# Patient Record
Sex: MALE | Race: WHITE | Employment: OTHER | URBAN - METROPOLITAN AREA
[De-identification: names, ages, dates, MRNs, and addresses within clinical notes are randomized per-mention and may not be internally consistent; named-entity substitution may affect disease eponyms.]

---

## 2019-12-05 RX ORDER — GABAPENTIN 600 MG/1
600 TABLET ORAL 3 TIMES DAILY
COMMUNITY
End: 2020-09-21

## 2019-12-05 ASSESSMENT — PAIN SCALES - GENERAL: PAINLEVEL_OUTOF10: 9

## 2019-12-06 ENCOUNTER — HOSPITAL ENCOUNTER (INPATIENT)
Age: 64
LOS: 2 days | Discharge: HOME | DRG: 195 | End: 2019-12-08
Attending: EMERGENCY MEDICINE | Admitting: HOSPITALIST
Payer: MEDICARE

## 2019-12-06 ENCOUNTER — APPOINTMENT (OUTPATIENT)
Dept: CT IMAGING | Age: 64
DRG: 195 | End: 2019-12-06
Payer: MEDICARE

## 2019-12-06 ENCOUNTER — APPOINTMENT (OUTPATIENT)
Dept: GENERAL RADIOLOGY | Age: 64
DRG: 195 | End: 2019-12-06
Payer: MEDICARE

## 2019-12-06 DIAGNOSIS — J18.9 PNEUMONIA DUE TO ORGANISM: Primary | ICD-10-CM

## 2019-12-06 LAB
A/G RATIO: 0.9 (ref 1.1–2.2)
ALBUMIN SERPL-MCNC: 3.2 G/DL (ref 3.4–5)
ALP BLD-CCNC: 75 U/L (ref 40–129)
ALT SERPL-CCNC: 10 U/L (ref 10–40)
ANION GAP SERPL CALCULATED.3IONS-SCNC: 10 MMOL/L (ref 3–16)
ANISOCYTOSIS: ABNORMAL
AST SERPL-CCNC: 10 U/L (ref 15–37)
BASOPHILS ABSOLUTE: 0 K/UL (ref 0–0.2)
BASOPHILS RELATIVE PERCENT: 0 %
BILIRUB SERPL-MCNC: 0.3 MG/DL (ref 0–1)
BUN BLDV-MCNC: 16 MG/DL (ref 7–20)
CALCIUM SERPL-MCNC: 8.8 MG/DL (ref 8.3–10.6)
CHLORIDE BLD-SCNC: 100 MMOL/L (ref 99–110)
CO2: 23 MMOL/L (ref 21–32)
CREAT SERPL-MCNC: 0.8 MG/DL (ref 0.8–1.3)
EKG ATRIAL RATE: 77 BPM
EKG DIAGNOSIS: NORMAL
EKG P AXIS: 41 DEGREES
EKG P-R INTERVAL: 146 MS
EKG Q-T INTERVAL: 438 MS
EKG QRS DURATION: 108 MS
EKG QTC CALCULATION (BAZETT): 495 MS
EKG R AXIS: 59 DEGREES
EKG T AXIS: 62 DEGREES
EKG VENTRICULAR RATE: 77 BPM
EOSINOPHILS ABSOLUTE: 0.5 K/UL (ref 0–0.6)
EOSINOPHILS RELATIVE PERCENT: 2 %
GFR AFRICAN AMERICAN: >60
GFR NON-AFRICAN AMERICAN: >60
GLOBULIN: 3.5 G/DL
GLUCOSE BLD-MCNC: 130 MG/DL (ref 70–99)
HCT VFR BLD CALC: 35.6 % (ref 40.5–52.5)
HEMOGLOBIN: 12.2 G/DL (ref 13.5–17.5)
LACTIC ACID, SEPSIS: 0.6 MMOL/L (ref 0.4–1.9)
LACTIC ACID, SEPSIS: 1.1 MMOL/L (ref 0.4–1.9)
LYMPHOCYTES ABSOLUTE: 2.1 K/UL (ref 1–5.1)
LYMPHOCYTES RELATIVE PERCENT: 8 %
MACROCYTES: ABNORMAL
MCH RBC QN AUTO: 30.7 PG (ref 26–34)
MCHC RBC AUTO-ENTMCNC: 34.4 G/DL (ref 31–36)
MCV RBC AUTO: 89.4 FL (ref 80–100)
MICROCYTES: ABNORMAL
MONOCYTES ABSOLUTE: 1.8 K/UL (ref 0–1.3)
MONOCYTES RELATIVE PERCENT: 7 %
NEUTROPHILS ABSOLUTE: 21.4 K/UL (ref 1.7–7.7)
NEUTROPHILS RELATIVE PERCENT: 83 %
OVALOCYTES: ABNORMAL
PDW BLD-RTO: 15.2 % (ref 12.4–15.4)
PLATELET # BLD: 682 K/UL (ref 135–450)
PMV BLD AUTO: 7.3 FL (ref 5–10.5)
POTASSIUM REFLEX MAGNESIUM: 4.1 MMOL/L (ref 3.5–5.1)
PRO-BNP: 995 PG/ML (ref 0–124)
PROCALCITONIN: 0.32 NG/ML (ref 0–0.15)
RBC # BLD: 3.99 M/UL (ref 4.2–5.9)
REPORT: NORMAL
RESPIRATORY PANEL PCR: NORMAL
SLIDE REVIEW: ABNORMAL
SODIUM BLD-SCNC: 133 MMOL/L (ref 136–145)
TOTAL PROTEIN: 6.7 G/DL (ref 6.4–8.2)
TROPONIN: <0.01 NG/ML
WBC # BLD: 25.8 K/UL (ref 4–11)

## 2019-12-06 PROCEDURE — 71046 X-RAY EXAM CHEST 2 VIEWS: CPT

## 2019-12-06 PROCEDURE — 87040 BLOOD CULTURE FOR BACTERIA: CPT

## 2019-12-06 PROCEDURE — 99222 1ST HOSP IP/OBS MODERATE 55: CPT | Performed by: INTERNAL MEDICINE

## 2019-12-06 PROCEDURE — 80053 COMPREHEN METABOLIC PANEL: CPT

## 2019-12-06 PROCEDURE — 2580000003 HC RX 258: Performed by: HOSPITALIST

## 2019-12-06 PROCEDURE — 85025 COMPLETE CBC W/AUTO DIFF WBC: CPT

## 2019-12-06 PROCEDURE — 97530 THERAPEUTIC ACTIVITIES: CPT

## 2019-12-06 PROCEDURE — 97161 PT EVAL LOW COMPLEX 20 MIN: CPT

## 2019-12-06 PROCEDURE — 93010 ELECTROCARDIOGRAM REPORT: CPT | Performed by: INTERNAL MEDICINE

## 2019-12-06 PROCEDURE — 6370000000 HC RX 637 (ALT 250 FOR IP): Performed by: HOSPITALIST

## 2019-12-06 PROCEDURE — 94640 AIRWAY INHALATION TREATMENT: CPT

## 2019-12-06 PROCEDURE — 1200000000 HC SEMI PRIVATE

## 2019-12-06 PROCEDURE — 36415 COLL VENOUS BLD VENIPUNCTURE: CPT

## 2019-12-06 PROCEDURE — 96374 THER/PROPH/DIAG INJ IV PUSH: CPT

## 2019-12-06 PROCEDURE — 97165 OT EVAL LOW COMPLEX 30 MIN: CPT

## 2019-12-06 PROCEDURE — 6360000002 HC RX W HCPCS: Performed by: EMERGENCY MEDICINE

## 2019-12-06 PROCEDURE — 84484 ASSAY OF TROPONIN QUANT: CPT

## 2019-12-06 PROCEDURE — 2580000003 HC RX 258: Performed by: EMERGENCY MEDICINE

## 2019-12-06 PROCEDURE — 6370000000 HC RX 637 (ALT 250 FOR IP): Performed by: INTERNAL MEDICINE

## 2019-12-06 PROCEDURE — 93005 ELECTROCARDIOGRAM TRACING: CPT | Performed by: EMERGENCY MEDICINE

## 2019-12-06 PROCEDURE — 6370000000 HC RX 637 (ALT 250 FOR IP): Performed by: EMERGENCY MEDICINE

## 2019-12-06 PROCEDURE — 0099U HC RESPIRPTHGN MULT REV TRANS & AMP PRB TECH 20 TRGT: CPT

## 2019-12-06 PROCEDURE — 94760 N-INVAS EAR/PLS OXIMETRY 1: CPT

## 2019-12-06 PROCEDURE — 99284 EMERGENCY DEPT VISIT MOD MDM: CPT

## 2019-12-06 PROCEDURE — 97116 GAIT TRAINING THERAPY: CPT

## 2019-12-06 PROCEDURE — 83880 ASSAY OF NATRIURETIC PEPTIDE: CPT

## 2019-12-06 PROCEDURE — 6360000004 HC RX CONTRAST MEDICATION: Performed by: EMERGENCY MEDICINE

## 2019-12-06 PROCEDURE — 71260 CT THORAX DX C+: CPT

## 2019-12-06 PROCEDURE — 87449 NOS EACH ORGANISM AG IA: CPT

## 2019-12-06 PROCEDURE — 83605 ASSAY OF LACTIC ACID: CPT

## 2019-12-06 PROCEDURE — 84145 PROCALCITONIN (PCT): CPT

## 2019-12-06 PROCEDURE — 6360000002 HC RX W HCPCS: Performed by: HOSPITALIST

## 2019-12-06 PROCEDURE — 97535 SELF CARE MNGMENT TRAINING: CPT

## 2019-12-06 PROCEDURE — 94761 N-INVAS EAR/PLS OXIMETRY MLT: CPT

## 2019-12-06 RX ORDER — SODIUM CHLORIDE 9 MG/ML
INJECTION, SOLUTION INTRAVENOUS CONTINUOUS
Status: DISCONTINUED | OUTPATIENT
Start: 2019-12-06 | End: 2019-12-08 | Stop reason: HOSPADM

## 2019-12-06 RX ORDER — IPRATROPIUM BROMIDE AND ALBUTEROL SULFATE 2.5; .5 MG/3ML; MG/3ML
1 SOLUTION RESPIRATORY (INHALATION) ONCE
Status: COMPLETED | OUTPATIENT
Start: 2019-12-06 | End: 2019-12-06

## 2019-12-06 RX ORDER — OXYCODONE HYDROCHLORIDE AND ACETAMINOPHEN 5; 325 MG/1; MG/1
1 TABLET ORAL EVERY 4 HOURS PRN
Status: DISCONTINUED | OUTPATIENT
Start: 2019-12-06 | End: 2019-12-08 | Stop reason: HOSPADM

## 2019-12-06 RX ORDER — CYCLOBENZAPRINE HCL 10 MG
10 TABLET ORAL 3 TIMES DAILY PRN
Status: DISCONTINUED | OUTPATIENT
Start: 2019-12-06 | End: 2019-12-08 | Stop reason: HOSPADM

## 2019-12-06 RX ORDER — BENZONATATE 100 MG/1
100 CAPSULE ORAL 3 TIMES DAILY PRN
Status: DISCONTINUED | OUTPATIENT
Start: 2019-12-06 | End: 2019-12-06 | Stop reason: HOSPADM

## 2019-12-06 RX ORDER — LISINOPRIL 20 MG/1
40 TABLET ORAL DAILY
Status: DISCONTINUED | OUTPATIENT
Start: 2019-12-06 | End: 2019-12-08 | Stop reason: HOSPADM

## 2019-12-06 RX ORDER — GABAPENTIN 300 MG/1
600 CAPSULE ORAL 3 TIMES DAILY
Status: DISCONTINUED | OUTPATIENT
Start: 2019-12-06 | End: 2019-12-08 | Stop reason: HOSPADM

## 2019-12-06 RX ORDER — ACETAMINOPHEN 325 MG/1
650 TABLET ORAL EVERY 4 HOURS PRN
Status: DISCONTINUED | OUTPATIENT
Start: 2019-12-06 | End: 2019-12-08 | Stop reason: HOSPADM

## 2019-12-06 RX ORDER — ALBUTEROL SULFATE 2.5 MG/3ML
2.5 SOLUTION RESPIRATORY (INHALATION)
Status: DISCONTINUED | OUTPATIENT
Start: 2019-12-06 | End: 2019-12-08 | Stop reason: HOSPADM

## 2019-12-06 RX ORDER — FAMOTIDINE 20 MG/1
20 TABLET, FILM COATED ORAL 2 TIMES DAILY
Status: DISCONTINUED | OUTPATIENT
Start: 2019-12-06 | End: 2019-12-08 | Stop reason: HOSPADM

## 2019-12-06 RX ORDER — TRAZODONE HYDROCHLORIDE 50 MG/1
50 TABLET ORAL NIGHTLY
Status: DISCONTINUED | OUTPATIENT
Start: 2019-12-06 | End: 2019-12-08 | Stop reason: HOSPADM

## 2019-12-06 RX ORDER — ONDANSETRON 2 MG/ML
4 INJECTION INTRAMUSCULAR; INTRAVENOUS EVERY 6 HOURS PRN
Status: DISCONTINUED | OUTPATIENT
Start: 2019-12-06 | End: 2019-12-08 | Stop reason: HOSPADM

## 2019-12-06 RX ORDER — HYDROCODONE BITARTRATE AND ACETAMINOPHEN 5; 325 MG/1; MG/1
1 TABLET ORAL EVERY 6 HOURS PRN
Status: DISCONTINUED | OUTPATIENT
Start: 2019-12-06 | End: 2019-12-06

## 2019-12-06 RX ORDER — HYDROCODONE BITARTRATE AND ACETAMINOPHEN 5; 325 MG/1; MG/1
1 TABLET ORAL ONCE
Status: COMPLETED | OUTPATIENT
Start: 2019-12-06 | End: 2019-12-06

## 2019-12-06 RX ORDER — VERAPAMIL HYDROCHLORIDE 80 MG/1
80 TABLET ORAL 3 TIMES DAILY
Status: DISCONTINUED | OUTPATIENT
Start: 2019-12-06 | End: 2019-12-08 | Stop reason: HOSPADM

## 2019-12-06 RX ORDER — SODIUM CHLORIDE 0.9 % (FLUSH) 0.9 %
10 SYRINGE (ML) INJECTION EVERY 12 HOURS SCHEDULED
Status: DISCONTINUED | OUTPATIENT
Start: 2019-12-06 | End: 2019-12-08 | Stop reason: HOSPADM

## 2019-12-06 RX ORDER — GUAIFENESIN 600 MG/1
600 TABLET, EXTENDED RELEASE ORAL 2 TIMES DAILY
Status: DISCONTINUED | OUTPATIENT
Start: 2019-12-06 | End: 2019-12-08 | Stop reason: HOSPADM

## 2019-12-06 RX ORDER — SODIUM CHLORIDE 0.9 % (FLUSH) 0.9 %
10 SYRINGE (ML) INJECTION PRN
Status: DISCONTINUED | OUTPATIENT
Start: 2019-12-06 | End: 2019-12-08 | Stop reason: HOSPADM

## 2019-12-06 RX ORDER — IPRATROPIUM BROMIDE AND ALBUTEROL SULFATE 2.5; .5 MG/3ML; MG/3ML
1 SOLUTION RESPIRATORY (INHALATION)
Status: DISCONTINUED | OUTPATIENT
Start: 2019-12-06 | End: 2019-12-08 | Stop reason: HOSPADM

## 2019-12-06 RX ADMIN — Medication 10 ML: at 08:09

## 2019-12-06 RX ADMIN — CYCLOBENZAPRINE HYDROCHLORIDE 10 MG: 10 TABLET, FILM COATED ORAL at 17:15

## 2019-12-06 RX ADMIN — CYCLOBENZAPRINE HYDROCHLORIDE 10 MG: 10 TABLET, FILM COATED ORAL at 23:25

## 2019-12-06 RX ADMIN — HYDROCODONE BITARTRATE AND ACETAMINOPHEN 1 TABLET: 5; 325 TABLET ORAL at 02:20

## 2019-12-06 RX ADMIN — HYDROCODONE BITARTRATE AND ACETAMINOPHEN 1 TABLET: 5; 325 TABLET ORAL at 06:09

## 2019-12-06 RX ADMIN — Medication 1 G: at 04:35

## 2019-12-06 RX ADMIN — FAMOTIDINE 20 MG: 20 TABLET ORAL at 21:45

## 2019-12-06 RX ADMIN — TRAZODONE HYDROCHLORIDE 50 MG: 50 TABLET ORAL at 23:16

## 2019-12-06 RX ADMIN — BENZONATATE 100 MG: 100 CAPSULE ORAL at 02:19

## 2019-12-06 RX ADMIN — LISINOPRIL 40 MG: 20 TABLET ORAL at 08:10

## 2019-12-06 RX ADMIN — GABAPENTIN 600 MG: 300 CAPSULE ORAL at 15:37

## 2019-12-06 RX ADMIN — VERAPAMIL HYDROCHLORIDE 80 MG: 80 TABLET, FILM COATED ORAL at 08:10

## 2019-12-06 RX ADMIN — IPRATROPIUM BROMIDE AND ALBUTEROL SULFATE 1 AMPULE: .5; 3 SOLUTION RESPIRATORY (INHALATION) at 19:44

## 2019-12-06 RX ADMIN — GABAPENTIN 600 MG: 300 CAPSULE ORAL at 08:10

## 2019-12-06 RX ADMIN — Medication 10 ML: at 21:48

## 2019-12-06 RX ADMIN — IPRATROPIUM BROMIDE AND ALBUTEROL SULFATE 1 AMPULE: .5; 3 SOLUTION RESPIRATORY (INHALATION) at 08:19

## 2019-12-06 RX ADMIN — ENOXAPARIN SODIUM 40 MG: 40 INJECTION SUBCUTANEOUS at 08:09

## 2019-12-06 RX ADMIN — OXYCODONE HYDROCHLORIDE AND ACETAMINOPHEN 1 TABLET: 5; 325 TABLET ORAL at 17:15

## 2019-12-06 RX ADMIN — AZITHROMYCIN MONOHYDRATE 500 MG: 500 INJECTION, POWDER, LYOPHILIZED, FOR SOLUTION INTRAVENOUS at 04:42

## 2019-12-06 RX ADMIN — HYDROCODONE BITARTRATE AND ACETAMINOPHEN 1 TABLET: 5; 325 TABLET ORAL at 12:32

## 2019-12-06 RX ADMIN — GUAIFENESIN 600 MG: 600 TABLET, EXTENDED RELEASE ORAL at 08:10

## 2019-12-06 RX ADMIN — OXYCODONE HYDROCHLORIDE AND ACETAMINOPHEN 1 TABLET: 5; 325 TABLET ORAL at 21:33

## 2019-12-06 RX ADMIN — SODIUM CHLORIDE: 9 INJECTION, SOLUTION INTRAVENOUS at 06:10

## 2019-12-06 RX ADMIN — IPRATROPIUM BROMIDE AND ALBUTEROL SULFATE 1 AMPULE: .5; 3 SOLUTION RESPIRATORY (INHALATION) at 15:26

## 2019-12-06 RX ADMIN — GUAIFENESIN 600 MG: 600 TABLET, EXTENDED RELEASE ORAL at 21:45

## 2019-12-06 RX ADMIN — IOPAMIDOL 75 ML: 755 INJECTION, SOLUTION INTRAVENOUS at 02:57

## 2019-12-06 RX ADMIN — IPRATROPIUM BROMIDE AND ALBUTEROL SULFATE 1 AMPULE: .5; 3 SOLUTION RESPIRATORY (INHALATION) at 11:50

## 2019-12-06 RX ADMIN — IPRATROPIUM BROMIDE AND ALBUTEROL SULFATE 1 AMPULE: .5; 3 SOLUTION RESPIRATORY (INHALATION) at 02:34

## 2019-12-06 RX ADMIN — VERAPAMIL HYDROCHLORIDE 80 MG: 80 TABLET, FILM COATED ORAL at 21:47

## 2019-12-06 RX ADMIN — GABAPENTIN 600 MG: 300 CAPSULE ORAL at 21:45

## 2019-12-06 RX ADMIN — FAMOTIDINE 20 MG: 20 TABLET ORAL at 08:10

## 2019-12-06 RX ADMIN — VERAPAMIL HYDROCHLORIDE 80 MG: 80 TABLET, FILM COATED ORAL at 15:38

## 2019-12-06 ASSESSMENT — PAIN DESCRIPTION - ORIENTATION
ORIENTATION: MID
ORIENTATION: RIGHT;LEFT
ORIENTATION: MID;LOWER

## 2019-12-06 ASSESSMENT — PAIN DESCRIPTION - ONSET
ONSET: ON-GOING
ONSET: ON-GOING
ONSET: GRADUAL
ONSET: ON-GOING

## 2019-12-06 ASSESSMENT — PAIN DESCRIPTION - DESCRIPTORS
DESCRIPTORS: ACHING;RADIATING
DESCRIPTORS: RADIATING
DESCRIPTORS: ACHING

## 2019-12-06 ASSESSMENT — PAIN DESCRIPTION - LOCATION
LOCATION: BACK;CHEST
LOCATION: BACK
LOCATION: BACK
LOCATION: BACK;CHEST
LOCATION: BACK;CHEST
LOCATION: BACK

## 2019-12-06 ASSESSMENT — PAIN SCALES - GENERAL
PAINLEVEL_OUTOF10: 7
PAINLEVEL_OUTOF10: 9
PAINLEVEL_OUTOF10: 2
PAINLEVEL_OUTOF10: 9
PAINLEVEL_OUTOF10: 7
PAINLEVEL_OUTOF10: 6
PAINLEVEL_OUTOF10: 5
PAINLEVEL_OUTOF10: 9

## 2019-12-06 ASSESSMENT — PAIN DESCRIPTION - PAIN TYPE
TYPE: CHRONIC PAIN
TYPE: CHRONIC PAIN
TYPE: ACUTE PAIN
TYPE: CHRONIC PAIN
TYPE: ACUTE PAIN
TYPE: ACUTE PAIN

## 2019-12-06 ASSESSMENT — PAIN DESCRIPTION - PROGRESSION
CLINICAL_PROGRESSION: GRADUALLY IMPROVING
CLINICAL_PROGRESSION: GRADUALLY WORSENING
CLINICAL_PROGRESSION: GRADUALLY IMPROVING
CLINICAL_PROGRESSION: NOT CHANGED
CLINICAL_PROGRESSION: GRADUALLY IMPROVING
CLINICAL_PROGRESSION: NOT CHANGED

## 2019-12-06 ASSESSMENT — PAIN DESCRIPTION - FREQUENCY
FREQUENCY: INTERMITTENT
FREQUENCY: CONTINUOUS
FREQUENCY: INTERMITTENT

## 2019-12-06 ASSESSMENT — PAIN - FUNCTIONAL ASSESSMENT
PAIN_FUNCTIONAL_ASSESSMENT: PREVENTS OR INTERFERES SOME ACTIVE ACTIVITIES AND ADLS

## 2019-12-07 LAB
A/G RATIO: 0.8 (ref 1.1–2.2)
ALBUMIN SERPL-MCNC: 2.7 G/DL (ref 3.4–5)
ALP BLD-CCNC: 73 U/L (ref 40–129)
ALT SERPL-CCNC: 10 U/L (ref 10–40)
ANION GAP SERPL CALCULATED.3IONS-SCNC: 10 MMOL/L (ref 3–16)
AST SERPL-CCNC: 8 U/L (ref 15–37)
BASOPHILS ABSOLUTE: 0.1 K/UL (ref 0–0.2)
BASOPHILS RELATIVE PERCENT: 0.4 %
BILIRUB SERPL-MCNC: 0.3 MG/DL (ref 0–1)
BUN BLDV-MCNC: 10 MG/DL (ref 7–20)
CALCIUM SERPL-MCNC: 8.3 MG/DL (ref 8.3–10.6)
CHLORIDE BLD-SCNC: 100 MMOL/L (ref 99–110)
CO2: 25 MMOL/L (ref 21–32)
CREAT SERPL-MCNC: 0.6 MG/DL (ref 0.8–1.3)
EOSINOPHILS ABSOLUTE: 0.4 K/UL (ref 0–0.6)
EOSINOPHILS RELATIVE PERCENT: 1.7 %
GFR AFRICAN AMERICAN: >60
GFR NON-AFRICAN AMERICAN: >60
GLOBULIN: 3.3 G/DL
GLUCOSE BLD-MCNC: 121 MG/DL (ref 70–99)
HCT VFR BLD CALC: 32.9 % (ref 40.5–52.5)
HEMOGLOBIN: 11.1 G/DL (ref 13.5–17.5)
L. PNEUMOPHILA SEROGP 1 UR AG: NORMAL
LYMPHOCYTES ABSOLUTE: 2.1 K/UL (ref 1–5.1)
LYMPHOCYTES RELATIVE PERCENT: 10.1 %
MCH RBC QN AUTO: 30.5 PG (ref 26–34)
MCHC RBC AUTO-ENTMCNC: 33.7 G/DL (ref 31–36)
MCV RBC AUTO: 90.4 FL (ref 80–100)
MONOCYTES ABSOLUTE: 2.1 K/UL (ref 0–1.3)
MONOCYTES RELATIVE PERCENT: 9.9 %
NEUTROPHILS ABSOLUTE: 16.5 K/UL (ref 1.7–7.7)
NEUTROPHILS RELATIVE PERCENT: 77.9 %
PDW BLD-RTO: 14.9 % (ref 12.4–15.4)
PLATELET # BLD: 710 K/UL (ref 135–450)
PMV BLD AUTO: 7.4 FL (ref 5–10.5)
POTASSIUM REFLEX MAGNESIUM: 3.9 MMOL/L (ref 3.5–5.1)
RBC # BLD: 3.63 M/UL (ref 4.2–5.9)
SODIUM BLD-SCNC: 135 MMOL/L (ref 136–145)
STREP PNEUMONIAE ANTIGEN, URINE: NORMAL
TOTAL PROTEIN: 6 G/DL (ref 6.4–8.2)
WBC # BLD: 21.2 K/UL (ref 4–11)

## 2019-12-07 PROCEDURE — 94761 N-INVAS EAR/PLS OXIMETRY MLT: CPT

## 2019-12-07 PROCEDURE — 6360000002 HC RX W HCPCS: Performed by: HOSPITALIST

## 2019-12-07 PROCEDURE — 6370000000 HC RX 637 (ALT 250 FOR IP): Performed by: INTERNAL MEDICINE

## 2019-12-07 PROCEDURE — 87070 CULTURE OTHR SPECIMN AEROBIC: CPT

## 2019-12-07 PROCEDURE — 2580000003 HC RX 258: Performed by: HOSPITALIST

## 2019-12-07 PROCEDURE — 94640 AIRWAY INHALATION TREATMENT: CPT

## 2019-12-07 PROCEDURE — 6370000000 HC RX 637 (ALT 250 FOR IP): Performed by: HOSPITALIST

## 2019-12-07 PROCEDURE — 1200000000 HC SEMI PRIVATE

## 2019-12-07 PROCEDURE — 87205 SMEAR GRAM STAIN: CPT

## 2019-12-07 PROCEDURE — 85025 COMPLETE CBC W/AUTO DIFF WBC: CPT

## 2019-12-07 PROCEDURE — 99232 SBSQ HOSP IP/OBS MODERATE 35: CPT | Performed by: INTERNAL MEDICINE

## 2019-12-07 PROCEDURE — 94669 MECHANICAL CHEST WALL OSCILL: CPT

## 2019-12-07 PROCEDURE — 36415 COLL VENOUS BLD VENIPUNCTURE: CPT

## 2019-12-07 PROCEDURE — 80053 COMPREHEN METABOLIC PANEL: CPT

## 2019-12-07 RX ADMIN — SODIUM CHLORIDE: 9 INJECTION, SOLUTION INTRAVENOUS at 21:02

## 2019-12-07 RX ADMIN — ENOXAPARIN SODIUM 40 MG: 40 INJECTION SUBCUTANEOUS at 08:06

## 2019-12-07 RX ADMIN — GUAIFENESIN 600 MG: 600 TABLET, EXTENDED RELEASE ORAL at 08:04

## 2019-12-07 RX ADMIN — GABAPENTIN 600 MG: 300 CAPSULE ORAL at 20:34

## 2019-12-07 RX ADMIN — LISINOPRIL 40 MG: 20 TABLET ORAL at 08:04

## 2019-12-07 RX ADMIN — OXYCODONE HYDROCHLORIDE AND ACETAMINOPHEN 1 TABLET: 5; 325 TABLET ORAL at 16:49

## 2019-12-07 RX ADMIN — SODIUM CHLORIDE: 9 INJECTION, SOLUTION INTRAVENOUS at 08:05

## 2019-12-07 RX ADMIN — VERAPAMIL HYDROCHLORIDE 80 MG: 80 TABLET, FILM COATED ORAL at 20:35

## 2019-12-07 RX ADMIN — GABAPENTIN 600 MG: 300 CAPSULE ORAL at 08:04

## 2019-12-07 RX ADMIN — AZITHROMYCIN MONOHYDRATE 500 MG: 500 INJECTION, POWDER, LYOPHILIZED, FOR SOLUTION INTRAVENOUS at 06:02

## 2019-12-07 RX ADMIN — Medication 1 G: at 06:02

## 2019-12-07 RX ADMIN — IPRATROPIUM BROMIDE AND ALBUTEROL SULFATE 1 AMPULE: .5; 3 SOLUTION RESPIRATORY (INHALATION) at 21:28

## 2019-12-07 RX ADMIN — IPRATROPIUM BROMIDE AND ALBUTEROL SULFATE 1 AMPULE: .5; 3 SOLUTION RESPIRATORY (INHALATION) at 09:05

## 2019-12-07 RX ADMIN — CYCLOBENZAPRINE HYDROCHLORIDE 10 MG: 10 TABLET, FILM COATED ORAL at 15:32

## 2019-12-07 RX ADMIN — GUAIFENESIN 600 MG: 600 TABLET, EXTENDED RELEASE ORAL at 20:34

## 2019-12-07 RX ADMIN — FAMOTIDINE 20 MG: 20 TABLET ORAL at 08:04

## 2019-12-07 RX ADMIN — OXYCODONE HYDROCHLORIDE AND ACETAMINOPHEN 1 TABLET: 5; 325 TABLET ORAL at 06:01

## 2019-12-07 RX ADMIN — GABAPENTIN 600 MG: 300 CAPSULE ORAL at 13:23

## 2019-12-07 RX ADMIN — OXYCODONE HYDROCHLORIDE AND ACETAMINOPHEN 1 TABLET: 5; 325 TABLET ORAL at 21:02

## 2019-12-07 RX ADMIN — Medication 10 ML: at 08:05

## 2019-12-07 RX ADMIN — VERAPAMIL HYDROCHLORIDE 80 MG: 80 TABLET, FILM COATED ORAL at 13:23

## 2019-12-07 RX ADMIN — CYCLOBENZAPRINE HYDROCHLORIDE 10 MG: 10 TABLET, FILM COATED ORAL at 23:56

## 2019-12-07 RX ADMIN — OXYCODONE HYDROCHLORIDE AND ACETAMINOPHEN 1 TABLET: 5; 325 TABLET ORAL at 01:50

## 2019-12-07 RX ADMIN — OXYCODONE HYDROCHLORIDE AND ACETAMINOPHEN 1 TABLET: 5; 325 TABLET ORAL at 12:32

## 2019-12-07 RX ADMIN — Medication 10 ML: at 20:36

## 2019-12-07 RX ADMIN — IPRATROPIUM BROMIDE AND ALBUTEROL SULFATE 1 AMPULE: .5; 3 SOLUTION RESPIRATORY (INHALATION) at 16:53

## 2019-12-07 RX ADMIN — VERAPAMIL HYDROCHLORIDE 80 MG: 80 TABLET, FILM COATED ORAL at 08:04

## 2019-12-07 RX ADMIN — TRAZODONE HYDROCHLORIDE 50 MG: 50 TABLET ORAL at 21:02

## 2019-12-07 RX ADMIN — FAMOTIDINE 20 MG: 20 TABLET ORAL at 20:35

## 2019-12-07 RX ADMIN — IPRATROPIUM BROMIDE AND ALBUTEROL SULFATE 1 AMPULE: .5; 3 SOLUTION RESPIRATORY (INHALATION) at 12:24

## 2019-12-07 RX ADMIN — CYCLOBENZAPRINE HYDROCHLORIDE 10 MG: 10 TABLET, FILM COATED ORAL at 07:25

## 2019-12-07 ASSESSMENT — PAIN DESCRIPTION - PROGRESSION
CLINICAL_PROGRESSION: NOT CHANGED
CLINICAL_PROGRESSION: NOT CHANGED

## 2019-12-07 ASSESSMENT — PAIN SCALES - GENERAL
PAINLEVEL_OUTOF10: 9
PAINLEVEL_OUTOF10: 9
PAINLEVEL_OUTOF10: 7
PAINLEVEL_OUTOF10: 0
PAINLEVEL_OUTOF10: 8
PAINLEVEL_OUTOF10: 0
PAINLEVEL_OUTOF10: 9
PAINLEVEL_OUTOF10: 8

## 2019-12-07 ASSESSMENT — PAIN DESCRIPTION - FREQUENCY
FREQUENCY: CONTINUOUS

## 2019-12-07 ASSESSMENT — PAIN DESCRIPTION - ORIENTATION
ORIENTATION: RIGHT;LEFT;LOWER;MID
ORIENTATION: LEFT
ORIENTATION: MID;LOWER
ORIENTATION: LEFT
ORIENTATION: RIGHT;LEFT;MID;LOWER

## 2019-12-07 ASSESSMENT — PAIN DESCRIPTION - PAIN TYPE
TYPE: CHRONIC PAIN

## 2019-12-07 ASSESSMENT — PAIN DESCRIPTION - LOCATION
LOCATION: BACK
LOCATION: BACK;KNEE;LEG
LOCATION: BACK;LEG

## 2019-12-07 ASSESSMENT — PAIN SCALES - WONG BAKER: WONGBAKER_NUMERICALRESPONSE: 0

## 2019-12-07 ASSESSMENT — PAIN DESCRIPTION - DESCRIPTORS
DESCRIPTORS: ACHING;CONSTANT
DESCRIPTORS: ACHING
DESCRIPTORS: ACHING;CONSTANT
DESCRIPTORS: ACHING;CONSTANT
DESCRIPTORS: SORE

## 2019-12-07 ASSESSMENT — PAIN DESCRIPTION - ONSET
ONSET: ON-GOING
ONSET: ON-GOING

## 2019-12-07 ASSESSMENT — PAIN - FUNCTIONAL ASSESSMENT
PAIN_FUNCTIONAL_ASSESSMENT: PREVENTS OR INTERFERES SOME ACTIVE ACTIVITIES AND ADLS
PAIN_FUNCTIONAL_ASSESSMENT: PREVENTS OR INTERFERES SOME ACTIVE ACTIVITIES AND ADLS

## 2019-12-08 VITALS
DIASTOLIC BLOOD PRESSURE: 86 MMHG | HEIGHT: 69 IN | SYSTOLIC BLOOD PRESSURE: 162 MMHG | OXYGEN SATURATION: 97 % | RESPIRATION RATE: 18 BRPM | BODY MASS INDEX: 21.8 KG/M2 | HEART RATE: 82 BPM | TEMPERATURE: 97.7 F | WEIGHT: 147.2 LBS

## 2019-12-08 LAB
ANION GAP SERPL CALCULATED.3IONS-SCNC: 11 MMOL/L (ref 3–16)
ANISOCYTOSIS: ABNORMAL
BASOPHILS ABSOLUTE: 0 K/UL (ref 0–0.2)
BASOPHILS RELATIVE PERCENT: 0 %
BUN BLDV-MCNC: 10 MG/DL (ref 7–20)
CALCIUM SERPL-MCNC: 8.4 MG/DL (ref 8.3–10.6)
CHLORIDE BLD-SCNC: 99 MMOL/L (ref 99–110)
CO2: 24 MMOL/L (ref 21–32)
CREAT SERPL-MCNC: 0.5 MG/DL (ref 0.8–1.3)
EOSINOPHILS ABSOLUTE: 0.3 K/UL (ref 0–0.6)
EOSINOPHILS RELATIVE PERCENT: 2 %
GFR AFRICAN AMERICAN: >60
GFR NON-AFRICAN AMERICAN: >60
GLUCOSE BLD-MCNC: 104 MG/DL (ref 70–99)
HCT VFR BLD CALC: 35 % (ref 40.5–52.5)
HEMOGLOBIN: 11.5 G/DL (ref 13.5–17.5)
HOWELL-JOLLY BODIES: ABNORMAL
LYMPHOCYTES ABSOLUTE: 1.6 K/UL (ref 1–5.1)
LYMPHOCYTES RELATIVE PERCENT: 10 %
MACROCYTES: ABNORMAL
MCH RBC QN AUTO: 30.6 PG (ref 26–34)
MCHC RBC AUTO-ENTMCNC: 32.8 G/DL (ref 31–36)
MCV RBC AUTO: 93.3 FL (ref 80–100)
MONOCYTES ABSOLUTE: 1.6 K/UL (ref 0–1.3)
MONOCYTES RELATIVE PERCENT: 10 %
NEUTROPHILS ABSOLUTE: 12.2 K/UL (ref 1.7–7.7)
NEUTROPHILS RELATIVE PERCENT: 78 %
OVALOCYTES: ABNORMAL
PDW BLD-RTO: 15.6 % (ref 12.4–15.4)
PLATELET # BLD: 750 K/UL (ref 135–450)
PLATELET SLIDE REVIEW: ABNORMAL
PMV BLD AUTO: 7.2 FL (ref 5–10.5)
POTASSIUM SERPL-SCNC: 3.8 MMOL/L (ref 3.5–5.1)
RBC # BLD: 3.76 M/UL (ref 4.2–5.9)
SLIDE REVIEW: ABNORMAL
SODIUM BLD-SCNC: 134 MMOL/L (ref 136–145)
TOXIC GRANULATION: PRESENT
WBC # BLD: 15.7 K/UL (ref 4–11)

## 2019-12-08 PROCEDURE — 85025 COMPLETE CBC W/AUTO DIFF WBC: CPT

## 2019-12-08 PROCEDURE — 94760 N-INVAS EAR/PLS OXIMETRY 1: CPT

## 2019-12-08 PROCEDURE — 6370000000 HC RX 637 (ALT 250 FOR IP): Performed by: HOSPITALIST

## 2019-12-08 PROCEDURE — 99232 SBSQ HOSP IP/OBS MODERATE 35: CPT | Performed by: INTERNAL MEDICINE

## 2019-12-08 PROCEDURE — 2580000003 HC RX 258: Performed by: HOSPITALIST

## 2019-12-08 PROCEDURE — 94640 AIRWAY INHALATION TREATMENT: CPT

## 2019-12-08 PROCEDURE — 6360000002 HC RX W HCPCS: Performed by: HOSPITALIST

## 2019-12-08 PROCEDURE — 36415 COLL VENOUS BLD VENIPUNCTURE: CPT

## 2019-12-08 PROCEDURE — 6370000000 HC RX 637 (ALT 250 FOR IP): Performed by: INTERNAL MEDICINE

## 2019-12-08 PROCEDURE — 80048 BASIC METABOLIC PNL TOTAL CA: CPT

## 2019-12-08 RX ORDER — LEVOFLOXACIN 750 MG/1
750 TABLET ORAL DAILY
Qty: 10 TABLET | Refills: 0 | Status: SHIPPED | OUTPATIENT
Start: 2019-12-08 | End: 2019-12-18

## 2019-12-08 RX ORDER — OXYCODONE HYDROCHLORIDE AND ACETAMINOPHEN 5; 325 MG/1; MG/1
1 TABLET ORAL EVERY 6 HOURS PRN
Qty: 12 TABLET | Refills: 0 | Status: SHIPPED | OUTPATIENT
Start: 2019-12-08 | End: 2019-12-11

## 2019-12-08 RX ADMIN — Medication 10 ML: at 07:50

## 2019-12-08 RX ADMIN — OXYCODONE HYDROCHLORIDE AND ACETAMINOPHEN 1 TABLET: 5; 325 TABLET ORAL at 01:24

## 2019-12-08 RX ADMIN — VERAPAMIL HYDROCHLORIDE 80 MG: 80 TABLET, FILM COATED ORAL at 13:21

## 2019-12-08 RX ADMIN — VERAPAMIL HYDROCHLORIDE 80 MG: 80 TABLET, FILM COATED ORAL at 07:49

## 2019-12-08 RX ADMIN — OXYCODONE HYDROCHLORIDE AND ACETAMINOPHEN 1 TABLET: 5; 325 TABLET ORAL at 09:40

## 2019-12-08 RX ADMIN — LISINOPRIL 40 MG: 20 TABLET ORAL at 07:49

## 2019-12-08 RX ADMIN — GABAPENTIN 600 MG: 300 CAPSULE ORAL at 07:50

## 2019-12-08 RX ADMIN — IPRATROPIUM BROMIDE AND ALBUTEROL SULFATE 1 AMPULE: .5; 3 SOLUTION RESPIRATORY (INHALATION) at 11:10

## 2019-12-08 RX ADMIN — GABAPENTIN 600 MG: 300 CAPSULE ORAL at 13:21

## 2019-12-08 RX ADMIN — IPRATROPIUM BROMIDE AND ALBUTEROL SULFATE 1 AMPULE: .5; 3 SOLUTION RESPIRATORY (INHALATION) at 07:47

## 2019-12-08 RX ADMIN — Medication 1 G: at 05:28

## 2019-12-08 RX ADMIN — OXYCODONE HYDROCHLORIDE AND ACETAMINOPHEN 1 TABLET: 5; 325 TABLET ORAL at 14:14

## 2019-12-08 RX ADMIN — AZITHROMYCIN MONOHYDRATE 500 MG: 500 INJECTION, POWDER, LYOPHILIZED, FOR SOLUTION INTRAVENOUS at 05:28

## 2019-12-08 RX ADMIN — GUAIFENESIN 600 MG: 600 TABLET, EXTENDED RELEASE ORAL at 07:49

## 2019-12-08 RX ADMIN — FAMOTIDINE 20 MG: 20 TABLET ORAL at 07:49

## 2019-12-08 RX ADMIN — CYCLOBENZAPRINE HYDROCHLORIDE 10 MG: 10 TABLET, FILM COATED ORAL at 07:49

## 2019-12-08 RX ADMIN — OXYCODONE HYDROCHLORIDE AND ACETAMINOPHEN 1 TABLET: 5; 325 TABLET ORAL at 05:28

## 2019-12-08 RX ADMIN — ENOXAPARIN SODIUM 40 MG: 40 INJECTION SUBCUTANEOUS at 07:50

## 2019-12-08 ASSESSMENT — PAIN DESCRIPTION - LOCATION
LOCATION: BACK;KNEE

## 2019-12-08 ASSESSMENT — PAIN DESCRIPTION - PAIN TYPE
TYPE: CHRONIC PAIN

## 2019-12-08 ASSESSMENT — PAIN SCALES - GENERAL
PAINLEVEL_OUTOF10: 8
PAINLEVEL_OUTOF10: 9
PAINLEVEL_OUTOF10: 0
PAINLEVEL_OUTOF10: 8

## 2019-12-08 ASSESSMENT — PAIN DESCRIPTION - ORIENTATION
ORIENTATION: LEFT

## 2019-12-08 ASSESSMENT — PAIN DESCRIPTION - FREQUENCY
FREQUENCY: CONTINUOUS
FREQUENCY: CONTINUOUS

## 2019-12-08 ASSESSMENT — PAIN DESCRIPTION - DESCRIPTORS
DESCRIPTORS: ACHING;CONSTANT
DESCRIPTORS: ACHING;CONSTANT

## 2019-12-08 ASSESSMENT — PAIN DESCRIPTION - PROGRESSION
CLINICAL_PROGRESSION: NOT CHANGED
CLINICAL_PROGRESSION: NOT CHANGED

## 2019-12-09 ENCOUNTER — CARE COORDINATION (OUTPATIENT)
Dept: CASE MANAGEMENT | Age: 64
End: 2019-12-09

## 2019-12-09 ENCOUNTER — TELEPHONE (OUTPATIENT)
Dept: PULMONOLOGY | Age: 64
End: 2019-12-09

## 2019-12-09 LAB
CULTURE, RESPIRATORY: NORMAL
GRAM STAIN RESULT: NORMAL

## 2019-12-10 ENCOUNTER — CARE COORDINATION (OUTPATIENT)
Dept: CASE MANAGEMENT | Age: 64
End: 2019-12-10

## 2019-12-10 LAB
BLOOD CULTURE, ROUTINE: NORMAL
CULTURE, BLOOD 2: NORMAL

## 2019-12-17 ENCOUNTER — CARE COORDINATION (OUTPATIENT)
Dept: CASE MANAGEMENT | Age: 64
End: 2019-12-17

## 2019-12-27 ENCOUNTER — HOSPITAL ENCOUNTER (INPATIENT)
Age: 64
LOS: 6 days | Discharge: HOME | DRG: 871 | End: 2020-01-02
Attending: EMERGENCY MEDICINE | Admitting: FAMILY MEDICINE
Payer: MEDICARE

## 2019-12-27 ENCOUNTER — TELEPHONE (OUTPATIENT)
Dept: OTHER | Facility: CLINIC | Age: 64
End: 2019-12-27

## 2019-12-27 ENCOUNTER — APPOINTMENT (OUTPATIENT)
Dept: CT IMAGING | Age: 64
DRG: 871 | End: 2019-12-27
Payer: MEDICARE

## 2019-12-27 ENCOUNTER — APPOINTMENT (OUTPATIENT)
Dept: GENERAL RADIOLOGY | Age: 64
DRG: 871 | End: 2019-12-27
Payer: MEDICARE

## 2019-12-27 PROBLEM — J18.9 PNEUMONIA: Status: ACTIVE | Noted: 2019-12-27

## 2019-12-27 LAB
A/G RATIO: 1 (ref 1.1–2.2)
ALBUMIN SERPL-MCNC: 3.7 G/DL (ref 3.4–5)
ALP BLD-CCNC: 100 U/L (ref 40–129)
ALT SERPL-CCNC: 8 U/L (ref 10–40)
ANION GAP SERPL CALCULATED.3IONS-SCNC: 15 MMOL/L (ref 3–16)
AST SERPL-CCNC: 9 U/L (ref 15–37)
BANDED NEUTROPHILS RELATIVE PERCENT: 2 % (ref 0–7)
BASOPHILS ABSOLUTE: 0 K/UL (ref 0–0.2)
BASOPHILS RELATIVE PERCENT: 0 %
BILIRUB SERPL-MCNC: 0.6 MG/DL (ref 0–1)
BUN BLDV-MCNC: 11 MG/DL (ref 7–20)
CALCIUM SERPL-MCNC: 9.1 MG/DL (ref 8.3–10.6)
CHLORIDE BLD-SCNC: 95 MMOL/L (ref 99–110)
CO2: 22 MMOL/L (ref 21–32)
CREAT SERPL-MCNC: <0.5 MG/DL (ref 0.8–1.3)
D DIMER: 410 NG/ML DDU (ref 0–229)
EOSINOPHILS ABSOLUTE: 0 K/UL (ref 0–0.6)
EOSINOPHILS RELATIVE PERCENT: 0 %
GFR AFRICAN AMERICAN: >60
GFR NON-AFRICAN AMERICAN: >60
GLOBULIN: 3.6 G/DL
GLUCOSE BLD-MCNC: 89 MG/DL (ref 70–99)
HCT VFR BLD CALC: 39.5 % (ref 40.5–52.5)
HEMOGLOBIN: 13.4 G/DL (ref 13.5–17.5)
HOWELL-JOLLY BODIES: ABNORMAL
INR BLD: 1.25 (ref 0.86–1.14)
LACTIC ACID, SEPSIS: 0.8 MMOL/L (ref 0.4–1.9)
LYMPHOCYTES ABSOLUTE: 1.8 K/UL (ref 1–5.1)
LYMPHOCYTES RELATIVE PERCENT: 7 %
MCH RBC QN AUTO: 29.9 PG (ref 26–34)
MCHC RBC AUTO-ENTMCNC: 33.9 G/DL (ref 31–36)
MCV RBC AUTO: 88.2 FL (ref 80–100)
MONOCYTES ABSOLUTE: 1.5 K/UL (ref 0–1.3)
MONOCYTES RELATIVE PERCENT: 6 %
NEUTROPHILS ABSOLUTE: 21.8 K/UL (ref 1.7–7.7)
NEUTROPHILS RELATIVE PERCENT: 85 %
OVALOCYTES: ABNORMAL
PDW BLD-RTO: 15.1 % (ref 12.4–15.4)
PLATELET # BLD: 745 K/UL (ref 135–450)
PLATELET SLIDE REVIEW: ABNORMAL
PMV BLD AUTO: 7.7 FL (ref 5–10.5)
POLYCHROMASIA: ABNORMAL
POTASSIUM REFLEX MAGNESIUM: 3.8 MMOL/L (ref 3.5–5.1)
PROTHROMBIN TIME: 14.5 SEC (ref 10–13.2)
RBC # BLD: 4.48 M/UL (ref 4.2–5.9)
SLIDE REVIEW: ABNORMAL
SODIUM BLD-SCNC: 132 MMOL/L (ref 136–145)
TOTAL PROTEIN: 7.3 G/DL (ref 6.4–8.2)
TROPONIN: <0.01 NG/ML
WBC # BLD: 25 K/UL (ref 4–11)

## 2019-12-27 PROCEDURE — 6360000004 HC RX CONTRAST MEDICATION: Performed by: EMERGENCY MEDICINE

## 2019-12-27 PROCEDURE — 96365 THER/PROPH/DIAG IV INF INIT: CPT

## 2019-12-27 PROCEDURE — 71046 X-RAY EXAM CHEST 2 VIEWS: CPT

## 2019-12-27 PROCEDURE — 96367 TX/PROPH/DG ADDL SEQ IV INF: CPT

## 2019-12-27 PROCEDURE — 93005 ELECTROCARDIOGRAM TRACING: CPT | Performed by: EMERGENCY MEDICINE

## 2019-12-27 PROCEDURE — 1200000000 HC SEMI PRIVATE

## 2019-12-27 PROCEDURE — 85610 PROTHROMBIN TIME: CPT

## 2019-12-27 PROCEDURE — 2580000003 HC RX 258: Performed by: EMERGENCY MEDICINE

## 2019-12-27 PROCEDURE — 85025 COMPLETE CBC W/AUTO DIFF WBC: CPT

## 2019-12-27 PROCEDURE — 83605 ASSAY OF LACTIC ACID: CPT

## 2019-12-27 PROCEDURE — 80053 COMPREHEN METABOLIC PANEL: CPT

## 2019-12-27 PROCEDURE — 84484 ASSAY OF TROPONIN QUANT: CPT

## 2019-12-27 PROCEDURE — 6360000002 HC RX W HCPCS: Performed by: EMERGENCY MEDICINE

## 2019-12-27 PROCEDURE — 96361 HYDRATE IV INFUSION ADD-ON: CPT

## 2019-12-27 PROCEDURE — 85379 FIBRIN DEGRADATION QUANT: CPT

## 2019-12-27 PROCEDURE — 71260 CT THORAX DX C+: CPT

## 2019-12-27 PROCEDURE — 36415 COLL VENOUS BLD VENIPUNCTURE: CPT

## 2019-12-27 PROCEDURE — 96375 TX/PRO/DX INJ NEW DRUG ADDON: CPT

## 2019-12-27 PROCEDURE — 99285 EMERGENCY DEPT VISIT HI MDM: CPT

## 2019-12-27 PROCEDURE — 87040 BLOOD CULTURE FOR BACTERIA: CPT

## 2019-12-27 RX ORDER — SODIUM CHLORIDE 0.9 % (FLUSH) 0.9 %
10 SYRINGE (ML) INJECTION PRN
Status: DISCONTINUED | OUTPATIENT
Start: 2019-12-27 | End: 2020-01-02 | Stop reason: HOSPADM

## 2019-12-27 RX ORDER — ONDANSETRON 2 MG/ML
4 INJECTION INTRAMUSCULAR; INTRAVENOUS ONCE
Status: COMPLETED | OUTPATIENT
Start: 2019-12-27 | End: 2019-12-27

## 2019-12-27 RX ORDER — 0.9 % SODIUM CHLORIDE 0.9 %
1000 INTRAVENOUS SOLUTION INTRAVENOUS ONCE
Status: COMPLETED | OUTPATIENT
Start: 2019-12-27 | End: 2019-12-27

## 2019-12-27 RX ORDER — LISINOPRIL 40 MG/1
40 TABLET ORAL DAILY
COMMUNITY

## 2019-12-27 RX ORDER — SODIUM CHLORIDE 0.9 % (FLUSH) 0.9 %
10 SYRINGE (ML) INJECTION EVERY 12 HOURS SCHEDULED
Status: DISCONTINUED | OUTPATIENT
Start: 2019-12-27 | End: 2020-01-02 | Stop reason: HOSPADM

## 2019-12-27 RX ORDER — VANCOMYCIN HYDROCHLORIDE 1 G/200ML
1000 INJECTION, SOLUTION INTRAVENOUS ONCE
Status: COMPLETED | OUTPATIENT
Start: 2019-12-27 | End: 2019-12-27

## 2019-12-27 RX ORDER — SODIUM CHLORIDE 9 MG/ML
30 INJECTION, SOLUTION INTRAVENOUS ONCE
Status: COMPLETED | OUTPATIENT
Start: 2019-12-27 | End: 2019-12-27

## 2019-12-27 RX ORDER — MORPHINE SULFATE 4 MG/ML
4 INJECTION, SOLUTION INTRAMUSCULAR; INTRAVENOUS ONCE
Status: COMPLETED | OUTPATIENT
Start: 2019-12-27 | End: 2019-12-27

## 2019-12-27 RX ADMIN — SODIUM CHLORIDE 1000 ML: 9 INJECTION, SOLUTION INTRAVENOUS at 18:00

## 2019-12-27 RX ADMIN — CEFEPIME HYDROCHLORIDE 2 G: 2 INJECTION, POWDER, FOR SOLUTION INTRAVENOUS at 18:53

## 2019-12-27 RX ADMIN — VANCOMYCIN HYDROCHLORIDE 1000 MG: 1 INJECTION, SOLUTION INTRAVENOUS at 19:45

## 2019-12-27 RX ADMIN — IOPAMIDOL 75 ML: 755 INJECTION, SOLUTION INTRAVENOUS at 17:42

## 2019-12-27 RX ADMIN — MORPHINE SULFATE 4 MG: 4 INJECTION INTRAVENOUS at 16:54

## 2019-12-27 RX ADMIN — SODIUM CHLORIDE 2121 ML: 9 INJECTION, SOLUTION INTRAVENOUS at 19:00

## 2019-12-27 RX ADMIN — ONDANSETRON 4 MG: 2 INJECTION INTRAMUSCULAR; INTRAVENOUS at 16:54

## 2019-12-27 ASSESSMENT — PAIN DESCRIPTION - FREQUENCY: FREQUENCY: CONTINUOUS

## 2019-12-27 ASSESSMENT — PAIN DESCRIPTION - DESCRIPTORS: DESCRIPTORS: ACHING;SHARP

## 2019-12-27 ASSESSMENT — PAIN SCALES - GENERAL
PAINLEVEL_OUTOF10: 10
PAINLEVEL_OUTOF10: 8

## 2019-12-27 ASSESSMENT — PAIN DESCRIPTION - LOCATION: LOCATION: BACK

## 2019-12-27 NOTE — ED PROVIDER NOTES
eMERGENCY dEPARTMENT eNCOUnter      PtName: Jason Bettencourt  MRN: [id number]  [place] 1955  Date of evaluation: 12/27/2019  Provider: Xu Solano, [address] [place] [address]       Chief Complaint   Patient presents with    Cough     c/o coughing, congestion, pt has scoliosis and c/o pain due to coughing so much. Pt states he was diagnosed with pneumonia 2 weeks ago and finished the antibiotic course completly. HISTORY OF PRESENT ILLNESS   (Location/Symptom, Timing/Onset,Context/Setting, Quality, Duration, Modifying Factors, Severity) Note limiting factors. HPI    Jsaon Bettencourt is a 71 y.o. male who presents to the emergency department with chief complaint of cough and chest pain. Patient diagnosed with left-sided pneumonia 2 weeks ago and finished antibiotics. Over the past couple days has been having some left-sided sharp chest pain described as intermittent without radiation worse with deep inspiration. Mild shortness of breath and productive cough. No fever. Pain is described as 8/10 no history of ACS. However has a history of hypertension hyperlipidemia. Nursing Notes were reviewed. REVIEW OF SYSTEMS    (2+ forlevel 4; 10+ for level 5)     Review of Systems  See hpi for further details. Complete 10 point review of all systems performed and is otherwise negative unless noted above.     PAST MEDICAL HISTORY     Past Medical History:   Diagnosis Date    Arthritis     Gong's esophagus     Chronic back pain     COPD (chronic obstructive pulmonary disease) ([address]     Depression     GERD (gastroesophageal reflux disease)     HYPERCHOLESTERAEMIA     Hypertension     Prinzmetal angina (HCC)     Spinal stenoses     TIA (transient ischaemic attack)        SURGICAL HISTORY       Past Surgical History:   Procedure Laterality Date    APPENDECTOMY      BACK SURGERY      CHOLECYSTECTOMY      CLAVICLE SURGERY      COLONOSCOPY      WITH BIOPSY    JOINT REPLACEMENT      RIGHT KNEE    SPLENECTOMY      UPPER GASTROINTESTINAL ENDOSCOPY  5-14-10    egd with biopsy    UPPER GASTROINTESTINAL ENDOSCOPY  11-27-12    Esophagogastroduodenoscopy with biopsy, Romero esophageal dilation, and Botulinum injection of the pylorus       CURRENT MEDICATIONS       Previous Medications    DULOXETINE (CYMBALTA) 20 MG EXTENDED RELEASE CAPSULE    Take 20 mg by mouth    GABAPENTIN (NEURONTIN) 600 MG TABLET    Take 600 mg by mouth 3 times daily. LISINOPRIL (PRINIVIL;ZESTRIL) 40 MG TABLET    Take 40 mg by mouth daily    RABEPRAZOLE (ACIPHEX) 20 MG TABLET    Take 1 tablet by mouth 4 times daily (before meals and nightly). VERAPAMIL (CALAN) 80 MG TABLET    Take 80 mg by mouth 3 times daily. ALLERGIES     Amitriptyline; Diltiazem hcl; Lansoprazole; Tricyclic antidepressants; Trilisate [choline magnesium trisalicylate];  Codeine; and Demerol    FAMILY HISTORY       Family History   Problem Relation Age of Onset    Heart Disease Mother     Cancer Father         lung          SOCIAL HISTORY       Social History     Socioeconomic History    Marital status:      Spouse name: None    Number of children: None    Years of education: None    Highest education level: None   Occupational History    None   Social Needs    Financial resource strain: None    Food insecurity:     Worry: None     Inability: None    Transportation needs:     Medical: None     Non-medical: None   Tobacco Use    Smoking status: Current Every Day Smoker     Packs/day: 1.00     Years: 42.00     Pack years: 42.00    Smokeless tobacco: Never Used   Substance and Sexual Activity    Alcohol use: No    Drug use: No    Sexual activity: None   Lifestyle    Physical activity:     Days per week: None     Minutes per session: None    Stress: None   Relationships    Social connections:     Talks on phone: None     Gets together: None     Attends [employer] service: None     Active member of club or organization: None     Attends meetings of clubs or organizations: None     Relationship status: None    Intimate partner violence:     Fear of current or ex partner: None     Emotionally abused: None     Physically abused: None     Forced sexual activity: None   Other Topics Concern    None   Social History Narrative    None       SCREENINGS           PHYSICAL EXAM    (5+ for level 4, 8+ for level 5)     ED Triage Vitals [12/27/19 1414]   BP Temp Temp Source Pulse Resp SpO2 Height Weight   (!) 149/127 99.1 °F (37.3 °C) Infrared 98 18 96 % 5' 9\" (1.753 m) 152 lb (68.9 kg)       Physical Exam  Vitals signs and nursing note reviewed. Constitutional:       General: He is not in acute distress. Appearance: Normal appearance. He is well-developed. He is not diaphoretic. HENT:      Head: Normocephalic and atraumatic. Right Ear: External ear normal.      Left Ear: External ear normal.      Nose: Nose normal.      Mouth/Throat:      Mouth: Mucous membranes are moist.      Pharynx: Oropharynx is clear. Eyes:      General: No scleral icterus. Right eye: No discharge. Left eye: No discharge. Conjunctiva/sclera: Conjunctivae normal.      Pupils: Pupils are equal, round, and reactive to light. Neck:      Musculoskeletal: Normal range of motion and neck supple. Vascular: No JVD. Trachea: No tracheal deviation. Cardiovascular:      Rate and Rhythm: Normal rate and regular rhythm. Pulses: Normal pulses. Heart sounds: Normal heart sounds. No murmur. No friction rub. No gallop. Pulmonary:      Effort: Pulmonary effort is normal. No respiratory distress. Breath sounds: Normal breath sounds. No stridor. No wheezing, rhonchi or rales. Chest:      Chest wall: Tenderness (Left costochondral junction) present. Abdominal:      General: There is no distension. Palpations: Abdomen is soft. Tenderness: There is no tenderness. Musculoskeletal: Normal range of motion.          General: No pleural effusion. Upper Abdomen: Hepatomegaly. Status post cholecystectomy, with mild intrahepatic ductal dilatation. Extrahepatic biliary dilatation is also present. This is partially imaged and evaluated. Nonobstructing left renal calculus. Minimal bilateral renal pelviectasis. No evidence of large adrenal masses. Nonspecific thickening of the distal esophagus. Soft Tissues/Bones: No significant chest wall abnormality. No evidence of acute osseous abnormalities. Multilevel degenerative changes of the thoracic spine. Interval increase in size of large masslike pulmonary opacity within left upper lobe. Differential includes worsening pneumonia or worsening neoplasm. Recommend short interval follow-up examination in 2-4 weeks. Interval improvement in nodular pulmonary opacities within lingula and left lower lobe. Scattered bibasilar atelectasis. Continued attention on follow-up examination is recommended. Mediastinal and bilateral hilar lymphadenopathy. Extensive emphysema. No evidence of acute pulmonary embolism. Hepatomegaly. Status post cholecystectomy, with intrahepatic and extrahepatic ductal dilatation. This is partially imaged. Nonobstructing left renal calculus. Minimal renal pelviectasis is partially imaged. Nonspecific thickening of the distal esophagus. This can be re-evaluated with upper endoscopy non emergently.        LABS:  Labs Reviewed   CBC WITH AUTO DIFFERENTIAL - Abnormal; Notable for the following components:       Result Value    WBC 25.0 (*)     Hemoglobin 13.4 (*)     Hematocrit 39.5 (*)     Platelets 547 (*)     Neutrophils Absolute 21.8 (*)     Monocytes Absolute 1.5 (*)     Polychromasia Occasional (*)     Ovalocytes Occasional (*)     Oh-Jolly Bodies Occasional (*)     All other components within normal limits    Narrative:     Performed at:  [facility] [address], [place], [address]   Phone [phone] METABOLIC PANEL W/ REFLEX TO MG FOR LOW K - Abnormal; Notable for the following components:    Sodium 132 (*)     Chloride 95 (*)     CREATININE <0.5 (*)     Albumin/Globulin Ratio 1.0 (*)     ALT 8 (*)     AST 9 (*)     All other components within normal limits    Narrative:     Performed at:  [facility] [address] [place], [place]   Phone [phone]   PROTIME-INR - Abnormal; Notable for the following components:    Protime 14.5 (*)     INR 1.25 (*)     All other components within normal limits    Narrative:     Performed at:  [facility] [address] [place], [place]   Phone [phone]   D-DIMER, QUANTITATIVE - Abnormal; Notable for the following components:    D-Dimer, Quant 410 (*)     All other components within normal limits    Narrative:     Performed at:  [facility] [address], [place]   Phone [phone]   CULTURE BLOOD #1   CULTURE BLOOD #2   TROPONIN    Narrative:     Performed at:  [facility] [address], [place]   Phone [phone]   LACTATE, SEPSIS    Narrative:     Performed at:  [facility] [address], [place]   Phone [phone]       All other labs were within normal range or not returned as of this dictation.     EMERGENCY DEPARTMENT COURSE and DIFFERENTIAL DIAGNOSIS/MDM:   Vitals:    Vitals:    12/27/19 1811 12/27/19 1813 12/27/19 1830 12/27/19 1930   BP: (!) 156/89  (!) 176/98 (!) 150/88   Pulse:  86 95 85   Resp:  17 19 19   Temp:       TempSrc:       SpO2:  92% 94% 97%   Weight:       Height:           Medications   sodium chloride flush 0.9 % injection 10 mL (has no administration in time range)   sodium chloride flush 0.9 % injection 10 mL (has no administration in time range)   vancomycin (VANCOCIN) 1000 mg in dextrose 5% 200 mL IVPB (1,000 mg Intravenous New Bag 12/27/19 1945)   morphine injection 4 mg (4 mg Intravenous Given 12/27/19 1654)   ondansetron (ZOFRAN) injection 4 mg (4 mg Intravenous Given 12/27/19 1654)   0.9 % sodium chloride bolus (0 mLs Intravenous Stopped 12/27/19 1900)   iopamidol (ISOVUE-370) 76 % injection 75 mL (75 mLs Intravenous Given 12/27/19 1742)   0.9 % sodium chloride infusion (2,121 mLs Intravenous New Bag 12/27/19 1900)   cefepime (MAXIPIME) 2 g IVPB minibag (0 g Intravenous Stopped 12/27/19 1923)       MDM. Cardiac work-up, dimer ordered. Dimer is elevated so CT PE protocol will be ordered given pleuritic chest pain. IV fluids and morphine/Zofran ordered. Heart score is low risk. Dimer is elevated so CT PE protocol was performed. Abnormalities noted above on CT chest.  Given patient's elevated white blood cell count and pleuritic chest pain as well as mass versus pneumonia on CT broad-spectrum antibiotics initiated. Patient recently hospitalized so nosocomial coverage initiated. Case discussed with hospitalist for admission. Note that given patient has a heart rate greater than 90 he does meet sepsis criteria so 30 cc/kg of ideal body weight IV fluids initiated. CRITICAL CARE TIME: 30 minutes excluding billable procedure time: aggressive fluid resuscitation in sepsis, multiple re-evaluations, complex MDM, potential for deterioration. CONSULTS:  PHARMACY TO DOSE VANCOMYCIN  IP CONSULT TO HOSPITALIST  PHARMACY TO DOSE VANCOMYCIN    PROCEDURES:  Unless otherwise noted below, none     Procedures    FINAL IMPRESSION      1. Pneumonia due to organism    2. Sepsis, due to unspecified organism, unspecified whether acute organ dysfunction present ([facility] [address]    3.  Abnormal CT of the chest          DISPOSITION/PLAN   DISPOSITION Admitted 12/27/2019 07:08:39 PM      PATIENT REFERRED TO:  Christelle Garibay [address] [facility]  [phone]            DISCHARGE MEDICATIONS:  New Prescriptions No medications on file          (Please note:  Portions of this note were completed with a voice recognition program. Efforts were made to edit the dictations but occasionally words and phrases are mis-transcribed.)    Form v2016. J.5-cn    Xu Solano DO (electronically signed)  Emergency Medicine Provider              Virgilio Matamoros DO  12/27/19 2037

## 2019-12-27 NOTE — ED NOTES
tech reporting pt. States he is having CP while in waiting room.  EKG ordered,completed, and shown to MD. Petros Negrete RN  12/27/19 1578

## 2019-12-28 LAB
ANION GAP SERPL CALCULATED.3IONS-SCNC: 12 MMOL/L (ref 3–16)
BUN BLDV-MCNC: 11 MG/DL (ref 7–20)
CALCIUM SERPL-MCNC: 8.5 MG/DL (ref 8.3–10.6)
CHLORIDE BLD-SCNC: 97 MMOL/L (ref 99–110)
CO2: 24 MMOL/L (ref 21–32)
CREAT SERPL-MCNC: 0.5 MG/DL (ref 0.8–1.3)
EKG ATRIAL RATE: 94 BPM
EKG DIAGNOSIS: NORMAL
EKG P AXIS: 70 DEGREES
EKG P-R INTERVAL: 154 MS
EKG Q-T INTERVAL: 412 MS
EKG QRS DURATION: 106 MS
EKG QTC CALCULATION (BAZETT): 515 MS
EKG R AXIS: 61 DEGREES
EKG T AXIS: 89 DEGREES
EKG VENTRICULAR RATE: 94 BPM
GFR AFRICAN AMERICAN: >60
GFR NON-AFRICAN AMERICAN: >60
GLUCOSE BLD-MCNC: 206 MG/DL (ref 70–99)
HCT VFR BLD CALC: 37.6 % (ref 40.5–52.5)
HEMOGLOBIN: 12.7 G/DL (ref 13.5–17.5)
MCH RBC QN AUTO: 29.8 PG (ref 26–34)
MCHC RBC AUTO-ENTMCNC: 33.7 G/DL (ref 31–36)
MCV RBC AUTO: 88.3 FL (ref 80–100)
PDW BLD-RTO: 14.8 % (ref 12.4–15.4)
PLATELET # BLD: 653 K/UL (ref 135–450)
PMV BLD AUTO: 7.6 FL (ref 5–10.5)
POTASSIUM SERPL-SCNC: 3.7 MMOL/L (ref 3.5–5.1)
RAPID INFLUENZA  B AGN: NEGATIVE
RAPID INFLUENZA A AGN: NEGATIVE
RBC # BLD: 4.26 M/UL (ref 4.2–5.9)
SODIUM BLD-SCNC: 133 MMOL/L (ref 136–145)
WBC # BLD: 24.3 K/UL (ref 4–11)

## 2019-12-28 PROCEDURE — 6370000000 HC RX 637 (ALT 250 FOR IP): Performed by: FAMILY MEDICINE

## 2019-12-28 PROCEDURE — 6370000000 HC RX 637 (ALT 250 FOR IP): Performed by: NURSE PRACTITIONER

## 2019-12-28 PROCEDURE — 93010 ELECTROCARDIOGRAM REPORT: CPT | Performed by: INTERNAL MEDICINE

## 2019-12-28 PROCEDURE — 6360000002 HC RX W HCPCS: Performed by: FAMILY MEDICINE

## 2019-12-28 PROCEDURE — 87804 INFLUENZA ASSAY W/OPTIC: CPT

## 2019-12-28 PROCEDURE — 1200000000 HC SEMI PRIVATE

## 2019-12-28 PROCEDURE — 2580000003 HC RX 258: Performed by: EMERGENCY MEDICINE

## 2019-12-28 PROCEDURE — 2580000003 HC RX 258

## 2019-12-28 PROCEDURE — 2580000003 HC RX 258: Performed by: FAMILY MEDICINE

## 2019-12-28 PROCEDURE — 99222 1ST HOSP IP/OBS MODERATE 55: CPT | Performed by: INTERNAL MEDICINE

## 2019-12-28 PROCEDURE — 80048 BASIC METABOLIC PNL TOTAL CA: CPT

## 2019-12-28 PROCEDURE — 36415 COLL VENOUS BLD VENIPUNCTURE: CPT

## 2019-12-28 PROCEDURE — 85027 COMPLETE CBC AUTOMATED: CPT

## 2019-12-28 RX ORDER — LISINOPRIL 20 MG/1
40 TABLET ORAL DAILY
Status: DISCONTINUED | OUTPATIENT
Start: 2019-12-28 | End: 2020-01-02 | Stop reason: HOSPADM

## 2019-12-28 RX ORDER — VANCOMYCIN HYDROCHLORIDE 1 G/200ML
1000 INJECTION, SOLUTION INTRAVENOUS EVERY 12 HOURS
Status: DISCONTINUED | OUTPATIENT
Start: 2019-12-28 | End: 2019-12-30 | Stop reason: DRUGHIGH

## 2019-12-28 RX ORDER — ACETAMINOPHEN 325 MG/1
650 TABLET ORAL EVERY 6 HOURS PRN
Status: DISCONTINUED | OUTPATIENT
Start: 2019-12-28 | End: 2020-01-02 | Stop reason: HOSPADM

## 2019-12-28 RX ORDER — OXYCODONE HYDROCHLORIDE AND ACETAMINOPHEN 5; 325 MG/1; MG/1
1 TABLET ORAL EVERY 6 HOURS PRN
Status: DISCONTINUED | OUTPATIENT
Start: 2019-12-28 | End: 2019-12-28

## 2019-12-28 RX ORDER — VERAPAMIL HYDROCHLORIDE 80 MG/1
80 TABLET ORAL 3 TIMES DAILY
Status: DISCONTINUED | OUTPATIENT
Start: 2019-12-28 | End: 2020-01-02 | Stop reason: HOSPADM

## 2019-12-28 RX ORDER — SODIUM CHLORIDE 9 MG/ML
INJECTION, SOLUTION INTRAVENOUS
Status: COMPLETED
Start: 2019-12-28 | End: 2019-12-28

## 2019-12-28 RX ORDER — OXYCODONE HYDROCHLORIDE AND ACETAMINOPHEN 5; 325 MG/1; MG/1
1 TABLET ORAL EVERY 4 HOURS PRN
Status: DISCONTINUED | OUTPATIENT
Start: 2019-12-28 | End: 2020-01-02 | Stop reason: HOSPADM

## 2019-12-28 RX ORDER — DULOXETIN HYDROCHLORIDE 20 MG/1
20 CAPSULE, DELAYED RELEASE ORAL DAILY
Status: DISCONTINUED | OUTPATIENT
Start: 2019-12-28 | End: 2020-01-02 | Stop reason: HOSPADM

## 2019-12-28 RX ORDER — GABAPENTIN 300 MG/1
600 CAPSULE ORAL 3 TIMES DAILY
Status: DISCONTINUED | OUTPATIENT
Start: 2019-12-28 | End: 2020-01-02 | Stop reason: HOSPADM

## 2019-12-28 RX ADMIN — OXYCODONE HYDROCHLORIDE AND ACETAMINOPHEN 1 TABLET: 5; 325 TABLET ORAL at 01:47

## 2019-12-28 RX ADMIN — OXYCODONE HYDROCHLORIDE AND ACETAMINOPHEN 1 TABLET: 5; 325 TABLET ORAL at 08:47

## 2019-12-28 RX ADMIN — SODIUM CHLORIDE 250 ML: 9 INJECTION, SOLUTION INTRAVENOUS at 03:22

## 2019-12-28 RX ADMIN — GABAPENTIN 600 MG: 300 CAPSULE ORAL at 21:14

## 2019-12-28 RX ADMIN — OXYCODONE HYDROCHLORIDE AND ACETAMINOPHEN 1 TABLET: 5; 325 TABLET ORAL at 14:54

## 2019-12-28 RX ADMIN — LISINOPRIL 40 MG: 20 TABLET ORAL at 08:47

## 2019-12-28 RX ADMIN — Medication 10 ML: at 21:14

## 2019-12-28 RX ADMIN — GABAPENTIN 600 MG: 300 CAPSULE ORAL at 08:47

## 2019-12-28 RX ADMIN — VANCOMYCIN HYDROCHLORIDE 1000 MG: 1 INJECTION, SOLUTION INTRAVENOUS at 11:59

## 2019-12-28 RX ADMIN — OXYCODONE HYDROCHLORIDE AND ACETAMINOPHEN 1 TABLET: 5; 325 TABLET ORAL at 18:40

## 2019-12-28 RX ADMIN — VERAPAMIL HYDROCHLORIDE 80 MG: 80 TABLET, FILM COATED ORAL at 08:47

## 2019-12-28 RX ADMIN — VERAPAMIL HYDROCHLORIDE 80 MG: 80 TABLET, FILM COATED ORAL at 21:14

## 2019-12-28 RX ADMIN — VANCOMYCIN HYDROCHLORIDE 1000 MG: 1 INJECTION, SOLUTION INTRAVENOUS at 23:40

## 2019-12-28 RX ADMIN — Medication 10 ML: at 08:47

## 2019-12-28 RX ADMIN — DULOXETINE HYDROCHLORIDE 20 MG: 20 CAPSULE, DELAYED RELEASE ORAL at 14:11

## 2019-12-28 RX ADMIN — SODIUM CHLORIDE 100 ML: 9 INJECTION, SOLUTION INTRAVENOUS at 21:18

## 2019-12-28 RX ADMIN — Medication 10 ML: at 01:55

## 2019-12-28 RX ADMIN — OXYCODONE HYDROCHLORIDE AND ACETAMINOPHEN 1 TABLET: 5; 325 TABLET ORAL at 23:36

## 2019-12-28 RX ADMIN — CEFEPIME HYDROCHLORIDE 2 G: 2 INJECTION, POWDER, FOR SOLUTION INTRAVENOUS at 03:22

## 2019-12-28 RX ADMIN — GABAPENTIN 600 MG: 300 CAPSULE ORAL at 14:11

## 2019-12-28 RX ADMIN — CEFEPIME HYDROCHLORIDE 2 G: 2 INJECTION, POWDER, FOR SOLUTION INTRAVENOUS at 21:13

## 2019-12-28 RX ADMIN — CEFEPIME HYDROCHLORIDE 2 G: 2 INJECTION, POWDER, FOR SOLUTION INTRAVENOUS at 11:06

## 2019-12-28 RX ADMIN — VERAPAMIL HYDROCHLORIDE 80 MG: 80 TABLET, FILM COATED ORAL at 14:11

## 2019-12-28 ASSESSMENT — PAIN DESCRIPTION - PROGRESSION
CLINICAL_PROGRESSION: NOT CHANGED
CLINICAL_PROGRESSION: GRADUALLY IMPROVING
CLINICAL_PROGRESSION: NOT CHANGED

## 2019-12-28 ASSESSMENT — PAIN SCALES - WONG BAKER
WONGBAKER_NUMERICALRESPONSE: 6
WONGBAKER_NUMERICALRESPONSE: 2
WONGBAKER_NUMERICALRESPONSE: 6
WONGBAKER_NUMERICALRESPONSE: 2
WONGBAKER_NUMERICALRESPONSE: 4
WONGBAKER_NUMERICALRESPONSE: 6
WONGBAKER_NUMERICALRESPONSE: 2
WONGBAKER_NUMERICALRESPONSE: 6

## 2019-12-28 ASSESSMENT — PAIN DESCRIPTION - LOCATION
LOCATION: CHEST;BACK
LOCATION: BACK;CHEST
LOCATION: BACK;CHEST
LOCATION: BACK

## 2019-12-28 ASSESSMENT — PAIN DESCRIPTION - PAIN TYPE
TYPE: CHRONIC PAIN

## 2019-12-28 ASSESSMENT — PAIN SCALES - GENERAL
PAINLEVEL_OUTOF10: 8
PAINLEVEL_OUTOF10: 10
PAINLEVEL_OUTOF10: 8
PAINLEVEL_OUTOF10: 9
PAINLEVEL_OUTOF10: 7
PAINLEVEL_OUTOF10: 8
PAINLEVEL_OUTOF10: 10
PAINLEVEL_OUTOF10: 10
PAINLEVEL_OUTOF10: 7
PAINLEVEL_OUTOF10: 8

## 2019-12-28 ASSESSMENT — PAIN DESCRIPTION - ORIENTATION
ORIENTATION: LEFT;ANTERIOR
ORIENTATION: LEFT
ORIENTATION: LEFT;ANTERIOR

## 2019-12-28 ASSESSMENT — PAIN DESCRIPTION - DESCRIPTORS
DESCRIPTORS: SHARP

## 2019-12-28 ASSESSMENT — PAIN DESCRIPTION - FREQUENCY
FREQUENCY: CONTINUOUS

## 2019-12-28 ASSESSMENT — PAIN DESCRIPTION - ONSET
ONSET: ON-GOING

## 2019-12-28 ASSESSMENT — PAIN - FUNCTIONAL ASSESSMENT: PAIN_FUNCTIONAL_ASSESSMENT: PREVENTS OR INTERFERES WITH ALL ACTIVE AND SOME PASSIVE ACTIVITIES

## 2019-12-28 NOTE — CARE COORDINATION
Discharge Planning Assessment  SW discharge planner met with patient to discuss reason for admission, current living situation, and potential needs at the time of discharge. Pt in ED d/t pneumonia     Demographics/Insurance verified:  Yes    Current type of dwelling:  House    Living arrangements:  w/spouse    Level of function/Support:  Pt reports he is still using his cane inside and walker outside of the home. Pt reported could barely use the walker today. Spouse is supportive. PCP:  Dr. Anaid Wallace    Last Visit to PCP:  November    DME:  nel Gallego    Active with any community resources/agencies/skilled home care:  None. Pt reported no non-skilled needs. Medication compliance issues:  No    Financial issues that could impact healthcare:  No    Transportation at the time of discharge:  Pt drives. Spouse can assist home. Tentative discharge plan:   Please consider PT/OT dre. Pt agreeable to [facility] if recommended. No other needs identified.     Electronically signed by EAN Rivera, [place] on 12/27/2019 at 7:45 PM

## 2019-12-28 NOTE — H&P
Reactions    Amitriptyline Other (See Comments)     crying    Diltiazem Hcl Other (See Comments)     crying    Lansoprazole Other (See Comments)     crying    Tricyclic Antidepressants Other (See Comments)     crying    Trilisate [Choline Magnesium Trisalicylate] Other (See Comments)     crying    Codeine Nausea And Vomiting    Demerol Nausea And Vomiting        Social History:   reports that he has been smoking. He has a 42.00 pack-year smoking history. He has never used smokeless tobacco. He reports that he does not drink alcohol or use drugs. Family History:  family history includes Cancer in his father; Heart Disease in his mother. ,     Physical Exam:  BP (!) 150/88   Pulse 85   Temp 99.1 °F (37.3 °C) (Infrared)   Resp 19   Ht 5' 9\" (1.753 m)   Wt 152 lb (68.9 kg)   SpO2 97%   BMI 22.45 kg/m²     General appearance:  Appears comfortable. Well nourished  Eyes: Sclera clear, pupils equal  ENT: Moist mucus membranes, no thrush. Trachea midline. Cardiovascular: Regular rhythm, normal S1, S2. No murmur, gallop, rub. No edema in lower extremities  Respiratory: Clear to auscultation bilaterally, no wheeze, good inspiratory effort  Gastrointestinal: Abdomen soft, non-tender, not distended, normal bowel sounds  Musculoskeletal: No cyanosis in digits, neck supple  Neurology: Cranial nerves grossly intact. Alert and oriented in time, place and person. No speech or motor deficits  Psychiatry: Appropriate affect.  Not agitated  Skin: Warm, dry, normal turgor, no rash    Labs:  CBC:   Lab Results   Component Value Date    WBC 25.0 12/27/2019    RBC 4.48 12/27/2019    HGB 13.4 12/27/2019    HCT 39.5 12/27/2019    MCV 88.2 12/27/2019    MCH 29.9 12/27/2019    MCHC 33.9 12/27/2019    RDW 15.1 12/27/2019     12/27/2019    MPV 7.7 12/27/2019     BMP:    Lab Results   Component Value Date     12/27/2019    K 3.8 12/27/2019    CL 95 12/27/2019    CO2 22 12/27/2019    BUN 11 12/27/2019    CREATININE <0.5 12/27/2019    CALCIUM 9.1 12/27/2019    GFRAA >60 12/27/2019    GFRAA >60 08/31/2012    LABGLOM >60 12/27/2019    LABGLOM 109 03/04/2015    GLUCOSE 89 12/27/2019       Chest Xray:   EKG:      Problem List  Active Problems:    Pneumonia  Resolved Problems:    * No resolved hospital problems. *        Assessment/Plan:         1. Health care associated Bacterial pneumonia   Flu a/b , resp culutre and blood cultures are pending  On IV vanc and cefepime  Breathing tx  Pulmonology consulted    Sepsis with leukocytosis WBC 25K  Fever  Pulse rate elvated in 90's      Current every day smoker   Advised cessation     HTN cont home meds    Admit as inpatien. I anticipate hospitalization spanning more than two midnights for investigation and treatment of the above medically necessary diagnoses.       Any Zee MD    12/27/2019 8:43 PM

## 2019-12-28 NOTE — PROGRESS NOTES
[facility]    12/28/2019 2:07 PM        Name: Coretta Land . Admitted: 12/27/2019  Primary Care Provider: Rupesh Leija (Tel: [phone])      Subjective:  . Feels somewhat better today  Tmax 102 last night  Coughing up sputum no blood  Breathing on RA  Tolerating diet    Reviewed interval ancillary notes    Current Medications  gabapentin (NEURONTIN) capsule 600 mg, TID  lisinopril (PRINIVIL;ZESTRIL) tablet 40 mg, Daily  verapamil (CALAN) tablet 80 mg, TID  cefepime (MAXIPIME) 2 g IVPB minibag, Q8H  vancomycin (VANCOCIN) 1000 mg in dextrose 5% 200 mL IVPB, Q12H  acetaminophen (TYLENOL) tablet 650 mg, Q6H PRN  oxyCODONE-acetaminophen (PERCOCET) 5-325 MG per tablet 1 tablet, Q6H PRN  DULoxetine (CYMBALTA) extended release capsule 20 mg, Daily  sodium chloride flush 0.9 % injection 10 mL, 2 times per day  sodium chloride flush 0.9 % injection 10 mL, PRN        Objective:  BP (!) 162/83   Pulse 82   Temp 99 °F (37.2 °C) (Oral)   Resp 17   Ht 5' 9\" (1.753 m)   Wt 140 lb 11.2 oz (63.8 kg)   SpO2 94%   BMI 20.78 kg/m²     Intake/Output Summary (Last 24 hours) at 12/28/2019 1407  Last data filed at 12/27/2019 2100  Gross per 24 hour   Intake 2371 ml   Output --   Net 2371 ml      Wt Readings from Last 3 Encounters:   12/28/19 140 lb 11.2 oz (63.8 kg)   12/08/19 147 lb 3.2 oz (66.8 kg)   03/30/18 145 lb 1 oz (65.8 kg)       General appearance:  Appears comfortable  Eyes: Sclera clear. Pupils equal.  ENT: Moist oral mucosa. Trachea midline, no adenopathy. Cardiovascular: Regular rhythm, normal S1, S2. No murmur. No edema in lower extremities  Respiratory: Not using accessory muscles. Good inspiratory effort. Clear to auscultation bilaterally, no wheeze or crackles.    GI: Abdomen soft, no tenderness, not distended, normal bowel sounds  Musculoskeletal: No cyanosis in digits, neck supple  Neurology: CN 2-12 grossly intact. No speech or motor deficits  Psych: Normal affect. Alert and oriented in time, place and person  Skin: Warm, dry, normal turgor    Labs and Tests:  CBC:   Recent Labs     12/27/19  1654 12/28/19  1145   WBC 25.0* 24.3*   HGB 13.4* 12.7*   * 653*     BMP:    Recent Labs     12/27/19  1654 12/28/19  1145   * 133*   K 3.8 3.7   CL 95* 97*   CO2 22 24   BUN 11 11   CREATININE <0.5* 0.5*   GLUCOSE 89 206*     Hepatic:   Recent Labs     12/27/19  1654   AST 9*   ALT 8*   BILITOT 0.6   ALKPHOS 100               Problem List  Active Problems:    Pneumonia  Resolved Problems:    * No resolved hospital problems. *       Assessment & Plan:   1.  Health care associated Bacterial pneumonia   Flu a/b , resp culutre and blood cultures are pending  On IV vanc and cefepime  Breathing tx  Pulmonology consulted     Sepsis with leukocytosis WBC trending down  Fever  Pulse rate elvated in 90's        Current every day smoker   Advised cessation      HTN cont home meds      Diet: DIET GENERAL;  Code:Full Code  DVT PPX      Radha Mcghee MD   12/28/2019 2:07 PM

## 2019-12-28 NOTE — CONSULTS
lymphadenopathy bilaterally.  No evidence of a   significant axillary lymphadenopathy.  No evidence of significant lower neck   lymphadenopathy within limitation of this examination.       Lungs/pleura:       Interval increase and worsening masslike pulmonary opacity within left upper   lobe.  Interval improvement in nodular pulmonary opacity within lingula and   left lower lobe.  Additional bibasilar pulmonary opacities and has a   configuration of atelectasis.  No evidence of suspicious pulmonary opacity   within right lung.  No evidence of pneumothorax.  Trace left pleural   effusion.  No evidence of right pleural effusion.       Upper Abdomen:       Hepatomegaly.  Status post cholecystectomy, with mild intrahepatic ductal   dilatation.  Extrahepatic biliary dilatation is also present.  This is   partially imaged and evaluated.       Nonobstructing left renal calculus.  Minimal bilateral renal pelviectasis. No evidence of large adrenal masses.       Nonspecific thickening of the distal esophagus.       Soft Tissues/Bones:       No significant chest wall abnormality.  No evidence of acute osseous   abnormalities.  Multilevel degenerative changes of the thoracic spine.           Impression   Interval increase in size of large masslike pulmonary opacity within left   upper lobe.  Differential includes worsening pneumonia or worsening neoplasm.    Recommend short interval follow-up examination in 2-4 weeks.       Interval improvement in nodular pulmonary opacities within lingula and left   lower lobe.  Scattered bibasilar atelectasis.  Continued attention on   follow-up examination is recommended.       Mediastinal and bilateral hilar lymphadenopathy.       Extensive emphysema.       No evidence of acute pulmonary embolism.       Hepatomegaly.  Status post cholecystectomy, with intrahepatic and   extrahepatic ductal dilatation.  This is partially imaged.       Nonobstructing left renal calculus.  Minimal renal pelviectasis is partially   imaged.       Nonspecific thickening of the distal esophagus.  This can be re-evaluated   with upper endoscopy non emergently. Problem List:   Left upper lobe pneumonia  Probable COPD  History of splenectomy    Assessment/Plan:     Reviewed patient CT imaging, the overall appearance is of the lung parenchyma still appears to be related to pneumonia. The opacities in the left upper lobe appears worse, however those in the left lingula and lower lobe appeared better in comparison to CT done earlier this month. Prior history of splenectomy, which puts him at risk for pneumonia. However recurrent hospitalization with similar problems. There is worsening of left apical irregular opacity which has a masslike configuration-which probably could be biopsied under CT guidance to rule out malignancy. Will repeat respiratory sputum cultures. If above investigations negative, might require bronchoscopy at a later date for bronchial lavage. Discussed the plan with the patient and his wife. Patient is not keen on the procedure and would like to think about this. Pulmonary will follow.     Kristina Adam MD

## 2019-12-28 NOTE — ED NOTES
Pts wife calling out multiple times stating, \"I have not eaten all day, why can I not eat. \" It was stated to wife that we had turkey sandwiches as well as crackers and peanut butter, pts wife states, \"That's not OK! Why cant you order me food! \" It was stated to wife that it was OK if she left to get food and came back but that I could not order her food at this time because the cafe was closed. Pts wife stated, \"This is bullshit! You have kept us here all day and I cant even eat! \" It was stated to wife that she was free to leave whenever she wanted to and she was welcome to come back. It was explained that the pt did not have a clean bed assigned to him yet but that if he was not in the ED when she came back that we would let her know what room he went to. Pts wife stated, \"This is ridiculous! We have been here all damn day and you cant feed me! \" At that time I stated that I was sorry she was upset and we were trying ti get pt upstairs to his room as soon as possible but that she was OK to come back and forth if she wanted to go get herself something to eat. Pts wife stated, \"Whatever! \" I left the room.       Nolan Moulton RN  12/27/19 1500

## 2019-12-29 LAB
HCT VFR BLD CALC: 34.9 % (ref 40.5–52.5)
HEMOGLOBIN: 11.6 G/DL (ref 13.5–17.5)
MCH RBC QN AUTO: 29.3 PG (ref 26–34)
MCHC RBC AUTO-ENTMCNC: 33.2 G/DL (ref 31–36)
MCV RBC AUTO: 88.3 FL (ref 80–100)
PDW BLD-RTO: 14.6 % (ref 12.4–15.4)
PLATELET # BLD: 567 K/UL (ref 135–450)
PMV BLD AUTO: 7.8 FL (ref 5–10.5)
RBC # BLD: 3.95 M/UL (ref 4.2–5.9)
VANCOMYCIN TROUGH: 10.4 UG/ML (ref 10–20)
WBC # BLD: 15.4 K/UL (ref 4–11)

## 2019-12-29 PROCEDURE — 80202 ASSAY OF VANCOMYCIN: CPT

## 2019-12-29 PROCEDURE — 36415 COLL VENOUS BLD VENIPUNCTURE: CPT

## 2019-12-29 PROCEDURE — 1200000000 HC SEMI PRIVATE

## 2019-12-29 PROCEDURE — 99232 SBSQ HOSP IP/OBS MODERATE 35: CPT | Performed by: INTERNAL MEDICINE

## 2019-12-29 PROCEDURE — 94760 N-INVAS EAR/PLS OXIMETRY 1: CPT

## 2019-12-29 PROCEDURE — 2580000003 HC RX 258: Performed by: EMERGENCY MEDICINE

## 2019-12-29 PROCEDURE — 2580000003 HC RX 258: Performed by: FAMILY MEDICINE

## 2019-12-29 PROCEDURE — 6360000002 HC RX W HCPCS: Performed by: FAMILY MEDICINE

## 2019-12-29 PROCEDURE — 6370000000 HC RX 637 (ALT 250 FOR IP): Performed by: FAMILY MEDICINE

## 2019-12-29 PROCEDURE — 85027 COMPLETE CBC AUTOMATED: CPT

## 2019-12-29 RX ADMIN — VERAPAMIL HYDROCHLORIDE 80 MG: 80 TABLET, FILM COATED ORAL at 12:15

## 2019-12-29 RX ADMIN — CEFEPIME HYDROCHLORIDE 2 G: 2 INJECTION, POWDER, FOR SOLUTION INTRAVENOUS at 02:38

## 2019-12-29 RX ADMIN — GABAPENTIN 600 MG: 300 CAPSULE ORAL at 20:27

## 2019-12-29 RX ADMIN — OXYCODONE HYDROCHLORIDE AND ACETAMINOPHEN 1 TABLET: 5; 325 TABLET ORAL at 07:54

## 2019-12-29 RX ADMIN — OXYCODONE HYDROCHLORIDE AND ACETAMINOPHEN 1 TABLET: 5; 325 TABLET ORAL at 03:52

## 2019-12-29 RX ADMIN — VERAPAMIL HYDROCHLORIDE 80 MG: 80 TABLET, FILM COATED ORAL at 20:27

## 2019-12-29 RX ADMIN — GABAPENTIN 600 MG: 300 CAPSULE ORAL at 12:15

## 2019-12-29 RX ADMIN — GABAPENTIN 600 MG: 300 CAPSULE ORAL at 07:53

## 2019-12-29 RX ADMIN — Medication 10 ML: at 07:57

## 2019-12-29 RX ADMIN — VANCOMYCIN HYDROCHLORIDE 1000 MG: 1 INJECTION, SOLUTION INTRAVENOUS at 13:21

## 2019-12-29 RX ADMIN — LISINOPRIL 40 MG: 20 TABLET ORAL at 07:53

## 2019-12-29 RX ADMIN — VERAPAMIL HYDROCHLORIDE 80 MG: 80 TABLET, FILM COATED ORAL at 07:54

## 2019-12-29 RX ADMIN — OXYCODONE HYDROCHLORIDE AND ACETAMINOPHEN 1 TABLET: 5; 325 TABLET ORAL at 16:22

## 2019-12-29 RX ADMIN — DULOXETINE HYDROCHLORIDE 20 MG: 20 CAPSULE, DELAYED RELEASE ORAL at 07:54

## 2019-12-29 RX ADMIN — CEFEPIME HYDROCHLORIDE 2 G: 2 INJECTION, POWDER, FOR SOLUTION INTRAVENOUS at 18:00

## 2019-12-29 RX ADMIN — CEFEPIME HYDROCHLORIDE 2 G: 2 INJECTION, POWDER, FOR SOLUTION INTRAVENOUS at 12:15

## 2019-12-29 RX ADMIN — OXYCODONE HYDROCHLORIDE AND ACETAMINOPHEN 1 TABLET: 5; 325 TABLET ORAL at 20:27

## 2019-12-29 RX ADMIN — OXYCODONE HYDROCHLORIDE AND ACETAMINOPHEN 1 TABLET: 5; 325 TABLET ORAL at 12:15

## 2019-12-29 RX ADMIN — Medication 10 ML: at 20:27

## 2019-12-29 ASSESSMENT — PAIN SCALES - GENERAL
PAINLEVEL_OUTOF10: 3
PAINLEVEL_OUTOF10: 9

## 2019-12-29 ASSESSMENT — PAIN DESCRIPTION - PAIN TYPE
TYPE: CHRONIC PAIN
TYPE: CHRONIC PAIN;ACUTE PAIN
TYPE: CHRONIC PAIN

## 2019-12-29 ASSESSMENT — PAIN SCALES - WONG BAKER
WONGBAKER_NUMERICALRESPONSE: 2

## 2019-12-29 ASSESSMENT — PAIN DESCRIPTION - LOCATION
LOCATION: BACK
LOCATION: BACK;CHEST
LOCATION: CHEST;BACK;HIP;LEG

## 2019-12-29 ASSESSMENT — PAIN DESCRIPTION - PROGRESSION
CLINICAL_PROGRESSION: NOT CHANGED
CLINICAL_PROGRESSION: NOT CHANGED

## 2019-12-29 NOTE — PROGRESS NOTES
[facility] Pulmonary/CCM Progress note      Admit Date: 12/27/2019    Chief Complaint: Fevers, cough and pleurisy    Subjective: Interval History: Persistent symptoms, states that he feels slightly better. Worried about lung biopsy and risk of pneumothorax. No significant phlegm with the cough.     Scheduled Meds:   gabapentin  600 mg Oral TID    lisinopril  40 mg Oral Daily    verapamil  80 mg Oral TID    cefepime  2 g Intravenous Q8H    vancomycin  1,000 mg Intravenous Q12H    DULoxetine  20 mg Oral Daily    sodium chloride flush  10 mL Intravenous 2 times per day     Continuous Infusions:  PRN Meds:acetaminophen, oxyCODONE-acetaminophen, sodium chloride flush    Review of Systems  Constitutional: negative for fatigue, fevers, malaise and weight loss  Ears, nose, mouth, throat: negative for ear drainage, epistaxis, hoarseness, nasal congestion, sore throat and voice change  Respiratory: negative except for cough, pleurisy/chest pain and shortness of breath  Cardiovascular: negative for chest pain, chest pressure/discomfort, irregular heart beat, lower extremity edema and palpitations  Gastrointestinal: negative for abdominal pain, constipation, diarrhea, jaundice, melena, odynophagia, reflux symptoms and vomiting  Hematologic/lymphatic: negative for bleeding, easy bruising, lymphadenopathy and petechiae  Musculoskeletal:negative for arthralgias, bone pain, muscle weakness, neck pain and stiff joints  Neurological: negative for dizziness, gait problems, headaches, seizures, speech problems, tremors and weakness  Behavioral/Psych: negative for anxiety, behavior problems, depression, fatigue and sleep disturbance  Endocrine: negative for diabetic symptoms including none, neuropathy, polyphagia, polyuria, polydipsia, vomiting and diarrhea and temperature intolerance  Allergic/Immunologic: negative for anaphylaxis, angioedema, hay fever and urticaria    Objective:     Patient Vitals for the past 8 hrs:   BP Temp Temp src Pulse Resp SpO2   12/29/19 1510 -- -- -- -- -- 96 %   12/29/19 1215 133/82 98.3 °F (36.8 °C) Axillary 72 18 96 %   12/29/19 0751 (!) 143/77 97.5 °F (36.4 °C) Oral 71 18 93 %     I/O last 3 completed shifts:  In: -   Out: 1325 [Urine:1325]  No intake/output data recorded. General Appearance: alert and oriented to person, place and time, well developed and well- nourished, in no acute distress  Skin: warm and dry, no rash or erythema  Head: normocephalic and atraumatic  Eyes: pupils equal, round, and reactive to light, extraocular eye movements intact, conjunctivae normal  ENT: external ear and ear canal normal bilaterally, nose without deformity, nasal mucosa and turbinates normal  Neck: supple and non-tender without mass, no cervical lymphadenopathy  Pulmonary/Chest: clear to auscultation bilaterally- no wheezes, rales or rhonchi, normal air movement, no respiratory distress  Cardiovascular: normal rate, regular rhythm,  no murmurs, rubs, distal pulses intact, no carotid bruits  Abdomen: soft, non-tender, non-distended, normal bowel sounds, no masses or organomegaly  Lymph Nodes: Cervical, supraclavicular normal  Extremities: no cyanosis, clubbing or edema  Musculoskeletal: normal range of motion, no joint swelling, deformity or tenderness  Neurologic: alert, no focal neurologic deficits    Data Review:  CBC:   Lab Results   Component Value Date    WBC 15.4 12/29/2019    RBC 3.95 12/29/2019     BMP:   Lab Results   Component Value Date    GLUCOSE 206 12/28/2019    CO2 24 12/28/2019    BUN 11 12/28/2019    CREATININE 0.5 12/28/2019    CALCIUM 8.5 12/28/2019     ABG:   Lab Results   Component Value Date    XIT7JHN 27.3 09/21/2015    BEART 3.1 09/21/2015    A1KJIKSD 97.4 09/21/2015    PHART 7.426 09/21/2015    IXG6FZY 42.3 09/21/2015    PO2ART 88.3 09/21/2015    TPQ7TFQ 28.6 09/21/2015       Radiology: All pertinent images / reports were reviewed as a part of this visit.     Narrative   EXAMINATION:   CTA OF THE CHEST 12/27/2019 5:42 pm       TECHNIQUE:   CTA of the chest was performed after the administration of intravenous   contrast.  Multiplanar reformatted images are provided for review.  MIP   images are provided for review. Dose modulation, iterative reconstruction,   and/or weight based adjustment of the mA/kV was utilized to reduce the   radiation dose to as low as reasonably achievable.       COMPARISON:   December 6, 2019       HISTORY:   ORDERING SYSTEM PROVIDED HISTORY: cp, elevated dimer   TECHNOLOGIST PROVIDED HISTORY:   Reason for exam:->cp, elevated dimer   Reason for Exam: Cough (c/o coughing, congestion, pt has scoliosis and c/o   pain due to coughing so much. Pt states he was diagnosed with pneumonia 2   weeks ago and finished the antibiotic course completly.)   Acuity: Acute   Type of Exam: Initial       FINDINGS:   Pulmonary Arteries:       No evidence of acute pulmonary embolism to the lobar branches. The main   pulmonary artery measures 2.9 cm.  This is considered within normal limits.       Mediastinum:       No evidence of thoracic aortic dissection.  No evidence of pericardial   effusion.       Mediastinal and hilar lymphadenopathy bilaterally.       No evidence of significant axillary lymphadenopathy.       Lungs/pleura:       Interval increase and worsening masslike pulmonary opacity within left upper   lobe.  Interval improvement in nodular pulmonary opacity within lingula and   left lower lobe.       Additional bibasilar pulmonary opacities and has a configuration of   atelectasis.  No evidence of pneumothorax.       Trace left pleural effusion.  No evidence of right pleural effusion.       Upper Abdomen:       Hepatomegaly.  Status post cholecystectomy, with mild intrahepatic ductal   dilatation.  Extrahepatic biliary dilatation is also present.  This is   partially imaged and evaluated.       Nonobstructing left renal calculus.  Minimal bilateral renal pelviectasis.    No evidence of

## 2019-12-29 NOTE — PROGRESS NOTES
[facility] PROGRESS NOTE    12/29/2019 3:43 PM        Name: Marcus Horton . Admitted: 12/27/2019  Primary Care Provider: Odalis Wyatt (Tel: [phone])      Subjective:  .    Smoker admitted with pneumonia  On broad spectrum abx  + cough, phlegm   Afebrile  Diet ok   Wbc trending down    Reviewed interval ancillary notes    Current Medications  gabapentin (NEURONTIN) capsule 600 mg, TID  lisinopril (PRINIVIL;ZESTRIL) tablet 40 mg, Daily  verapamil (CALAN) tablet 80 mg, TID  cefepime (MAXIPIME) 2 g IVPB minibag, Q8H  vancomycin (VANCOCIN) 1000 mg in dextrose 5% 200 mL IVPB, Q12H  acetaminophen (TYLENOL) tablet 650 mg, Q6H PRN  DULoxetine (CYMBALTA) extended release capsule 20 mg, Daily  oxyCODONE-acetaminophen (PERCOCET) 5-325 MG per tablet 1 tablet, Q4H PRN  sodium chloride flush 0.9 % injection 10 mL, 2 times per day  sodium chloride flush 0.9 % injection 10 mL, PRN        Objective:  /82   Pulse 72   Temp 98.3 °F (36.8 °C) (Axillary)   Resp 18   Ht 5' 9\" (1.753 m)   Wt 140 lb 11.2 oz (63.8 kg)   SpO2 96%   BMI 20.78 kg/m²     Intake/Output Summary (Last 24 hours) at 12/29/2019 1543  Last data filed at 12/29/2019 1129  Gross per 24 hour   Intake --   Output 1325 ml   Net -1325 ml      Wt Readings from Last 3 Encounters:   12/28/19 140 lb 11.2 oz (63.8 kg)   12/08/19 147 lb 3.2 oz (66.8 kg)   03/30/18 145 lb 1 oz (65.8 kg)       General appearance:  Appears comfortable  Eyes: Sclera clear. Pupils equal.  ENT: Moist oral mucosa. Trachea midline, no adenopathy. Cardiovascular: Regular rhythm, normal S1, S2. No murmur. No edema in lower extremities  Respiratory: Not using accessory muscles. Good inspiratory effort. Clear to auscultation bilaterally, no wheeze or crackles.    GI: Abdomen soft, no tenderness, not distended, normal bowel sounds  Musculoskeletal: No cyanosis in digits, neck supple  Neurology: CN 2-12 grossly intact. No speech or motor deficits  Psych: Normal affect. Alert and oriented in time, place and person  Skin: Warm, dry, normal turgor    Labs and Tests:  CBC:   Recent Labs     12/27/19  1654 12/28/19  1145 12/29/19  0747   WBC 25.0* 24.3* 15.4*   HGB 13.4* 12.7* 11.6*   * 653* 567*     BMP:    Recent Labs     12/27/19  1654 12/28/19  1145   * 133*   K 3.8 3.7   CL 95* 97*   CO2 22 24   BUN 11 11   CREATININE <0.5* 0.5*   GLUCOSE 89 206*     Hepatic:   Recent Labs     12/27/19  1654   AST 9*   ALT 8*   BILITOT 0.6   ALKPHOS 100       *      Problem List  Active Problems:    Pneumonia  Resolved Problems:    * No resolved hospital problems. *       Assessment & Plan:   1.  Health care associated Bacterial pneumonia   Flu a/b , resp culutre and blood cultures are pending  On IV vanc and cefepime  Breathing tx  Pulmonology plan for bronchoscopy tomorrow  Npo after midnight     Sepsis with leukocytosis WBC trending down  Fever  Pulse rate elvated in 90's        Current every day smoker   Advised cessation      HTN cont home meds      Diet: DIET GENERAL;  Diet NPO, After Midnight  Code:Full Code  DVT PPX      Esperanza Mccoy MD   12/29/2019 3:43 PM

## 2019-12-30 ENCOUNTER — ANESTHESIA EVENT (OUTPATIENT)
Dept: ENDOSCOPY | Age: 64
DRG: 871 | End: 2019-12-30
Payer: MEDICARE

## 2019-12-30 ENCOUNTER — ANESTHESIA (OUTPATIENT)
Dept: ENDOSCOPY | Age: 64
DRG: 871 | End: 2019-12-30
Payer: MEDICARE

## 2019-12-30 VITALS — SYSTOLIC BLOOD PRESSURE: 137 MMHG | DIASTOLIC BLOOD PRESSURE: 88 MMHG | OXYGEN SATURATION: 77 %

## 2019-12-30 LAB
ANION GAP SERPL CALCULATED.3IONS-SCNC: 11 MMOL/L (ref 3–16)
APPEARANCE BAL (LAVAGE): ABNORMAL
BUN BLDV-MCNC: 7 MG/DL (ref 7–20)
CALCIUM SERPL-MCNC: 8.9 MG/DL (ref 8.3–10.6)
CHLORIDE BLD-SCNC: 93 MMOL/L (ref 99–110)
CLOT EVALUATION BAL: ABNORMAL
CO2: 28 MMOL/L (ref 21–32)
COLOR LAVAGE: COLORLESS
CREAT SERPL-MCNC: <0.5 MG/DL (ref 0.8–1.3)
GFR AFRICAN AMERICAN: >60
GFR NON-AFRICAN AMERICAN: >60
GLUCOSE BLD-MCNC: 96 MG/DL (ref 70–99)
HCT VFR BLD CALC: 36.4 % (ref 40.5–52.5)
HEMOGLOBIN: 12.5 G/DL (ref 13.5–17.5)
LYMPHOCYTES, BAL: 9 % (ref 5–10)
MCH RBC QN AUTO: 30.1 PG (ref 26–34)
MCHC RBC AUTO-ENTMCNC: 34.3 G/DL (ref 31–36)
MCV RBC AUTO: 87.8 FL (ref 80–100)
MONOCYTES, BAL: 3 %
NUMBER OF CELLS COUNTED BAL (LAVAGE): 100
PATH REVIEW BAL (LAVAGE): YES
PDW BLD-RTO: 14.7 % (ref 12.4–15.4)
PLATELET # BLD: 614 K/UL (ref 135–450)
PMV BLD AUTO: 8.1 FL (ref 5–10.5)
POTASSIUM SERPL-SCNC: 3.8 MMOL/L (ref 3.5–5.1)
RBC # BLD: 4.14 M/UL (ref 4.2–5.9)
RBC, BAL: 1600 /CUMM
SEGMENTED NEUTROPHILS, BAL: 88 % (ref 5–10)
SODIUM BLD-SCNC: 132 MMOL/L (ref 136–145)
VANCOMYCIN TROUGH: 7.5 UG/ML (ref 10–20)
WBC # BLD: 13.1 K/UL (ref 4–11)
WBC/EPI CELLS BAL: 4604 /CUMM

## 2019-12-30 PROCEDURE — 1200000000 HC SEMI PRIVATE

## 2019-12-30 PROCEDURE — 0BD88ZX EXTRACTION OF LEFT UPPER LOBE BRONCHUS, VIA NATURAL OR ARTIFICIAL OPENING ENDOSCOPIC, DIAGNOSTIC: ICD-10-PCS | Performed by: INTERNAL MEDICINE

## 2019-12-30 PROCEDURE — 2580000003 HC RX 258: Performed by: ANESTHESIOLOGY

## 2019-12-30 PROCEDURE — 87641 MR-STAPH DNA AMP PROBE: CPT

## 2019-12-30 PROCEDURE — 6370000000 HC RX 637 (ALT 250 FOR IP): Performed by: PHYSICIAN ASSISTANT

## 2019-12-30 PROCEDURE — 31624 DX BRONCHOSCOPE/LAVAGE: CPT | Performed by: INTERNAL MEDICINE

## 2019-12-30 PROCEDURE — 3700000000 HC ANESTHESIA ATTENDED CARE: Performed by: INTERNAL MEDICINE

## 2019-12-30 PROCEDURE — 36415 COLL VENOUS BLD VENIPUNCTURE: CPT

## 2019-12-30 PROCEDURE — 87070 CULTURE OTHR SPECIMN AEROBIC: CPT

## 2019-12-30 PROCEDURE — 31623 DX BRONCHOSCOPE/BRUSH: CPT | Performed by: INTERNAL MEDICINE

## 2019-12-30 PROCEDURE — 87541 LEGION PNEUMO DNA AMP PROB: CPT

## 2019-12-30 PROCEDURE — 80048 BASIC METABOLIC PNL TOTAL CA: CPT

## 2019-12-30 PROCEDURE — 87581 M.PNEUMON DNA AMP PROBE: CPT

## 2019-12-30 PROCEDURE — 87102 FUNGUS ISOLATION CULTURE: CPT

## 2019-12-30 PROCEDURE — 88305 TISSUE EXAM BY PATHOLOGIST: CPT

## 2019-12-30 PROCEDURE — 0B9G8ZX DRAINAGE OF LEFT UPPER LUNG LOBE, VIA NATURAL OR ARTIFICIAL OPENING ENDOSCOPIC, DIAGNOSTIC: ICD-10-PCS | Performed by: INTERNAL MEDICINE

## 2019-12-30 PROCEDURE — 88104 CYTOPATH FL NONGYN SMEARS: CPT

## 2019-12-30 PROCEDURE — 7100000001 HC PACU RECOVERY - ADDTL 15 MIN: Performed by: INTERNAL MEDICINE

## 2019-12-30 PROCEDURE — 99232 SBSQ HOSP IP/OBS MODERATE 35: CPT | Performed by: INTERNAL MEDICINE

## 2019-12-30 PROCEDURE — 80202 ASSAY OF VANCOMYCIN: CPT

## 2019-12-30 PROCEDURE — 6360000002 HC RX W HCPCS: Performed by: FAMILY MEDICINE

## 2019-12-30 PROCEDURE — 2500000003 HC RX 250 WO HCPCS: Performed by: NURSE ANESTHETIST, CERTIFIED REGISTERED

## 2019-12-30 PROCEDURE — 6360000002 HC RX W HCPCS: Performed by: INTERNAL MEDICINE

## 2019-12-30 PROCEDURE — 7100000000 HC PACU RECOVERY - FIRST 15 MIN: Performed by: INTERNAL MEDICINE

## 2019-12-30 PROCEDURE — 2580000003 HC RX 258: Performed by: NURSE ANESTHETIST, CERTIFIED REGISTERED

## 2019-12-30 PROCEDURE — 99223 1ST HOSP IP/OBS HIGH 75: CPT | Performed by: INTERNAL MEDICINE

## 2019-12-30 PROCEDURE — 2580000003 HC RX 258: Performed by: FAMILY MEDICINE

## 2019-12-30 PROCEDURE — 87798 DETECT AGENT NOS DNA AMP: CPT

## 2019-12-30 PROCEDURE — 2709999900 HC NON-CHARGEABLE SUPPLY: Performed by: INTERNAL MEDICINE

## 2019-12-30 PROCEDURE — 3609010800 HC BRONCHOSCOPY ALVEOLAR LAVAGE: Performed by: INTERNAL MEDICINE

## 2019-12-30 PROCEDURE — 87206 SMEAR FLUORESCENT/ACID STAI: CPT

## 2019-12-30 PROCEDURE — 87205 SMEAR GRAM STAIN: CPT

## 2019-12-30 PROCEDURE — 88112 CYTOPATH CELL ENHANCE TECH: CPT

## 2019-12-30 PROCEDURE — 87116 MYCOBACTERIA CULTURE: CPT

## 2019-12-30 PROCEDURE — 2580000003 HC RX 258: Performed by: INTERNAL MEDICINE

## 2019-12-30 PROCEDURE — 89051 BODY FLUID CELL COUNT: CPT

## 2019-12-30 PROCEDURE — 87106 FUNGI IDENTIFICATION YEAST: CPT

## 2019-12-30 PROCEDURE — 0100U HC RESPIRPTHGN MULT REV TRANS & AMP PRB TECH 21 TRGT: CPT

## 2019-12-30 PROCEDURE — 3700000001 HC ADD 15 MINUTES (ANESTHESIA): Performed by: INTERNAL MEDICINE

## 2019-12-30 PROCEDURE — 87385 HISTOPLASMA CAPSUL AG IA: CPT

## 2019-12-30 PROCEDURE — 93005 ELECTROCARDIOGRAM TRACING: CPT | Performed by: PHYSICIAN ASSISTANT

## 2019-12-30 PROCEDURE — 87449 NOS EACH ORGANISM AG IA: CPT

## 2019-12-30 PROCEDURE — 2580000003 HC RX 258: Performed by: EMERGENCY MEDICINE

## 2019-12-30 PROCEDURE — 3609011100 HC BRONCHOSCOPY BRUSHINGS: Performed by: INTERNAL MEDICINE

## 2019-12-30 PROCEDURE — 6360000002 HC RX W HCPCS: Performed by: NURSE ANESTHETIST, CERTIFIED REGISTERED

## 2019-12-30 PROCEDURE — 87015 SPECIMEN INFECT AGNT CONCNTJ: CPT

## 2019-12-30 PROCEDURE — 85027 COMPLETE CBC AUTOMATED: CPT

## 2019-12-30 PROCEDURE — 6370000000 HC RX 637 (ALT 250 FOR IP): Performed by: FAMILY MEDICINE

## 2019-12-30 RX ORDER — SODIUM CHLORIDE 9 MG/ML
INJECTION, SOLUTION INTRAVENOUS CONTINUOUS
Status: DISCONTINUED | OUTPATIENT
Start: 2019-12-30 | End: 2020-01-02 | Stop reason: HOSPADM

## 2019-12-30 RX ORDER — PROPOFOL 10 MG/ML
INJECTION, EMULSION INTRAVENOUS PRN
Status: DISCONTINUED | OUTPATIENT
Start: 2019-12-30 | End: 2019-12-30 | Stop reason: SDUPTHER

## 2019-12-30 RX ORDER — KETOROLAC TROMETHAMINE 30 MG/ML
15 INJECTION, SOLUTION INTRAMUSCULAR; INTRAVENOUS EVERY 6 HOURS PRN
Status: DISCONTINUED | OUTPATIENT
Start: 2019-12-30 | End: 2020-01-02 | Stop reason: HOSPADM

## 2019-12-30 RX ORDER — PROPOFOL 10 MG/ML
INJECTION, EMULSION INTRAVENOUS CONTINUOUS PRN
Status: DISCONTINUED | OUTPATIENT
Start: 2019-12-30 | End: 2019-12-30 | Stop reason: SDUPTHER

## 2019-12-30 RX ORDER — LANOLIN ALCOHOL/MO/W.PET/CERES
3 CREAM (GRAM) TOPICAL NIGHTLY PRN
Status: DISCONTINUED | OUTPATIENT
Start: 2019-12-30 | End: 2020-01-02 | Stop reason: HOSPADM

## 2019-12-30 RX ORDER — SODIUM CHLORIDE 9 MG/ML
INJECTION, SOLUTION INTRAVENOUS CONTINUOUS PRN
Status: DISCONTINUED | OUTPATIENT
Start: 2019-12-30 | End: 2019-12-30 | Stop reason: SDUPTHER

## 2019-12-30 RX ORDER — LIDOCAINE HYDROCHLORIDE 20 MG/ML
INJECTION, SOLUTION EPIDURAL; INFILTRATION; INTRACAUDAL; PERINEURAL PRN
Status: DISCONTINUED | OUTPATIENT
Start: 2019-12-30 | End: 2019-12-30 | Stop reason: SDUPTHER

## 2019-12-30 RX ADMIN — GABAPENTIN 600 MG: 300 CAPSULE ORAL at 14:28

## 2019-12-30 RX ADMIN — PROPOFOL 70 MG: 10 INJECTION, EMULSION INTRAVENOUS at 12:23

## 2019-12-30 RX ADMIN — VERAPAMIL HYDROCHLORIDE 80 MG: 80 TABLET, FILM COATED ORAL at 14:28

## 2019-12-30 RX ADMIN — Medication 10 ML: at 09:20

## 2019-12-30 RX ADMIN — CEFEPIME HYDROCHLORIDE 2 G: 2 INJECTION, POWDER, FOR SOLUTION INTRAVENOUS at 20:52

## 2019-12-30 RX ADMIN — CEFEPIME HYDROCHLORIDE 2 G: 2 INJECTION, POWDER, FOR SOLUTION INTRAVENOUS at 03:02

## 2019-12-30 RX ADMIN — PROPOFOL 30 MG: 10 INJECTION, EMULSION INTRAVENOUS at 12:25

## 2019-12-30 RX ADMIN — OXYCODONE HYDROCHLORIDE AND ACETAMINOPHEN 1 TABLET: 5; 325 TABLET ORAL at 14:28

## 2019-12-30 RX ADMIN — LIDOCAINE HYDROCHLORIDE 100 MG: 20 INJECTION, SOLUTION EPIDURAL; INFILTRATION; INTRACAUDAL; PERINEURAL at 12:22

## 2019-12-30 RX ADMIN — Medication 10 ML: at 21:05

## 2019-12-30 RX ADMIN — VERAPAMIL HYDROCHLORIDE 80 MG: 80 TABLET, FILM COATED ORAL at 20:53

## 2019-12-30 RX ADMIN — OXYCODONE HYDROCHLORIDE AND ACETAMINOPHEN 1 TABLET: 5; 325 TABLET ORAL at 04:21

## 2019-12-30 RX ADMIN — SODIUM CHLORIDE: 9 INJECTION, SOLUTION INTRAVENOUS at 12:15

## 2019-12-30 RX ADMIN — OXYCODONE HYDROCHLORIDE AND ACETAMINOPHEN 1 TABLET: 5; 325 TABLET ORAL at 00:39

## 2019-12-30 RX ADMIN — OXYCODONE HYDROCHLORIDE AND ACETAMINOPHEN 1 TABLET: 5; 325 TABLET ORAL at 18:26

## 2019-12-30 RX ADMIN — MELATONIN TAB 3 MG 3 MG: 3 TAB at 23:08

## 2019-12-30 RX ADMIN — OXYCODONE HYDROCHLORIDE AND ACETAMINOPHEN 1 TABLET: 5; 325 TABLET ORAL at 22:30

## 2019-12-30 RX ADMIN — SODIUM CHLORIDE: 9 INJECTION, SOLUTION INTRAVENOUS at 14:32

## 2019-12-30 RX ADMIN — PROPOFOL 50 MG: 10 INJECTION, EMULSION INTRAVENOUS at 12:28

## 2019-12-30 RX ADMIN — GABAPENTIN 600 MG: 300 CAPSULE ORAL at 20:53

## 2019-12-30 RX ADMIN — PROPOFOL 150 MCG/KG/MIN: 10 INJECTION, EMULSION INTRAVENOUS at 12:23

## 2019-12-30 RX ADMIN — VANCOMYCIN HYDROCHLORIDE 1250 MG: 10 INJECTION, POWDER, LYOPHILIZED, FOR SOLUTION INTRAVENOUS at 18:08

## 2019-12-30 RX ADMIN — OXYCODONE HYDROCHLORIDE AND ACETAMINOPHEN 1 TABLET: 5; 325 TABLET ORAL at 09:31

## 2019-12-30 RX ADMIN — CEFEPIME HYDROCHLORIDE 2 G: 2 INJECTION, POWDER, FOR SOLUTION INTRAVENOUS at 11:41

## 2019-12-30 RX ADMIN — SODIUM CHLORIDE: 9 INJECTION, SOLUTION INTRAVENOUS at 12:13

## 2019-12-30 RX ADMIN — VANCOMYCIN HYDROCHLORIDE 1000 MG: 1 INJECTION, SOLUTION INTRAVENOUS at 00:44

## 2019-12-30 ASSESSMENT — PAIN DESCRIPTION - PAIN TYPE
TYPE: CHRONIC PAIN
TYPE: CHRONIC PAIN

## 2019-12-30 ASSESSMENT — PAIN SCALES - GENERAL
PAINLEVEL_OUTOF10: 10
PAINLEVEL_OUTOF10: 9
PAINLEVEL_OUTOF10: 9
PAINLEVEL_OUTOF10: 10
PAINLEVEL_OUTOF10: 8

## 2019-12-30 ASSESSMENT — ENCOUNTER SYMPTOMS
SHORTNESS OF BREATH: 1
EYE DISCHARGE: 0
EYE REDNESS: 0
RHINORRHEA: 0
SORE THROAT: 0
TROUBLE SWALLOWING: 0
NAUSEA: 0
CONSTIPATION: 0
ABDOMINAL PAIN: 0
COUGH: 1
BACK PAIN: 0
WHEEZING: 0
SHORTNESS OF BREATH: 1
DIARRHEA: 0

## 2019-12-30 ASSESSMENT — PAIN DESCRIPTION - PROGRESSION
CLINICAL_PROGRESSION: NOT CHANGED

## 2019-12-30 ASSESSMENT — PAIN DESCRIPTION - FREQUENCY: FREQUENCY: CONTINUOUS

## 2019-12-30 ASSESSMENT — PAIN DESCRIPTION - LOCATION
LOCATION: BACK
LOCATION: BACK

## 2019-12-30 ASSESSMENT — PAIN SCALES - WONG BAKER
WONGBAKER_NUMERICALRESPONSE: 2

## 2019-12-30 ASSESSMENT — PAIN DESCRIPTION - ORIENTATION: ORIENTATION: LEFT

## 2019-12-30 ASSESSMENT — PAIN DESCRIPTION - DESCRIPTORS: DESCRIPTORS: NAGGING;SHARP

## 2019-12-30 ASSESSMENT — PAIN DESCRIPTION - ONSET: ONSET: ON-GOING

## 2019-12-30 ASSESSMENT — PAIN - FUNCTIONAL ASSESSMENT: PAIN_FUNCTIONAL_ASSESSMENT: PREVENTS OR INTERFERES SOME ACTIVE ACTIVITIES AND ADLS

## 2019-12-30 NOTE — CONSULTS
Infectious Diseases   Consult Note        Admission Date: 12/27/2019  Hospital Day: Hospital Day: 4   Attending: Jason Trevino MD  Date of service: 12/30/19     Reason for admission: Pneumonia [J18.9]  Pneumonia [J18.9]    Chief complaint/ Reason for consult: Sepsis with high fever and leukocytosis of admission, multifocal pneumonia    Microbiology:        I have reviewed allavailable micro lab data and cultures    · Blood culture (2/2) - collected on 12/27/2019: in process      Antibiotics and immunizations:       Current antibiotics: All antibiotics and their doses were reviewed by me    Recent Abx Admin                   cefepime (MAXIPIME) 2 g IVPB minibag (g) 2 g New Bag 12/30/19 1141     2 g New Bag  0302     2 g New Bag 12/29/19 1800    vancomycin (VANCOCIN) 1000 mg in dextrose 5% 200 mL IVPB (mg) 1,000 mg New Bag 12/30/19 0044                  Immunization History: All immunization history was reviewed by me today. Immunization History   Administered Date(s) Administered    Influenza Whole 10/01/2012    Pneumococcal Conjugate 7-valent (Prevnar7) 10/12/2006    Tdap (Boostrix, Adacel) 03/23/2018       Known drug allergies: All allergies were reviewed and updated    Allergies   Allergen Reactions    Amitriptyline Other (See Comments)     crying    Diltiazem Hcl Other (See Comments)     crying    Lansoprazole Other (See Comments)     crying    Tricyclic Antidepressants Other (See Comments)     crying    Trilisate [Choline Magnesium Trisalicylate] Other (See Comments)     crying    Codeine Nausea And Vomiting    Demerol Nausea And Vomiting       Social history:     Social History:  All social andepidemiologic history was reviewed and updated by me today as needed. · Tobacco use:   reports that he has been smoking. He has a 42.00 pack-year smoking history. He has never used smokeless tobacco.  · Alcohol use:   reports no history of alcohol use.   · Currently lives in: [address] [address]  ·  reports no history of drug use. Assessment:     The patient is a 71 y.o. old male who  has a past medical history of Arthritis, Gong's esophagus, Chronic back pain, COPD (chronic obstructive pulmonary disease) ([address] Depression, GERD (gastroesophageal reflux disease), HYPERCHOLESTERAEMIA, Hypertension, Prinzmetal angina ([address] Spinal stenoses, and TIA (transient ischaemic attack). with following problems:    · High fever and leukocytosis on admission  · Multifocal pneumonia involving left upper lobe and nodular opacities in the lingula and left lower lobe area  · Status post bronchoscopy  · Mediastinal and bilateral hilar lymphadenopathy  · Extensive emphysema  · Status post cholecystectomy  · Left nephrolithiasis  · History of splenectomy  · COPD  · Heavy smoker      Discussion:      The patient has history of splenectomy and has a dense pneumonia in the left upper lobe area. He is at high risk of pneumococcal pneumonia. He is currently on IV vancomycin and IV cefepime. He has undergone a bronchoscopy today. Imaging of CT angiogram of the chest reviewed. Shows dense) to the left upper lobe    Plan:     Diagnostic Workup:    · Will order  urine Legionella and pneumococcal antigens  · Will also order urine histoplasma antigen  · Will order respiratory film array viral PCR panel. The patient may have had bacterial pneumonia secondary to  initial viral infection  · We will follow-up on BAL cultures and cytology  · Nasal MRSA screen. Can be protective of MRSA pneumonia  · Continue to follow fever curve, WBC count and blood cultures  · Follow up on liverand renal functions closely    Antimicrobials:    · Will continue IV vancomycin  Check Vancomycin trough before the 5th dose. Target vancomycin trough of around 15. Keep vancomycin trough below 20 at all times.   Avoid increasing the dose of vancomycin above a total of 4 grams in a 24-hour period in patients younger than 45 years and above 3 agent toxicity. Thank you for involving me in the care of your patient. I will continue to follow. If you have any additional questions, please do not hesitate to contact me. Subjective:     Presenting complaint in ER:     Chief Complaint   Patient presents with    Cough     c/o coughing, congestion, pt has scoliosis and c/o pain due to coughing so much. Pt states he was diagnosed with pneumonia 2 weeks ago and finished the antibiotic course completly. HPI: Caitlyn Merchant is a 71 y.o. male patient, who was seen at the request of Dr. El Carlson MD.    History was obtained from chart review and the patient. The patient was admitted on 12/27/2019. I have been consulted to see the patient for above mentioned reason(s). The patient has multiple medical comorbidities, and presented to the ER for cough, congestion and a pleuritic chest pain. The patient apparently had a pneumonia 2 weeks ago and had finished an antibiotic course    In the ER, he was noted to have a high fever up to one 2.4 and white cell count of 25,000. He was admitted. Blood cultures were sent. He was started empirically on IV vancomycin and IV cefepime. Chest x-ray from admission was indicative of multifocal pneumonia. Patient had a CT angiogram of the chest done on 12/27/2019 which showed multifocal pneumonia with a dense infiltrate in the left upper lobe area. He had a bronchoscopy done today. Purulent secretions were noted. I have been asked to see the patient for this complicated infection. Past Medical History: All past medical history reviewed today.     Past Medical History:   Diagnosis Date    Arthritis     Gong's esophagus     Chronic back pain     COPD (chronic obstructive pulmonary disease) (HCC)     Depression     GERD (gastroesophageal reflux disease)     HYPERCHOLESTERAEMIA     Hypertension     Prinzmetal angina (HCC)     Spinal stenoses     TIA (transient ischaemic attack)          Past Surgical History: All pastsurgical history was reviewed today. Past Surgical History:   Procedure Laterality Date    APPENDECTOMY      BACK SURGERY      CHOLECYSTECTOMY      CLAVICLE SURGERY      COLONOSCOPY      WITH BIOPSY    JOINT REPLACEMENT      RIGHT KNEE    SPLENECTOMY      UPPER GASTROINTESTINAL ENDOSCOPY  5-14-10    egd with biopsy    UPPER GASTROINTESTINAL ENDOSCOPY  11-27-12    Esophagogastroduodenoscopy with biopsy, Romero esophageal dilation, and Botulinum injection of the pylorus         Family History: All family history was reviewed today. Problem Relation Age of Onset    Heart Disease Mother     Cancer Father         lung         Medications: All current and past medications were reviewed. Medications Prior to Admission: lisinopril (PRINIVIL;ZESTRIL) 40 MG tablet, Take 40 mg by mouth daily  gabapentin (NEURONTIN) 600 MG tablet, Take 600 mg by mouth 3 times daily. DULoxetine (CYMBALTA) 20 MG extended release capsule, Take 20 mg by mouth  rabeprazole (ACIPHEX) 20 MG tablet, Take 1 tablet by mouth 4 times daily (before meals and nightly). verapamil (CALAN) 80 MG tablet, Take 80 mg by mouth 3 times daily.  gabapentin  600 mg Oral TID    lisinopril  40 mg Oral Daily    verapamil  80 mg Oral TID    cefepime  2 g Intravenous Q8H    vancomycin  1,000 mg Intravenous Q12H    DULoxetine  20 mg Oral Daily    sodium chloride flush  10 mL Intravenous 2 times per day          REVIEW OF SYSTEMS:       Review of Systems   Constitutional: Positive for fatigue. Negative for chills, diaphoresis and fever. HENT: Negative for ear discharge, ear pain, rhinorrhea, sore throat and trouble swallowing. Eyes: Negative for discharge and redness. Respiratory: Positive for cough and shortness of breath. Negative for wheezing. Cardiovascular: Negative for chest pain and leg swelling.    Gastrointestinal: Negative for abdominal pain, constipation, diarrhea and Abdomen is soft. Tenderness: There is no tenderness. There is no right CVA tenderness, left CVA tenderness, guarding or rebound. Musculoskeletal: Normal range of motion. General: No swelling or tenderness. Lymphadenopathy:      Cervical: No cervical adenopathy. Skin:     General: Skin is warm and dry. Coloration: Skin is not jaundiced. Findings: No erythema or rash. Neurological:      General: No focal deficit present. Mental Status: He is alert and oriented to person, place, and time. Mental status is at baseline. Motor: No abnormal muscle tone. Psychiatric:         Mood and Affect: Mood normal.         Behavior: Behavior normal.         Thought Content: Thought content normal.           Lines: All vascular access sites are healthy with no local erythema, discharge or tenderness. Intake and output:     I/O last 3 completed shifts:  In: -   Out: 2975 [Urine:2975]    Lab Data:   All available labs were reviewed by me today. CBC:   Recent Labs     12/27/19  1654 12/28/19  1145 12/29/19  0747 12/30/19  1001   WBC 25.0* 24.3* 15.4* 13.1*   RBC 4.48 4.26 3.95* 4.14*   HGB 13.4* 12.7* 11.6* 12.5*   HCT 39.5* 37.6* 34.9* 36.4*   * 653* 567* 614*   MCV 88.2 88.3 88.3 87.8   MCH 29.9 29.8 29.3 30.1   MCHC 33.9 33.7 33.2 34.3   RDW 15.1 14.8 14.6 14.7   BANDSPCT 2  --   --   --         BMP:  Recent Labs     12/27/19  1654 12/28/19  1145 12/30/19  1001   * 133* 132*   K 3.8 3.7 3.8   CL 95* 97* 93*   CO2 22 24 28   BUN 11 11 7   CREATININE <0.5* 0.5* <0.5*   CALCIUM 9.1 8.5 8.9   GLUCOSE 89 206* 96        Hepatic FunctionPanel:   Lab Results   Component Value Date    ALKPHOS 100 12/27/2019    ALT 8 12/27/2019    AST 9 12/27/2019    PROT 7.3 12/27/2019    PROT 6.9 08/31/2012    BILITOT 0.6 12/27/2019    LABALBU 3.7 12/27/2019       CPK: No results found for: CKTOTAL  ESR: No results found for: SEDRATE  CRP: No results found for: CRP      Imaging:     All pertinent images and reports for the current visit were reviewed by meduring this visit. CT CHEST PULMONARY EMBOLISM W CONTRAST   Final Result   1. Interval increase in size of large masslike pulmonary opacity within left   upper lobe. Differential includes worsening pneumonia or worsening neoplasm. Recommend short interval follow-up examination in 2-4 weeks. 2. Interval improvement in nodular pulmonary opacities within lingula and   left lower lobe. Scattered bibasilar atelectasis. Continued attention on   follow-up examination is recommended. 3. Mediastinal and bilateral hilar lymphadenopathy. 4. Extensive emphysema. 5. No evidence of acute pulmonary embolism. 6. Hepatomegaly. Status post cholecystectomy, with intrahepatic and   extrahepatic ductal dilatation. This is partially imaged. 7. Nonobstructing left renal calculus. Minimal renal pelviectasis is   partially imaged. 8. Nonspecific thickening of the distal esophagus. This can be re-evaluated   with upper endoscopy non emergently. XR CHEST STANDARD (2 VW)   Final Result   Residual opacities in the perihilar and apical regions of the left lung. Given some improvement, infectious etiology appears most likely. As noted   previously, atypical etiologies, such as fungal pneumonia, are considered. Although less likely, neoplasm is still considered in the differential.      Changes consistent with COPD. RECOMMENDATION:   Surveillance until resolution is recommended. Outside records:    Labs, Microbiology, Radiology and pertinent results from Care everywhere, if available, were reviewed as a part ofthe consultation.       Problem list:       Patient Active Problem List   Diagnosis Code    Respiratory distress R06.03    Opiate overdose ([facility] T40.601A    Pneumonia due to organism J18.9    Chronic pain G89.29    Sepsis ([facility] A41.9    Acute encephalopathy G93.40    PNA (pneumonia) J18.9    Pneumonia J18.9    Abnormal CT of the chest R93.89    Status post bronchoscopy Z98.890    Mediastinal lymphadenopathy R59.0    Hilar lymphadenopathy R59.0    Status post cholecystectomy Z90.49    Nephrolithiasis N20.0    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    Chronic obstructive pulmonary disease (HCC) J44.9    Smoker F17.200         Please note that this chart was generated using Dragon dictation software. Although every effort was made to ensure the accuracy of this automated transcription, some errors in transcription may have occurred inadvertently. If you may need any clarification, please do not hesitate to contact me through EPIC or at the phone number provided below with my electronic signature. Any pictures or media included in this note were obtained after taking informed verbal consent from the patient and with their approval to include those in the patient's medical record.       Seth Arcos MD, MPH  12/30/19, 2:53 PM   [facility] Infectious Disease   Office: [phone]  Fax: [phone]  Tuesday AM clinic:   [address] [facility] [place] [address]  Thursday AM clinic: [address] [facility] [place]

## 2019-12-30 NOTE — PROGRESS NOTES
Reviewed patient's medical and surgical history in electronic record and with patient at the bedside. All questions regarding procedure answered. Scope number and equipment verified using a two person system. Family in waiting room.     Electronically signed by Nichole Bishop RN on 12/30/2019 at 12:21 PM

## 2019-12-30 NOTE — OP NOTE
the right main bronchus and then into the RUL, RML, and RLL bronchi and segmental bronchi. Endobronchial findings: Purulent secretions noted over the KARY, some underlying inflammation. No lung mass noted. The patient was taken to the Endoscopy Recovery area in satisfactory condition. Recommendation:    1. F/U on culture results  2. F/U on cytology results    Attestation: I performed the procedure.     Magda Jean-Baptiste

## 2019-12-30 NOTE — PROGRESS NOTES
(63.8 kg)   12/08/19 147 lb 3.2 oz (66.8 kg)   03/30/18 145 lb 1 oz (65.8 kg)       General appearance:  Appears comfortable  Eyes: Sclera clear. Pupils equal.  ENT: Moist oral mucosa. Trachea midline, no adenopathy. Cardiovascular: Regular rhythm, normal S1, S2. No murmur. No edema in lower extremities  Respiratory: Not using accessory muscles. Good inspiratory effort. Clear to auscultation bilaterally, no wheeze or crackles. GI: Abdomen soft, no tenderness, not distended, normal bowel sounds  Musculoskeletal: No cyanosis in digits, neck supple  Neurology: CN 2-12 grossly intact. No speech or motor deficits  Psych: Normal affect. Alert and oriented in time, place and person  Skin: Warm, dry, normal turgor    Labs and Tests:  CBC:   Recent Labs     12/28/19  1145 12/29/19  0747 12/30/19  1001   WBC 24.3* 15.4* 13.1*   HGB 12.7* 11.6* 12.5*   * 567* 614*     BMP:    Recent Labs     12/27/19  1654 12/28/19  1145 12/30/19  1001   * 133* 132*   K 3.8 3.7 3.8   CL 95* 97* 93*   CO2 22 24 28   BUN 11 11 7   CREATININE <0.5* 0.5* <0.5*   GLUCOSE 89 206* 96     Hepatic:   Recent Labs     12/27/19  1654   AST 9*   ALT 8*   BILITOT 0.6   ALKPHOS 100       CTA chest 12/27/2019:  1. Interval increase in size of large masslike pulmonary opacity within left   upper lobe.  Differential includes worsening pneumonia or worsening neoplasm. Recommend short interval follow-up examination in 2-4 weeks.       2. Interval improvement in nodular pulmonary opacities within lingula and   left lower lobe.  Scattered bibasilar atelectasis.  Continued attention on   follow-up examination is recommended.       3. Mediastinal and bilateral hilar lymphadenopathy.       4. Extensive emphysema.       5. No evidence of acute pulmonary embolism.       6. Hepatomegaly.  Status post cholecystectomy, with intrahepatic and   extrahepatic ductal dilatation.  This is partially imaged.      7. Nonobstructing left renal calculus.  Minimal renal pelviectasis is   partially imaged.       8. Nonspecific thickening of the distal esophagus.  This can be re-evaluated   with upper endoscopy non emergently. CXR 12/27/2019:  Residual opacities in the perihilar and apical regions of the left lung. Given some improvement, infectious etiology appears most likely.  As noted   previously, atypical etiologies, such as fungal pneumonia, are considered. Although less likely, neoplasm is still considered in the differential.       Changes consistent with COPD. Problem List  Active Problems:    Pneumonia  Resolved Problems:    * No resolved hospital problems. *       Assessment & Plan:   1. HAP. Underwent bronch this am, purulent secretion noted. Bronchial lavage and brush results pending. Management per pulmonary and ID. On cefepime and vancomycin, pharmacy dosing. WBC trending down. 2. HTN. Controlled. Continue lisinopril, renal numbers stable. 3. Sepsis present on admission. Resolved. 4. Chronic back pain. At baseline. Continue gabapentin and prn ketorolac and Percocet. 5. Tobacco use. Advised cessation.       Diet: Diet NPO, After Midnight  Code:Full Code  DVT PPX: [facility] - CNP   12/30/2019 11:20 AM

## 2019-12-30 NOTE — PROGRESS NOTES
Clinical Pharmacist Note: Pharmacy to dose Vancomycin. Patient is currently receiving Vancomycin 1000 mg IV q 12 hrs for Pneumonia. Vancomycin trough level was 7.5, which reflects about a 14-hour trough due to patient being in endoscopy when trough was due. True trough is likely higher and likely therapeutic, however on the lower end of goal range. Increase dose to 1250 mg Q12H starting at 1800 today. Will order vancomycin trough prior to 4th dose of dosing regimen on 1/1/19 @ 0500. Pharmacy will continue to monitor and adjust as necessary.     Thank you,     Cesar Crespo, PharmD  Clinical Pharmacist [id number]  12/30/2019

## 2019-12-30 NOTE — PROGRESS NOTES
Dr. Loly Conde at bedside talking to patient    Electronically signed by Lluvia Garcia RN on 12/30/2019 at 12:50 PM

## 2019-12-31 LAB
BLOOD CULTURE, ROUTINE: NORMAL
CULTURE, BLOOD 2: NORMAL
EKG ATRIAL RATE: 74 BPM
EKG DIAGNOSIS: NORMAL
EKG P AXIS: 58 DEGREES
EKG P-R INTERVAL: 164 MS
EKG Q-T INTERVAL: 452 MS
EKG QRS DURATION: 110 MS
EKG QTC CALCULATION (BAZETT): 501 MS
EKG R AXIS: 72 DEGREES
EKG T AXIS: 72 DEGREES
EKG VENTRICULAR RATE: 74 BPM
L. PNEUMOPHILA SEROGP 1 UR AG: NORMAL
MRSA SCREEN RT-PCR: NORMAL
PATH CONSULT FLUID: NORMAL
REPORT: NORMAL
RESPIRATORY PANEL PCR: NORMAL
STREP PNEUMONIAE ANTIGEN, URINE: NORMAL

## 2019-12-31 PROCEDURE — 2580000003 HC RX 258: Performed by: ANESTHESIOLOGY

## 2019-12-31 PROCEDURE — 2580000003 HC RX 258: Performed by: INTERNAL MEDICINE

## 2019-12-31 PROCEDURE — 6360000002 HC RX W HCPCS: Performed by: INTERNAL MEDICINE

## 2019-12-31 PROCEDURE — 6370000000 HC RX 637 (ALT 250 FOR IP): Performed by: PHYSICIAN ASSISTANT

## 2019-12-31 PROCEDURE — 2580000003 HC RX 258: Performed by: EMERGENCY MEDICINE

## 2019-12-31 PROCEDURE — 93010 ELECTROCARDIOGRAM REPORT: CPT | Performed by: INTERNAL MEDICINE

## 2019-12-31 PROCEDURE — 2580000003 HC RX 258: Performed by: FAMILY MEDICINE

## 2019-12-31 PROCEDURE — 94760 N-INVAS EAR/PLS OXIMETRY 1: CPT

## 2019-12-31 PROCEDURE — 99233 SBSQ HOSP IP/OBS HIGH 50: CPT | Performed by: INTERNAL MEDICINE

## 2019-12-31 PROCEDURE — 99232 SBSQ HOSP IP/OBS MODERATE 35: CPT | Performed by: INTERNAL MEDICINE

## 2019-12-31 PROCEDURE — 6370000000 HC RX 637 (ALT 250 FOR IP): Performed by: INTERNAL MEDICINE

## 2019-12-31 PROCEDURE — 6370000000 HC RX 637 (ALT 250 FOR IP): Performed by: FAMILY MEDICINE

## 2019-12-31 PROCEDURE — 1200000000 HC SEMI PRIVATE

## 2019-12-31 PROCEDURE — 6360000002 HC RX W HCPCS: Performed by: FAMILY MEDICINE

## 2019-12-31 RX ORDER — NICOTINE 21 MG/24HR
1 PATCH, TRANSDERMAL 24 HOURS TRANSDERMAL DAILY
Status: DISCONTINUED | OUTPATIENT
Start: 2019-12-31 | End: 2020-01-02 | Stop reason: HOSPADM

## 2019-12-31 RX ORDER — METRONIDAZOLE 250 MG/1
500 TABLET ORAL EVERY 8 HOURS SCHEDULED
Status: DISCONTINUED | OUTPATIENT
Start: 2019-12-31 | End: 2020-01-01

## 2019-12-31 RX ADMIN — VANCOMYCIN HYDROCHLORIDE 1250 MG: 10 INJECTION, POWDER, LYOPHILIZED, FOR SOLUTION INTRAVENOUS at 18:25

## 2019-12-31 RX ADMIN — OXYCODONE HYDROCHLORIDE AND ACETAMINOPHEN 1 TABLET: 5; 325 TABLET ORAL at 17:37

## 2019-12-31 RX ADMIN — OXYCODONE HYDROCHLORIDE AND ACETAMINOPHEN 1 TABLET: 5; 325 TABLET ORAL at 22:05

## 2019-12-31 RX ADMIN — CEFEPIME HYDROCHLORIDE 2 G: 2 INJECTION, POWDER, FOR SOLUTION INTRAVENOUS at 20:24

## 2019-12-31 RX ADMIN — KETOROLAC TROMETHAMINE 15 MG: 30 INJECTION, SOLUTION INTRAMUSCULAR at 09:36

## 2019-12-31 RX ADMIN — LISINOPRIL 40 MG: 20 TABLET ORAL at 09:16

## 2019-12-31 RX ADMIN — CEFEPIME HYDROCHLORIDE 2 G: 2 INJECTION, POWDER, FOR SOLUTION INTRAVENOUS at 09:17

## 2019-12-31 RX ADMIN — Medication 10 ML: at 09:18

## 2019-12-31 RX ADMIN — GABAPENTIN 600 MG: 300 CAPSULE ORAL at 13:29

## 2019-12-31 RX ADMIN — OXYCODONE HYDROCHLORIDE AND ACETAMINOPHEN 1 TABLET: 5; 325 TABLET ORAL at 09:15

## 2019-12-31 RX ADMIN — VANCOMYCIN HYDROCHLORIDE 1250 MG: 10 INJECTION, POWDER, LYOPHILIZED, FOR SOLUTION INTRAVENOUS at 06:01

## 2019-12-31 RX ADMIN — KETOROLAC TROMETHAMINE 15 MG: 30 INJECTION, SOLUTION INTRAMUSCULAR at 15:45

## 2019-12-31 RX ADMIN — VERAPAMIL HYDROCHLORIDE 80 MG: 80 TABLET, FILM COATED ORAL at 13:28

## 2019-12-31 RX ADMIN — VERAPAMIL HYDROCHLORIDE 80 MG: 80 TABLET, FILM COATED ORAL at 09:17

## 2019-12-31 RX ADMIN — GABAPENTIN 600 MG: 300 CAPSULE ORAL at 20:24

## 2019-12-31 RX ADMIN — OXYCODONE HYDROCHLORIDE AND ACETAMINOPHEN 1 TABLET: 5; 325 TABLET ORAL at 13:28

## 2019-12-31 RX ADMIN — SODIUM CHLORIDE: 9 INJECTION, SOLUTION INTRAVENOUS at 16:08

## 2019-12-31 RX ADMIN — METRONIDAZOLE 500 MG: 250 TABLET, FILM COATED ORAL at 15:04

## 2019-12-31 RX ADMIN — KETOROLAC TROMETHAMINE 15 MG: 30 INJECTION, SOLUTION INTRAMUSCULAR at 22:06

## 2019-12-31 RX ADMIN — MELATONIN TAB 3 MG 3 MG: 3 TAB at 22:05

## 2019-12-31 RX ADMIN — OXYCODONE HYDROCHLORIDE AND ACETAMINOPHEN 1 TABLET: 5; 325 TABLET ORAL at 03:30

## 2019-12-31 RX ADMIN — DULOXETINE HYDROCHLORIDE 20 MG: 20 CAPSULE, DELAYED RELEASE ORAL at 09:17

## 2019-12-31 RX ADMIN — GABAPENTIN 600 MG: 300 CAPSULE ORAL at 09:16

## 2019-12-31 RX ADMIN — VERAPAMIL HYDROCHLORIDE 80 MG: 80 TABLET, FILM COATED ORAL at 20:24

## 2019-12-31 RX ADMIN — METRONIDAZOLE 500 MG: 250 TABLET, FILM COATED ORAL at 20:24

## 2019-12-31 RX ADMIN — CEFEPIME HYDROCHLORIDE 2 G: 2 INJECTION, POWDER, FOR SOLUTION INTRAVENOUS at 03:30

## 2019-12-31 ASSESSMENT — PAIN SCALES - GENERAL
PAINLEVEL_OUTOF10: 10
PAINLEVEL_OUTOF10: 10
PAINLEVEL_OUTOF10: 9
PAINLEVEL_OUTOF10: 10
PAINLEVEL_OUTOF10: 10
PAINLEVEL_OUTOF10: 0
PAINLEVEL_OUTOF10: 10
PAINLEVEL_OUTOF10: 10
PAINLEVEL_OUTOF10: 7
PAINLEVEL_OUTOF10: 10

## 2019-12-31 ASSESSMENT — PAIN SCALES - WONG BAKER
WONGBAKER_NUMERICALRESPONSE: 2

## 2019-12-31 ASSESSMENT — PAIN DESCRIPTION - PROGRESSION
CLINICAL_PROGRESSION: NOT CHANGED

## 2019-12-31 ASSESSMENT — PAIN DESCRIPTION - ORIENTATION: ORIENTATION: LEFT

## 2019-12-31 ASSESSMENT — PAIN DESCRIPTION - PAIN TYPE: TYPE: CHRONIC PAIN

## 2019-12-31 ASSESSMENT — PAIN DESCRIPTION - LOCATION: LOCATION: BACK
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
## 2019-12-31 NOTE — PROGRESS NOTES
[facility]    Follow Up Note    Subjective:   CHIEF COMPLAINT / HPI:   Chief Complaint   Patient presents with    Cough     c/o coughing, congestion, pt has scoliosis and c/o pain due to coughing so much. Pt states he was diagnosed with pneumonia 2 weeks ago and finished the antibiotic course completly. Interval history: Patient had presented with shortness of breath and back pain. He underwent bronchoscopy which was negative for malignancy. Acute inflammation is noted. Most likely dense consolidative changes are pneumonic in origin. He also has severe emphysema on CT imaging. He has an extensive smoking history. He is feeling better and wants to go home    Past Medical History:    Reviewed; no changes    Social History:    Reviewed; no changes    REVIEW OF SYSTEMS:    CONSTITUTIONAL:  negative for fevers and chills  RESPIRATORY:  See HPI  CARDIOVASCULAR:  negative for chest pain, palpitations, edema  GASTROINTESTINAL:  negative for nausea, vomiting, diarrhea, constipation and abdominal pain    Objective:   PHYSICAL EXAM:        VITALS:  BP (!) 161/84   Pulse 70   Temp 97.5 °F (36.4 °C) (Axillary)   Resp 18   Ht 5' 9\" (1.753 m)   Wt 140 lb 11.2 oz (63.8 kg)   SpO2 94%   BMI 20.78 kg/m²  on room air     24HR INTAKE/OUTPUT:      Intake/Output Summary (Last 24 hours) at 12/31/2019 1426  Last data filed at 12/31/2019 1418  Gross per 24 hour   Intake 2973 ml   Output 2660 ml   Net 313 ml       CONSTITUTIONAL:  awake, alert,  no apparent distress, and appears stated age  LUNGS:  No increased work of breathing and clear to auscultation, no crackles or wheezes  CARDIOVASCULAR: S1 and S2 and no JVD  ABDOMEN:  normal bowel sounds, non-distended and non-tender to palpation  EXT: No edema, no calf tenderness. Pulses are present bilaterally. NEUROLOGIC:  Mental Status Exam:  Level of Alertness:   awake  Orientation:   person, place, time.  Non focal  SKIN:  normal skin color, texture, turgor, no redness, warmth, or swelling at IV sites    DATA:    CBC:  Recent Labs     12/29/19  0747 12/30/19  1001   WBC 15.4* 13.1*   RBC 3.95* 4.14*   HGB 11.6* 12.5*   HCT 34.9* 36.4*   * 614*   MCV 88.3 87.8   MCH 29.3 30.1   MCHC 33.2 34.3   RDW 14.6 14.7      BMP:  Recent Labs     12/30/19  1001   *   K 3.8   CL 93*   CO2 28   BUN 7   CREATININE <0.5*   CALCIUM 8.9   GLUCOSE 96      ABG:  No results for input(s): PHART, XFK8CSD, PO2ART, FOI9RVX, O7ASXGMB, BEART in the last 72 hours. Cultures:    Abx:    Radiology Review:  Pertinent images / reports were reviewed as a part of this visit. Assessment:     1. Multifocal pneumonia  · Biopsies negative for malignancy  · ID managing antibiotics  · Will need follow-up imaging to ensure clearing of the masslike consolidation    2.   Severe emphysema  · Has albuterol at home  · Would recommend discharge with a controlling medication like Bevespi  · Would benefit from follow-up as an outpatient with pulmonology and outpatient pulmonary function testing at some point

## 2019-12-31 NOTE — PROGRESS NOTES
Routine VSS - see flowsheets; prescribed pain medication and antibiotics given - see MAR; primary RN Choco Mckinnon aware.

## 2019-12-31 NOTE — PROGRESS NOTES
Social History:  All social andepidemiologic history was reviewed and updated by me today as needed. · Tobacco use:   reports that he has been smoking. He has a 42.00 pack-year smoking history. He has never used smokeless tobacco.  · Alcohol use:   reports no history of alcohol use. · Currently lives in: [place] [id number]  ·  reports no history of drug use. Assessment:     The patient is a 71 y.o. old male who  has a past medical history of Arthritis, Gong's esophagus, Chronic back pain, COPD (chronic obstructive pulmonary disease) ([address] Depression, GERD (gastroesophageal reflux disease), HYPERCHOLESTERAEMIA, Hypertension, Prinzmetal angina ([address] Spinal stenoses, and TIA (transient ischaemic attack). with following problems:    · High fever and leukocytosis on admission -slowly improving  · Multifocal pneumonia involving left upper lobe and nodular opacities in the lingula and left lower lobe area-pulmonary consult for necrotizing pneumonia  · Status post bronchoscopy-BAL cultures awaited  · Mediastinal and bilateral hilar lymphadenopathy  · Extensive emphysema  · Status post cholecystectomy  · Left nephrolithiasis  · History of splenectomy  · COPD  · Heavy smoker      Discussion:      The patient remains on IV vancomycin and IV cefepime. He seems to be tolerating antibiotics okay. White cell count is 13,100 today. Urine Legionella and pneumococcal antigens are negative. Nasal MRSA screen is negative. He underwent a bronchoscopy yesterday. Purulence was noted. Pulmonary is concerned about possibility of necrotizing pneumonia.     BAL cultures are awaited    Respiratory film antiviral PCR panel was negative      Plan:     Diagnostic Workup:    · Continue to follow  fever curve, WBC count and blood cultures  · Follow up on liver and renal function    Antimicrobials:    · Will o continue IV vancomycin  · Target vancomycin trough level of 15-20  · Continue IV cefepime 2 g every 12 hours  · Will order p.o. Flagyl 500 mg every 8 hour for empiric anaerobic coverage  · We will follow-up on the BAL culture results and renal progress and will make further recommendations accordingly  · Cough and deep eating exercises  · Continue oxygen support to maintain oxygen saturation above 92%  · DVT prophylaxis  · Discussed the above plan with patient and RN       Drug Monitoring:    · Continue monitoring for antibiotic toxicity as follows: Vancomycin trough, CMP, QTc interval  · Continue to watch for following: new or worsening fever, new hypotension, hives, lip swelling and redness or purulence at vascular access sites. Current isolation precautions: There are no current isolations documented for this patient. I/v access Management:    · Continue to monitor i.v access sites for erythema, induration,discharge or tenderness. · As always, continue efforts to minimize tubes/ lines/ drains as clinically appropriate to reduce chances of line associated infections. Patient education and counseling:     · The patient was educated in detail about the side-effects of various antibiotics and things to watch for like new rashes, lip swelling, severe reaction, worsening diarrhea, break through fever etc.  · Discussed patient's condition and what to expect. All of the patient's questions were addressed in a satisfactory manner and patient verbalized understanding all instructions. Level of complexity of visit: High     Risk of Complications/Morbidity: High     TIME SPENT TODAY:     - Spent over  35  minutes on visit (including interval history, physical exam, review of data including labs, cultures, imaging, development and implementation of treatment plan and coordination of complex care). - Over 50% of time spent with patient face to face on counseling and education. Thank you for involving me in the care of your patient. I will continue to follow.  If you have any additional questions, please do not hesitate to contact me. Subjective: Interval history: Patient was seen and examined at bedside. Interval history was obtained. The patient appears tired. Had a bronchoscopy yesterday. He is tolerating antibiotics okay. No diarrhea. REVIEW OF SYSTEMS:    Review of Systems   Constitutional: Positive for fatigue. Negative for chills, diaphoresis and fever. HENT: Negative for ear discharge, ear pain, rhinorrhea, sore throat and trouble swallowing. Eyes: Negative for discharge and redness. Respiratory: Positive for cough. Negative for shortness of breath and wheezing. Cardiovascular: Negative for chest pain and leg swelling. Gastrointestinal: Negative for abdominal pain, constipation, diarrhea and nausea. Endocrine: Negative for polyuria. Genitourinary: Negative for dysuria, flank pain, frequency, hematuria and urgency. Musculoskeletal: Negative for back pain and myalgias. Skin: Negative for rash. Neurological: Negative for dizziness, seizures and headaches. Hematological: Does not bruise/bleed easily. Psychiatric/Behavioral: Negative for hallucinations and suicidal ideas. All other systems reviewed and are negative. Past Medical History: All past medical history reviewed today. Past Medical History:   Diagnosis Date    Arthritis     Gong's esophagus     Chronic back pain     COPD (chronic obstructive pulmonary disease) (HCC)     Depression     GERD (gastroesophageal reflux disease)     HYPERCHOLESTERAEMIA     Hypertension     Prinzmetal angina (HCC)     Spinal stenoses     TIA (transient ischaemic attack)        Past Surgical History: All past surgical history was reviewed today.     Past Surgical History:   Procedure Laterality Date    APPENDECTOMY      BACK SURGERY      BRONCHOSCOPY N/A 12/30/2019    BRONCHOSCOPY ALVEOLAR LAVAGE performed by Mariajose Singh MD at [address]  12/30/2019    BRONCHOSCOPY reviewed by me during this visit. CT CHEST PULMONARY EMBOLISM W CONTRAST   Final Result   1. Interval increase in size of large masslike pulmonary opacity within left   upper lobe. Differential includes worsening pneumonia or worsening neoplasm. Recommend short interval follow-up examination in 2-4 weeks. 2. Interval improvement in nodular pulmonary opacities within lingula and   left lower lobe. Scattered bibasilar atelectasis. Continued attention on   follow-up examination is recommended. 3. Mediastinal and bilateral hilar lymphadenopathy. 4. Extensive emphysema. 5. No evidence of acute pulmonary embolism. 6. Hepatomegaly. Status post cholecystectomy, with intrahepatic and   extrahepatic ductal dilatation. This is partially imaged. 7. Nonobstructing left renal calculus. Minimal renal pelviectasis is   partially imaged. 8. Nonspecific thickening of the distal esophagus. This can be re-evaluated   with upper endoscopy non emergently. XR CHEST STANDARD (2 VW)   Final Result   Residual opacities in the perihilar and apical regions of the left lung. Given some improvement, infectious etiology appears most likely. As noted   previously, atypical etiologies, such as fungal pneumonia, are considered. Although less likely, neoplasm is still considered in the differential.      Changes consistent with COPD. RECOMMENDATION:   Surveillance until resolution is recommended. Medications: All current and past medications were reviewed.      vancomycin  1,250 mg Intravenous Q12H    gabapentin  600 mg Oral TID    lisinopril  40 mg Oral Daily    verapamil  80 mg Oral TID    cefepime  2 g Intravenous Q8H    DULoxetine  20 mg Oral Daily    sodium chloride flush  10 mL Intravenous 2 times per day        sodium chloride 100 mL/hr at 12/30/19 1432       ketorolac, melatonin, acetaminophen, oxyCODONE-acetaminophen, sodium chloride flush      Problem list:

## 2019-12-31 NOTE — CARE COORDINATION
Referral received to check IV Benefits. Information faxed to [employer] office awaiting call back on costs. Therapy:Vancomycin 1.25gm  q12     Pt.  Copay:$0/wk drugs, $0/day supplies     Electronically signed by Rock Murry on 12/31/2019 at 4:09 PM   Cell Ph# [phone], Office # [phone]

## 2020-01-01 LAB
ANION GAP SERPL CALCULATED.3IONS-SCNC: 9 MMOL/L (ref 3–16)
BUN BLDV-MCNC: 8 MG/DL (ref 7–20)
CALCIUM SERPL-MCNC: 8.6 MG/DL (ref 8.3–10.6)
CHLORIDE BLD-SCNC: 100 MMOL/L (ref 99–110)
CO2: 26 MMOL/L (ref 21–32)
CREAT SERPL-MCNC: <0.5 MG/DL (ref 0.8–1.3)
CULTURE, RESPIRATORY: NORMAL
CULTURE, RESPIRATORY: NORMAL
GFR AFRICAN AMERICAN: >60
GFR NON-AFRICAN AMERICAN: >60
GLUCOSE BLD-MCNC: 119 MG/DL (ref 70–99)
GRAM STAIN RESULT: NORMAL
GRAM STAIN RESULT: NORMAL
HCT VFR BLD CALC: 32.5 % (ref 40.5–52.5)
HEMOGLOBIN: 11.2 G/DL (ref 13.5–17.5)
MCH RBC QN AUTO: 30.2 PG (ref 26–34)
MCHC RBC AUTO-ENTMCNC: 34.4 G/DL (ref 31–36)
MCV RBC AUTO: 87.9 FL (ref 80–100)
PDW BLD-RTO: 14.3 % (ref 12.4–15.4)
PLATELET # BLD: 541 K/UL (ref 135–450)
PMV BLD AUTO: 8.1 FL (ref 5–10.5)
POTASSIUM SERPL-SCNC: 4.1 MMOL/L (ref 3.5–5.1)
RBC # BLD: 3.7 M/UL (ref 4.2–5.9)
SODIUM BLD-SCNC: 135 MMOL/L (ref 136–145)
VANCOMYCIN TROUGH: 11.6 UG/ML (ref 10–20)
WBC # BLD: 10.9 K/UL (ref 4–11)

## 2020-01-01 PROCEDURE — 6360000002 HC RX W HCPCS: Performed by: INTERNAL MEDICINE

## 2020-01-01 PROCEDURE — 36415 COLL VENOUS BLD VENIPUNCTURE: CPT

## 2020-01-01 PROCEDURE — 85027 COMPLETE CBC AUTOMATED: CPT

## 2020-01-01 PROCEDURE — 80048 BASIC METABOLIC PNL TOTAL CA: CPT

## 2020-01-01 PROCEDURE — 6370000000 HC RX 637 (ALT 250 FOR IP): Performed by: INTERNAL MEDICINE

## 2020-01-01 PROCEDURE — 6370000000 HC RX 637 (ALT 250 FOR IP): Performed by: FAMILY MEDICINE

## 2020-01-01 PROCEDURE — 6360000002 HC RX W HCPCS: Performed by: FAMILY MEDICINE

## 2020-01-01 PROCEDURE — 2580000003 HC RX 258: Performed by: FAMILY MEDICINE

## 2020-01-01 PROCEDURE — 1200000000 HC SEMI PRIVATE

## 2020-01-01 PROCEDURE — 80202 ASSAY OF VANCOMYCIN: CPT

## 2020-01-01 PROCEDURE — 99233 SBSQ HOSP IP/OBS HIGH 50: CPT | Performed by: INTERNAL MEDICINE

## 2020-01-01 PROCEDURE — 2580000003 HC RX 258: Performed by: INTERNAL MEDICINE

## 2020-01-01 PROCEDURE — 2580000003 HC RX 258: Performed by: ANESTHESIOLOGY

## 2020-01-01 RX ORDER — LEVOFLOXACIN 500 MG/1
750 TABLET, FILM COATED ORAL DAILY
Status: DISCONTINUED | OUTPATIENT
Start: 2020-01-01 | End: 2020-01-01

## 2020-01-01 RX ORDER — LACTOBACILLUS RHAMNOSUS GG 10B CELL
1 CAPSULE ORAL 2 TIMES DAILY WITH MEALS
Status: DISCONTINUED | OUTPATIENT
Start: 2020-01-01 | End: 2020-01-02 | Stop reason: HOSPADM

## 2020-01-01 RX ORDER — LINEZOLID 600 MG/1
600 TABLET, FILM COATED ORAL EVERY 12 HOURS SCHEDULED
Status: DISCONTINUED | OUTPATIENT
Start: 2020-01-01 | End: 2020-01-02 | Stop reason: HOSPADM

## 2020-01-01 RX ORDER — AMOXICILLIN AND CLAVULANATE POTASSIUM 875; 125 MG/1; MG/1
1 TABLET, FILM COATED ORAL EVERY 12 HOURS SCHEDULED
Status: DISCONTINUED | OUTPATIENT
Start: 2020-01-01 | End: 2020-01-02 | Stop reason: HOSPADM

## 2020-01-01 RX ADMIN — SODIUM CHLORIDE: 9 INJECTION, SOLUTION INTRAVENOUS at 15:08

## 2020-01-01 RX ADMIN — CEFEPIME HYDROCHLORIDE 2 G: 2 INJECTION, POWDER, FOR SOLUTION INTRAVENOUS at 02:27

## 2020-01-01 RX ADMIN — SODIUM CHLORIDE: 9 INJECTION, SOLUTION INTRAVENOUS at 02:30

## 2020-01-01 RX ADMIN — SODIUM CHLORIDE: 9 INJECTION, SOLUTION INTRAVENOUS at 23:51

## 2020-01-01 RX ADMIN — KETOROLAC TROMETHAMINE 15 MG: 30 INJECTION, SOLUTION INTRAMUSCULAR at 10:19

## 2020-01-01 RX ADMIN — GABAPENTIN 600 MG: 300 CAPSULE ORAL at 21:07

## 2020-01-01 RX ADMIN — KETOROLAC TROMETHAMINE 15 MG: 30 INJECTION, SOLUTION INTRAMUSCULAR at 16:59

## 2020-01-01 RX ADMIN — VERAPAMIL HYDROCHLORIDE 80 MG: 80 TABLET, FILM COATED ORAL at 14:26

## 2020-01-01 RX ADMIN — OXYCODONE HYDROCHLORIDE AND ACETAMINOPHEN 1 TABLET: 5; 325 TABLET ORAL at 22:42

## 2020-01-01 RX ADMIN — KETOROLAC TROMETHAMINE 15 MG: 30 INJECTION, SOLUTION INTRAMUSCULAR at 23:51

## 2020-01-01 RX ADMIN — OXYCODONE HYDROCHLORIDE AND ACETAMINOPHEN 1 TABLET: 5; 325 TABLET ORAL at 06:13

## 2020-01-01 RX ADMIN — OXYCODONE HYDROCHLORIDE AND ACETAMINOPHEN 1 TABLET: 5; 325 TABLET ORAL at 18:33

## 2020-01-01 RX ADMIN — Medication 1 CAPSULE: at 18:33

## 2020-01-01 RX ADMIN — AMOXICILLIN AND CLAVULANATE POTASSIUM 1 TABLET: 875; 125 TABLET, FILM COATED ORAL at 21:07

## 2020-01-01 RX ADMIN — LISINOPRIL 40 MG: 20 TABLET ORAL at 10:20

## 2020-01-01 RX ADMIN — GABAPENTIN 600 MG: 300 CAPSULE ORAL at 10:19

## 2020-01-01 RX ADMIN — VANCOMYCIN HYDROCHLORIDE 1250 MG: 10 INJECTION, POWDER, LYOPHILIZED, FOR SOLUTION INTRAVENOUS at 07:19

## 2020-01-01 RX ADMIN — GABAPENTIN 600 MG: 300 CAPSULE ORAL at 14:25

## 2020-01-01 RX ADMIN — KETOROLAC TROMETHAMINE 15 MG: 30 INJECTION, SOLUTION INTRAMUSCULAR at 04:10

## 2020-01-01 RX ADMIN — METRONIDAZOLE 500 MG: 250 TABLET, FILM COATED ORAL at 07:18

## 2020-01-01 RX ADMIN — OXYCODONE HYDROCHLORIDE AND ACETAMINOPHEN 1 TABLET: 5; 325 TABLET ORAL at 02:05

## 2020-01-01 RX ADMIN — VERAPAMIL HYDROCHLORIDE 80 MG: 80 TABLET, FILM COATED ORAL at 21:07

## 2020-01-01 RX ADMIN — CEFEPIME HYDROCHLORIDE 2 G: 2 INJECTION, POWDER, FOR SOLUTION INTRAVENOUS at 10:28

## 2020-01-01 RX ADMIN — OXYCODONE HYDROCHLORIDE AND ACETAMINOPHEN 1 TABLET: 5; 325 TABLET ORAL at 14:26

## 2020-01-01 RX ADMIN — VERAPAMIL HYDROCHLORIDE 80 MG: 80 TABLET, FILM COATED ORAL at 10:21

## 2020-01-01 RX ADMIN — LINEZOLID 600 MG: 600 TABLET, FILM COATED ORAL at 21:07

## 2020-01-01 RX ADMIN — OXYCODONE HYDROCHLORIDE AND ACETAMINOPHEN 1 TABLET: 5; 325 TABLET ORAL at 10:19

## 2020-01-01 RX ADMIN — DULOXETINE HYDROCHLORIDE 20 MG: 20 CAPSULE, DELAYED RELEASE ORAL at 10:19

## 2020-01-01 ASSESSMENT — PAIN DESCRIPTION - PAIN TYPE
TYPE: CHRONIC PAIN
TYPE: CHRONIC PAIN

## 2020-01-01 ASSESSMENT — PAIN SCALES - WONG BAKER
WONGBAKER_NUMERICALRESPONSE: 2

## 2020-01-01 ASSESSMENT — ENCOUNTER SYMPTOMS
TROUBLE SWALLOWING: 0
SHORTNESS OF BREATH: 0
BACK PAIN: 0
WHEEZING: 0
EYE DISCHARGE: 0
EYE REDNESS: 0
DIARRHEA: 0
NAUSEA: 0
ABDOMINAL PAIN: 0
RHINORRHEA: 0
CONSTIPATION: 0
SORE THROAT: 0
COUGH: 1

## 2020-01-01 ASSESSMENT — PAIN SCALES - GENERAL
PAINLEVEL_OUTOF10: 10
PAINLEVEL_OUTOF10: 10
PAINLEVEL_OUTOF10: 8
PAINLEVEL_OUTOF10: 10
PAINLEVEL_OUTOF10: 10
PAINLEVEL_OUTOF10: 9
PAINLEVEL_OUTOF10: 8
PAINLEVEL_OUTOF10: 9
PAINLEVEL_OUTOF10: 10
PAINLEVEL_OUTOF10: 10

## 2020-01-01 ASSESSMENT — PAIN DESCRIPTION - FREQUENCY: FREQUENCY: CONTINUOUS

## 2020-01-01 ASSESSMENT — PAIN DESCRIPTION - DESCRIPTORS: DESCRIPTORS: ACHING;PRESSURE;THROBBING

## 2020-01-01 ASSESSMENT — PAIN DESCRIPTION - LOCATION
LOCATION: BACK
LOCATION: BACK
LOCATION: BACK;LEG

## 2020-01-01 ASSESSMENT — PAIN DESCRIPTION - PROGRESSION
CLINICAL_PROGRESSION: NOT CHANGED

## 2020-01-01 NOTE — PLAN OF CARE
Problem: Pain:  Description  Pain management should include both nonpharmacologic and pharmacologic interventions.   Goal: Control of acute pain  Description  Control of acute pain  Outcome: Met This Shift  Note:   Patient get percocet every four hours for pain  And toradol every six hours      Problem: Falls - Risk of:  Goal: Will remain free from falls  Description  Will remain free from falls  Outcome: Met This Shift
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
## 2020-01-01 NOTE — PROGRESS NOTES
Infectious Diseases   Progress Note      Admission Date: 12/27/2019  Hospital Day: Hospital Day: 6   Attending: Lisbet Dumont MD  Date of service: 1/1/2020     Chief complaint/ Reason for consult: The patient was seen today for the following:    · High fever and leukocytosis on admission  · Multifocal pneumonia involving left upper lobe and nodular opacities in the lingula and left lower lobe area  · Status post bronchoscopy  · Mediastinal and bilateral hilar lymphadenopathy    Microbiology:        I have reviewed allavailable micro lab data and cultures    · Blood culture (2/2) - collected on 12/27/2019: Negative  · Body fluid (BAL) culture - collectedon 12/30/2019: In process  · U urine Legionella and pneumococcal antigens- collected on 12/30/2019: *Negative      Antibiotics and immunizations:       Current antibiotics: All antibiotics and their doses were reviewed by me    Recent Abx Admin                   cefepime (MAXIPIME) 2 g IVPB minibag (g) 2 g New Bag 01/01/20 1028     2 g New Bag  0227     2 g New Bag 12/31/19 2024    vancomycin (VANCOCIN) 1,250 mg in dextrose 5 % 250 mL IVPB (mg) 1,250 mg New Bag 01/01/20 0719     1,250 mg New Bag 12/31/19 1825    metroNIDAZOLE (FLAGYL) tablet 500 mg (mg) 500 mg Given 01/01/20 0718     500 mg Given 12/31/19 2024     500 mg Given  1504                  Immunization History: All immunization history was reviewed by me today. Immunization History   Administered Date(s) Administered    Influenza Whole 10/01/2012    Pneumococcal Conjugate 7-valent (Prevnar7) 10/12/2006    Tdap (Boostrix, Adacel) 03/23/2018       Known drug allergies:      All allergies were reviewed and updated    Allergies   Allergen Reactions    Amitriptyline Other (See Comments)     crying    Diltiazem Hcl Other (See Comments)     crying    Lansoprazole Other (See Comments)     crying    Tricyclic Antidepressants Other (See Comments)     crying    Trilisate [Choline Magnesium Trisalicylate] Other (See Comments)     crying    Codeine Nausea And Vomiting    Demerol Nausea And Vomiting       Social history:     Social History:  All social andepidemiologic history was reviewed and updated by me today as needed. · Tobacco use:   reports that he has been smoking. He has a 42.00 pack-year smoking history. He has never used smokeless tobacco.  · Alcohol use:   reports no history of alcohol use. · Currently lives in: [place] [id number]  ·  reports no history of drug use. Assessment:     The patient is a 71 y.o. old male who  has a past medical history of Arthritis, Gong's esophagus, Chronic back pain, COPD (chronic obstructive pulmonary disease) ([address] Depression, GERD (gastroesophageal reflux disease), HYPERCHOLESTERAEMIA, Hypertension, Prinzmetal angina ([address] Spinal stenoses, and TIA (transient ischaemic attack). with following problems:    · High fever and leukocytosis on admission-resolved  · Multifocal pneumonia involving left upper lobe and nodular opacities in the lingula and left lower lobe area-pulmonary consult for necrotizing pneumonia-BAL cultures pending  · Status post bronchoscopy  · Mediastinal and bilateral hilar lymphadenopathy-should improve slowly  · Extensive emphysema  · Status post cholecystectomy  · Left nephrolithiasis  · History of splenectomy  · COPD  · Heavy smoker      Discussion:      The patient is on IV vancomycin, IV cefepime and oral Flagyl. He is tolerating the antibiotics okay. He had a bronchoscopy done on 12/30/2019. BAL cultures are running negative so far. Serum creatinine is 0.5. BAL cytology showed acute inflammatory cells. Candida from likely pharyngeal contamination. No malignant cells.     Platelet count is 747,077 today        Plan:     Diagnostic Workup:    · Will order follow-up chest x-ray today  · Continue to follow  fever curve, WBC count and blood cultures  · Follow up on liver and renal function    Antimicrobials:    · Will stop IV vancomycin today  · We will stop IV cefepime and Flagyl today  · Will order p.o. linezolid 600 mg every 12 hour  · His QTc interval is around 500 ms. Hence, avoiding fluoroquinolones  · We will start p.o. Augmentin 875 mg every 12 hours  · Start oral probiotic twice daily  · We will see how he responds to switch to oral antibiotics  · We will follow-up on BAL cultures and clinical response and will make further recommendations accordingly  · Discussed all above with patient and RN      Drug Monitoring:    · Continue monitoring for antibiotic toxicity as follows: *CBC, CMP  · Continue to watch for following: new or worsening fever, new hypotension, hives, lip swelling and redness or purulence at vascular access sites. I/v access Management:    · Continue to monitor i.v access sites for erythema, induration, discharge or tenderness. · As always, continue efforts to minimize tubes/lines/drains as clinically appropriate to reduce chances of line associated infections. Patient education and counseling:        · The patient was educated in detail about the side-effects of various antibiotics and things to watch for like new rashes, lip swelling, severe reaction, worsening diarrhea, break through fever etc.  · Discussed patient's condition and what to expect. All of the patient's questions were addressed in a satisfactory manner and patient verbalized understanding all instructions. Smoking cessation/ Tobacco use disorder counseling:    I have counseled the patient extensively about risks of smoking and have encouraged the patient to maintain a healthy smoke-free lifestyle. Information was given about various smoking cessation programs and their websites like www.smokefree.gov, [place]. quitlogix. org as well as help lines like 1-800-QUIT-NOW and 1-800-LUNG-[place].     TIME SPENT TODAY:     - Spent over  35  minutes on visit (including interval history, physical exam, review of data including labs, cultures, imaging, development and implementation of treatment plan and coordination of complex care). - Over 50% of time spent with patient face to face on counseling and education. Thank you for involving me in the care of your patient. I will continue to follow. If you have anyadditional questions, please do not hesitate to contact me. Subjective: Interval history: Patient was seen and examined at bedside. Interval history was obtained. The patient is afebrile. Feels tired. Still has productive cough. Tolerating antibiotic okay. Left-sided pleuritic pain is decreasing     REVIEW OF SYSTEMS:    Review of Systems   Constitutional: Positive for fatigue. Negative for chills, diaphoresis and fever. HENT: Negative for ear discharge, ear pain, rhinorrhea, sore throat and trouble swallowing. Eyes: Negative for discharge and redness. Respiratory: Positive for cough. Negative for shortness of breath and wheezing. Cardiovascular: Negative for chest pain and leg swelling. Gastrointestinal: Negative for abdominal pain, constipation, diarrhea and nausea. Endocrine: Negative for polyuria. Genitourinary: Negative for dysuria, flank pain, frequency, hematuria and urgency. Musculoskeletal: Negative for back pain and myalgias. Skin: Negative for rash. Neurological: Negative for dizziness, seizures and headaches. Hematological: Does not bruise/bleed easily. Psychiatric/Behavioral: Negative for hallucinations and suicidal ideas. All other systems reviewed and are negative. Past Medical History: All past medical history reviewed today.     Past Medical History:   Diagnosis Date    Arthritis     Gong's esophagus     Chronic back pain     [facility]     Depression     GERD (gastroesophageal reflux disease)     HYPERCHOLESTERAEMIA     Hypertension     Prinzmetal angina ([facility])     Spinal stenoses     to light. Neck:      Musculoskeletal: Normal range of motion and neck supple. No neck rigidity or muscular tenderness. Cardiovascular:      Rate and Rhythm: Normal rate and regular rhythm. Pulses: Normal pulses. Heart sounds: No murmur. No friction rub. Pulmonary:      Effort: Pulmonary effort is normal. No respiratory distress. Breath sounds: No stridor. Rales (Decreasing crackles in left upper lobe area) present. No wheezing or rhonchi. Abdominal:      General: Bowel sounds are normal.      Palpations: Abdomen is soft. Tenderness: There is no tenderness. There is no right CVA tenderness, left CVA tenderness, guarding or rebound. Musculoskeletal: Normal range of motion. General: No swelling or tenderness. Lymphadenopathy:      Cervical: No cervical adenopathy. Skin:     General: Skin is warm and dry. Coloration: Skin is not jaundiced. Findings: No erythema or rash. Neurological:      General: No focal deficit present. Mental Status: He is alert and oriented to person, place, and time. Mental status is at baseline. Motor: No abnormal muscle tone. Psychiatric:         Mood and Affect: Mood normal.         Behavior: Behavior normal.         Thought Content: Thought content normal.           Lines: All vascular access sites are healthy with no local erythema, discharge or tenderness. Intake and output:    I/O last 3 completed shifts: In: 12 [P.O.:960]  Out: 1550 [Urine:1550]    Lab Data:   All available labs and old records have been reviewed by me.     CBC:  Recent Labs     12/30/19  1001 01/01/20  0508   WBC 13.1* 10.9   RBC 4.14* 3.70*   HGB 12.5* 11.2*   HCT 36.4* 32.5*   * 541*   MCV 87.8 87.9   MCH 30.1 30.2   MCHC 34.3 34.4   RDW 14.7 14.3        BMP:  Recent Labs     12/30/19  1001 01/01/20  0508   * 135*   K 3.8 4.1   CL 93* 100   CO2 28 26   BUN 7 8   CREATININE <0.5* <0.5*   CALCIUM 8.9 8.6   GLUCOSE 96 119*        Hepatic 1,500 mg Intravenous Q12H    metroNIDAZOLE  500 mg Oral 3 times per day    nicotine  1 patch Transdermal Daily    gabapentin  600 mg Oral TID    lisinopril  40 mg Oral Daily    verapamil  80 mg Oral TID    cefepime  2 g Intravenous Q8H    DULoxetine  20 mg Oral Daily    sodium chloride flush  10 mL Intravenous 2 times per day        sodium chloride 100 mL/hr at 01/01/20 0230       ketorolac, melatonin, acetaminophen, oxyCODONE-acetaminophen, sodium chloride flush      Problem list:       Patient Active Problem List   Diagnosis Code    Respiratory distress R06.03    Opiate overdose ([facility] T40.601A    Pneumonia due to organism J18.9    Chronic pain G89.29    Sepsis ([facility] A41.9    Acute encephalopathy G93.40    PNA (pneumonia) J18.9    Pneumonia J18.9    Abnormal CT of the chest R93.89    Status post bronchoscopy Z98.890    Mediastinal lymphadenopathy R59.0    Hilar lymphadenopathy R59.0    Status post cholecystectomy Z90.49    Nephrolithiasis N20.0    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    Chronic obstructive pulmonary disease ([facility] J44.9    Smoker F17.200       Please note that this chart was generated using Dragon dictation software. Although every effort was made to ensure the accuracy of this automated transcription, some errors in transcription may have occurred inadvertently. If you may need any clarification, please do not hesitate to contact me through EPIC or at the phone number provided below with my electronic signature. Any pictures or media included in this note were obtained after taking informed verbal consent from the patient and with their approval to include those in the patient's medical record.     Inocencio Sanchez MD, MPH  1/1/2020 , 12:30 PM   [facility] Infectious Disease   Office: [phone]  Fax: [phone]  Tuesday AM clinic:   [address] [facility] [address]  Thursday AM QVKANE:[id number] [address]

## 2020-01-01 NOTE — PROGRESS NOTES
Assessment completed see doc flow sheets. VSS,  Pain medication given for chronic pain. See MAR. Patient encouraged to call nurse if assistance is needed. Call light in reach .  IRIS CamarenaN, RN

## 2020-01-02 ENCOUNTER — APPOINTMENT (OUTPATIENT)
Dept: GENERAL RADIOLOGY | Age: 65
DRG: 871 | End: 2020-01-02
Payer: MEDICARE

## 2020-01-02 VITALS
RESPIRATION RATE: 16 BRPM | OXYGEN SATURATION: 94 % | HEART RATE: 57 BPM | DIASTOLIC BLOOD PRESSURE: 85 MMHG | WEIGHT: 140.7 LBS | HEIGHT: 69 IN | BODY MASS INDEX: 20.84 KG/M2 | SYSTOLIC BLOOD PRESSURE: 174 MMHG | TEMPERATURE: 97.4 F

## 2020-01-02 LAB
HISTOPLASMA ANTIGEN URINE INTERP: NOT DETECTED
HISTOPLASMA ANTIGEN URINE: NOT DETECTED NG/ML
LEGIONELLA PNEUMOPHILIA BY PCR: NOT DETECTED
LEGIONELLA SOURCE: NORMAL
LEGIONELLA SPECIES PCR: NOT DETECTED
MYCOPLASMA PNEUMO SOURCE: NORMAL
MYCOPLASMA PNEUMONIAE PCR: NOT DETECTED

## 2020-01-02 PROCEDURE — 99232 SBSQ HOSP IP/OBS MODERATE 35: CPT | Performed by: INTERNAL MEDICINE

## 2020-01-02 PROCEDURE — 2580000003 HC RX 258: Performed by: ANESTHESIOLOGY

## 2020-01-02 PROCEDURE — 6370000000 HC RX 637 (ALT 250 FOR IP): Performed by: PHYSICIAN ASSISTANT

## 2020-01-02 PROCEDURE — 2580000003 HC RX 258: Performed by: EMERGENCY MEDICINE

## 2020-01-02 PROCEDURE — 6370000000 HC RX 637 (ALT 250 FOR IP): Performed by: FAMILY MEDICINE

## 2020-01-02 PROCEDURE — 6360000002 HC RX W HCPCS: Performed by: INTERNAL MEDICINE

## 2020-01-02 PROCEDURE — 6370000000 HC RX 637 (ALT 250 FOR IP): Performed by: INTERNAL MEDICINE

## 2020-01-02 RX ORDER — LINEZOLID 600 MG/1
600 TABLET, FILM COATED ORAL EVERY 12 HOURS SCHEDULED
Qty: 30 TABLET | Refills: 0 | Status: SHIPPED | OUTPATIENT
Start: 2020-01-02 | End: 2020-01-17

## 2020-01-02 RX ORDER — LACTOBACILLUS RHAMNOSUS GG 10B CELL
1 CAPSULE ORAL 2 TIMES DAILY WITH MEALS
Qty: 30 CAPSULE | Refills: 0 | Status: SHIPPED | OUTPATIENT
Start: 2020-01-02 | End: 2020-01-17

## 2020-01-02 RX ORDER — AMOXICILLIN AND CLAVULANATE POTASSIUM 875; 125 MG/1; MG/1
1 TABLET, FILM COATED ORAL EVERY 12 HOURS SCHEDULED
Qty: 30 TABLET | Refills: 0 | Status: SHIPPED | OUTPATIENT
Start: 2020-01-02 | End: 2020-01-17

## 2020-01-02 RX ADMIN — MELATONIN TAB 3 MG 3 MG: 3 TAB at 00:03

## 2020-01-02 RX ADMIN — Medication 1 CAPSULE: at 08:47

## 2020-01-02 RX ADMIN — Medication 10 ML: at 08:48

## 2020-01-02 RX ADMIN — DULOXETINE HYDROCHLORIDE 20 MG: 20 CAPSULE, DELAYED RELEASE ORAL at 08:46

## 2020-01-02 RX ADMIN — OXYCODONE HYDROCHLORIDE AND ACETAMINOPHEN 1 TABLET: 5; 325 TABLET ORAL at 03:58

## 2020-01-02 RX ADMIN — GABAPENTIN 600 MG: 300 CAPSULE ORAL at 08:47

## 2020-01-02 RX ADMIN — VERAPAMIL HYDROCHLORIDE 80 MG: 80 TABLET, FILM COATED ORAL at 08:46

## 2020-01-02 RX ADMIN — LINEZOLID 600 MG: 600 TABLET, FILM COATED ORAL at 08:46

## 2020-01-02 RX ADMIN — SODIUM CHLORIDE: 9 INJECTION, SOLUTION INTRAVENOUS at 07:13

## 2020-01-02 RX ADMIN — AMOXICILLIN AND CLAVULANATE POTASSIUM 1 TABLET: 875; 125 TABLET, FILM COATED ORAL at 08:46

## 2020-01-02 RX ADMIN — GABAPENTIN 600 MG: 300 CAPSULE ORAL at 14:33

## 2020-01-02 RX ADMIN — LISINOPRIL 40 MG: 20 TABLET ORAL at 08:47

## 2020-01-02 RX ADMIN — OXYCODONE HYDROCHLORIDE AND ACETAMINOPHEN 1 TABLET: 5; 325 TABLET ORAL at 15:59

## 2020-01-02 RX ADMIN — OXYCODONE HYDROCHLORIDE AND ACETAMINOPHEN 1 TABLET: 5; 325 TABLET ORAL at 12:05

## 2020-01-02 RX ADMIN — KETOROLAC TROMETHAMINE 15 MG: 30 INJECTION, SOLUTION INTRAMUSCULAR at 14:34

## 2020-01-02 RX ADMIN — KETOROLAC TROMETHAMINE 15 MG: 30 INJECTION, SOLUTION INTRAMUSCULAR at 05:56

## 2020-01-02 RX ADMIN — OXYCODONE HYDROCHLORIDE AND ACETAMINOPHEN 1 TABLET: 5; 325 TABLET ORAL at 08:07

## 2020-01-02 ASSESSMENT — ENCOUNTER SYMPTOMS
TROUBLE SWALLOWING: 0
BACK PAIN: 0
EYE REDNESS: 0
DIARRHEA: 0
CONSTIPATION: 0
RHINORRHEA: 0
SORE THROAT: 0
SHORTNESS OF BREATH: 0
COUGH: 0
WHEEZING: 0
EYE DISCHARGE: 0
ABDOMINAL PAIN: 0
NAUSEA: 0

## 2020-01-02 ASSESSMENT — PAIN SCALES - GENERAL
PAINLEVEL_OUTOF10: 10
PAINLEVEL_OUTOF10: 8
PAINLEVEL_OUTOF10: 10
PAINLEVEL_OUTOF10: 10
PAINLEVEL_OUTOF10: 9
PAINLEVEL_OUTOF10: 10
PAINLEVEL_OUTOF10: 10

## 2020-01-02 NOTE — PROGRESS NOTES
Infectious Diseases   Progress Note      Admission Date: 12/27/2019  Hospital Day: Hospital Day: 7   Attending: Vel Wilson MD  Date of service: 1/2/2020     Chief complaint/ Reason for consult: The patient was seen today for the following:    · High fever and leukocytosis on admission  · Multifocal pneumonia involving left upper lobe and nodular opacities in the lingula and left lower lobe area  · Status post bronchoscopy  · Mediastinal and bilateral hilar lymphadenopathy    Microbiology:        I have reviewed allavailable micro lab data and cultures    · Blood culture (2/2) - collected on 12/27/2019: Negative  · Body fluid (BAL) culture - collectedon 12/30/2019: In process  · U urine Legionella and pneumococcal antigens- collected on 12/30/2019: *Negative      Antibiotics and immunizations:       Current antibiotics: All antibiotics and their doses were reviewed by me    Recent Abx Admin                   linezolid (ZYVOX) tablet 600 mg (mg) 600 mg Given 01/02/20 0846     600 mg Given 01/01/20 2107    amoxicillin-clavulanate (AUGMENTIN) 875-125 MG per tablet 1 tablet (tablet) 1 tablet Given 01/02/20 0846     1 tablet Given 01/01/20 2107                  Immunization History: All immunization history was reviewed by me today. Immunization History   Administered Date(s) Administered    Influenza Whole 10/01/2012    Pneumococcal Conjugate 7-valent (Prevnar7) 10/12/2006    Tdap (Boostrix, Adacel) 03/23/2018       Known drug allergies:      All allergies were reviewed and updated    Allergies   Allergen Reactions    Amitriptyline Other (See Comments)     crying    Diltiazem Hcl Other (See Comments)     crying    Lansoprazole Other (See Comments)     crying    Tricyclic Antidepressants Other (See Comments)     crying    Trilisate [Choline Magnesium Trisalicylate] Other (See Comments)     crying    Codeine Nausea And Vomiting    Demerol Nausea And Vomiting       Social history:     Social History: All social andepidemiologic history was reviewed and updated by me today as needed. · Tobacco use:   reports that he has been smoking. He has a 42.00 pack-year smoking history. He has never used smokeless tobacco.  · Alcohol use:   reports no history of alcohol use. · Currently lives in: [place] [id number]  ·  reports no history of drug use. Assessment:     The patient is a 71 y.o. old male who  has a past medical history of Arthritis, Gong's esophagus, Chronic back pain, COPD (chronic obstructive pulmonary disease) ([address] Depression, GERD (gastroesophageal reflux disease), HYPERCHOLESTERAEMIA, Hypertension, Prinzmetal angina ([address] Spinal stenoses, and TIA (transient ischaemic attack). with following problems:    · High fever and leukocytosis on admission-this is resolved  · Multifocal pneumonia involving left upper lobe and nodular opacities in the lingula and left lower lobe area-pulmonary consult for necrotizing pneumonia-BAL cultures negative so far  · Status post bronchoscopy  · Mediastinal and bilateral hilar lymphadenopathy-  · Improve slowly  · Extensive emphysema  · Status post cholecystectomy  · Left nephrolithiasis  · History of splenectomy  · COPD  · Heavy smoker-counseling done      Discussion:      The patient is clinically improving. I had switched his antibiotics to oral linezolid and oral Augmentin yesterday. He seems to be tolerating antibiotics okay. BAL Legionella PCR was negative. BAL cultures from 12/30/2019 remain negative so far as well. Plan:     Diagnostic Workup:    · Continue to follow  fever curve, WBC count and blood cultures  · Follow up on liver and renal function  · Follow-up on chest x-ray done today    Antimicrobials:    · Will continue p.o. linezolid 600 mg every 12 hour for 15 days  · Continue p.o.  Augmentin 875 mg every 12 hour for another 15 days  · Recommend oral probiotic twice daily while on antibiotic  · Continue to watch for diarrhea  · Will follow-up on the culture results and clinical progress and will make further recommendations accordingly  · Cough and deep wheezing exercises  · Continue to watch for new fever at home  · DVT prophylaxis  · Discussed importance of staying away from smoking  · Discussed all above with patient and RN   · Please make sure he is up-to-date on his postsplenectomy vaccinations    Vaccination recommendations for patient with anatomical/functional asplenia:    1. Haemophilus b conjugate (Hib) vaccine (ACTHIB) IM once if they have not previously received Hib vaccine   2. Pneumococcal conjugate 13-valent (PCV13) vaccine (PREVNAR 13) IM once   2nd dose: Pneumococcal polysaccharide 23-valent (PPSV23) vaccine (PNEUMOVAX 23) SQ/IM once given ? 8 weeks later, then 3rd dose as PPSV23 > 5 years later. (If already received ? 1 prior doses of PPSV23: give PCV13 at least 1 year after last PPSV23 dose.)  3. Meningococcal conjugate (MenACWY-D) vaccine (MENACTRA) IM (repeat in ? 8 weeks, then every 5 years thereafter)   4. Meningococcal serogroup B (MenB) vaccine (BEXSERO) IM (repeat in ? 4 weeks)       Drug Monitoring:    · Continue monitoring for antibiotic toxicity as follows: CMP, CBC  · Continue to watch for following: new or worsening fever, new hypotension, hives, lip swelling and redness or purulence at vascular access sites. I/v access Management:    · Continue to monitor i.v access sites for erythema, induration, discharge or tenderness. · As always, continue efforts to minimize tubes/lines/drains as clinically appropriate to reduce chances of line associated infections. Patient education and counseling:      · The patient was educated in detail about the side-effects of various antibiotics and things to watch for like new rashes, lip swelling, severe reaction, worsening diarrhea, break through fever etc.  · Discussed patient's condition and what to expect.  All of the patient's questions were addressed in a satisfactory manner and patient verbalized understanding all instructions. Thank you for involving me in the care of your patient. I will continue to follow. If you have anyadditional questions, please do not hesitate to contact me. Subjective: Interval history: Patient was seen and examined at bedside. Interval history was obtained. The patient is on linezolid and Augmentin. Tolerating it okay. No fever. No diarrhea     REVIEW OF SYSTEMS:    Review of Systems   Constitutional: Positive for fatigue. Negative for chills, diaphoresis and fever. HENT: Negative for ear discharge, ear pain, rhinorrhea, sore throat and trouble swallowing. Eyes: Negative for discharge and redness. Respiratory: Negative for cough, shortness of breath and wheezing. Cardiovascular: Negative for chest pain and leg swelling. Gastrointestinal: Negative for abdominal pain, constipation, diarrhea and nausea. Endocrine: Negative for polyuria. Genitourinary: Negative for dysuria, flank pain, frequency, hematuria and urgency. Musculoskeletal: Negative for back pain and myalgias. Skin: Negative for rash. Neurological: Negative for dizziness, seizures and headaches. Hematological: Does not bruise/bleed easily. Psychiatric/Behavioral: Negative for hallucinations and suicidal ideas. All other systems reviewed and are negative. Past Medical History: All past medical history reviewed today. Past Medical History:   Diagnosis Date    Arthritis     Gong's esophagus     Chronic back pain     COPD (chronic obstructive pulmonary disease) (HCC)     Depression     GERD (gastroesophageal reflux disease)     HYPERCHOLESTERAEMIA     Hypertension     Prinzmetal angina (HCC)     Spinal stenoses     TIA (transient ischaemic attack)        Past Surgical History: All past surgical history was reviewed today.     Past Surgical History:   Procedure Laterality Date    APPENDECTOMY      BACK SURGERY      BRONCHOSCOPY N/A 12/30/2019    BRONCHOSCOPY ALVEOLAR LAVAGE performed by Mike Walters MD at [address] [place] [address]  12/30/2019    BRONCHOSCOPY BRUSHINGS performed by [facility] at [facility]      COLONOSCOPY      WITH BIOPSY    JOINT REPLACEMENT      RIGHT KNEE    SPLENECTOMY      UPPER GASTROINTESTINAL ENDOSCOPY  5-14-10    egd with biopsy    UPPER GASTROINTESTINAL ENDOSCOPY  11-27-12    Esophagogastroduodenoscopy with biopsy, Romero esophageal dilation, and Botulinum injection of the pylorus       Family History: All family history was reviewed today. Problem Relation Age of Onset    Heart Disease Mother     Cancer Father         lung       Objective:       PHYSICAL EXAM:      Vitals:   Vitals:    01/01/20 2351 01/02/20 0436 01/02/20 0830 01/02/20 1157   BP: (!) 156/75 (!) 165/101 (!) 166/97 (!) 174/85   Pulse: 72 60 58 57   Resp: 16 16 14 16   Temp: 97.6 °F (36.4 °C) 97.6 °F (36.4 °C) 97.5 °F (36.4 °C) 97.4 °F (36.3 °C)   TempSrc: Oral Oral Oral Rectal   SpO2: 94% 93% 96% 94%   Weight:       Height:           Physical Exam  Vitals signs and nursing note reviewed. Constitutional:       Appearance: Normal appearance. He is well-developed. HENT:      Head: Normocephalic and atraumatic. Right Ear: External ear normal.      Left Ear: External ear normal.      Nose: Nose normal. No congestion or rhinorrhea. Mouth/Throat:      Mouth: Mucous membranes are moist.      Pharynx: No oropharyngeal exudate or posterior oropharyngeal erythema. Eyes:      General: No scleral icterus. Right eye: No discharge. Left eye: No discharge. Conjunctiva/sclera: Conjunctivae normal.      Pupils: Pupils are equal, round, and reactive to light. Neck:      Musculoskeletal: Normal range of motion and neck supple. No neck rigidity or muscular tenderness.    Cardiovascular:      Rate and Rhythm: Normal rate and regular rhythm. Pulses: Normal pulses. Heart sounds: No murmur. No friction rub. Pulmonary:      Effort: Pulmonary effort is normal. No respiratory distress. Breath sounds: No stridor. Rales (Bibasilar crackles) present. No wheezing or rhonchi. Abdominal:      General: Bowel sounds are normal.      Palpations: Abdomen is soft. Tenderness: There is no tenderness. There is no right CVA tenderness, left CVA tenderness, guarding or rebound. Musculoskeletal: Normal range of motion. General: No swelling or tenderness. Lymphadenopathy:      Cervical: No cervical adenopathy. Skin:     General: Skin is warm and dry. Coloration: Skin is not jaundiced. Findings: No erythema or rash. Neurological:      General: No focal deficit present. Mental Status: He is alert and oriented to person, place, and time. Mental status is at baseline. Motor: No abnormal muscle tone. Psychiatric:         Mood and Affect: Mood normal.         Behavior: Behavior normal.         Thought Content: Thought content normal.           Lines: All vascular access sites are healthy with no local erythema, discharge or tenderness. Intake and output:    I/O last 3 completed shifts: In: 8636 [P.O.:840; I.V.:2895]  Out: 4422 [Urine:3655]    Lab Data:   All available labs and old records have been reviewed by me.     CBC:  Recent Labs     01/01/20  0508   WBC 10.9   RBC 3.70*   HGB 11.2*   HCT 32.5*   *   MCV 87.9   MCH 30.2   MCHC 34.4   RDW 14.3        BMP:  Recent Labs     01/01/20  0508   *   K 4.1      CO2 26   BUN 8   CREATININE <0.5*   CALCIUM 8.6   GLUCOSE 119*        Hepatic Function Panel:   Lab Results   Component Value Date    ALKPHOS 100 12/27/2019    ALT 8 12/27/2019    AST 9 12/27/2019    PROT 7.3 12/27/2019    PROT 6.9 08/31/2012    BILITOT 0.6 12/27/2019    LABALBU 3.7 12/27/2019       CPK: No results found for: CKTOTAL  ESR: No results found for: SEDRATE  CRP: No

## 2020-01-02 NOTE — PROGRESS NOTES
Shift assessment complete. VSS. Scheduled meds administered. POC for shift reviewed and agreed on. Pt sitting up in bed eating breakfast. No current needs noted. Call light in reach.

## 2020-01-02 NOTE — PROGRESS NOTES
09/21/2015    PO2ART 88.3 09/21/2015    GGD0AFZ 28.6 09/21/2015       Radiology: All pertinent images / reports were reviewed as a part of this visit. Narrative   EXAMINATION:   CTA OF THE CHEST 12/27/2019 5:42 pm       TECHNIQUE:   CTA of the chest was performed after the administration of intravenous   contrast.  Multiplanar reformatted images are provided for review.  MIP   images are provided for review. Dose modulation, iterative reconstruction,   and/or weight based adjustment of the mA/kV was utilized to reduce the   radiation dose to as low as reasonably achievable.       COMPARISON:   December 6, 2019       HISTORY:   ORDERING SYSTEM PROVIDED HISTORY: cp, elevated dimer   TECHNOLOGIST PROVIDED HISTORY:   Reason for exam:->cp, elevated dimer   Reason for Exam: Cough (c/o coughing, congestion, pt has scoliosis and c/o   pain due to coughing so much. Pt states he was diagnosed with pneumonia 2   weeks ago and finished the antibiotic course completly.)   Acuity: Acute   Type of Exam: Initial       FINDINGS:   Pulmonary Arteries:       No evidence of acute pulmonary embolism to the lobar branches. The main   pulmonary artery measures 2.9 cm.  This is considered within normal limits.       Mediastinum:       No evidence of thoracic aortic dissection.  No evidence of pericardial   effusion.       Mediastinal and hilar lymphadenopathy bilaterally.       No evidence of significant axillary lymphadenopathy.       Lungs/pleura:       Interval increase and worsening masslike pulmonary opacity within left upper   lobe.  Interval improvement in nodular pulmonary opacity within lingula and   left lower lobe.       Additional bibasilar pulmonary opacities and has a configuration of   atelectasis.  No evidence of pneumothorax.       Trace left pleural effusion.  No evidence of right pleural effusion.       Upper Abdomen:       Hepatomegaly.  Status post cholecystectomy, with mild intrahepatic ductal   dilatation.  Extrahepatic biliary dilatation is also present.  This is   partially imaged and evaluated.       Nonobstructing left renal calculus.  Minimal bilateral renal pelviectasis. No evidence of large adrenal masses.       Nonspecific thickening of the distal esophagus.       Soft Tissues/Bones:       No evidence of acute osseous abnormalities.  Multilevel degenerative changes   of the thoracic spine.           Impression   1. Interval increase in size of large masslike pulmonary opacity within left   upper lobe.  Differential includes worsening pneumonia or worsening neoplasm. Recommend short interval follow-up examination in 2-4 weeks.       2. Interval improvement in nodular pulmonary opacities within lingula and   left lower lobe.  Scattered bibasilar atelectasis.  Continued attention on   follow-up examination is recommended.       3. Mediastinal and bilateral hilar lymphadenopathy.       4. Extensive emphysema.       5. No evidence of acute pulmonary embolism.       6. Hepatomegaly.  Status post cholecystectomy, with intrahepatic and   extrahepatic ductal dilatation.  This is partially imaged.       7. Nonobstructing left renal calculus.  Minimal renal pelviectasis is   partially imaged.       8. Nonspecific thickening of the distal esophagus.  This can be re-evaluated   with upper endoscopy non emergently. Problem List:     Recurrent multifocal pneumonia  History of splenectomy  Copd, severity to be determined  Assessment/Plan:     Antibiotics have now been switched over to oral Augmentin and linezolid by ID. Would prefer a prolonged course, given recurrence of pneumonia with necrotizing characteristics. ID following the patient. Bronchoscopy cultures negative, bronchial lavage and bronchial brush negative for malignancy. Will require repeat CT imaging in 4 weeks, if persistent opacities noted in the left upper lobe-particularly the masslike area, will require CT-guided lung biopsy.   Discussed about this with the patient. Okay for discharge from pulmonary standpoint. Will organize for follow-up next week in our office.     Parveen Rand MD
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
## 2020-01-02 NOTE — DISCHARGE SUMMARY
day.   BP (!) 174/85   Pulse 57   Temp 97.4 °F (36.3 °C) (Rectal)   Resp 16   Ht 5' 9\" (1.753 m)   Wt 140 lb 11.2 oz (63.8 kg)   SpO2 94%   BMI 20.78 kg/m²   General appearance. Alert. Looks comfortable. HEENT. Sclera clear. Moist mucus membranes. Cardiovascular. Regular rate and rhythm, normal S1, S2. No murmur. Respiratory. Not using accessory muscles. Diminished, no wheeze. Gastrointestinal. Abdomen soft, non-tender, not distended, normal bowel sounds  Neurology. Facial symmetry. No speech deficits. Moving all extremities equally. Extremities. No edema in lower extremities. Skin. Warm, dry, normal turgor    Condition at time of discharge stable    Medication instructions provided to patient at discharge. Medication List      START taking these medications    amoxicillin-clavulanate 875-125 MG per tablet  Commonly known as:  AUGMENTIN  Take 1 tablet by mouth every 12 hours for 15 days     lactobacillus capsule  Take 1 capsule by mouth 2 times daily (with meals) for 15 days     linezolid 600 MG tablet  Commonly known as:  ZYVOX  Take 1 tablet by mouth every 12 hours for 15 days        CONTINUE taking these medications    DULoxetine 20 MG extended release capsule  Commonly known as:  CYMBALTA     gabapentin 600 MG tablet  Commonly known as:  NEURONTIN     lisinopril 40 MG tablet  Commonly known as:  PRINIVIL;ZESTRIL     RABEprazole 20 MG tablet  Commonly known as:  ACIPHEX  Take 1 tablet by mouth 4 times daily (before meals and nightly). verapamil 80 MG tablet  Commonly known as:  CALAN           Where to Get Your Medications      These medications were sent to [facility], [address] [place] [id number]    Phone:  [phone]   · amoxicillin-clavulanate 875-125 MG per tablet  · lactobacillus capsule  · linezolid 600 MG tablet     Per patient he has medical marijuana for his chronic back pain.      Checked with pharmacy, no interaction between antibiotics and aciphex. Discharge recommendations given to patient. Follow Up. PCP in 1 week, pulmonary appt. 1/10  Disposition. home  Activity. As tolerated  Diet: DIET GENERAL;      Spent 40 minutes in discharge process.     Signed:  TORIE Bhagat CNP     1/2/2020 3:26 PM

## 2020-01-03 ENCOUNTER — CARE COORDINATION (OUTPATIENT)
Dept: CASE MANAGEMENT | Age: 65
End: 2020-01-03

## 2020-01-03 NOTE — CARE COORDINATION
Alan 45 Transitions Initial Follow Up Call    Call within 2 business days of discharge: Yes    Patient: Jayden Del Toro Patient : 1955   MRN: [id number]  Reason for Admission:   Discharge Date: 20 RARS: Readmission Risk Score: 9      Last Discharge [facility]       Complaint Diagnosis Description Type Department Provider    19 Cough Pneumonia due to organism . .. ED to Hosp-Admission (Discharged) (ADMITTED) [facility] 5T Lexii Valle MD; Mickey Miller. ..            Initial 24 hr call attempted, contact info left on vm      Follow Up  Future Appointments   Date Time Provider Morgan Cochran   1/10/2020  2:45 PM Betito Saha MD [address] [facility]       America English RN

## 2020-01-04 ENCOUNTER — CARE COORDINATION (OUTPATIENT)
Dept: CASE MANAGEMENT | Age: 65
End: 2020-01-04

## 2020-01-13 ENCOUNTER — TELEPHONE (OUTPATIENT)
Dept: PULMONOLOGY | Age: 65
End: 2020-01-13

## 2020-01-13 ENCOUNTER — CARE COORDINATION (OUTPATIENT)
Dept: CASE MANAGEMENT | Age: 65
End: 2020-01-13

## 2020-01-13 NOTE — TELEPHONE ENCOUNTER
----- Message from Chelle Mon MD sent at 1/10/2020  2:57 PM EST -----  I spoke to this patient today who is a no show. Doing good, continue with augmentin and zyvox. He will need repeat CT scan chest without contrast to be organized in 2 weeks followed by an appt to see me in office.

## 2020-01-24 ENCOUNTER — CARE COORDINATION (OUTPATIENT)
Dept: CASE MANAGEMENT | Age: 65
End: 2020-01-24

## 2020-02-03 LAB
FUNGUS (MYCOLOGY) CULTURE: ABNORMAL
FUNGUS (MYCOLOGY) CULTURE: NORMAL
FUNGUS STAIN: ABNORMAL
FUNGUS STAIN: NORMAL
ORGANISM: ABNORMAL

## 2020-02-06 ENCOUNTER — CARE COORDINATION (OUTPATIENT)
Dept: CASE MANAGEMENT | Age: 65
End: 2020-02-06

## 2020-02-06 NOTE — CARE COORDINATION
Alan 45 Transitions Follow Up Call    2020    Patient: Dimas Preston  Patient : 1955   MRN: [id number]    Discharge Date: 20 RARS: Readmission Risk Score: 9         Spoke with: Ramu Matos and wife [facility] Transitions Subsequent and Final Call    Subsequent and Final Calls  Care Transitions Interventions                          Other Interventions:          CTN spoke to Ramu Matos for f/u BPCI-A call. Pt stated he is doing OK, has not scheduled f/u CT scan yet. Denies increased SOB, cough, CP/pressure, fever, chills. Wife was in background yelling at pt, telling him to schedule CT appt. Pt handed phone to wife. Wife stated he is working with billing over past bill and madina not want to schedule appt without prior auth verification. Stated he is in process of getting Medicare and has Medicaid and secondary insurance. CTN offered to schedule CT scan and inquire about prior auth. CTN called [facility] and scheduled CT for 20 at 7 pm. Informed they will call if pt does not have prior auth approval prior to appt. CTN attempted to call pt back with updates, left detailed VM with appt information and requested pt call CTN back t verify receipt of message. Will continue to follow for BPCI-A. Jeronimo Orta RN BSN   Care Transitions Nurse  [phone]     Follow Up  No future appointments.     Jeronimo Orta RN

## 2020-02-10 ENCOUNTER — CARE COORDINATION (OUTPATIENT)
Dept: CASE MANAGEMENT | Age: 65
End: 2020-02-10

## 2020-02-17 ENCOUNTER — CARE COORDINATION (OUTPATIENT)
Dept: CASE MANAGEMENT | Age: 65
End: 2020-02-17

## 2020-02-18 LAB
AFB CULTURE (MYCOBACTERIA): NORMAL
AFB CULTURE (MYCOBACTERIA): NORMAL
AFB SMEAR: NORMAL
AFB SMEAR: NORMAL

## 2020-03-04 ENCOUNTER — CARE COORDINATION (OUTPATIENT)
Dept: CASE MANAGEMENT | Age: 65
End: 2020-03-04

## 2020-03-04 NOTE — CARE COORDINATION
Alan 45 Transitions Follow Up Call    3/4/2020    Patient: Coretta Land  Patient : 1955   MRN: [id number]  Reason for Admission:   Discharge Date: 20 RARS: Readmission Risk Score: 9         Attempted to call patient for follow up BPCI-A Transition call. Left voicemail message stating this is the final call we will be making. If you have any health concerns or problems arise please call your PCP to schedule an appointment. Care Transitions Subsequent and Final Call    Subsequent and Final Calls  Care Transitions Interventions                          Other Interventions: Follow Up  No future appointments.     Susu Duff LPN

## 2020-05-14 ENCOUNTER — HOSPITAL ENCOUNTER (EMERGENCY)
Age: 65
Discharge: ANOTHER HOSPITAL | End: 2020-05-14
Attending: EMERGENCY MEDICINE
Payer: MEDICARE

## 2020-05-14 ENCOUNTER — APPOINTMENT (OUTPATIENT)
Dept: CT IMAGING | Age: 65
End: 2020-05-14
Payer: MEDICARE

## 2020-05-14 ENCOUNTER — APPOINTMENT (OUTPATIENT)
Dept: GENERAL RADIOLOGY | Age: 65
End: 2020-05-14
Payer: MEDICARE

## 2020-05-14 VITALS
OXYGEN SATURATION: 93 % | BODY MASS INDEX: 20.4 KG/M2 | TEMPERATURE: 97.5 F | HEART RATE: 84 BPM | WEIGHT: 145.72 LBS | SYSTOLIC BLOOD PRESSURE: 119 MMHG | RESPIRATION RATE: 18 BRPM | DIASTOLIC BLOOD PRESSURE: 92 MMHG | HEIGHT: 71 IN

## 2020-05-14 LAB
A/G RATIO: 1.1 (ref 1.1–2.2)
ALBUMIN SERPL-MCNC: 3.2 G/DL (ref 3.4–5)
ALP BLD-CCNC: 155 U/L (ref 40–129)
ALT SERPL-CCNC: 11 U/L (ref 10–40)
ANION GAP SERPL CALCULATED.3IONS-SCNC: 12 MMOL/L (ref 3–16)
AST SERPL-CCNC: 19 U/L (ref 15–37)
BASOPHILS ABSOLUTE: 0.1 K/UL (ref 0–0.2)
BASOPHILS RELATIVE PERCENT: 1.2 %
BILIRUB SERPL-MCNC: <0.2 MG/DL (ref 0–1)
BILIRUBIN URINE: NEGATIVE
BLOOD, URINE: NEGATIVE
BUN BLDV-MCNC: 9 MG/DL (ref 7–20)
CALCIUM SERPL-MCNC: 9.1 MG/DL (ref 8.3–10.6)
CHLORIDE BLD-SCNC: 101 MMOL/L (ref 99–110)
CLARITY: CLEAR
CO2: 26 MMOL/L (ref 21–32)
COLOR: YELLOW
CREAT SERPL-MCNC: 0.6 MG/DL (ref 0.8–1.3)
EOSINOPHILS ABSOLUTE: 0.6 K/UL (ref 0–0.6)
EOSINOPHILS RELATIVE PERCENT: 7.5 %
GFR AFRICAN AMERICAN: >60
GFR NON-AFRICAN AMERICAN: >60
GLOBULIN: 2.8 G/DL
GLUCOSE BLD-MCNC: 94 MG/DL (ref 70–99)
GLUCOSE URINE: NEGATIVE MG/DL
HCT VFR BLD CALC: 30.7 % (ref 40.5–52.5)
HEMOGLOBIN: 9.2 G/DL (ref 13.5–17.5)
KETONES, URINE: NEGATIVE MG/DL
LACTIC ACID, SEPSIS: 1.2 MMOL/L (ref 0.4–1.9)
LEUKOCYTE ESTERASE, URINE: NEGATIVE
LYMPHOCYTES ABSOLUTE: 1.7 K/UL (ref 1–5.1)
LYMPHOCYTES RELATIVE PERCENT: 20.5 %
MCH RBC QN AUTO: 27.2 PG (ref 26–34)
MCHC RBC AUTO-ENTMCNC: 30 G/DL (ref 31–36)
MCV RBC AUTO: 90.8 FL (ref 80–100)
MICROSCOPIC EXAMINATION: NORMAL
MONOCYTES ABSOLUTE: 1.1 K/UL (ref 0–1.3)
MONOCYTES RELATIVE PERCENT: 13.6 %
NEUTROPHILS ABSOLUTE: 4.7 K/UL (ref 1.7–7.7)
NEUTROPHILS RELATIVE PERCENT: 57.2 %
NITRITE, URINE: NEGATIVE
OCCULT BLOOD DIAGNOSTIC: NORMAL
PDW BLD-RTO: 14.9 % (ref 12.4–15.4)
PH UA: 6.5 (ref 5–8)
PLATELET # BLD: 837 K/UL (ref 135–450)
PMV BLD AUTO: 7 FL (ref 5–10.5)
POTASSIUM SERPL-SCNC: 4.5 MMOL/L (ref 3.5–5.1)
PRO-BNP: 499 PG/ML (ref 0–124)
PROTEIN UA: NEGATIVE MG/DL
RBC # BLD: 3.38 M/UL (ref 4.2–5.9)
SODIUM BLD-SCNC: 139 MMOL/L (ref 136–145)
SPECIFIC GRAVITY UA: 1.01 (ref 1–1.03)
TOTAL PROTEIN: 6 G/DL (ref 6.4–8.2)
TROPONIN: 0.02 NG/ML
URINE REFLEX TO CULTURE: NORMAL
URINE TYPE: NORMAL
UROBILINOGEN, URINE: 0.2 E.U./DL
WBC # BLD: 8.2 K/UL (ref 4–11)

## 2020-05-14 PROCEDURE — 83605 ASSAY OF LACTIC ACID: CPT

## 2020-05-14 PROCEDURE — 36415 COLL VENOUS BLD VENIPUNCTURE: CPT

## 2020-05-14 PROCEDURE — 84484 ASSAY OF TROPONIN QUANT: CPT

## 2020-05-14 PROCEDURE — 2580000003 HC RX 258: Performed by: EMERGENCY MEDICINE

## 2020-05-14 PROCEDURE — 83880 ASSAY OF NATRIURETIC PEPTIDE: CPT

## 2020-05-14 PROCEDURE — 93005 ELECTROCARDIOGRAM TRACING: CPT | Performed by: EMERGENCY MEDICINE

## 2020-05-14 PROCEDURE — 71275 CT ANGIOGRAPHY CHEST: CPT

## 2020-05-14 PROCEDURE — 99291 CRITICAL CARE FIRST HOUR: CPT

## 2020-05-14 PROCEDURE — G0328 FECAL BLOOD SCRN IMMUNOASSAY: HCPCS

## 2020-05-14 PROCEDURE — 80053 COMPREHEN METABOLIC PANEL: CPT

## 2020-05-14 PROCEDURE — 71045 X-RAY EXAM CHEST 1 VIEW: CPT

## 2020-05-14 PROCEDURE — 70450 CT HEAD/BRAIN W/O DYE: CPT

## 2020-05-14 PROCEDURE — 85025 COMPLETE CBC W/AUTO DIFF WBC: CPT

## 2020-05-14 PROCEDURE — 6360000004 HC RX CONTRAST MEDICATION: Performed by: EMERGENCY MEDICINE

## 2020-05-14 PROCEDURE — 82947 ASSAY GLUCOSE BLOOD QUANT: CPT

## 2020-05-14 PROCEDURE — 81003 URINALYSIS AUTO W/O SCOPE: CPT

## 2020-05-14 PROCEDURE — 99285 EMERGENCY DEPT VISIT HI MDM: CPT

## 2020-05-14 RX ORDER — OXYCODONE AND ACETAMINOPHEN 10; 325 MG/1; MG/1
1 TABLET ORAL EVERY 8 HOURS PRN
COMMUNITY
Start: 2020-05-01 | End: 2020-09-21

## 2020-05-14 RX ORDER — SODIUM CHLORIDE 9 MG/ML
INJECTION, SOLUTION INTRAVENOUS CONTINUOUS
Status: DISCONTINUED | OUTPATIENT
Start: 2020-05-14 | End: 2020-05-15 | Stop reason: HOSPADM

## 2020-05-14 RX ORDER — ALBUTEROL SULFATE 90 UG/1
2 AEROSOL, METERED RESPIRATORY (INHALATION) EVERY 6 HOURS PRN
COMMUNITY
Start: 2020-05-01

## 2020-05-14 RX ORDER — TRAZODONE HYDROCHLORIDE 50 MG/1
50 TABLET ORAL NIGHTLY
COMMUNITY

## 2020-05-14 RX ORDER — 0.9 % SODIUM CHLORIDE 0.9 %
1000 INTRAVENOUS SOLUTION INTRAVENOUS ONCE
Status: COMPLETED | OUTPATIENT
Start: 2020-05-14 | End: 2020-05-14

## 2020-05-14 RX ORDER — 0.9 % SODIUM CHLORIDE 0.9 %
500 INTRAVENOUS SOLUTION INTRAVENOUS ONCE
Status: COMPLETED | OUTPATIENT
Start: 2020-05-14 | End: 2020-05-14

## 2020-05-14 RX ADMIN — SODIUM CHLORIDE 1000 ML: 9 INJECTION, SOLUTION INTRAVENOUS at 17:20

## 2020-05-14 RX ADMIN — IOPAMIDOL 100 ML: 755 INJECTION, SOLUTION INTRAVENOUS at 18:10

## 2020-05-14 RX ADMIN — SODIUM CHLORIDE 1000 ML: 9 INJECTION, SOLUTION INTRAVENOUS at 16:40

## 2020-05-14 RX ADMIN — SODIUM CHLORIDE 150 ML/HR: 9 INJECTION, SOLUTION INTRAVENOUS at 19:33

## 2020-05-14 RX ADMIN — SODIUM CHLORIDE 1000 ML: 9 INJECTION, SOLUTION INTRAVENOUS at 18:29

## 2020-05-14 NOTE — ED NOTES
Fall risk screening completed. Fall risk bracelet applied to patient. Non-skid socks provided and placed on patient. The fall risk is indicated using  fall sign . Based on score, a bed alarm was not indicated. The call light is within the patient's reach, and instructions for use were provided. The bed is in the lowest position with wheels locked. The patient has been advised to notify staff, using the call light, if there is a need to get up or use restroom. The patient verbalized understanding of safety precautions and how to contact staff for assistance.       Nay Maldonado RN  05/14/20 1761

## 2020-05-14 NOTE — ED NOTES
Bed: 01  Expected date: 5/14/20  Expected time: 4:11 PM  Means of arrival: [facility]  Comments:  70 yo male altered mental status     Edwina Solorzano RN  05/14/20 3880

## 2020-05-14 NOTE — ED NOTES
Rectal exam done per Dr Harriet West. No pedro blood seen. Specimen sent to lab.      Willem Go RN  05/14/20 1343

## 2020-05-14 NOTE — ED PROVIDER NOTES
[facility]  eMERGENCY dEPARTMENT eNCOUnter      Pt Name: Meeta Dash  MRN: [id number]  [place] 1955  Date of evaluation: 5/14/2020  Provider: Roberto Schaffer MD    [address] [place] [address]       Chief Complaint   Patient presents with    Altered Mental Status     arrived per [facility], awake, alert  and talking. EMS reports decreased level of consciousness, and was found unresponsive inside his car. Patient now states he was on his way to [employer] to get a phone and got so tired         CRITICAL CARE TIME   Total Critical Care time was a minimum of 45 minutes, excluding separately reportable procedures. There was a high probability of clinically significant/life threatening deterioration in the patient's condition which required my urgent intervention. Critical care time includes my initial evaluation, ongoing reassessment, review of laboratory, EKG, chest x-ray, CT scans, consultation with the hospitalist for transfer. No procedure time was included. HISTORY OF PRESENT ILLNESS  (Location/Symptom, Timing/Onset, Context/Setting, Quality, Duration, Modifying Factors, Severity.)   Meeta aDsh is a 71 y.o. male who presents to the emergency department via [address] [place] [address] Dr with a history that he was found unresponsive in his car. The patient states he remembers driving up to the store because he had to get a telephone. He remembers pulling into the parking lot. He remembers saying to himself I am tired, and got a close my eyes for a minute. The next thing he remembers is the [employer]. They apparently did a sternal rub and aroused him. He was confused. By the time he arrived here he was appropriately alert and was oriented. He was alert oriented without any complaints when he arrived here. No headache. No blurred vision. No chest pain or shortness of breath. No abdominal pain nausea vomiting. He states he has been constipated recently. No frequency urgency or dysuria.   No but is not limited to bradycardia arrhythmia, tachyarrhythmia, TIA, hypoglycemia, dehydration, electrolyte abnormality      DIAGNOSTIC RESULTS     EKG: All EKG's are interpreted by Mellisa Bunch MD in the absence of a cardiologist.    Normal sinus rhythm, rate of 73, prolonged QT. Rhythm strip shows a sinus rhythm with a rate of 73, NH interval 158 ms,  ms with no other ectopy as interpreted by me. No significant change compared to 12/27/2019    RADIOLOGY:   Non-plain film images such as CT, Ultrasound and MRI are read by the radiologist. Plain radiographic images are visualized and preliminarily interpreted Mellisa Bunch MD with the below findings:      Interpretation per the Radiologist below, if available at the time of this note:    CTA CHEST ABDOMEN PELVIS W CONTRAST   Preliminary Result      1. Right rectus abdominus 6 x 2 x 3 cm acute-subacute hematoma. No evidence   of active extravasation. 2. Infrarenal abdominal aortic aneurysm measuring 42 x 42 mm. Follow-up CT   recommended in 12 months. No evidence of aneurysm rupture or retroperitoneal   hematoma. 3. No acute abnormality in the chest.  Interval resolution of left upper lobe   consolidation. Moderate emphysema. 4. Postoperative changes from T5-S1 fusion and sacroiliac joint fixation. Extensive fluid in the posterior paraspinal soft tissues with calcifications   and bone graft. A component of the fluid tracks into the left paraspinal   soft tissues with a rounded 2 cm component lateral to the iliopsoas muscle. If there is any concern for infection, MRI could be considered. CT Head WO Contrast   Final Result   No acute intracranial abnormality. XR CHEST PORTABLE   Final Result   No acute abnormality. Interval resolution of masslike consolidation in the left lung apex.                ED BEDSIDE ULTRASOUND:   Performed by ED Physician - none    LABS:  Labs Reviewed   CBC WITH AUTO DIFFERENTIAL - Abnormal; Notable for the following components:       Result Value    RBC 3.38 (*)     Hemoglobin 9.2 (*)     Hematocrit 30.7 (*)     MCHC 30.0 (*)     Platelets 488 (*)     All other components within normal limits    Narrative:     Performed at:  [facility]) [place] [address] [address] [facility]   Phone [phone]   COMPREHENSIVE METABOLIC PANEL - Abnormal; Notable for the following components:    CREATININE 0.6 (*)     Total Protein 6.0 (*)     Alb 3.2 (*)     Alkaline Phosphatase 155 (*)     All other components within normal limits    Narrative:     Performed at:  [place] [address] [address] [facility]   Phone [phone]   TROPONIN - Abnormal; Notable for the following components:    Troponin 0.02 (*)     All other components within normal limits    Narrative:     Performed at:  [place] [address] [address] [facility]   Phone [phone]   BRAIN NATRIURETIC PEPTIDE - Abnormal; Notable for the following components:    Pro- (*)     All other components within normal limits    Narrative:     Performed at:  [place] [address] [address] [facility]   Phone [phone]   URINE RT REFLEX TO CULTURE    Narrative:     Performed at:  [address] [facility]  [address] [place] [address]   Phone [phone]   LACTATE, SEPSIS    Narrative:     Performed at:  [address] [facility]  [address] [place] [address]   Phone [phone]   BLOOD OCCULT STOOL DIAGNOSTIC    Narrative:     ORDER#: [id number]                          ORDERED BY: Theresa Lance  SOURCE: Stool                              COLLECTED:  05/14/20 19:07  ANTIBIOTICS AT MAXI.:                      RECEIVED :  05/14/20 19:08  Performed at:  [facility]  [address] [facility] [address]   Phone [phone]       All other labs were within normal range or not returned as of this dictation. EMERGENCY DEPARTMENT COURSE and DIFFERENTIAL DIAGNOSIS/MDM:   Vitals:    Vitals:    05/14/20 2115 05/14/20 2130 05/14/20 2200 05/14/20 2210   BP: (!) 140/94 128/72 (!) 90/45 (!) 119/92   Pulse: 85 80 82 84   Resp: 14 17 15 18   Temp:       TempSrc:       SpO2: 92% 93% 93% 93%   Weight:       Height:           1640: Patient's blood pressure dropped to 64 systolic. His initial blood pressure was 148/126. He already had a 500 cc bolus ordered and some maintenance fluid. I ordered an additional 1000 cc bolus. No change in mental status. His labs are pending other than his H&H is back at 9.2 and 30.7    1730: Blood pressure is up to 84 systolic. Review of his old records shows a recent visit with failure to thrive where his blood pressure was 99 systolic. He is status post recent back fusion surgery about 3 weeks ago. He was in an extended care facility. There is also mention in his old records of an abdominal aortic aneurysm and iliac aneurysm, although I cannot find the imaging that demonstrates that pathology. I am going to get a stat CT angiogram of the chest abdomen and pelvis because of his hypotension. .    0492: His last several blood pressures have been in the 77-4 05 systolic range. He has had about a liter and a half of IV fluids. This patient presents after being found unresponsive in his car by a bystander. He remembers driving to the place and feeling tired like he needed to sleep. That last thing he remembers. He was confused when the [employer] was able to arouse him. He was alert and oriented when he arrived here. This patient is status post recent back fusion at [facility] on 4/23/2020. He was in rehab for short period of time and discharge. He is back at home.   Other than some soreness in his low back and some soreness around his abdominal incision he has no other specific complaint when he arrived here. His initial blood pressure was normal, but subsequently he dropped down to 64/48. His blood pressures gradually come up with IV fluids. Review of his old records shows a recent blood pressure of 99 systolic he was recently seen in the emergency department on 5/3. He was diagnosed with failure to thrive at that time. He does clinically look dry, but his BUN is not significantly elevated. His hemoglobin is 9.2 which is significantly decreased from his hemoglobin in the hospital on 4/4/2018 of 13.6. This was prior to his surgery. His work-up here was otherwise remarkable only for a troponin of 0.02 with no acute EKG changes. Review of his old records shows he did have a history of an abdominal aortic aneurysm. Because of this I proceeded with a CT angiogram of the chest abdomen and pelvis. It showed his previously reported aneurysm with no evidence of extravasation or rupture. Does show a right rectus abdominis acute subacute hematoma that is 6 x 2 x 3 cm. It does show some extensive fluid in the posterior paraspinal soft tissues with calcifications and bone graft. A component of the fluid tracks into the left paraspinous soft tissues with a lateral component to the iliopsoas muscles. They cannot rule out infection. He is not febrile. His lactic acid is not elevated. My index of suspicion for sepsis is low. He is going to require hospitalization for syncope/hypotension. I have discussed with the patient. He wants to go to [facility] where all of his care has been provided and his recent surgery was performed. I will contact the hospitalist at that facility for transfer. Test results, diagnosis, and treatment plan were discussed with the patient. He understands the treatment plan and need for transfer/admission.     1940:  Discussed with Henry Snell

## 2020-05-15 LAB
EKG ATRIAL RATE: 73 BPM
EKG DIAGNOSIS: NORMAL
EKG P AXIS: 66 DEGREES
EKG P-R INTERVAL: 158 MS
EKG Q-T INTERVAL: 488 MS
EKG QRS DURATION: 102 MS
EKG QTC CALCULATION (BAZETT): 537 MS
EKG R AXIS: 47 DEGREES
EKG T AXIS: 61 DEGREES
EKG VENTRICULAR RATE: 73 BPM
GLUCOSE BLD-MCNC: 98 MG/DL (ref 70–99)
PERFORMED ON: NORMAL

## 2020-05-15 PROCEDURE — 93010 ELECTROCARDIOGRAM REPORT: CPT | Performed by: INTERNAL MEDICINE

## 2020-05-15 NOTE — ED NOTES
[employer] here for transport     Cintia Lynn, [facility]  05/14/20 2631

## 2020-07-17 ENCOUNTER — APPOINTMENT (OUTPATIENT)
Dept: GENERAL RADIOLOGY | Age: 65
DRG: 208 | End: 2020-07-17
Payer: MEDICARE

## 2020-07-17 ENCOUNTER — HOSPITAL ENCOUNTER (INPATIENT)
Age: 65
LOS: 3 days | Discharge: HOME | DRG: 208 | End: 2020-07-20
Attending: EMERGENCY MEDICINE | Admitting: INTERNAL MEDICINE
Payer: MEDICARE

## 2020-07-17 PROBLEM — R00.1 SYMPTOMATIC BRADYCARDIA: Status: ACTIVE | Noted: 2020-07-17

## 2020-07-17 LAB
A/G RATIO: 1.4 (ref 1.1–2.2)
ALBUMIN SERPL-MCNC: 3.5 G/DL (ref 3.4–5)
ALP BLD-CCNC: 99 U/L (ref 40–129)
ALT SERPL-CCNC: 10 U/L (ref 10–40)
ANION GAP SERPL CALCULATED.3IONS-SCNC: 16 MMOL/L (ref 3–16)
AST SERPL-CCNC: 12 U/L (ref 15–37)
BASOPHILS ABSOLUTE: 0.1 K/UL (ref 0–0.2)
BASOPHILS RELATIVE PERCENT: 0.8 %
BILIRUB SERPL-MCNC: <0.2 MG/DL (ref 0–1)
BUN BLDV-MCNC: 18 MG/DL (ref 7–20)
CALCIUM SERPL-MCNC: 8.7 MG/DL (ref 8.3–10.6)
CHLORIDE BLD-SCNC: 105 MMOL/L (ref 99–110)
CO2: 17 MMOL/L (ref 21–32)
CREAT SERPL-MCNC: 1.3 MG/DL (ref 0.8–1.3)
EOSINOPHILS ABSOLUTE: 0.1 K/UL (ref 0–0.6)
EOSINOPHILS RELATIVE PERCENT: 0.5 %
GFR AFRICAN AMERICAN: >60
GFR NON-AFRICAN AMERICAN: 55
GLOBULIN: 2.5 G/DL
GLUCOSE BLD-MCNC: 123 MG/DL (ref 70–99)
GLUCOSE BLD-MCNC: 151 MG/DL (ref 70–99)
HCT VFR BLD CALC: 36.4 % (ref 40.5–52.5)
HEMOGLOBIN: 11.8 G/DL (ref 13.5–17.5)
INR BLD: 1.14 (ref 0.86–1.14)
LYMPHOCYTES ABSOLUTE: 1.2 K/UL (ref 1–5.1)
LYMPHOCYTES RELATIVE PERCENT: 11.3 %
MCH RBC QN AUTO: 29 PG (ref 26–34)
MCHC RBC AUTO-ENTMCNC: 32.3 G/DL (ref 31–36)
MCV RBC AUTO: 89.7 FL (ref 80–100)
MONOCYTES ABSOLUTE: 1 K/UL (ref 0–1.3)
MONOCYTES RELATIVE PERCENT: 9.6 %
NEUTROPHILS ABSOLUTE: 8 K/UL (ref 1.7–7.7)
NEUTROPHILS RELATIVE PERCENT: 77.8 %
PDW BLD-RTO: 15.8 % (ref 12.4–15.4)
PERFORMED ON: ABNORMAL
PLATELET # BLD: 388 K/UL (ref 135–450)
PMV BLD AUTO: 7.5 FL (ref 5–10.5)
POTASSIUM REFLEX MAGNESIUM: 4.7 MMOL/L (ref 3.5–5.1)
PROTHROMBIN TIME: 13.3 SEC (ref 10–13.2)
RBC # BLD: 4.06 M/UL (ref 4.2–5.9)
SARS-COV-2, NAAT: NOT DETECTED
SODIUM BLD-SCNC: 138 MMOL/L (ref 136–145)
TOTAL PROTEIN: 6 G/DL (ref 6.4–8.2)
TROPONIN: <0.01 NG/ML
WBC # BLD: 10.3 K/UL (ref 4–11)

## 2020-07-17 PROCEDURE — 93005 ELECTROCARDIOGRAM TRACING: CPT | Performed by: EMERGENCY MEDICINE

## 2020-07-17 PROCEDURE — B2111ZZ FLUOROSCOPY OF MULTIPLE CORONARY ARTERIES USING LOW OSMOLAR CONTRAST: ICD-10-PCS | Performed by: INTERNAL MEDICINE

## 2020-07-17 PROCEDURE — 99285 EMERGENCY DEPT VISIT HI MDM: CPT

## 2020-07-17 PROCEDURE — 6360000004 HC RX CONTRAST MEDICATION: Performed by: EMERGENCY MEDICINE

## 2020-07-17 PROCEDURE — 2720000010 HC SURG SUPPLY STERILE

## 2020-07-17 PROCEDURE — 99152 MOD SED SAME PHYS/QHP 5/>YRS: CPT | Performed by: INTERNAL MEDICINE

## 2020-07-17 PROCEDURE — 96365 THER/PROPH/DIAG IV INF INIT: CPT

## 2020-07-17 PROCEDURE — 85610 PROTHROMBIN TIME: CPT

## 2020-07-17 PROCEDURE — 84484 ASSAY OF TROPONIN QUANT: CPT

## 2020-07-17 PROCEDURE — 6360000002 HC RX W HCPCS

## 2020-07-17 PROCEDURE — 4A023N7 MEASUREMENT OF CARDIAC SAMPLING AND PRESSURE, LEFT HEART, PERCUTANEOUS APPROACH: ICD-10-PCS | Performed by: INTERNAL MEDICINE

## 2020-07-17 PROCEDURE — 80053 COMPREHEN METABOLIC PANEL: CPT

## 2020-07-17 PROCEDURE — 99152 MOD SED SAME PHYS/QHP 5/>YRS: CPT

## 2020-07-17 PROCEDURE — 85025 COMPLETE CBC W/AUTO DIFF WBC: CPT

## 2020-07-17 PROCEDURE — C1760 CLOSURE DEV, VASC: HCPCS

## 2020-07-17 PROCEDURE — 31500 INSERT EMERGENCY AIRWAY: CPT

## 2020-07-17 PROCEDURE — 2500000003 HC RX 250 WO HCPCS

## 2020-07-17 PROCEDURE — 6360000002 HC RX W HCPCS: Performed by: EMERGENCY MEDICINE

## 2020-07-17 PROCEDURE — 36415 COLL VENOUS BLD VENIPUNCTURE: CPT

## 2020-07-17 PROCEDURE — 2709999900 HC NON-CHARGEABLE SUPPLY

## 2020-07-17 PROCEDURE — 5A1935Z RESPIRATORY VENTILATION, LESS THAN 24 CONSECUTIVE HOURS: ICD-10-PCS | Performed by: EMERGENCY MEDICINE

## 2020-07-17 PROCEDURE — 0BH18EZ INSERTION OF ENDOTRACHEAL AIRWAY INTO TRACHEA, VIA NATURAL OR ARTIFICIAL OPENING ENDOSCOPIC: ICD-10-PCS | Performed by: EMERGENCY MEDICINE

## 2020-07-17 PROCEDURE — 94002 VENT MGMT INPAT INIT DAY: CPT

## 2020-07-17 PROCEDURE — 5A1213Z PERFORMANCE OF CARDIAC PACING, INTERMITTENT: ICD-10-PCS | Performed by: INTERNAL MEDICINE

## 2020-07-17 PROCEDURE — 71045 X-RAY EXAM CHEST 1 VIEW: CPT

## 2020-07-17 PROCEDURE — 2100000000 HC CCU R&B

## 2020-07-17 PROCEDURE — 99223 1ST HOSP IP/OBS HIGH 75: CPT | Performed by: INTERNAL MEDICINE

## 2020-07-17 PROCEDURE — 93010 ELECTROCARDIOGRAM REPORT: CPT | Performed by: INTERNAL MEDICINE

## 2020-07-17 PROCEDURE — 99153 MOD SED SAME PHYS/QHP EA: CPT

## 2020-07-17 PROCEDURE — B41D1ZZ FLUOROSCOPY OF AORTA AND BILATERAL LOWER EXTREMITY ARTERIES USING LOW OSMOLAR CONTRAST: ICD-10-PCS | Performed by: INTERNAL MEDICINE

## 2020-07-17 PROCEDURE — 93458 L HRT ARTERY/VENTRICLE ANGIO: CPT

## 2020-07-17 PROCEDURE — 93458 L HRT ARTERY/VENTRICLE ANGIO: CPT | Performed by: INTERNAL MEDICINE

## 2020-07-17 PROCEDURE — 2500000003 HC RX 250 WO HCPCS: Performed by: EMERGENCY MEDICINE

## 2020-07-17 PROCEDURE — 93567 NJX CAR CTH SPRVLV AORTGRPHY: CPT

## 2020-07-17 PROCEDURE — 2580000003 HC RX 258: Performed by: EMERGENCY MEDICINE

## 2020-07-17 PROCEDURE — 93567 NJX CAR CTH SPRVLV AORTGRPHY: CPT | Performed by: INTERNAL MEDICINE

## 2020-07-17 PROCEDURE — U0002 COVID-19 LAB TEST NON-CDC: HCPCS

## 2020-07-17 PROCEDURE — 6360000002 HC RX W HCPCS: Performed by: INTERNAL MEDICINE

## 2020-07-17 PROCEDURE — B2151ZZ FLUOROSCOPY OF LEFT HEART USING LOW OSMOLAR CONTRAST: ICD-10-PCS | Performed by: INTERNAL MEDICINE

## 2020-07-17 PROCEDURE — C1894 INTRO/SHEATH, NON-LASER: HCPCS

## 2020-07-17 PROCEDURE — C1769 GUIDE WIRE: HCPCS

## 2020-07-17 RX ORDER — PROPOFOL 10 MG/ML
10 INJECTION, EMULSION INTRAVENOUS
Status: DISCONTINUED | OUTPATIENT
Start: 2020-07-17 | End: 2020-07-18

## 2020-07-17 RX ORDER — ACETAMINOPHEN 325 MG/1
650 TABLET ORAL EVERY 4 HOURS PRN
Status: DISCONTINUED | OUTPATIENT
Start: 2020-07-17 | End: 2020-07-20 | Stop reason: HOSPADM

## 2020-07-17 RX ORDER — POLYETHYLENE GLYCOL 3350 17 G/17G
17 POWDER, FOR SOLUTION ORAL DAILY PRN
Status: DISCONTINUED | OUTPATIENT
Start: 2020-07-17 | End: 2020-07-20 | Stop reason: HOSPADM

## 2020-07-17 RX ORDER — SODIUM CHLORIDE 9 MG/ML
INJECTION, SOLUTION INTRAVENOUS CONTINUOUS
Status: DISCONTINUED | OUTPATIENT
Start: 2020-07-17 | End: 2020-07-17

## 2020-07-17 RX ORDER — KETAMINE HYDROCHLORIDE 50 MG/ML
100 INJECTION, SOLUTION, CONCENTRATE INTRAMUSCULAR; INTRAVENOUS ONCE
Status: COMPLETED | OUTPATIENT
Start: 2020-07-17 | End: 2020-07-17

## 2020-07-17 RX ORDER — SODIUM CHLORIDE 0.9 % (FLUSH) 0.9 %
10 SYRINGE (ML) INJECTION EVERY 12 HOURS SCHEDULED
Status: DISCONTINUED | OUTPATIENT
Start: 2020-07-18 | End: 2020-07-20 | Stop reason: HOSPADM

## 2020-07-17 RX ORDER — FENTANYL CITRATE 50 UG/ML
25 INJECTION, SOLUTION INTRAMUSCULAR; INTRAVENOUS
Status: DISCONTINUED | OUTPATIENT
Start: 2020-07-17 | End: 2020-07-18

## 2020-07-17 RX ORDER — SODIUM CHLORIDE 9 MG/ML
INJECTION, SOLUTION INTRAVENOUS CONTINUOUS
Status: DISCONTINUED | OUTPATIENT
Start: 2020-07-18 | End: 2020-07-18

## 2020-07-17 RX ORDER — ONDANSETRON 2 MG/ML
4 INJECTION INTRAMUSCULAR; INTRAVENOUS EVERY 4 HOURS PRN
Status: DISCONTINUED | OUTPATIENT
Start: 2020-07-17 | End: 2020-07-20 | Stop reason: HOSPADM

## 2020-07-17 RX ORDER — HYDRALAZINE HYDROCHLORIDE 20 MG/ML
10 INJECTION INTRAMUSCULAR; INTRAVENOUS EVERY 4 HOURS PRN
Status: DISCONTINUED | OUTPATIENT
Start: 2020-07-17 | End: 2020-07-20 | Stop reason: HOSPADM

## 2020-07-17 RX ORDER — ROCURONIUM BROMIDE 10 MG/ML
100 INJECTION, SOLUTION INTRAVENOUS ONCE
Status: COMPLETED | OUTPATIENT
Start: 2020-07-17 | End: 2020-07-17

## 2020-07-17 RX ORDER — ACETAMINOPHEN 325 MG/1
650 TABLET ORAL EVERY 4 HOURS PRN
Status: DISCONTINUED | OUTPATIENT
Start: 2020-07-17 | End: 2020-07-17 | Stop reason: SDUPTHER

## 2020-07-17 RX ORDER — ATROPINE SULFATE 0.1 MG/ML
1 INJECTION INTRAVENOUS ONCE
Status: COMPLETED | OUTPATIENT
Start: 2020-07-17 | End: 2020-07-17

## 2020-07-17 RX ORDER — SODIUM CHLORIDE 0.9 % (FLUSH) 0.9 %
10 SYRINGE (ML) INJECTION PRN
Status: DISCONTINUED | OUTPATIENT
Start: 2020-07-17 | End: 2020-07-20 | Stop reason: HOSPADM

## 2020-07-17 RX ORDER — ACETAMINOPHEN 650 MG/1
650 SUPPOSITORY RECTAL EVERY 4 HOURS PRN
Status: DISCONTINUED | OUTPATIENT
Start: 2020-07-17 | End: 2020-07-20 | Stop reason: HOSPADM

## 2020-07-17 RX ADMIN — Medication 25 MCG/HR: at 23:10

## 2020-07-17 RX ADMIN — ROCURONIUM BROMIDE 100 MG: 10 INJECTION INTRAVENOUS at 19:25

## 2020-07-17 RX ADMIN — PROPOFOL 10 MCG/KG/MIN: 10 INJECTION, EMULSION INTRAVENOUS at 19:35

## 2020-07-17 RX ADMIN — IOPAMIDOL 95 ML: 755 INJECTION, SOLUTION INTRAVENOUS at 22:06

## 2020-07-17 RX ADMIN — KETAMINE HYDROCHLORIDE 100 MG: 50 INJECTION, SOLUTION INTRAMUSCULAR; INTRAVENOUS at 19:24

## 2020-07-17 RX ADMIN — ATROPINE SULFATE 1 MG: 0.1 INJECTION, SOLUTION INTRAVENOUS at 18:49

## 2020-07-17 RX ADMIN — EPINEPHRINE 2 MCG/MIN: 1 INJECTION PARENTERAL at 18:53

## 2020-07-17 ASSESSMENT — PAIN SCALES - GENERAL: PAINLEVEL_OUTOF10: 0

## 2020-07-17 ASSESSMENT — PULMONARY FUNCTION TESTS
PIF_VALUE: 13
PIF_VALUE: 13
PIF_VALUE: 14

## 2020-07-17 NOTE — ED TRIAGE NOTES
Pt arrives from home where he lives with his wife, wife called because of SOB. Medics report upon arrival pt sitting up on bed and pt's wife states that he has been using his inhaler all day and is a smoker. Medics report pt's oxygen in the 80's at that time and heart rate in the 160's. Once in the medic oxygen was in the 70's and heart idioventricular in the 40's on the monitor, was given 3 atropines and pt started yelling his chest hurt. They then gave 10mg versed and attempted to externally pace pt x 2 but could not capture. Pt lethargic upon arrival on non-rebreather. No palpable radial pulses felt.

## 2020-07-17 NOTE — PROGRESS NOTES
Assisted with intubation 7.5 ETT, 24 cm at the lip, + color change, bilateral breath sounds, Xray ordered.     .Electronically signed by Aria Paris RCP on 7/17/2020 at 7:51 PM

## 2020-07-17 NOTE — ED NOTES
Pt woke up states \"get the fuck off me\" during attempt to put nasal trumpet in.       Edward Callahan RN  07/17/20 1383

## 2020-07-17 NOTE — ED NOTES
ETT 7.5   24 @ lip  Positive color change and bilat breath sounds noted     Marjorie Patel RN  07/17/20 1927

## 2020-07-17 NOTE — ED NOTES
Pt able to put his thumb up on each had when asked to but not verbally responding.       Pat Kauffman, RN  07/17/20 [facility], RN  07/17/20 1620

## 2020-07-17 NOTE — ED NOTES
Bed: A16  Expected date: 7/17/20  Expected time: 6:28 PM  Means of arrival: [facility]  Comments:  DEVORA Chavez RN  07/17/20 8170

## 2020-07-17 NOTE — ED PROVIDER NOTES
[facility]      Pt Name: Kaylah Client  MRN: [id number]  Xochitl 1955  Date of evaluation: 7/17/2020  Provider: Kimberly Cai MD    CHIEF COMPLAINT       Chief Complaint   Patient presents with    Chest Pain    Shortness of Breath     HISTORY OF PRESENT ILLNESS   (Location/Symptom, Timing/Onset,Context/Setting, Quality, Duration, Modifying Factors, Severity)  Note limiting factors. Kaylah Client is a 71 y.o. male who presents to the emergency department via EMS from home reportedly secondary to concern for SOB. Per EMS wife called and reported patient SOB all day using inhaler multiple times without improvement. EMS states on their arrival patient was sitting up in a chair with O2 sat in the 80s and heart rate 160s. En route oxygen decreased to 70s and patient became bradycardic at which time they gave him atropine (total 3 times) and put him on NRB. At one point he sat up and yelled is chest hurt otherwise did not talk en route. Gave 10mg versed in attempt to externally pace without success of capture. EMS states wife reported the patient is an active smoker. On arrival patient is responsive to pain otherwise does not answer questions or follow commands. He is unable to provide any further history. Nursing Notes were reviewed. REVIEW OF SYSTEMS    (2-9 systems for level 4, 10 or more for level 5)   Review of Systems  Unable to assess secondary to acuity of condition.      PAST MEDICAL HISTORY     Past Medical History:   Diagnosis Date    Arthritis     Gong's esophagus     Chronic back pain     COPD (chronic obstructive pulmonary disease) ([facility] [address]     Depression     GERD (gastroesophageal reflux disease)     HYPERCHOLESTERAEMIA     Hypertension     Prinzmetal angina (HCC)     Spinal stenoses     TIA (transient ischaemic attack)      SURGICALHISTORY       Past Surgical History:   Procedure Laterality Date    APPENDECTOMY      BACK SURGERY      BRONCHOSCOPY N/A 12/30/2019    BRONCHOSCOPY ALVEOLAR LAVAGE performed by Ember Caal MD at [address]  12/30/2019    BRONCHOSCOPY BRUSHINGS performed by Ember Caal MD at [address] COLONOSCOPY      WITH BIOPSY    JOINT REPLACEMENT      RIGHT KNEE    SPLENECTOMY      UPPER GASTROINTESTINAL ENDOSCOPY  5-14-10    egd with biopsy    UPPER GASTROINTESTINAL ENDOSCOPY  11-27-12    Esophagogastroduodenoscopy with biopsy, Romero esophageal dilation, and Botulinum injection of the pylorus     CURRENT MEDICATIONS       Previous Medications    ALBUTEROL SULFATE  (90 BASE) MCG/ACT INHALER    Inhale 2 puffs into the lungs every 6 hours as needed    BACLOFEN (LIORESAL) 5 MG TABS    Take 10 mg by mouth 3 times daily    DULOXETINE (CYMBALTA) 20 MG EXTENDED RELEASE CAPSULE    Take 20 mg by mouth    GABAPENTIN (NEURONTIN) 600 MG TABLET    Take 600 mg by mouth 3 times daily. LISINOPRIL (PRINIVIL;ZESTRIL) 40 MG TABLET    Take 40 mg by mouth daily    MEDICAL MARIJUANA    Take 1 each by mouth as needed. OXYCODONE-ACETAMINOPHEN (PERCOCET)  MG PER TABLET        TRAZODONE (DESYREL) 50 MG TABLET    Take 50 mg by mouth nightly    VERAPAMIL (CALAN) 80 MG TABLET    Take 80 mg by mouth 3 times daily. ALLERGIES     Amitriptyline;  Codeine; Demerol; Diltiazem hcl; Lansoprazole; Tricyclic antidepressants; and Trilisate [choline magnesium trisalicylate]  FAMILY HISTORY       Family History   Problem Relation Age of Onset    Heart Disease Mother     Cancer Father         lung     SOCIAL HISTORY       Social History     Socioeconomic History    Marital status:      Spouse name: Not on file    Number of children: Not on file    Years of education: Not on file    Highest education level: Not on file   Occupational History    Not on file   Social Needs    Financial resource strain: Not on file    Food insecurity     Worry: Not on file     Inability: Not on file    Transportation needs     Medical: Not on file     Non-medical: Not on file   Tobacco Use    Smoking status: Current Every Day Smoker     Packs/day: 1.00     Years: 42.00     Pack years: 42.00    Smokeless tobacco: Never Used   Substance and Sexual Activity    Alcohol use: No    Drug use: Yes     Types: Marijuana     Comment: medical    Sexual activity: Not Currently   Lifestyle    Physical activity     Days per week: Not on file     Minutes per session: Not on file    Stress: Not on file   Relationships    Social connections     Talks on phone: Not on file     Gets together: Not on file     Attends Christianity service: Not on file     Active member of club or organization: Not on file     Attends meetings of clubs or organizations: Not on file     Relationship status: Not on file    Intimate partner violence     Fear of current or ex partner: Not on file     Emotionally abused: Not on file     Physically abused: Not on file     Forced sexual activity: Not on file   Other Topics Concern    Not on file   Social History Narrative    Not on file     SCREENINGS         PHYSICAL EXAM    (up to 7 for level 4, 8 or more for level 5)     ED Triage Vitals   BP Temp Temp src Pulse Resp SpO2 Height Weight   07/17/20 1847 -- -- 07/17/20 1847 07/17/20 1847 07/17/20 1854 -- 07/17/20 1914   (!) 161/112   (!) 41 16 100 %  155 lb 6.8 oz (70.5 kg)     Physical Exam  Vitals signs reviewed. Constitutional:       General: He is in acute distress. Appearance: He is well-developed and normal weight. He is not diaphoretic. Interventions: Nasal cannula and face mask in place. HENT:      Head: Normocephalic and atraumatic. Right Ear: External ear normal.      Left Ear: External ear normal.      Nose: Nose normal.      Mouth/Throat:      Mouth: Mucous membranes are dry. Eyes:      General: No scleral icterus. Right eye: No discharge. Left eye: No discharge. Conjunctiva/sclera: Conjunctivae normal.      Comments: Pupils 7mm sluggishly responsive bilaterally   Neck:      Musculoskeletal: Normal range of motion. Trachea: No tracheal deviation. Cardiovascular:      Rate and Rhythm: Bradycardia present. Pulses:           Radial pulses are 1+ on the right side and 1+ on the left side. Femoral pulses are 2+ on the right side and 2+ on the left side. Pulmonary:      Breath sounds: No wheezing or rhonchi. Abdominal:      General: There is no distension. Genitourinary:     Penis: Normal.    Musculoskeletal:      Right lower leg: No edema. Left lower leg: No edema. Skin:     General: Skin is dry. Neurological:      GCS: GCS eye subscore is 2. GCS verbal subscore is 3. GCS motor subscore is 5. Comments: Face grossly symmetric, moves all extremities spontaneously. Psychiatric:         Behavior: Behavior is uncooperative. DIAGNOSTIC RESULTS   EKG: All EKG's are interpreted by the Emergency Department Physician who either signs or Co-signsthis chart in the absence of a cardiologist.    Indication: bradycardia  Interpretation: rate bradycardic 49, rhythm atrial fibrillation, axis normal. QRS prolonged 170, QTc prolonged 612. LBBB present. No T/ST changes consistent with acute ischemia. Significantly different to prior EKG which shows NSR from 5.14.2020. RADIOLOGY:   Interpretation per the Radiologist below, if available at the time of this note:  XR CHEST PORTABLE   Final Result   1. The enteric tube is in good position. 2. Pulmonary edema.         LABS:  Labs Reviewed   CBC WITH AUTO DIFFERENTIAL - Abnormal; Notable for the following components:       Result Value    RBC 4.06 (*)     Hemoglobin 11.8 (*)     Hematocrit 36.4 (*)     RDW 15.8 (*)     Neutrophils Absolute 8.0 (*)     All other components within normal limits    Narrative:     Performed at:  [facility]) kidney dysfunction, does have a history of CAD. No known history of a.fib. Nasal trumpet attempted to be placed and patient sat up yelling \"get the fuck off me\" though would not say anything else afterwards. Did follow command to show thumb on each side once though again unable to consistently repeat. Plan to intubate given anticipated clinical course and ordered ketamine/akira. Pre-oxygenated x 2 minutes and intubation successful with first attempt, please see procedure note below. Initial labs largely unremarkable - potassium 4.7, troponin negative, hgb 11.8    Discussed with cardiology need for pacemaker and plan for patient to go to cath lab to evaluate anatomy first. He will be admitted with plan to go to cath lab. CRITICAL CARE TIME   Total Critical Care time was 42 minutes due to bradycardia and AMS, excluding separately reportable procedures. There was a high probability of clinically significant/life threatening deterioration in the patient's condition which required my urgent intervention. CONSULTS:  IP CONSULT TO CARDIOLOGY    PROCEDURES:  Intubation    Date/Time: 7/17/2020 6:30 PM  Performed by: Estelita Gonzales MD  Authorized by: Estelita Gonzales MD     Consent:     Consent obtained:  Emergent situation  Pre-procedure details:     Patient status:  Altered mental status    Mallampati score:  II    Pretreatment medications:  None    Paralytics:  Rocuronium  Procedure details:     Preoxygenation: NC and NRB. CPR in progress: no      Intubation method:  Oral    Oral intubation technique:  Video-assisted    Laryngoscope size: video.     Tube size (mm):  7.5    Tube type:  Cuffed    Number of attempts:  1    Cricoid pressure: no      Tube visualized through cords: yes    Placement assessment:     ETT to lip:  24    Tube secured with:  ETT stringer    Breath sounds:  Equal and absent over the epigastrium    Placement verification: chest rise, CXR verification, direct visualization, equal breath sounds and ETCO2 detector      CXR findings:  ETT in proper place  Post-procedure details:     Patient tolerance of procedure: Tolerated well, no immediate complications        FINAL IMPRESSION      1. Dyspnea, unspecified type    2. Chest pain, unspecified type    3.  Bradycardia        DISPOSITION/PLAN   DISPOSITION Decision To Admit 07/17/2020 07:33:52 PM           (Please note that portions of this note were completed with a voice recognition program. Efforts were made to edit the dictations but occasionally words are mis-transcribed.)    Jose Elias Lima MD (electronically signed)  Attending Emergency Physician          Jose Elias Lima MD  07/18/20 [id number] Carina Wing MD  07/18/20 [id number]

## 2020-07-18 ENCOUNTER — APPOINTMENT (OUTPATIENT)
Dept: GENERAL RADIOLOGY | Age: 65
DRG: 208 | End: 2020-07-18
Payer: MEDICARE

## 2020-07-18 LAB
A/G RATIO: 1.4 (ref 1.1–2.2)
ALBUMIN SERPL-MCNC: 4 G/DL (ref 3.4–5)
ALP BLD-CCNC: 118 U/L (ref 40–129)
ALT SERPL-CCNC: 11 U/L (ref 10–40)
ANION GAP SERPL CALCULATED.3IONS-SCNC: 13 MMOL/L (ref 3–16)
AST SERPL-CCNC: 14 U/L (ref 15–37)
BASE EXCESS ARTERIAL: -2 MMOL/L (ref -3–3)
BASOPHILS ABSOLUTE: 0.1 K/UL (ref 0–0.2)
BASOPHILS RELATIVE PERCENT: 0.4 %
BILIRUB SERPL-MCNC: <0.2 MG/DL (ref 0–1)
BUN BLDV-MCNC: 17 MG/DL (ref 7–20)
CALCIUM SERPL-MCNC: 9.2 MG/DL (ref 8.3–10.6)
CARBOXYHEMOGLOBIN ARTERIAL: 0 % (ref 0–1.5)
CHLORIDE BLD-SCNC: 105 MMOL/L (ref 99–110)
CO2: 19 MMOL/L (ref 21–32)
CREAT SERPL-MCNC: 0.6 MG/DL (ref 0.8–1.3)
EKG ATRIAL RATE: 54 BPM
EKG ATRIAL RATE: 84 BPM
EKG DIAGNOSIS: NORMAL
EKG DIAGNOSIS: NORMAL
EKG P AXIS: 82 DEGREES
EKG P-R INTERVAL: 184 MS
EKG Q-T INTERVAL: 384 MS
EKG Q-T INTERVAL: 678 MS
EKG QRS DURATION: 124 MS
EKG QRS DURATION: 170 MS
EKG QTC CALCULATION (BAZETT): 453 MS
EKG QTC CALCULATION (BAZETT): 612 MS
EKG R AXIS: 80 DEGREES
EKG R AXIS: 96 DEGREES
EKG T AXIS: 2 DEGREES
EKG T AXIS: 28 DEGREES
EKG VENTRICULAR RATE: 49 BPM
EKG VENTRICULAR RATE: 84 BPM
EOSINOPHILS ABSOLUTE: 0 K/UL (ref 0–0.6)
EOSINOPHILS RELATIVE PERCENT: 0 %
GFR AFRICAN AMERICAN: >60
GFR NON-AFRICAN AMERICAN: >60
GLOBULIN: 2.8 G/DL
GLUCOSE BLD-MCNC: 120 MG/DL (ref 70–99)
HCO3 ARTERIAL: 22.9 MMOL/L (ref 21–29)
HCT VFR BLD CALC: 38.2 % (ref 40.5–52.5)
HEMOGLOBIN, ART, EXTENDED: 12.5 G/DL (ref 13.5–17.5)
HEMOGLOBIN: 12.8 G/DL (ref 13.5–17.5)
LYMPHOCYTES ABSOLUTE: 1.1 K/UL (ref 1–5.1)
LYMPHOCYTES RELATIVE PERCENT: 6.4 %
MAGNESIUM: 2.1 MG/DL (ref 1.8–2.4)
MCH RBC QN AUTO: 29.3 PG (ref 26–34)
MCHC RBC AUTO-ENTMCNC: 33.6 G/DL (ref 31–36)
MCV RBC AUTO: 87.2 FL (ref 80–100)
METHEMOGLOBIN ARTERIAL: 0.8 %
MONOCYTES ABSOLUTE: 1.1 K/UL (ref 0–1.3)
MONOCYTES RELATIVE PERCENT: 6.3 %
NEUTROPHILS ABSOLUTE: 14.7 K/UL (ref 1.7–7.7)
NEUTROPHILS RELATIVE PERCENT: 86.9 %
O2 CONTENT ARTERIAL: 18 ML/DL
O2 SAT, ARTERIAL: 99.2 %
O2 THERAPY: ABNORMAL
PCO2 ARTERIAL: 38.4 MMHG (ref 35–45)
PDW BLD-RTO: 15.9 % (ref 12.4–15.4)
PH ARTERIAL: 7.38 (ref 7.35–7.45)
PHOSPHORUS: 3.6 MG/DL (ref 2.5–4.9)
PLATELET # BLD: 418 K/UL (ref 135–450)
PMV BLD AUTO: 7.3 FL (ref 5–10.5)
PO2 ARTERIAL: 322 MMHG (ref 75–108)
POTASSIUM REFLEX MAGNESIUM: 4.3 MMOL/L (ref 3.5–5.1)
RBC # BLD: 4.38 M/UL (ref 4.2–5.9)
REASON FOR REJECTION: NORMAL
REJECTED TEST: NORMAL
SODIUM BLD-SCNC: 137 MMOL/L (ref 136–145)
TCO2 ARTERIAL: 24 MMOL/L
TOTAL PROTEIN: 6.8 G/DL (ref 6.4–8.2)
WBC # BLD: 16.9 K/UL (ref 4–11)

## 2020-07-18 PROCEDURE — 85025 COMPLETE CBC W/AUTO DIFF WBC: CPT

## 2020-07-18 PROCEDURE — 36415 COLL VENOUS BLD VENIPUNCTURE: CPT

## 2020-07-18 PROCEDURE — 82803 BLOOD GASES ANY COMBINATION: CPT

## 2020-07-18 PROCEDURE — 83735 ASSAY OF MAGNESIUM: CPT

## 2020-07-18 PROCEDURE — 99291 CRITICAL CARE FIRST HOUR: CPT | Performed by: INTERNAL MEDICINE

## 2020-07-18 PROCEDURE — 6360000002 HC RX W HCPCS: Performed by: INTERNAL MEDICINE

## 2020-07-18 PROCEDURE — 84100 ASSAY OF PHOSPHORUS: CPT

## 2020-07-18 PROCEDURE — 93010 ELECTROCARDIOGRAM REPORT: CPT | Performed by: INTERNAL MEDICINE

## 2020-07-18 PROCEDURE — 94003 VENT MGMT INPAT SUBQ DAY: CPT

## 2020-07-18 PROCEDURE — 36600 WITHDRAWAL OF ARTERIAL BLOOD: CPT

## 2020-07-18 PROCEDURE — 80053 COMPREHEN METABOLIC PANEL: CPT

## 2020-07-18 PROCEDURE — 2580000003 HC RX 258: Performed by: INTERNAL MEDICINE

## 2020-07-18 PROCEDURE — 71045 X-RAY EXAM CHEST 1 VIEW: CPT

## 2020-07-18 PROCEDURE — 99232 SBSQ HOSP IP/OBS MODERATE 35: CPT | Performed by: INTERNAL MEDICINE

## 2020-07-18 PROCEDURE — 2700000000 HC OXYGEN THERAPY PER DAY

## 2020-07-18 PROCEDURE — 36592 COLLECT BLOOD FROM PICC: CPT

## 2020-07-18 PROCEDURE — 6370000000 HC RX 637 (ALT 250 FOR IP): Performed by: INTERNAL MEDICINE

## 2020-07-18 PROCEDURE — 2100000000 HC CCU R&B

## 2020-07-18 PROCEDURE — 94750 HC PULMONARY COMPLIANCE STUDY: CPT

## 2020-07-18 PROCEDURE — APPNB15 APP NON BILLABLE TIME 0-15 MINS: Performed by: NURSE PRACTITIONER

## 2020-07-18 PROCEDURE — 2500000003 HC RX 250 WO HCPCS: Performed by: NURSE PRACTITIONER

## 2020-07-18 PROCEDURE — 94761 N-INVAS EAR/PLS OXIMETRY MLT: CPT

## 2020-07-18 RX ORDER — METHOCARBAMOL 500 MG/1
750 TABLET, FILM COATED ORAL EVERY 6 HOURS PRN
Status: DISCONTINUED | OUTPATIENT
Start: 2020-07-18 | End: 2020-07-20 | Stop reason: HOSPADM

## 2020-07-18 RX ORDER — METHOCARBAMOL 750 MG/1
750 TABLET, FILM COATED ORAL EVERY 6 HOURS PRN
COMMUNITY
End: 2020-09-21

## 2020-07-18 RX ORDER — OXYCODONE AND ACETAMINOPHEN 10; 325 MG/1; MG/1
1 TABLET ORAL EVERY 8 HOURS PRN
Status: DISCONTINUED | OUTPATIENT
Start: 2020-07-18 | End: 2020-07-20 | Stop reason: HOSPADM

## 2020-07-18 RX ORDER — GABAPENTIN 300 MG/1
600 CAPSULE ORAL 3 TIMES DAILY
Status: DISCONTINUED | OUTPATIENT
Start: 2020-07-18 | End: 2020-07-20 | Stop reason: HOSPADM

## 2020-07-18 RX ORDER — TRAZODONE HYDROCHLORIDE 50 MG/1
50 TABLET ORAL NIGHTLY
Status: DISCONTINUED | OUTPATIENT
Start: 2020-07-18 | End: 2020-07-20 | Stop reason: HOSPADM

## 2020-07-18 RX ORDER — ALBUTEROL SULFATE 2.5 MG/3ML
2.5 SOLUTION RESPIRATORY (INHALATION) EVERY 4 HOURS PRN
Status: DISCONTINUED | OUTPATIENT
Start: 2020-07-18 | End: 2020-07-20 | Stop reason: HOSPADM

## 2020-07-18 RX ORDER — LISINOPRIL 5 MG/1
5 TABLET ORAL DAILY
Status: DISCONTINUED | OUTPATIENT
Start: 2020-07-19 | End: 2020-07-18

## 2020-07-18 RX ORDER — CHLORHEXIDINE GLUCONATE 0.12 MG/ML
15 RINSE ORAL 2 TIMES DAILY
Status: DISCONTINUED | OUTPATIENT
Start: 2020-07-18 | End: 2020-07-18

## 2020-07-18 RX ORDER — DULOXETIN HYDROCHLORIDE 60 MG/1
60 CAPSULE, DELAYED RELEASE ORAL DAILY
Status: DISCONTINUED | OUTPATIENT
Start: 2020-07-18 | End: 2020-07-20 | Stop reason: HOSPADM

## 2020-07-18 RX ORDER — DULOXETIN HYDROCHLORIDE 60 MG/1
60 CAPSULE, DELAYED RELEASE ORAL DAILY
COMMUNITY

## 2020-07-18 RX ORDER — LISINOPRIL 40 MG/1
40 TABLET ORAL DAILY
Status: DISCONTINUED | OUTPATIENT
Start: 2020-07-19 | End: 2020-07-20 | Stop reason: HOSPADM

## 2020-07-18 RX ORDER — VERAPAMIL HYDROCHLORIDE 80 MG/1
80 TABLET ORAL EVERY 8 HOURS SCHEDULED
Status: DISCONTINUED | OUTPATIENT
Start: 2020-07-19 | End: 2020-07-20 | Stop reason: HOSPADM

## 2020-07-18 RX ADMIN — Medication 10 ML: at 20:51

## 2020-07-18 RX ADMIN — ONDANSETRON 4 MG: 2 INJECTION INTRAMUSCULAR; INTRAVENOUS at 16:22

## 2020-07-18 RX ADMIN — OXYCODONE HYDROCHLORIDE AND ACETAMINOPHEN 1 TABLET: 10; 325 TABLET ORAL at 13:15

## 2020-07-18 RX ADMIN — TRAZODONE HYDROCHLORIDE 50 MG: 50 TABLET ORAL at 20:48

## 2020-07-18 RX ADMIN — FENTANYL CITRATE 25 MCG: 50 INJECTION, SOLUTION INTRAMUSCULAR; INTRAVENOUS at 05:17

## 2020-07-18 RX ADMIN — FAMOTIDINE 20 MG: 10 INJECTION, SOLUTION INTRAVENOUS at 09:32

## 2020-07-18 RX ADMIN — FENTANYL CITRATE 25 MCG: 50 INJECTION, SOLUTION INTRAMUSCULAR; INTRAVENOUS at 01:27

## 2020-07-18 RX ADMIN — Medication 125 MCG/HR: at 06:10

## 2020-07-18 RX ADMIN — Medication 10 ML: at 09:33

## 2020-07-18 RX ADMIN — Medication 10 ML: at 01:28

## 2020-07-18 RX ADMIN — GABAPENTIN 600 MG: 300 CAPSULE ORAL at 13:16

## 2020-07-18 RX ADMIN — FENTANYL CITRATE 25 MCG: 50 INJECTION, SOLUTION INTRAMUSCULAR; INTRAVENOUS at 04:11

## 2020-07-18 RX ADMIN — OXYCODONE HYDROCHLORIDE AND ACETAMINOPHEN 1 TABLET: 10; 325 TABLET ORAL at 20:48

## 2020-07-18 RX ADMIN — Medication 10 ML: at 00:20

## 2020-07-18 RX ADMIN — PROPOFOL 50 MCG/KG/MIN: 10 INJECTION, EMULSION INTRAVENOUS at 05:48

## 2020-07-18 RX ADMIN — ENOXAPARIN SODIUM 40 MG: 40 INJECTION SUBCUTANEOUS at 09:32

## 2020-07-18 RX ADMIN — FENTANYL CITRATE 25 MCG: 50 INJECTION, SOLUTION INTRAMUSCULAR; INTRAVENOUS at 00:20

## 2020-07-18 RX ADMIN — HYDRALAZINE HYDROCHLORIDE 10 MG: 20 INJECTION INTRAMUSCULAR; INTRAVENOUS at 04:21

## 2020-07-18 RX ADMIN — CHLORHEXIDINE GLUCONATE 15 ML: 0.12 RINSE ORAL at 09:33

## 2020-07-18 RX ADMIN — METHOCARBAMOL TABLETS 750 MG: 500 TABLET, COATED ORAL at 18:00

## 2020-07-18 RX ADMIN — Medication 10 ML: at 04:12

## 2020-07-18 RX ADMIN — PROPOFOL 50 MCG/KG/MIN: 10 INJECTION, EMULSION INTRAVENOUS at 00:54

## 2020-07-18 RX ADMIN — GABAPENTIN 600 MG: 300 CAPSULE ORAL at 20:48

## 2020-07-18 RX ADMIN — METHOCARBAMOL TABLETS 750 MG: 500 TABLET, COATED ORAL at 23:28

## 2020-07-18 RX ADMIN — Medication 10 ML: at 05:41

## 2020-07-18 RX ADMIN — Medication 10 ML: at 04:21

## 2020-07-18 RX ADMIN — Medication 10 ML: at 05:18

## 2020-07-18 RX ADMIN — DULOXETINE HYDROCHLORIDE 60 MG: 60 CAPSULE, DELAYED RELEASE ORAL at 17:56

## 2020-07-18 ASSESSMENT — PAIN SCALES - GENERAL
PAINLEVEL_OUTOF10: 2
PAINLEVEL_OUTOF10: 5
PAINLEVEL_OUTOF10: 6
PAINLEVEL_OUTOF10: 5
PAINLEVEL_OUTOF10: 5
PAINLEVEL_OUTOF10: 8
PAINLEVEL_OUTOF10: 0
PAINLEVEL_OUTOF10: 4
PAINLEVEL_OUTOF10: 5
PAINLEVEL_OUTOF10: 8
PAINLEVEL_OUTOF10: 4
PAINLEVEL_OUTOF10: 8
PAINLEVEL_OUTOF10: 5
PAINLEVEL_OUTOF10: 8
PAINLEVEL_OUTOF10: 2
PAINLEVEL_OUTOF10: 8

## 2020-07-18 ASSESSMENT — PAIN DESCRIPTION - LOCATION
LOCATION: BACK
LOCATION: BACK

## 2020-07-18 ASSESSMENT — PAIN DESCRIPTION - PROGRESSION
CLINICAL_PROGRESSION: GRADUALLY WORSENING
CLINICAL_PROGRESSION: NOT CHANGED
CLINICAL_PROGRESSION: GRADUALLY WORSENING

## 2020-07-18 ASSESSMENT — PAIN DESCRIPTION - PAIN TYPE
TYPE: CHRONIC PAIN
TYPE: CHRONIC PAIN

## 2020-07-18 ASSESSMENT — PULMONARY FUNCTION TESTS
PIF_VALUE: 18
PIF_VALUE: 20
PIF_VALUE: 14
PIF_VALUE: 23
PIF_VALUE: 14
PIF_VALUE: 15
PIF_VALUE: 16
PIF_VALUE: 18
PIF_VALUE: 15
PIF_VALUE: 18
PIF_VALUE: 18

## 2020-07-18 ASSESSMENT — PAIN DESCRIPTION - DESCRIPTORS
DESCRIPTORS: ACHING
DESCRIPTORS: ACHING

## 2020-07-18 ASSESSMENT — PAIN DESCRIPTION - ONSET
ONSET: ON-GOING
ONSET: ON-GOING

## 2020-07-18 ASSESSMENT — PAIN DESCRIPTION - FREQUENCY
FREQUENCY: CONTINUOUS
FREQUENCY: CONTINUOUS

## 2020-07-18 ASSESSMENT — PAIN DESCRIPTION - ORIENTATION: ORIENTATION: LOWER

## 2020-07-18 NOTE — H&P
Hospital Medicine History & Physical      PCP: Sonya Payton    Date of Admission: 7/17/2020    Date of Service: Pt seen/examined on 7/18/20 and Admitted to Inpatient     Chief Complaint:  SOB    History Of Present Illness: The patient is a 71 y.o. male who presents to [facility] with shortness of breath. Patient called EMS as the patient had been complaining of shortness of breath for the past day or so. He had been using his inhalers more frequently than normal.  When EMS arrived his O2 sats were in the 80s and his heart rate was in the 160s. In route his oxygen decreased down to the 70s and the patient became bradycardic. They gave him atropine a total of 3 times and placed him on a nonrebreather. At 1 point he became upset and yelled that his chest hurt. He was given 10 mg of Versed in order to externally pace the patient without success. On arrival the patient was responding to pain but other than that was not able to answer any questions. Continue to try to externally pace the patient but it would not capture. Due to the patient's worsening respiratory status they decided to intubate the patient. Cardiology was consulted for the placement of a temporary pacemaker due to his profound bradycardia. Because of his EKG changes and his chest discomfort angiography was also thought to be appropriate. He was then taken to the Cath Lab where a temporary pacing wire was placed. The patient was also noted to have coronary artery disease but no acute coronary syndrome. No intervention was taken. Patient was then transferred to the CVICU for further care and intervention.     Past Medical History:        Diagnosis Date    Arthritis     Gong's esophagus     Chronic back pain     COPD (chronic obstructive pulmonary disease) (HCC)     Depression     GERD (gastroesophageal reflux disease)     HYPERCHOLESTERAEMIA     Hypertension     Prinzmetal angina (HCC)     Spinal stenoses     TIA (transient ischaemic attack)        Past Surgical History:        Procedure Laterality Date    APPENDECTOMY      BACK SURGERY      BRONCHOSCOPY N/A 12/30/2019    BRONCHOSCOPY ALVEOLAR LAVAGE performed by Mitchel Khan MD at [address] [facility]  12/30/2019    BRONCHOSCOPY BRUSHINGS performed by Mitchel Khan MD at [address] COLONOSCOPY      WITH BIOPSY    JOINT REPLACEMENT      RIGHT KNEE    SPLENECTOMY      UPPER GASTROINTESTINAL ENDOSCOPY  5-14-10    egd with biopsy    UPPER GASTROINTESTINAL ENDOSCOPY  11-27-12    Esophagogastroduodenoscopy with biopsy, Romero esophageal dilation, and Botulinum injection of the pylorus       Medications Prior to Admission:    Prior to Admission medications    Medication Sig Start Date End Date Taking? Authorizing Provider   albuterol sulfate  (90 Base) MCG/ACT inhaler Inhale 2 puffs into the lungs every 6 hours as needed 5/1/20   Historical Provider, MD   baclofen (LIORESAL) 5 mg TABS Take 10 mg by mouth 3 times daily    Historical Provider, MD   oxyCODONE-acetaminophen (PERCOCET)  MG per tablet  5/1/20   Historical Provider, MD   traZODone (DESYREL) 50 MG tablet Take 50 mg by mouth nightly    Historical Provider, MD   medical marijuana Take 1 each by mouth as needed. Historical Provider, MD   lisinopril (PRINIVIL;ZESTRIL) 40 MG tablet Take 40 mg by mouth daily    Historical Provider, MD   gabapentin (NEURONTIN) 600 MG tablet Take 600 mg by mouth 3 times daily. Historical Provider, MD   DULoxetine (CYMBALTA) 20 MG extended release capsule Take 20 mg by mouth    Historical Provider, MD   verapamil (CALAN) 80 MG tablet Take 80 mg by mouth 3 times daily. Historical Provider, MD       Allergies:  Amitriptyline;  Codeine; Demerol; Diltiazem hcl; Lansoprazole; Tricyclic antidepressants; and Trilisate [choline magnesium trisalicylate]    Social History:  The patient currently lives at home    TOBACCO:   reports that he has been smoking. He has a 42.00 pack-year smoking history. He has never used smokeless tobacco.  ETOH:   reports no history of alcohol use. Family History:  Reviewed in detail and negative for DM, Early CAD, Cancer, CVA. Positive as follows:        Problem Relation Age of Onset    Heart Disease Mother     Cancer Father         lung       REVIEW OF SYSTEMS:   Positive for as noted in the HPI. All other systems reviewed and negative. PHYSICAL EXAM:    BP (!) 168/84   Pulse 88   Temp 97.6 °F (36.4 °C) (Oral)   Resp 20   Ht 5' 11\" (1.803 m)   Wt 141 lb 1.5 oz (64 kg)   SpO2 100%   BMI 19.68 kg/m²     General appearance: No apparent distress, intubated and sedated  HEENT Normal cephalic, atraumatic without obvious deformity. Pupils equal, round, and reactive to light. Extra ocular muscles intact. Conjunctivae/corneas clear. Neck: Supple, No jugular venous distention/bruits. Trachea midline without thyromegaly or adenopathy with full range of motion. Lungs: bronchial BS BL  Heart: Regular rate and rhythm with Normal S1/S2   Abdomen: Soft, non-tender or non-distended without rigidity or guarding and positive bowel sounds all four quadrants. Extremities: No clubbing, cyanosis, or edema bilaterally. Skin: Skin color, texture, turgor normal.  No rashes or lesions.   Neurologic: Intubated and sedated  Mental status: Intubated and sedated  Capillary Refill: Acceptable  < 3 seconds  Peripheral Pulses: +3 Easily felt, not easily obliterated with pressure      CXR:  I have reviewed the CXR with the following interpretation: Pulmonary edema    EKG:  I have reviewed the EKG with the following interpretation: Appears to be marked bradycardia, second EKG with normal sinus rhythm with ST-T wave abnormalities.     CBC   Recent Labs     07/17/20 1910 07/18/20  0541   WBC 10.3 16.9*   HGB 11.8* 12.8*   HCT 36.4* 38.2*    418      RENAL  Recent Labs     07/17/20 1910 07/18/20  0541    137   K 4.7 4.3    105   CO2 17* 19*   BUN 18 17   CREATININE 1.3 0.6*     LFT'S  Recent Labs     07/17/20 1910 07/18/20  0541   AST 12* 14*   ALT 10 11   BILITOT <0.2 <0.2   ALKPHOS 99 118     COAG  Recent Labs     07/17/20 1910   INR 1.14     CARDIAC ENZYMES  Recent Labs     07/17/20 1910   TROPONINI <0.01       U/A:    Lab Results   Component Value Date    COLORU Yellow 05/14/2020    WBCUA 3 09/20/2015    RBCUA 5 09/20/2015    CLARITYU Clear 05/14/2020    SPECGRAV 1.015 05/14/2020    LEUKOCYTESUR Negative 05/14/2020    BLOODU Negative 05/14/2020    GLUCOSEU Negative 05/14/2020       ABG    Lab Results   Component Value Date    OUS0NVL 27.3 09/21/2015    BEART 3.1 09/21/2015    Z1TWQOUY 97.4 09/21/2015    PHART 7.426 09/21/2015    WWZ2ZQR 42.3 09/21/2015    PO2ART 88.3 09/21/2015    XUG1NWA 28.6 09/21/2015         PHYSICIANS CERTIFICATION:    I certify that Steven Hicks is expected to be hospitalized for more than 2 midnights based on the following assessment and plan:      ASSESSMENT/PLAN:    Acute respiratory failure with hypoxia  Requiring intubation, spontaneous breathing trial this morning with eventual extubation  Titrate to keep saturations above 90%  Possibly secondary to bradycardia    Bradycardia followed by atrial fibrillation  Temporary pacer placed via cardiology  May need permanent pacemaker, will let EP decide  Continue serial enzymes    Acute pulmonary edema  Lasix as needed    Acute metabolic encephalopathy  Appears to be resolved    Coronary artery disease  No acute event  Continue home medications    Chronic pain syndrome  Restart patient's home pain medications    DVT Prophylaxis: SCD  Diet: Diet NPO Effective Now  Code Status: Full Code  PT/OT Eval Status: NA    Dispo - ICU    I spent greater than 30 minutes of critical care time with patient regards to his life-threatening acute respiratory failure, symptomatic bradycardia requiring transcutaneous pacemaker       Alline Collet, MD    Thank you Elver Gilman for the opportunity to be involved in this patient's care. If you have any questions or concerns please feel free to contact me at [phone].

## 2020-07-18 NOTE — PLAN OF CARE
Problem: Skin Integrity:  Goal: Will show no infection signs and symptoms  Description: Will show no infection signs and symptoms  Outcome: Ongoing  Goal: Absence of new skin breakdown  Description: Absence of new skin breakdown  7/18/2020 1402 by Adal Joe RN  Outcome: Ongoing  7/18/2020 0842 by Emmy Carlson RN  Outcome: Ongoing     Problem: Airway Clearance - Ineffective:  Goal: Ability to maintain a clear airway will improve  Description: Ability to maintain a clear airway will improve  7/18/2020 1402 by Adal Joe RN  Outcome: Ongoing  7/18/2020 0842 by Emmy Carlson RN  Outcome: Ongoing     Problem: Anxiety:  Goal: Level of anxiety will decrease  Description: Level of anxiety will decrease  7/18/2020 1402 by Adal Joe RN  Outcome: Ongoing  7/18/2020 0842 by Emmy Carlson RN  Outcome: Ongoing     Problem: Urinary Elimination:  Goal: Complications related to the disease process, condition or treatment will be avoided or minimized  Description: Complications related to the disease process, condition or treatment will be avoided or minimized  7/18/2020 0842 by Emmy Carlson RN  Outcome: Ongoing     Problem: Nutrition  Goal: Optimal nutrition therapy  7/18/2020 1153 by Hasmukh Pierce RD, LD  Outcome: Ongoing     Problem: Pain:  Goal: Pain level will decrease  Description: Pain level will decrease  Outcome: Ongoing  Goal: Control of chronic pain  Description: Control of chronic pain  Outcome: Ongoing     Problem: Falls - Risk of:  Goal: Will remain free from falls  Description: Will remain free from falls  Outcome: Ongoing  Goal: Absence of physical injury  Description: Absence of physical injury  Outcome: Ongoing     Problem: Restraint Use - Nonviolent/Non-Self-Destructive Behavior:  Goal: Absence of restraint indications  Description: Absence of restraint indications  Outcome: Completed  Goal: Absence of restraint-related injury  Description: Absence of restraint-related injury  7/18/2020 1402 by Karin Brandt RN  Outcome: Completed  7/18/2020 0842 by Tomas Ramos RN  Outcome: Ongoing  7/18/2020 0841 by Tomas Ramos RN  Outcome: Ongoing

## 2020-07-18 NOTE — PROGRESS NOTES
Late entry due to patient care. 07/17/2020    @ 2230 - Admitted from Cardiac Cath Lab, intubated, responds to pain, moving all of his extremities non-purposefully, biting/ chewing on his ETT, honking the mechanical ventilator every now and then, SR (on the 90s) w/ IVCD on the monitor, w/ NGT to R nare, w/ intact sosa catheter, w/ R femoral venous sheath w/ transvenous pacemaker. Pacemaker settings are: VVI, Rate set @ 60 bpm, Ventricular output = 3 V, Sensitivity = 1 mV. He's on Propofol drip @ 50 mcg/kg/min. Per Cath Lab RN, Teresa Schaffer, he increased the Propofol drip to 50 mcg/kg/min due to his agitation. This RN suctioned his ETT and got a moderate amount of thin cloudy white secretions. He has moderate amount of clear oral secretions. @ 2250 - Dr. Kory Wylie is at the bedside. More orders were made (see orders). Dr. Lula Treadwell didn't want to place the pt. on an IVF for now since his LVEDP was high & he wanted to keep the same pacemaker settings, as written above.    @ 2300 - This RN checked the placement of the NGT and no sounds over the gastric area were heard when air bolus was made. When the NGT was hooked to low wall suction, output was saliva-looking (whitish). This RN removed her NGT.    @ 0294 - This RN started Fentanyl drip @ 25 mcg/min and will titrate accordingly (and give Fentanyl IV bolus as ordered w/ each increase in rate). @ [phone] - Consult was placed to the Hospitalist.    @ [phone] - Hospitalist, Dr. Griselda Orona, called this RN back. This RN updated Dr. Dennys Carter of the pt.'s situation/ status. @ 1981 - Dr. Yovana Ingram called-back regarding the Critical Care consult.  This RN informed Dr. Maryjane Yip of the pt.' chief complaint on admission, the events that transpired at the ED, that the pt. had transvenous pacemaker insertion at the Cath Lab, that pt. has his own intrinsic heart rate (on the 90s) since admission to CVU, that pt. doesn't follow commands but moves all his extremities non-purposefully, pt.'s current vent settings, & that he's on Fentanyl and Propofol drips. This RN also informed Dr. Lynne Cramer that NGT that was placed at the ED didn't have placement confirmation and didn't seem to be in the right place upon assessment, so this was removed. Dr. Lynne Cramer acknowledged. He ordered ABG for tomorrow at 8 AM and he said it's okay to insert an orogastric tube and connect to continuous low wall suction. @ 80 - Dr. Hardik Estrella at the bedside and assessed the pt.    @ [phone] - This RN was about to call the pt.'s spouse (Puneet Ryan) for updates, but the spouse got to call this RN first.    07/18/2020    @ 0000 - This RN suctioned his ETT and got a small amount of thin cloudy white secretions. He has moderate amount of clear oral secretions. @ 0009 - This RN talked, about 12 minutes, to the pt.'s spouse to update her. This RN told Margarito Andreinas that the pt.'s heart rate was low so he was paced and he wasn't really responsive at the ED so he was intubated for airway protection, that he went to the Cardiac Cath Lab for insertion of a temporary transvenous pacemaker to help his heart rate go up to normal if it drops again, that he's sedated right now on the ventilator to help w/ his comfort and vent compliance since he was agitated when he wasn't. The pt.'s spouse said that she just wants someone to know that her  has a \"drug problem\", that he is a \"victim of the Opioid crisis\", that she doesn't know exactly what medicines he takes now because her  \"wants to be in-control of his medicines\", that he goes to [facility] and to other doctors to get prescription for medicines, that he runs-out of his prescriptions sooner because he takes them more frequent that they should, and that he doesn't go to his doctor's appointments. Pt.'s spouse said she'll visit today. @ 0030 - This RN inserted an orogastric tube (OGT), @ 50 cm level, without any problems.  OGT was hooked to continuous low wall suction and greenish output was noted. Will order CXR for placement verification. @ 0230 - CXR resulted and said to advance the OGT 7 cm more. This RN advanced the OGT to 57 cm. Will order another CXR to verify placement. @ 0410 - Reassessment unchanged, except now, he opened his eyes when his name was called, stacking his breaths every now and then on the vent, follows commands occasionally (squeezing and letting go of this RN's hands when told to and moving his toes), and a little sweaty. He's still SR w/ IVCD, but the IVCD is shorter. Since admission to CVU, he has his own intrinsic rhythm and never was paced. This RN suctioned his ETT and got a small amount of thin cloudy white secretions. He has small amount of clear oral secretions. @ 0421 - BP = 168/87. PRN Hydralazine 10 mg IV was given (see MAR). @ 0500 - BP = 158/80.    @ 0518 - RASS (+)2. He was pulling himself off the bed, bending and moving his R leg, & coughing against the ventilator. This RN kept on re-orienting him and reminding him that he couldn't bend his legs, but he wouldn't follow. His Fentanyl drip increased to 125 mcg/hr after giving a bolus dose of 25 mcg IV (see MAR). This RN observed that every bolus and Fentanyl drip increase, he wouldn't really stay on a RASS of (-)2, but mostly is either restless or agitated. @ 0730 - Bedside shift hand-off done w/ oncoming RN NGOZI Cano, to assume pt. care. RASS (-2). He's still SR w/ IVCD on the monitor, no paced beats seen. This RN handed-off the pt. in a stable condition. Current drips are: Propofol @ 50 mcg/kg/min and Fentanyl @ 125 mcg/hr.     Electronically signed by Anna Martin RN on 7/18/2020 at 8:21 AM

## 2020-07-18 NOTE — PROGRESS NOTES
@2312 bedside handoff with Joaquin Sanchez currently on propofol 50mcg, fentanyl 125 mcg. On the vent intubated/sedated/ restraints ( bilat wrist) sinus per the monitor. Occasional paced beat present. Temp pacer on and rate of 60. Right groin venous sheath in place as access site. @0830 ABG obtained per resp care. @ 6030 Dr Shannon Ill at bedside to see. Did see the abg. Did turn the vent down to 40% FIO2  @0915 decreased the fentanyl drip to 100 mcg, propofol to 40 mcg. A little hard to arouse. @6473 started to give his scheduled medications. He did wake up startled. Did explain to him where he was and who I was. At first he shook his head like he understood. Then he became very agitated. Trying to pull the tube out and lifting his head off the pillow. Kicking his feet. Could not get him to calm down. Did have to call out for help at this point. Did have 3 other nurses come into the room all of us had to hold an extremity to keep him from pulling out the ET tube. resp care here as well. She was able to get in touch with Dr Yuli Haas and he is now on his way down to see patient. Did suction his ET tube and also oral. Having a lot of thin secretions. @5141 Dr Yuli Haas here to see patient. Was ok to extubate. @9201 resp care did extubate and place on 4-L  @0955 following the ET tube coming out was then calm. States could not breath, but is better now. @1000 did remove restraints. Did also teach him to use the yanker suction. Did have some sputum. @4262 did call his wife Mason Ellsworth, did update her about now being off the vent. Did also give her visiting hours. States she was aware. Will possibly be out to see later. @1030 decreased the oxygen on N/C to 2-L sats 100% able to take ice chip and drinks of water with no problems. @6519 patient has been asking about his lisinopril as well as the rest of his home medications. Did go over his list with him the only thing he was unsure of was the time frame of the pain medications. Did message Dr Gerry Gilford about the possibility of restarting his home medications. Also that he was extubated at [phone]. @3112 patient states his pain level for the back was 8/10 did message Dr Gerry Gilford about this. [facility] has Pharmacy clarifying his medications. @9636 order received for Gabapentin and Percocet. Did give both. Tolerated well. @4437 states starting to get some relief from the pain. @1510 currently resting in bed. In sinus per the [email or web address]. No paced beats noted. @1700 wife here did update her. She was wondering if we had his belonging bag. Was not in the room did check with security and did also call ED to check he room they did find in the room in ED. Security did bring over to him. All belongs were in the bag. Did also have his home medications in the bag. Did clarify them with both the patient and wife. Did update his home medication list.   @3262 did perfect serve Dr Gerry Gilford about his home medications. Will restart the ones he takes. Patient is shaky at this time. Feels he needs his Cymbalta for this. Did also order meal for his.when wife goes home she will take his over night bag. His wallet cloths and shoes. Did leave his dentures, cell phone and  and reading glasses. @0030 asking for pain medications. Did let him know he will not be due till 2115. States at home for this pain he would have taken another pain pill. Did educate him that they are ordered q 8hrs prn that he would be taking them too often. States that's the way I do at home. States oh well.    @1900 bedside handoff with [facility]

## 2020-07-18 NOTE — PLAN OF CARE
Problem: Airway Clearance - Ineffective:  Goal: Ability to maintain a clear airway will improve  Description: Ability to maintain a clear airway will improve  Outcome: Ongoing  A: Respiratory assessment. Keep HOB elevated to at least 30 degrees. ETT and oral suctioning PRN. Oral care every 4 hours. Problem: Anxiety:  Goal: Level of anxiety will decrease  Description: Level of anxiety will decrease  Outcome: Ongoing  A: Assess factors that contribute to his anxiety/ agitation. Re-orient PRN. Titrate Propofol & Fentanyl drips as ordered. Monitor for response to intervention. Problem: Skin Integrity:  Goal: Absence of new skin breakdown  Description: Absence of new skin breakdown  Outcome: Ongoing  A: Skin assessment. Turn/ reposition every 2 hours and PRN. Use the wedge foam when being repositioned in bed. Place preventative Mepilex border on the sacrum. Keep the bilateral heel protectors (HeelMedix) on. Check for incontinence at least every 2 hours. Use barrier cream cloths for olaf care. Utilize a low air loss and alternating pressure-relief mattress. Problem: Urinary Elimination:  Goal: Complications related to the disease process, condition or treatment will be avoided or minimized  Description: Complications related to the disease process, condition or treatment will be avoided or minimized  Outcome: Ongoing  A: Assess need for continued sosa catheter use. Assess and educate for signs/symptoms of infection. Monitor quality and quantity of urine output. Keep Statlock stabilization device in place. Make sure the drainage bag is hanging below the level of the bladder. Routine catheter care.      Problem: Restraint Use - Nonviolent/Non-Self-Destructive Behavior:  Goal: Absence of restraint-related injury  Description: Absence of restraint-related injury  7/18/2020 0842 by Ce Medina RN  Outcome: Ongoing  A: Educate the pt. of the indication for the restraint and criteria for removal. Perform

## 2020-07-18 NOTE — PROGRESS NOTES
4 Eyes Skin Assessment     The patient is being assess for  Cath Lab Post-Op    I agree that 2 RN's have performed a thorough Head to Toe Skin Assessment on the patient. ALL assessment sites listed below have been assessed. Areas assessed by both nurses: Alyse Hernandes RN and Lopez RN  [x]   Head, Face, and Ears   [x]   Shoulders, Back, and Chest  [x]   Arms, Elbows, and Hands   [x]   Coccyx, Sacrum, and IschIum  [x]   Legs, Feet, and Heels        Does the Patient have Skin Breakdown?   No         Cesar Prevention initiated:  Yes   Wound Care Orders initiated:  NA      [facility] nurse consulted for Pressure Injury (Stage 3,4, Unstageable, DTI, NWPT, and Complex wounds), New and Established Ostomies:  NA      Nurse 1 eSignature: Electronically signed by Judy Jeff RN on 7/18/20 at 6:44 AM EDT    **SHARE this note so that the co-signing nurse is able to place an eSignature**    Nurse 2 eSignature: Electronically signed by Russel Schwartz RN on 7/18/20 at 6:45 AM EDT

## 2020-07-18 NOTE — CONSULTS
egd with biopsy    UPPER GASTROINTESTINAL ENDOSCOPY  11-27-12    Esophagogastroduodenoscopy with biopsy, Romero esophageal dilation, and Botulinum injection of the pylorus       Family Hx  family history includes Cancer in his father; Heart Disease in his mother. Social Hx   reports that he has been smoking. He has a 42.00 pack-year smoking history. He has never used smokeless tobacco.    Scheduled Meds:   sodium chloride flush  10 mL Intravenous 2 times per day    enoxaparin  40 mg Subcutaneous Daily       Continuous Infusions:      PRN Meds:  sodium chloride flush, acetaminophen **OR** acetaminophen, polyethylene glycol, ondansetron, hydrALAZINE    ALLERGIES:  Patient is allergic to amitriptyline; codeine; demerol; diltiazem hcl; lansoprazole; tricyclic antidepressants; and trilisate [choline magnesium trisalicylate]. REVIEW OF SYSTEMS:  Unable to obtain due to mechanical ventilation  Objective:   PHYSICAL EXAM:  Blood pressure 123/64, pulse 69, temperature 97.6 °F (36.4 °C), temperature source Axillary, resp. rate 14, height 5' 11\" (1.803 m), weight 141 lb 1.5 oz (64 kg), SpO2 100 %.'  Gen:  No acute distress. Eyes: PERRL. Anicteric sclera. No conjunctival injection. ENT: No discharge. Posterior oropharynx with ET tube. External appearance of ears and nose normal.  Neck: Trachea midline. No mass   Resp:  No crackles. No wheezes. No rhonchi. No dullness on percussion. CV: Regular rate. Regular rhythm. No murmur or rub. No edema. GI: Soft, Non-tender. Non-distended. +BS  Skin: Warm, dry, w/o erythema. Lymph: No cervical or supraclavicular LAD. M/S: No cyanosis. No clubbing. Neuro:  no focal neurologic deficit.   Moves all extremities very agitated      Data Reviewed:   LABS:  CBC:   Recent Labs     07/17/20 1910 07/18/20  0541   WBC 10.3 16.9*   HGB 11.8* 12.8*   HCT 36.4* 38.2*   MCV 89.7 87.2    418     BMP:   Recent Labs     07/17/20 1910 07/18/20  0541    137   K 4.7 4.3 2019    HISTORY:  ORDERING SYSTEM PROVIDED HISTORY: cp, elevated dimer  TECHNOLOGIST PROVIDED HISTORY:  Reason for exam:->cp, elevated dimer  Reason for Exam: Cough (c/o coughing, congestion, pt has scoliosis and c/o  pain due to coughing so much. Pt states he was diagnosed with pneumonia 2  weeks ago and finished the antibiotic course completly.)  Acuity: Acute  Type of Exam: Initial    FINDINGS:  Pulmonary Arteries:    No evidence of acute pulmonary embolism to the lobar branches. The main  pulmonary artery measures 2.9 cm. This is considered within normal limits. Mediastinum:    No evidence of thoracic aortic dissection. No evidence of pericardial  effusion. Mediastinal and hilar lymphadenopathy bilaterally. No evidence of significant axillary lymphadenopathy. Lungs/pleura:    Interval increase and worsening masslike pulmonary opacity within left upper  lobe. Interval improvement in nodular pulmonary opacity within lingula and  left lower lobe. Additional bibasilar pulmonary opacities and has a configuration of  atelectasis. No evidence of pneumothorax. Trace left pleural effusion. No evidence of right pleural effusion. Upper Abdomen:    Hepatomegaly. Status post cholecystectomy, with mild intrahepatic ductal  dilatation. Extrahepatic biliary dilatation is also present. This is  partially imaged and evaluated. Nonobstructing left renal calculus. Minimal bilateral renal pelviectasis. No evidence of large adrenal masses. Nonspecific thickening of the distal esophagus. Soft Tissues/Bones:    No evidence of acute osseous abnormalities. Multilevel degenerative changes  of the thoracic spine. Impression 1. Interval increase in size of large masslike pulmonary opacity within left  upper lobe. Differential includes worsening pneumonia or worsening neoplasm. Recommend short interval follow-up examination in 2-4 weeks.     2. Interval improvement in nodular pulmonary opacities

## 2020-07-18 NOTE — CONSULTS
[facility]    Cardiology Consult/H&P  Consulting Cardiologist Annabelle Mcdonald M.D., [facility]  Referring Provider:  Eluterio Carrel    7/17/2020, 10:23 PM    Chief Complaint   Patient presents with    Chest Pain    Shortness of Breath      Primary Cardiologist:  Asked by No admitting provider for patient encounter. Eluterio Carrel to evaluate and assess this patient's respiratory arrest and profound bradycardia with atrial fibrillation    History of Present Illness:   Belkis Andres is a 71 y.o. male is being seen in the emergency department where he was presented by life squrudolph after having acute episode at home where he lives with his wife. She called the medics because he was profoundly short of breath. He was sitting on the bed using more inhalers through the day and O2 levels were in the 80s. Heart rate was in the 160s. In the squad he was dropped down to 40s atrial fibrillation. And was down to 20s when he arrived in the emergency department. Apparently when he was awake was yelling that his chest was hurting. He did receive additional Versed was externally paced. In the emergency department he received external pacing and did improve somewhat but his respiratory status deteriorated and he wind up being intubated and placed on the ventilator. We came to see him ostensibly to place a temporary pacemaker however given the entire set of circumstances we felt he needed to know his coronary anatomy. Angiography is performed. And temporary pacemaker installation. Past Medical History:   has a past medical history of Arthritis, Gong's esophagus, Chronic back pain, COPD (chronic obstructive pulmonary disease) ([address] Depression, GERD (gastroesophageal reflux disease), HYPERCHOLESTERAEMIA, Hypertension, Prinzmetal angina ([address] Spinal stenoses, and TIA (transient ischaemic attack).     Surgical History:   has a past surgical history that includes joint replacement; Colonoscopy; back surgery; Splenectomy; Cholecystectomy; Appendectomy; Upper gastrointestinal endoscopy (5-14-10); Upper gastrointestinal endoscopy (11-27-12); Clavicle surgery; bronchoscopy (N/A, 12/30/2019); and bronchoscopy (12/30/2019). Social History:   reports that he has been smoking. He has a 42.00 pack-year smoking history. He has never used smokeless tobacco. He reports current drug use. Drug: Marijuana. He reports that he does not drink alcohol. Family History:  family history includes Cancer in his father; Heart Disease in his mother. Home Medications:  Prior to Admission medications    Medication Sig Start Date End Date Taking? Authorizing Provider   albuterol sulfate  (90 Base) MCG/ACT inhaler Inhale 2 puffs into the lungs every 6 hours as needed 5/1/20   Historical Provider, MD   baclofen (LIORESAL) 5 mg TABS Take 10 mg by mouth 3 times daily    Historical Provider, MD   oxyCODONE-acetaminophen (PERCOCET)  MG per tablet  5/1/20   Historical Provider, MD   traZODone (DESYREL) 50 MG tablet Take 50 mg by mouth nightly    Historical Provider, MD   medical marijuana Take 1 each by mouth as needed. Historical Provider, MD   lisinopril (PRINIVIL;ZESTRIL) 40 MG tablet Take 40 mg by mouth daily    Historical Provider, MD   gabapentin (NEURONTIN) 600 MG tablet Take 600 mg by mouth 3 times daily. Historical Provider, MD   DULoxetine (CYMBALTA) 20 MG extended release capsule Take 20 mg by mouth    Historical Provider, MD   verapamil (CALAN) 80 MG tablet Take 80 mg by mouth 3 times daily.     Historical Provider, MD        Current Medications:  Current Facility-Administered Medications   Medication Dose Route Frequency Provider Last Rate Last Dose    EPINEPHrine (EPINEPHrine HCL) 5 mg in dextrose 5 % 250 mL infusion  2 mcg/min Intravenous Continuous Elver Rice MD   Stopped at 07/17/20 2000    propofol injection  10 mcg/kg/min Intravenous Titrated Evler Rice MD 6.3 mL/hr at 07/17/20 2023 15 mcg/kg/min at 07/17/20 2023     Current Outpatient Medications   Medication Sig Dispense Refill    albuterol sulfate  (90 Base) MCG/ACT inhaler Inhale 2 puffs into the lungs every 6 hours as needed      baclofen (LIORESAL) 5 mg TABS Take 10 mg by mouth 3 times daily      oxyCODONE-acetaminophen (PERCOCET)  MG per tablet       traZODone (DESYREL) 50 MG tablet Take 50 mg by mouth nightly      medical marijuana Take 1 each by mouth as needed.  lisinopril (PRINIVIL;ZESTRIL) 40 MG tablet Take 40 mg by mouth daily      gabapentin (NEURONTIN) 600 MG tablet Take 600 mg by mouth 3 times daily.  DULoxetine (CYMBALTA) 20 MG extended release capsule Take 20 mg by mouth      verapamil (CALAN) 80 MG tablet Take 80 mg by mouth 3 times daily. Allergies:  Amitriptyline; Codeine; Demerol; Diltiazem hcl; Lansoprazole; Tricyclic antidepressants; and Trilisate [choline magnesium trisalicylate]     Review of Systems:   Review of systems is obtained from the spouse and through the notes. This patient is intubated when I saw him in the emergency department. He was on temporary pacemaker cutaneous. Unable to give any history. It seems that in the emergency department his rhythm improved to sinus mechanism superseding the external pacer. We will bring him to the Cath Lab for temporary pacemaker when and of another acute more urgent patient arrived with an acute inferior wall infarct requiring more urgent use of the Cath Lab. Physical Examination:    Vitals:    07/17/20 2106 07/17/20 2107 07/17/20 2108 07/17/20 2109   BP:    (!) 166/82   Pulse: 90 91 92 91   Resp: 19 21 18 18   SpO2: 98% 99% 98% 98%   Weight:            EXAMl and General Appearance: Elderly frail intubated unable to respond. HEENT: eyes and ears intact.  No nasal masses  THYROID: not enlarged  LUNGS:  · Clear to auscultation and percussion  HEART and VASCULAR:  · The apical impulses not EKG: Several rhythm strips are observed showing atrial fibrillation with rates of 20/min. Most recent full 12-lead EKG showed sinus rhythm at 84/min with a wide complex. echocardiogram pending     assessment/ Plan     Patient Active Problem List    Diagnosis Date Noted    Tobacco abuse counseling     Abnormal CT of the chest     Status post bronchoscopy     Mediastinal lymphadenopathy     Hilar lymphadenopathy     Status post cholecystectomy     Nephrolithiasis     History of splenectomy     Chronic obstructive pulmonary disease [facility]     Smoker     Pneumonia 12/27/2019    PNA (pneumonia) 12/06/2019    Chronic pain 09/21/2015    Sepsis ([facility] 09/21/2015    Acute encephalopathy 09/21/2015    Respiratory distress     Opiate overdose ([facility]     Pneumonia due to organism    Patient with acute severe respiratory failure requiring intubation. Additionally bradycardia atrial fibrillation with profound bradycardia requiring external cutaneous pacemaker and subsequently a temporary intravenous pacemaker. His rhythm did improve with ventilation. Unclear if he will need a permanent pacemaker. We did perform coronary angiography and he does have coronary artery lesions that did not require any acute intervention. Serial enzymes are being performed. He will be hopefully weaned off the ventilator and reassess tomorrow for possible need for permanent pacemaker. Thanks for allowing me the opportunity  to participate in the evaluation and care of your patients.  Please call if we can assist further [phone]    This chart was likely completed using voice recognition technology and may contain unintended grammatical , phraseology,and/or punctuation errors  Curt Patel M.D., [facility] [place]  7/17/202010:23 PM

## 2020-07-18 NOTE — PROCEDURES
CARDIOLOGY CATH LAB NOTE  [facility]       Left heart catheterization with coronary angiography and temporary pacemaker    Ana Mclain  71 y.o.  [id number]  7/17/2020, 10:15 PM  Referring MD : Grecia Pierre  Procedure Coronary Angiogram   Left heart cath  Selective coronary angiogram  Temporary pacemaker  Left ventriculogram  Aortic root injection  Right ileofemoral angiogram  Closure device (Angioseal)      Indication: Cardiac cath to rule out ischemic CAD, Possible angioplasty, The procedure and risks described to patient including risk of CVA, MI, bleeding, emergency surgery, death, patient with a significant history of atrial fibrillation on admission with bradycardia requiring sternal pacing. He apparently had significant pain in his chest and his back this today and then apparently had syncope or blackout and was brought by life squad. He apparently had coded in the life squad and did require intubation in the emergency department. We were asked to see the patient for temporary pacemaker because of his profound bradycardia. Because he had EKG changes and had chest discomfort we felt that angiography was appropriate as well. Patient is coming from the emergency department intubated on ventilator and on propofol sedation. We did see the patient and work with him in the emergency department. While we were waiting to take the patient to the Cath Lab another more acute infarct came in and basically caused him to be sidelined for a period of time. Or Consent signed  Anesthesia: Moderate sedation with Versed and Fentanyl IV  Estimated blood loss :minimal        After informed consent was obtained and  History and exam reviewed the patient was taken to the cardiac cath lab. The patient had the right groin prepped and draped in sterile fashion. The groin was anesthetized with 2% Xylocaine. Using a thin walled needle the right femoral artery was entered and and .035mm guide wire was inserted. A 5 Indonesian arterial introducer was advanced through the needle and the wire was removed. The sheath was aspirated and flushed with normal saline. A 5 left Elpidio catheter was advanced through the sheath over a guide wire and advanced under fluoroscopic guidance into the ascending aorta. The wire was withdrawn and the catheter was aspirated and flushed with normal saline. The catheter was then advanced into the ostium of the left coronary artery under fluoroscopic guidance. Multiple cine images were obtained in different projections of the left coronary artery. The catheter was then withdrawn. A JR4 catheter was subsequently advanced  Through the sheath over a wire and advanced under fluoroscopic guidance in to the ascending aorta. The guide wire was removed and the catheter was aspirated and flushed and subsequently advanced in to the ostium of the right coronary artery. Multiple cine images were then obtained of the right coronary artery. The catheter was then withdrawn. A pigtail catheter was then advanced through the sheath over a guide wire and advanced in to the ascending aorta. The wire was then used to help prolapse the catheter across the aortic valve. Once the catheter was across the valve the wire was withdrawn and the catheter was aspirated and flushed with normal saline. Pressure measurements of the left ventricle were then obtained. Then catheter was then connected to a mechanical injection device for contrast ventriculography that was performed in an STINSON projection. The catheter was then reconnected to a pressure system for a pullback pressure analysis of the aortic valve. The catheter had a guide wire placed in it and the catheter was then withdrawn. The right ileofemoral sheath was injected and the vessel imaged    Hemostasis was obtained by Angioseal/ manual pressure    The pigtail catheter was used to perform an aortic root injection on this patient.   This was done to because of dilatation of the root and to rule out aortic dissection. Complications: None      Estimated blood loss: Minimal  Start time 2131    Sedation: Fentanyl 50 micrograms                  Versed 2 mg    Fluoro time 7.8 minutes  Contrast 95 cc  Radiation exposure finish time 2209 mGy  Angiographic Findings:  Left Main: Normal    Left Anterior Descending: Moderate plaquing      Circumflex: Probably some ostial plaquing of 10 to 15%. Right Coronary: Completely occluded shortly after is origin. There is some collateralization to the distal obtuse marginal branch and some to the distal right coronary artery. This is a chronically occluded vessel. Left Ventriculogram: Ejection fraction 40 to 45%. Distal inferior anterior akinetic segment. Right ileofemoral: Normal    We did place a catheter into the right femoral vein. Through that we place a balloon floating pacing wire into the apex of the right ventricle where capture was established. At the end of our procedure it was sutured in position and the patient's pacing was left at 3 MAs and a rate of 60/min. Hemodynamics:   Left Ventricular Pressure: 25  Central Aortic Pressure: 126    Assessment:  Atrial fibrillation on admission which converted to sinus rhythm and at this time he is in sinus at 80/min. He did have temporary pacing percutaneously in the emergency department. He was still on the cutaneous pacer in the Cath Lab and we did place a temporary pacemaker. Is unclear if he will need a permanent pacemaker. The patient does have coronary disease but I do not anticipate that he has acute coronary syndrome. He has old coronary lesion as noted above and there were no   opportunities for intervention. Also of note is that there has been significant vertebral support surgically. It seems that there are several surgical screws that are loose and not necessarily attached to the support bars.   The patient has significant kyphosis and scoliosis. Recommendations: Continue current medical management. Temporary pacemaker is in position and will be a reassess tomorrow to determine if a permanent pacemaker is necessary. I do not anticipate that there will be any opportunities for intervention either percutaneous nor bypass surgery on this coronary lesions. Hopefully he will be improved and be weaned from the ventilator in the next day or so. I believe most of his issues are acute respiratory failure. Will follow the troponin levels as they become available.       This note was likely completed using voice recognition technology and may contain unintended errors  Cardona M.D., [facility]  Cc; Fadi Majano

## 2020-07-18 NOTE — CONSULTS
Comprehensive Nutrition Assessment    Type and Reason for Visit:  Initial, Consult    Nutrition Recommendations/Plan:   Monitor swallow and need for SLP eval post extubation   Will monitor nutritional adequacy, nutrition-related labs, weights, BMs, and clinical progress     Nutrition Assessment:  Consult for nutritonal assessment for Cesar Score. Pt presented with SOB, was eventually intubated (extubated now however). Also developed chest pain in ED, had transvenous pacer placed. Currently NPO, weight appears fairly stable PTA. Pt with puncture site, otherwise skin intact. Will monitor intake and need for ONS. Malnutrition Assessment:  Malnutrition Status:  No malnutrition      Estimated Daily Nutrient Needs:  Energy (kcal):  5188-5136 kcal (25-30 kcal/kg ABW); Weight Used for Energy Requirements:  Current     Protein (g):  64-77 gm (1-1.2 gm/kg ABW); Weight Used for Protein Requirements:  Current        Fluid (ml/day):  1 ml/kcal; Weight Used for Fluid Requirements:  Current      Nutrition Related Findings:  no edema      Wounds:  None       Current Nutrition Therapies:    Diet NPO Effective Now    Anthropometric Measures:  · Height: 5' 11\" (180.3 cm)  · Current Body Weight: 141 lb (64 kg)   · Admission Body Weight: 155 lb (70.3 kg)    · Usual Body Weight: 145 lb (65.8 kg)     · Ideal Body Weight: 172 lbs;     · BMI: 19.7  · Adjusted Body Weight:  ; No Adjustment   · BMI Categories: Normal Weight (BMI 18.5-24. 9)       Nutrition Diagnosis:   · Inadequate oral intake related to impaired respiratory funtion as evidenced by NPO or clear liquid status due to medical condition      Nutrition Interventions:   Food and/or Nutrient Delivery:  Continue NPO(start nutrition when appropriate)  Nutrition Education/Counseling:  No recommendation at this time   Coordination of Nutrition Care:  Continued Inpatient Monitoring, No recommendation at this time    Goals:   Tolerate diet and consume greater than 50% of meals and supplements this admission       Nutrition Monitoring and Evaluation:   Behavioral-Environmental Outcomes:  (NA)   Food/Nutrient Intake Outcomes:  Diet Advancement/Tolerance, Food and Nutrient Intake  Physical Signs/Symptoms Outcomes:  Biochemical Data, Chewing or Swallowing, Hemodynamic Status, Nutrition Focused Physical Findings, Skin, Weight     Discharge Planning:     Too soon to determine     Electronically signed by Heather Saavedra RD, LD on 7/18/20 at 11:51 AM EDT    Contact: [phone]

## 2020-07-18 NOTE — PROGRESS NOTES
Cardiology Progress Note     Admit Date: 2020     Reason for follow up: respiratory distress, bradycardia    HPI and Interval History:   Patient seen and examined. Clinical notes reviewed. Telemetry reviewed - SR 60's. Intubated. TVP R groin but not actively pacing at this time. No major events overnight. Review of System:  All other systems reviewed except for that noted above. Pertinent negatives and positives are:     · General: negative for fever, chills   · Ophthalmic ROS: negative for - eye pain or loss of vision  · ENT ROS: negative for - headaches, sore throat   · Respiratory: negative for - cough, sputum  · Cardiovascular: Reviewed in HPI  · Gastrointestinal: negative for - abdominal pain, diarrhea, N/V  · Hematology: negative for - bleeding, blood clots, bruising or jaundice  · Genito-Urinary:  negative for - Dysuria or incontinence  · Musculoskeletal: negative for - Joint swelling, muscle pain  · Neurological: negative for - confusion, dizziness, headaches   · Psychiatric: No anxiety, no depression. · Dermatological: negative for - rash      Physical Examination:  Vitals:    20 0900   BP: 123/64   Pulse: 69   Resp: 22   Temp:    SpO2: 100%        Intake/Output Summary (Last 24 hours) at 2020 0906  Last data filed at 2020 0852  Gross per 24 hour   Intake 50 ml   Output 1375 ml   Net -1325 ml     In: 50 [I.V.:50]  Out: 1375    Wt Readings from Last 3 Encounters:   20 141 lb 1.5 oz (64 kg)   20 145 lb 11.6 oz (66.1 kg)   19 140 lb 11.2 oz (63.8 kg)     Temp  Av.6 °F (36.4 °C)  Min: 97.6 °F (36.4 °C)  Max: 97.6 °F (36.4 °C)  Pulse  Av.5  Min: 38  Max: 103  BP  Min: 72/46  Max: 172/82  SpO2  Av.6 %  Min: 79 %  Max: 100 %  FiO2   Av.9 %  Min: 70 %  Max: 100 %    · Telemetry: Sinus rhythm 60's bpm  · Constitutional: Alert. Oriented to person, place, and time. No distress. · Head: Normocephalic and atraumatic.    · Mouth/Throat: Lips appear moist. 3623)    fentaNYL 125 mcg/hr (07/18/20 0610)     PRN Meds:fentanNYL, sodium chloride flush, acetaminophen **OR** acetaminophen, polyethylene glycol, ondansetron, hydrALAZINE     Patient Active Problem List    Diagnosis Date Noted    Symptomatic bradycardia 07/17/2020    Dyspnea     Atrial fibrillation with slow ventricular response (HCC)     Bradycardia     Chest pain     Tobacco abuse counseling     Abnormal CT of the chest     Status post bronchoscopy     Mediastinal lymphadenopathy     Hilar lymphadenopathy     Status post cholecystectomy     Nephrolithiasis     History of splenectomy     Chronic obstructive pulmonary disease [facility]     Smoker     Pneumonia 12/27/2019    PNA (pneumonia) 12/06/2019    Chronic pain 09/21/2015    Sepsis ([facility] [address] 09/21/2015    Acute encephalopathy 09/21/2015    Respiratory distress     Opiate overdose ([address]     Pneumonia due to organism       Active Hospital Problems    Diagnosis Date Noted    Symptomatic bradycardia [R00.1] 07/17/2020       Assessment and Plan:     Respiratory distress  -unclear etiology at this time  -was in respiratory distress prior to bradycardic episode as per chart history  -will defer to pulmonary service to extubate    Bradycardia  -EKG from Epic shows wide-complex QRS bradycardic. Unclear underlying rhythm, but does not appear to be atrial fibrillation, but instead probably sinus with AV dissociation. But would have been ideal to have a repeat eKG at that time to corroborate.  -currently sinus with 1:1 AV conduction without the need for TVP pacing.  -will continue to assess after extubation whether the patient warrants PPM implantation. Chronic CAD without need for intervention  -LHC last night did not show any evidence to suggest ACS played a role in the presenting complaint. Will follow with you. Thank you for allowing me to participate in the care of this patient.  If you have any questions, please do not hesitate to contact me.    Mario Swann MD, MS, [facility] [place], [place] [address] Electrophysiology  [address]  [address] [facility], [facility] [facility]  [facility]ie Rahman [facility]  [phone]

## 2020-07-18 NOTE — ANESTHESIA PRE-OP
Brief Pre-Op Note/Sedation Assessment      [facility]  1955  [birth year]      [id number]  9:17 PM    Planned Procedure: Cardiac Catheterization Procedure    Post Procedure Plan: Return to same level of care    Consent: I have discussed with the patient and/or the patient representative the indication, alternatives, and the possible risks and/or complications of the planned procedure and the anesthesia methods. The patient and/or patient representative appear to understand and agree to proceed. Chief Complaint: Dyspnea on Exertion  Dyspnea  Fatigue      Indications for Cath Procedure:  Suspected CAD, Cardiac Arrythmia, Cardiomyopathy and Syncope  Anginal Classification within 2 weeks:  CCS I - Angina only during strenuous or prolonged physical activity  NYHA Heart Failure Class within 2 weeks: Class II - Symptoms of HF on ordinary exertion, Newly Diagnosed? Yes, Heart Failure Type: Systolic  Is Cath Lab Visit Valve-related?: No  Surgical Risk: High Risk: Vascular  Functional Type: < 4 METS    Anti- Anginal Meds within 2 weeks:   No: Contraindicated  none    Stress or Imaging Studies Performed:  None     Vital Signs:  BP (!) 166/82   Pulse 91   Resp 18   Wt 155 lb 6.8 oz (70.5 kg)   SpO2 98%   BMI 21.68 kg/m²     Allergies:   Allergies   Allergen Reactions    Amitriptyline Other (See Comments)     crying    Codeine Nausea And Vomiting    Demerol Nausea And Vomiting    Diltiazem Hcl Other (See Comments)     crying    Lansoprazole Other (See Comments)     crying    Tricyclic Antidepressants Other (See Comments)     crying    Trilisate [Choline Magnesium Trisalicylate] Other (See Comments)     crying       Past Medical History:  Past Medical History:   Diagnosis Date    Arthritis     Gong's esophagus     Chronic back pain     COPD (chronic obstructive pulmonary disease) (HCC)     Depression     GERD (gastroesophageal reflux disease)     HYPERCHOLESTERAEMIA     Hypertension     Prinzmetal angina ([address]     Spinal stenoses     TIA (transient ischaemic attack)          Surgical History:  Past Surgical History:   Procedure Laterality Date    APPENDECTOMY      BACK SURGERY      BRONCHOSCOPY N/A 12/30/2019    BRONCHOSCOPY ALVEOLAR LAVAGE performed by Marielos Christina MD at [address]  12/30/2019    BRONCHOSCOPY BRUSHINGS performed by Marielos Christina MD at [address] COLONOSCOPY      WITH BIOPSY    JOINT REPLACEMENT      RIGHT KNEE    SPLENECTOMY      UPPER GASTROINTESTINAL ENDOSCOPY  5-14-10    egd with biopsy    UPPER GASTROINTESTINAL ENDOSCOPY  11-27-12    Esophagogastroduodenoscopy with biopsy, Romero esophageal dilation, and Botulinum injection of the pylorus         Medications:  Current Facility-Administered Medications   Medication Dose Route Frequency Provider Last Rate Last Dose    EPINEPHrine (EPINEPHrine HCL) 5 mg in dextrose 5 % 250 mL infusion  2 mcg/min Intravenous Continuous Delio Delvalle MD   Stopped at 07/17/20 2000    propofol injection  10 mcg/kg/min Intravenous Titrated Delio Delvalle MD 6.3 mL/hr at 07/17/20 2023 15 mcg/kg/min at 07/17/20 2023     Current Outpatient Medications   Medication Sig Dispense Refill    albuterol sulfate  (90 Base) MCG/ACT inhaler Inhale 2 puffs into the lungs every 6 hours as needed      baclofen (LIORESAL) 5 mg TABS Take 10 mg by mouth 3 times daily      oxyCODONE-acetaminophen (PERCOCET)  MG per tablet       traZODone (DESYREL) 50 MG tablet Take 50 mg by mouth nightly      medical marijuana Take 1 each by mouth as needed.  lisinopril (PRINIVIL;ZESTRIL) 40 MG tablet Take 40 mg by mouth daily      gabapentin (NEURONTIN) 600 MG tablet Take 600 mg by mouth 3 times daily.  DULoxetine (CYMBALTA) 20 MG extended release capsule Take 20 mg by mouth      verapamil (CALAN) 80 MG tablet Take 80 mg by mouth 3 times daily. Pre-Sedation:    Pre-Sedation Documentation and Exam:  I have personally completed a history, physical exam & review of systems for this patient (see notes). Prior History of Anesthesia Complications:   none    Modified Mallampati:  I (soft palate, uvula, fauces, tonsillar pillars visible)    ASA Classification:  Class 3 - A patient with severe systemic disease that limits activity but is not incapacitating      Durga Scale: Activity:  0 - Able to move 0 extremities voluntarily on command  Respiration:  0 - Apneic  Circulation:  1 - BP+/- 20-50mmHg of normal  Consciousness:  0 - Not responsive  Oxygen Saturation (color):  1 - Needs oxygen to maintain oxygen saturation >90%   Patient had arrest and profound bradycardia, A fib and needing external pacing. Intubated and on ventilator and is on propofol in the ED. Sedation/Anesthesia Plan:  Guard the patient's safety and welfare. Minimize physical discomfort and pain. Minimize negative psychological responses to treatment by providing sedation and analgesia and maximize the potential amnesia. Patient to meet pre-procedure discharge plan. Medication Planned:  none    Patient is an appropriate candidate for plan of sedation:  Already tubed and on propofol. Anaya Eduardo.  Sumit KOCH,FACC    Electronically signed by Samm Figueroa MD on 7/17/2020 at 9:17 PM

## 2020-07-18 NOTE — ED NOTES
Cath lab ready of patient. Respiratory called.  Patient to move on respiratory arrival.      Daniele Campbell RN  07/17/20 [id number]

## 2020-07-19 LAB
A/G RATIO: 1.2 (ref 1.1–2.2)
ALBUMIN SERPL-MCNC: 3.7 G/DL (ref 3.4–5)
ALP BLD-CCNC: 116 U/L (ref 40–129)
ALT SERPL-CCNC: 17 U/L (ref 10–40)
ANION GAP SERPL CALCULATED.3IONS-SCNC: 16 MMOL/L (ref 3–16)
AST SERPL-CCNC: 28 U/L (ref 15–37)
BASOPHILS ABSOLUTE: 0.2 K/UL (ref 0–0.2)
BASOPHILS RELATIVE PERCENT: 1.2 %
BILIRUB SERPL-MCNC: <0.2 MG/DL (ref 0–1)
BUN BLDV-MCNC: 12 MG/DL (ref 7–20)
CALCIUM SERPL-MCNC: 9.2 MG/DL (ref 8.3–10.6)
CHLORIDE BLD-SCNC: 100 MMOL/L (ref 99–110)
CO2: 21 MMOL/L (ref 21–32)
CREAT SERPL-MCNC: 0.5 MG/DL (ref 0.8–1.3)
EOSINOPHILS ABSOLUTE: 0.1 K/UL (ref 0–0.6)
EOSINOPHILS RELATIVE PERCENT: 0.4 %
GFR AFRICAN AMERICAN: >60
GFR NON-AFRICAN AMERICAN: >60
GLOBULIN: 3.1 G/DL
GLUCOSE BLD-MCNC: 67 MG/DL (ref 70–99)
GLUCOSE BLD-MCNC: 78 MG/DL (ref 70–99)
HCT VFR BLD CALC: 39.9 % (ref 40.5–52.5)
HEMOGLOBIN: 13.1 G/DL (ref 13.5–17.5)
LYMPHOCYTES ABSOLUTE: 1.2 K/UL (ref 1–5.1)
LYMPHOCYTES RELATIVE PERCENT: 8.7 %
MAGNESIUM: 2.2 MG/DL (ref 1.8–2.4)
MCH RBC QN AUTO: 29.1 PG (ref 26–34)
MCHC RBC AUTO-ENTMCNC: 32.9 G/DL (ref 31–36)
MCV RBC AUTO: 88.3 FL (ref 80–100)
MONOCYTES ABSOLUTE: 1.3 K/UL (ref 0–1.3)
MONOCYTES RELATIVE PERCENT: 9.5 %
NEUTROPHILS ABSOLUTE: 11.3 K/UL (ref 1.7–7.7)
NEUTROPHILS RELATIVE PERCENT: 80.2 %
PDW BLD-RTO: 15.4 % (ref 12.4–15.4)
PERFORMED ON: NORMAL
PHOSPHORUS: 3 MG/DL (ref 2.5–4.9)
PLATELET # BLD: 366 K/UL (ref 135–450)
PMV BLD AUTO: 7.8 FL (ref 5–10.5)
POTASSIUM REFLEX MAGNESIUM: 4 MMOL/L (ref 3.5–5.1)
RBC # BLD: 4.52 M/UL (ref 4.2–5.9)
SODIUM BLD-SCNC: 137 MMOL/L (ref 136–145)
TOTAL PROTEIN: 6.8 G/DL (ref 6.4–8.2)
TROPONIN: 0.02 NG/ML
WBC # BLD: 14.1 K/UL (ref 4–11)

## 2020-07-19 PROCEDURE — 83735 ASSAY OF MAGNESIUM: CPT

## 2020-07-19 PROCEDURE — 6360000002 HC RX W HCPCS: Performed by: INTERNAL MEDICINE

## 2020-07-19 PROCEDURE — 84100 ASSAY OF PHOSPHORUS: CPT

## 2020-07-19 PROCEDURE — 99232 SBSQ HOSP IP/OBS MODERATE 35: CPT | Performed by: INTERNAL MEDICINE

## 2020-07-19 PROCEDURE — 2100000000 HC CCU R&B

## 2020-07-19 PROCEDURE — 80053 COMPREHEN METABOLIC PANEL: CPT

## 2020-07-19 PROCEDURE — 84484 ASSAY OF TROPONIN QUANT: CPT

## 2020-07-19 PROCEDURE — 94761 N-INVAS EAR/PLS OXIMETRY MLT: CPT

## 2020-07-19 PROCEDURE — 99231 SBSQ HOSP IP/OBS SF/LOW 25: CPT | Performed by: INTERNAL MEDICINE

## 2020-07-19 PROCEDURE — APPNB15 APP NON BILLABLE TIME 0-15 MINS: Performed by: NURSE PRACTITIONER

## 2020-07-19 PROCEDURE — 85025 COMPLETE CBC W/AUTO DIFF WBC: CPT

## 2020-07-19 PROCEDURE — 2580000003 HC RX 258: Performed by: INTERNAL MEDICINE

## 2020-07-19 PROCEDURE — 6370000000 HC RX 637 (ALT 250 FOR IP): Performed by: INTERNAL MEDICINE

## 2020-07-19 PROCEDURE — 36415 COLL VENOUS BLD VENIPUNCTURE: CPT

## 2020-07-19 RX ORDER — FUROSEMIDE 10 MG/ML
40 INJECTION INTRAMUSCULAR; INTRAVENOUS 2 TIMES DAILY
Status: COMPLETED | OUTPATIENT
Start: 2020-07-19 | End: 2020-07-19

## 2020-07-19 RX ADMIN — METHOCARBAMOL TABLETS 750 MG: 500 TABLET, COATED ORAL at 20:49

## 2020-07-19 RX ADMIN — DULOXETINE HYDROCHLORIDE 60 MG: 60 CAPSULE, DELAYED RELEASE ORAL at 09:34

## 2020-07-19 RX ADMIN — Medication 10 ML: at 09:35

## 2020-07-19 RX ADMIN — OXYCODONE HYDROCHLORIDE AND ACETAMINOPHEN 1 TABLET: 10; 325 TABLET ORAL at 06:55

## 2020-07-19 RX ADMIN — ONDANSETRON 4 MG: 2 INJECTION INTRAMUSCULAR; INTRAVENOUS at 12:17

## 2020-07-19 RX ADMIN — VERAPAMIL HYDROCHLORIDE 80 MG: 80 TABLET, FILM COATED ORAL at 06:55

## 2020-07-19 RX ADMIN — ONDANSETRON 4 MG: 2 INJECTION INTRAMUSCULAR; INTRAVENOUS at 03:24

## 2020-07-19 RX ADMIN — OXYCODONE HYDROCHLORIDE AND ACETAMINOPHEN 1 TABLET: 10; 325 TABLET ORAL at 22:51

## 2020-07-19 RX ADMIN — OXYCODONE HYDROCHLORIDE AND ACETAMINOPHEN 1 TABLET: 10; 325 TABLET ORAL at 15:04

## 2020-07-19 RX ADMIN — TRAZODONE HYDROCHLORIDE 50 MG: 50 TABLET ORAL at 22:51

## 2020-07-19 RX ADMIN — METHOCARBAMOL TABLETS 750 MG: 500 TABLET, COATED ORAL at 13:44

## 2020-07-19 RX ADMIN — VERAPAMIL HYDROCHLORIDE 80 MG: 80 TABLET, FILM COATED ORAL at 13:43

## 2020-07-19 RX ADMIN — LISINOPRIL 40 MG: 40 TABLET ORAL at 09:34

## 2020-07-19 RX ADMIN — GABAPENTIN 600 MG: 300 CAPSULE ORAL at 20:50

## 2020-07-19 RX ADMIN — VERAPAMIL HYDROCHLORIDE 80 MG: 80 TABLET, FILM COATED ORAL at 22:51

## 2020-07-19 RX ADMIN — GABAPENTIN 600 MG: 300 CAPSULE ORAL at 09:34

## 2020-07-19 RX ADMIN — ENOXAPARIN SODIUM 40 MG: 40 INJECTION SUBCUTANEOUS at 09:34

## 2020-07-19 RX ADMIN — GABAPENTIN 600 MG: 300 CAPSULE ORAL at 13:43

## 2020-07-19 RX ADMIN — FUROSEMIDE 40 MG: 10 INJECTION, SOLUTION INTRAMUSCULAR; INTRAVENOUS at 09:34

## 2020-07-19 RX ADMIN — FUROSEMIDE 40 MG: 10 INJECTION, SOLUTION INTRAMUSCULAR; INTRAVENOUS at 19:25

## 2020-07-19 ASSESSMENT — PAIN SCALES - GENERAL
PAINLEVEL_OUTOF10: 5
PAINLEVEL_OUTOF10: 8

## 2020-07-19 ASSESSMENT — PAIN DESCRIPTION - PAIN TYPE
TYPE: CHRONIC PAIN
TYPE: CHRONIC PAIN

## 2020-07-19 ASSESSMENT — PAIN DESCRIPTION - DESCRIPTORS: DESCRIPTORS: ACHING

## 2020-07-19 ASSESSMENT — PAIN DESCRIPTION - ORIENTATION
ORIENTATION: LOWER
ORIENTATION: LOWER

## 2020-07-19 ASSESSMENT — PAIN DESCRIPTION - LOCATION
LOCATION: BACK
LOCATION: BACK

## 2020-07-19 ASSESSMENT — PAIN DESCRIPTION - PROGRESSION
CLINICAL_PROGRESSION: GRADUALLY IMPROVING

## 2020-07-19 ASSESSMENT — PAIN DESCRIPTION - FREQUENCY: FREQUENCY: CONTINUOUS

## 2020-07-19 NOTE — PLAN OF CARE
Problem: Skin Integrity:  Goal: Will show no infection signs and symptoms  Outcome: Ongoing, pt has been repositioning self, monitored temporary pacemaker Q1hr, remains intacact. Problem: Airway Clearance - Ineffective:  Goal: Ability to maintain a clear airway will improve  7/19/2020 0352 by Parish Jain RN  Outcome: Ongoing, has been on RA most of shift, sats dropped @ 0130 to 88, o2 2l/nc given, woken up at 0330 during routine rounds, pt got HA, and n/v, Zofran given, effective for N/v. States he always gets HA's with o2, sats 97% at moment, o2 turned off, sats remain 92%, to continue monitoring. Problem: Anxiety:  Goal: Level of anxiety will decreases  Pt has not had any anxiety,  Problem: Urinary Elimination:  Goal: Complications related to the disease process, condition or treatment will be avoided or minimized  Outcome: Ongoing, catheter care done, bag kept below bladder level. Problem: Pain:  Goal: Pain level will decrease  7/19/2020 0352 by Parish Jain RN  Outcome: Ongoing, voices relief , slept after given Robaxin.

## 2020-07-19 NOTE — PROGRESS NOTES
Cardiology Progress Note     Admit Date: 2020     Reason for follow up: respiratory distress, bradycardia    HPI and Interval History:   Patient seen and examined. Clinical notes reviewed. Patient now extubated and mentating well. Telemetry reviewed - SR 60-70's. TVP R groin but not actively pacing at this time. No major events overnight. Review of System:  All other systems reviewed except for that noted above. Pertinent negatives and positives are:     · General: negative for fever, chills   · Ophthalmic ROS: negative for - eye pain or loss of vision  · ENT ROS: negative for - headaches, sore throat   · Respiratory: negative for - cough, sputum  · Cardiovascular: Reviewed in HPI  · Gastrointestinal: negative for - abdominal pain, diarrhea, N/V  · Hematology: negative for - bleeding, blood clots, bruising or jaundice  · Genito-Urinary:  negative for - Dysuria or incontinence  · Musculoskeletal: negative for - Joint swelling, muscle pain  · Neurological: negative for - confusion, dizziness, headaches   · Psychiatric: No anxiety, no depression. · Dermatological: negative for - rash      Physical Examination:  Vitals:    20 0800   BP: 129/67   Pulse: 75   Resp: 12   Temp:    SpO2: 93%        Intake/Output Summary (Last 24 hours) at 2020 0858  Last data filed at 2020 0858  Gross per 24 hour   Intake 738.07 ml   Output 2330 ml   Net -1591.93 ml     In: 370 [P.O.:370]  Out: 1780    Wt Readings from Last 3 Encounters:   20 134 lb 11.2 oz (61.1 kg)   20 145 lb 11.6 oz (66.1 kg)   19 140 lb 11.2 oz (63.8 kg)     Temp  Av.3 °F (36.8 °C)  Min: 97.9 °F (36.6 °C)  Max: 98.5 °F (36.9 °C)  Pulse  Av  Min: 69  Max: 127  BP  Min: 123/64  Max: 172/82  SpO2  Av.3 %  Min: 85 %  Max: 100 %  FiO2   Av %  Min: 70 %  Max: 70 %    · Telemetry: Sinus rhythm 60-70's bpm with rare pacing  · Constitutional: Alert. Oriented to person, place, and time. No distress.    · Head: Normocephalic and atraumatic. · Mouth/Throat: Lips appear moist. Oropharynx is clear and moist.  · Eyes: Conjunctivae normal. EOM are normal.   · Neck: Neck supple. No lymphadenopathy. No rigidity. 14cm JVD present. · Cardiovascular: Normal rate, regular rhythm. Normal S1&S2. Carotid pulse 2+ bilaterally. · Pulmonary/Chest: Bilateral respiratory sounds present. No respiratory accessory muscle use. No wheezes, No rhonchi. No rales. · Abdominal: Soft. Normal bowel sounds present. No distension, No tenderness. No splenomegaly. No hernia. · Musculoskeletal: No tenderness. No BLE edema    · Lymphadenopathy: Has no cervical adenopathy. · Neurological: Alert and oriented. Cranial nerve II-XII grossly intact, No gross deficit to touch. · Skin: Skin is warm and dry. No rash, lesions, ulcerations noted. · Psychiatric: No anxiety nor agitation. Labs, diagnostic and imaging results reviewed. Reviewed. Recent Labs     07/17/20 1910 07/18/20  0541 07/19/20  0445    137 137   K 4.7 4.3 4.0    105 100   CO2 17* 19* 21   PHOS  --  3.6 3.0   BUN 18 17 12   CREATININE 1.3 0.6* 0.5*     Recent Labs     07/17/20 1910 07/18/20  0541 07/19/20  0445   WBC 10.3 16.9* 14.1*   HGB 11.8* 12.8* 13.1*   HCT 36.4* 38.2* 39.9*   MCV 89.7 87.2 88.3    418 366     Lab Results   Component Value Date    TROPONINI <0.01 07/17/2020     Estimated Creatinine Clearance: 127 mL/min (A) (based on SCr of 0.5 mg/dL (L)).    No results found for: BNP  Lab Results   Component Value Date    PROTIME 13.3 07/17/2020    PROTIME 14.5 12/27/2019    PROTIME 13.1 09/20/2015    INR 1.14 07/17/2020    INR 1.25 12/27/2019    INR 1.20 09/20/2015     Lab Results   Component Value Date    CHOL 144 03/04/2015    HDL 56 03/04/2015    HDL 51 05/04/2012    TRIG 67 03/04/2015       Scheduled Meds:   gabapentin  600 mg Oral TID    traZODone  50 mg Oral Nightly    DULoxetine  60 mg Oral Daily    verapamil  80 mg Oral 3 times per day    lisinopril  40 mg Oral Daily    sodium chloride flush  10 mL Intravenous 2 times per day    enoxaparin  40 mg Subcutaneous Daily     Continuous Infusions:    PRN Meds:oxyCODONE-acetaminophen, albuterol, methocarbamol, sodium chloride flush, acetaminophen **OR** acetaminophen, polyethylene glycol, ondansetron, hydrALAZINE     Patient Active Problem List    Diagnosis Date Noted    Symptomatic bradycardia 07/17/2020    Dyspnea     Atrial fibrillation with slow ventricular response (HCC)     Bradycardia     Chest pain     Tobacco abuse counseling     Abnormal CT of the chest     Status post bronchoscopy     Mediastinal lymphadenopathy     Hilar lymphadenopathy     Status post cholecystectomy     Nephrolithiasis     History of splenectomy     Chronic obstructive [facility] disease ([facility]     Smoker     Pneumonia 12/27/2019    PNA (pneumonia) 12/06/2019    Chronic pain 09/21/2015    Sepsis ([facility] 09/21/2015    Acute encephalopathy 09/21/2015    Respiratory distress     [facility] overdose [facility]     Pneumonia due to organism       Active Hospital Problems    Diagnosis Date Noted    Symptomatic bradycardia [R00.1] 07/17/2020       Assessment and Plan:     Respiratory distress  -unclear etiology at this time  -was in respiratory distress prior to bradycardic episode as per chart history  -extubated now and breathing well. Bradycardia  -EKG from Epic shows wide-complex QRS bradycardic. Unclear underlying rhythm, but does not appear to be atrial fibrillation, but instead probably sinus with 2nd degree AV block type I (wenckebach) upon further review with v-rates 40's bpm  -currently sinus with 1:1 AV conduction without the need for TVP pacing.  -will discontinue TVP today.  -not certain that the patient's decompensation is attributable to the bradycardia. Will observe overnight.  If no further cardiac dysrhythmia, would issue 30-day event monitor tomorrow and, from EP perspective, may be discharged home with f/u with me in clinic. Chronic CAD without need for intervention  -LHC last night did not show any evidence to suggest ACS played a role in the presenting complaint. CHF, acute  -unclear if systolic or diastolic. Will obtain echo  -hypervolemic. Will diurese with lasix 40mg IV BID today, then re-assess. Will follow with you from EP perspective. Thank you for allowing me to participate in the care of this patient. If you have any questions, please do not hesitate to contact me.     Char Chinchilla MD, MS, [facility]  Cardiac Electrophysiology  [address]  [address] [facility], [address] [place] [address]  Artie Rahman [facility] [address]  [phone]

## 2020-07-19 NOTE — PROGRESS NOTES
Hospitalist Progress Note      PCP: Jason Moya    Date of Admission: 7/17/2020    Chief Complaint: SOB    Hospital Course: The patient is a 71 y.o. male who presents to [facility] with shortness of breath. Patient called EMS as the patient had been complaining of shortness of breath for the past day or so. He had been using his inhalers more frequently than normal.  When EMS arrived his O2 sats were in the 80s and his heart rate was in the 160s. In route his oxygen decreased down to the 70s and the patient became bradycardic. They gave him atropine a total of 3 times and placed him on a nonrebreather. At 1 point he became upset and yelled that his chest hurt. He was given 10 mg of Versed in order to externally pace the patient without success. On arrival the patient was responding to pain but other than that was not able to answer any questions. Continue to try to externally pace the patient but it would not capture. Due to the patient's worsening respiratory status they decided to intubate the patient. Cardiology was consulted for the placement of a temporary pacemaker due to his profound bradycardia. Because of his EKG changes and his chest discomfort angiography was also thought to be appropriate. He was then taken to the Cath Lab where a temporary pacing wire was placed. The patient was also noted to have coronary artery disease but no acute coronary syndrome. No intervention was taken. Patient was then transferred to the CVICU for further care and intervention. Subjective: Patient seen and examined. He is awake, alert x4. He states for the 5 days before coming to ED he has chest pain that would not go away. He then developed SOB that was worse with exertion, minimally better with rest. He tried his inhalers but they did not help. Patient does not carry a diagnosis of COPD despite smoking 3 packs a day for 50 years. His lung sounds are diminished and I hear no wheezing. Discussed with Dr Oscar Bills and one of his EKG appear to show Wenkebach. We will take out pacemaker today and see how he does. Tomorrow we can send him out with a 30 day event monitor. Medications:  Reviewed    Infusion Medications   Scheduled Medications    gabapentin  600 mg Oral TID    traZODone  50 mg Oral Nightly    DULoxetine  60 mg Oral Daily    verapamil  80 mg Oral 3 times per day    lisinopril  40 mg Oral Daily    sodium chloride flush  10 mL Intravenous 2 times per day    enoxaparin  40 mg Subcutaneous Daily     PRN Meds: oxyCODONE-acetaminophen, albuterol, methocarbamol, sodium chloride flush, acetaminophen **OR** acetaminophen, polyethylene glycol, ondansetron, hydrALAZINE      Intake/Output Summary (Last 24 hours) at 7/19/2020 0812  Last data filed at 7/19/2020 0500  Gross per 24 hour   Intake 738.07 ml   Output 2405 ml   Net -1666.93 ml       Physical Exam Performed:    BP (!) 172/82   Pulse 86   Temp 98.5 °F (36.9 °C) (Oral)   Resp 16   Ht 5' 11\" (1.803 m)   Wt 134 lb 11.2 oz (61.1 kg) Comment: with lift pad  SpO2 93%   BMI 18.79 kg/m²     General appearance: No apparent distress, appears stated age and cooperative. HEENT: Pupils equal, round, and reactive to light. Conjunctivae/corneas clear. Neck: Supple, with full range of motion. No jugular venous distention. Trachea midline. Respiratory:  Normal respiratory effort. Diminished BS BL  Cardiovascular: Regular rate and rhythm with normal S1/S2   Abdomen: Soft, non-tender, non-distended with normal bowel sounds. Musculoskeletal: No clubbing, cyanosis or edema bilaterally. Full range of motion without deformity. Skin: Skin color, texture, turgor normal.  No rashes or lesions. Neurologic:  Neurovascularly intact without any focal sensory/motor deficits.  Cranial nerves: II-XII intact, grossly non-focal.  Psychiatric: Alert and oriented, thought content appropriate, normal insight  Capillary Refill: Brisk,< 3 seconds   Peripheral Pulses: +2 palpable, equal bilaterally       Labs:   Recent Labs     07/17/20 1910 07/18/20  0541 07/19/20  0445   WBC 10.3 16.9* 14.1*   HGB 11.8* 12.8* 13.1*   HCT 36.4* 38.2* 39.9*    418 366     Recent Labs     07/17/20 1910 07/18/20  0541 07/19/20  0445    137 137   K 4.7 4.3 4.0    105 100   CO2 17* 19* 21   BUN 18 17 12   CREATININE 1.3 0.6* 0.5*   CALCIUM 8.7 9.2 9.2   PHOS  --  3.6 3.0     Recent Labs     07/17/20 1910 07/18/20  0541 07/19/20  0445   AST 12* 14* 28   ALT 10 11 17   BILITOT <0.2 <0.2 <0.2   ALKPHOS 99 118 116     Recent Labs     07/17/20 1910   INR 1.14     Recent Labs     07/17/20 1910   TROPONINI <0.01       Urinalysis:      Lab Results   Component Value Date    NITRU Negative 05/14/2020    WBCUA 3 09/20/2015    RBCUA 5 09/20/2015    BLOODU Negative 05/14/2020    SPECGRAV 1.015 05/14/2020    GLUCOSEU Negative 05/14/2020       Radiology:  XR CHEST PORTABLE   Final Result   1. The enteric tube is in good position. 2. Pulmonary edema. XR CHEST PORTABLE   Final Result   The tip of the enteric tube is in the stomach though the proximal side hole   is in the distal esophagus. The tube should be advanced 7 cm. XR CHEST PORTABLE   Final Result   Addendum 1 of 1   ADDENDUM:   Additional images are submitted. The tube projecting over the trachea is   presumed to be the endotracheal tube (in normal position). No enteric    tube   is identified over the esophagus or stomach.          Final              Assessment/Plan:    Acute respiratory failure with hypoxia  Requiring intubation, extubated 7/18, on 2L now  Titrate to keep saturations above 90%  Possibly secondary to bradycardia     Bradycardia followed by atrial fibrillation  Temporary pacer placed via cardiology  May need permanent pacemaker, will let EP decide  Remove temp pacer  30 day event monitor at DC     Acute pulmonary edema  Lasix as needed     Acute metabolic encephalopathy  Appears to be

## 2020-07-19 NOTE — PROGRESS NOTES
REASON FOR CONSULTATION/CC: Respiratory failure      Consult at request of Marlon Tinoco MD     PCP: Richie Hwang  Chief Complaint   Patient presents with    Chest Pain    Shortness of Breath     Subjective:  Extubated yesterday, transvenous pacer removed  Patient has mild dyspnea but tolerating room air    Continuous Infusions:      PRN Meds:  oxyCODONE-acetaminophen, albuterol, methocarbamol, sodium chloride flush, acetaminophen **OR** acetaminophen, polyethylene glycol, ondansetron, hydrALAZINE    ALLERGIES:  Patient is allergic to amitriptyline; codeine; demerol; diltiazem hcl; lansoprazole; tricyclic antidepressants; and trilisate [choline magnesium trisalicylate]. REVIEW OF SYSTEMS:  Unable to obtain due to mechanical ventilation  Objective:   PHYSICAL EXAM:  Blood pressure 129/67, pulse 75, temperature 98.5 °F (36.9 °C), temperature source Oral, resp. rate 12, height 5' 11\" (1.803 m), weight 134 lb 11.2 oz (61.1 kg), SpO2 93 %.'  Gen:  No acute distress. Eyes: PERRL. Anicteric sclera. No conjunctival injection. ENT: No discharge. Posterior oropharynx with ET tube. External appearance of ears and nose normal.  Neck: Trachea midline. No mass   Resp:  No crackles. No wheezes. No rhonchi. No dullness on percussion. CV: Regular rate. Regular rhythm. No murmur or rub. No edema. GI: Soft, Non-tender. Non-distended. +BS  Skin: Warm, dry, w/o erythema. Lymph: No cervical or supraclavicular LAD. M/S: No cyanosis. No clubbing. Neuro:  no focal neurologic deficit.   Moves all extremities very agitated      Data Reviewed:   LABS:  CBC:   Recent Labs     07/17/20 1910 07/18/20  0541 07/19/20  0445   WBC 10.3 16.9* 14.1*   HGB 11.8* 12.8* 13.1*   HCT 36.4* 38.2* 39.9*   MCV 89.7 87.2 88.3    418 366     BMP:   Recent Labs     07/17/20 1910 07/18/20  0541 07/19/20  0445    137 137   K 4.7 4.3 4.0    105 100   CO2 17* 19* 21   PHOS  --  3.6 3.0   BUN 18 17 12   CREATININE 1.3 0.6* HISTORY: cp, elevated dimer  TECHNOLOGIST PROVIDED HISTORY:  Reason for exam:->cp, elevated dimer  Reason for Exam: Cough (c/o coughing, congestion, pt has scoliosis and c/o  pain due to coughing so much. Pt states he was diagnosed with pneumonia 2  weeks ago and finished the antibiotic course completly.)  Acuity: Acute  Type of Exam: Initial    FINDINGS:  Pulmonary Arteries:    No evidence of acute pulmonary embolism to the lobar branches. The main  pulmonary artery measures 2.9 cm. This is considered within normal limits. Mediastinum:    No evidence of thoracic aortic dissection. No evidence of pericardial  effusion. Mediastinal and hilar lymphadenopathy bilaterally. No evidence of significant axillary lymphadenopathy. Lungs/pleura:    Interval increase and worsening masslike pulmonary opacity within left upper  lobe. Interval improvement in nodular pulmonary opacity within lingula and  left lower lobe. Additional bibasilar pulmonary opacities and has a configuration of  atelectasis. No evidence of pneumothorax. Trace left pleural effusion. No evidence of right pleural effusion. Upper Abdomen:    Hepatomegaly. Status post cholecystectomy, with mild intrahepatic ductal  dilatation. Extrahepatic biliary dilatation is also present. This is  partially imaged and evaluated. Nonobstructing left renal calculus. Minimal bilateral renal pelviectasis. No evidence of large adrenal masses. Nonspecific thickening of the distal esophagus. Soft Tissues/Bones:    No evidence of acute osseous abnormalities. Multilevel degenerative changes  of the thoracic spine. Impression 1. Interval increase in size of large masslike pulmonary opacity within left  upper lobe. Differential includes worsening pneumonia or worsening neoplasm. Recommend short interval follow-up examination in 2-4 weeks. 2. Interval improvement in nodular pulmonary opacities within lingula and  left lower lobe. Scattered bibasilar atelectasis. Continued attention on  follow-up examination is recommended. 3. Mediastinal and bilateral hilar lymphadenopathy. 4. Extensive emphysema. 5. No evidence of acute pulmonary embolism. 6. Hepatomegaly. Status post cholecystectomy, with intrahepatic and  extrahepatic ductal dilatation. This is partially imaged. 7. Nonobstructing left renal calculus. Minimal renal pelviectasis is  partially imaged. 8. Nonspecific thickening of the distal esophagus. This can be re-evaluated  with upper endoscopy non emergently. CXR PA/LAT:   Results for orders placed during the hospital encounter of 12/27/19   XR CHEST STANDARD (2 VW)    Narrative EXAMINATION:  TWO XRAY VIEWS OF THE CHEST    12/27/2019 4:34 pm    COMPARISON:  CT chest, 12/06/2019    HISTORY:  ORDERING SYSTEM PROVIDED HISTORY: Cough  TECHNOLOGIST PROVIDED HISTORY:  Reason for exam:->Cough  Reason for Exam: cough  Acuity: Acute  Type of Exam: Ongoing    FINDINGS:  The cardiac silhouette is normal.  Mediastinal and hilar contours are normal.    The lungs are hyperinflated. There is a consolidation noted in the left  upper lung. There is improved aeration of the left perihilar region. Right  lung is clear. Cortical plate is fixed to the mid clavicle with multiple cortical screws. No acute osseous abnormality. Impression Residual opacities in the perihilar and apical regions of the left lung. Given some improvement, infectious etiology appears most likely. As noted  previously, atypical etiologies, such as fungal pneumonia, are considered. Although less likely, neoplasm is still considered in the differential.    Changes consistent with COPD. RECOMMENDATION:  Surveillance until resolution is recommended.          CXR portable:   Results for orders placed during the hospital encounter of 07/17/20   XR CHEST PORTABLE    Narrative EXAMINATION:  ONE XRAY VIEW OF THE CHEST    7/18/2020 2:57 am    COMPARISON:  07/18/2020    HISTORY:  ORDERING SYSTEM PROVIDED HISTORY: OG placement  TECHNOLOGIST PROVIDED HISTORY:  Reason for exam:->OG placement  Reason for Exam: og placement    FINDINGS:  The enteric tube has been advanced with the tip and side hole in the stomach. Pulmonary edema has developed. The heart size is within normal limits. There is no large pleural effusion or definite evidence for pneumothorax. Spinal fixation hardware is again seen. Catheter projects over the lumbar  spine and the right side of the heart. Impression 1. The enteric tube is in good position. 2. Pulmonary edema. Access  Arterial       PICC           CVC               Assessment:     Acute hypoxemic respiratory failure with SPO2 less than 90% on room air-resolved  Symptomatic bradycardia-resolved  Acute pulmonary edema  Acute metabolic encephalopathy-resolved    Plan:      -Successfully extubated yesterday, currently tolerating room air  -No further agitation  -Transvenous pacer removed, plan for event monitor    We will sign off at this time     This note was transcribed using [id number]. Please disregard any translational errors.     Thank you for the consult    [address]  Pulmonary, Sleep and Critical Care Medicine

## 2020-07-19 NOTE — PROGRESS NOTES
@4887 Dr Wolfgang Andres in to see. Did rec new orders. @5393 did receive call from Dr Wolfgang Andres. May remove turn off pacemaker. May remove pacing wires and d/c sheath. @1130 pacing wires removed without difficulty. Did then remove right femoral venous sheath. Pressure held to site for 10 minutes. No bleeding noted. Sterile dressing applied to site. @5031 has been resting and does refuse the oxygen, states gives him headache. sats when sleeping will drop to 88-90%  @1712 did request him to wear 1-l when sleeping and states gives him headache but will wear. @1715 sats with one liter 94%    @1825 states feels a lot better. Did order his supper tray.   @1900 bedside handoff with [facility]

## 2020-07-19 NOTE — CONSULTS
Cardiology Progress Note      Admit Date: 2020     Reason for follow up: respiratory distress, bradycardia     HPI and Interval History:   Patient seen and examined. Clinical notes reviewed. Telemetry reviewed - SR 60's. Intubated. TVP R groin but not actively pacing at this time. No major events overnight.      Review of System:  All other systems reviewed except for that noted above. Pertinent negatives and positives are:      · General: negative for fever, chills   · Ophthalmic ROS: negative for - eye pain or loss of vision  · ENT ROS: negative for - headaches, sore throat   · Respiratory: negative for - cough, sputum  · Cardiovascular: Reviewed in HPI  · Gastrointestinal: negative for - abdominal pain, diarrhea, N/V  · Hematology: negative for - bleeding, blood clots, bruising or jaundice  · Genito-Urinary:  negative for - Dysuria or incontinence  · Musculoskeletal: negative for - Joint swelling, muscle pain  · Neurological: negative for - confusion, dizziness, headaches   · Psychiatric: No anxiety, no depression. · Dermatological: negative for - rash        Physical Examination:  Vitals:     20 0900   BP: 123/64   Pulse: 69   Resp: 22   Temp:     SpO2: 100%        Intake/Output Summary (Last 24 hours) at 2020 0906  Last data filed at 2020 0852      Gross per 24 hour   Intake 50 ml   Output 1375 ml   Net -1325 ml     In: 50 [I.V.:50]  Out: 1375        Wt Readings from Last 3 Encounters:   20 141 lb 1.5 oz (64 kg)   20 145 lb 11.6 oz (66.1 kg)   19 140 lb 11.2 oz (63.8 kg)     Temp  Av.6 °F (36.4 °C)  Min: 97.6 °F (36.4 °C)  Max: 97.6 °F (36.4 °C)  Pulse  Av.5  Min: 38  Max: 103  BP  Min: 72/46  Max: 172/82  SpO2  Av.6 %  Min: 79 %  Max: 100 %  FiO2   Av.9 %  Min: 70 %  Max: 100 %     · Telemetry: Sinus rhythm 60's bpm  · Constitutional: Alert. Oriented to person, place, and time. No distress. · Head: Normocephalic and atraumatic.    · Mouth/Throat: Lips appear moist. Oropharynx is clear and moist.  · Eyes: Conjunctivae normal. EOM are normal.   · Neck: Neck supple. No lymphadenopathy. No rigidity. ? JVD present. · Cardiovascular: Normal rate, regular rhythm. Normal S1&S2. Carotid pulse 2+ bilaterally. · Pulmonary/Chest: Bilateral respiratory sounds present. No respiratory accessory muscle use. No wheezes, No rhonchi. No rales. · Abdominal: Soft. Normal bowel sounds present. No distension, No tenderness. No splenomegaly. No hernia. · Musculoskeletal: No tenderness. No BLE edema    · Lymphadenopathy: Has no cervical adenopathy. · Neurological: Alert and oriented. Cranial nerve II-XII grossly intact, No gross deficit to touch. · Skin: Skin is warm and dry. No rash, lesions, ulcerations noted. · Psychiatric: No anxiety nor agitation.     Labs, diagnostic and imaging results reviewed. Reviewed. Recent Labs     07/17/20 1910 07/18/20  0541    137   K 4.7 4.3    105   CO2 17* 19*   BUN 18 17   CREATININE 1.3 0.6*          Recent Labs     07/17/20 1910 07/18/20  0541   WBC 10.3 16.9*   HGB 11.8* 12.8*   HCT 36.4* 38.2*   MCV 89.7 87.2    418           Lab Results   Component Value Date     TROPONINI <0.01 07/17/2020     Estimated Creatinine Clearance: 111 mL/min (A) (based on SCr of 0.6 mg/dL (L)).    No results found for: BNP        Lab Results   Component Value Date     PROTIME 13.3 07/17/2020     PROTIME 14.5 12/27/2019     PROTIME 13.1 09/20/2015     INR 1.14 07/17/2020     INR 1.25 12/27/2019     INR 1.20 09/20/2015           Lab Results   Component Value Date     CHOL 144 03/04/2015     HDL 56 03/04/2015     HDL 51 05/04/2012     TRIG 67 03/04/2015        Scheduled Meds:  Scheduled Medications    chlorhexidine  15 mL Mouth/Throat BID    famotidine (PEPCID) injection  20 mg Intravenous BID    sodium chloride flush  10 mL Intravenous 2 times per day    enoxaparin  40 mg Subcutaneous Daily        Continuous Infusions:  Infusions Meds    EPINEPHrine infusion Stopped (07/17/20 2000)    propofol 50 mcg/kg/min (07/18/20 0548)    fentaNYL 125 mcg/hr (07/18/20 0610)        PRN Meds:  PRN Medications   fentanNYL, sodium chloride flush, acetaminophen **OR** acetaminophen, polyethylene glycol, ondansetron, hydrALAZINE              Patient Active Problem List     Diagnosis Date Noted    Symptomatic bradycardia 07/17/2020    Dyspnea      Atrial fibrillation with slow ventricular response (HCC)      Bradycardia      Chest pain      Tobacco abuse counseling      Abnormal CT of the chest      Status post bronchoscopy      Mediastinal lymphadenopathy      Hilar lymphadenopathy      Status post cholecystectomy      Nephrolithiasis      History of splenectomy      Chronic obstructive pulmonary disease (HCC)      Smoker      Pneumonia 12/27/2019    PNA (pneumonia) 12/06/2019    Chronic pain 09/21/2015    Sepsis ([facility] [address] 09/21/2015    Acute encephalopathy 09/21/2015    Respiratory distress      Opiate overdose ([facility] [address]      Pneumonia due to organism             Active Hospital Problems     Diagnosis Date Noted    Symptomatic bradycardia [R00.1] 07/17/2020        Assessment and Plan:      Respiratory distress  -unclear etiology at this time  -was in respiratory distress prior to bradycardic episode as per chart history  -will defer to pulmonary service to extubate     Bradycardia  -EKG from Epic shows wide-complex QRS bradycardic. Unclear underlying rhythm, but does not appear to be atrial fibrillation, but instead probably sinus with AV dissociation.  But would have been ideal to have a repeat eKG at that time to corroborate.  -currently sinus with 1:1 AV conduction without the need for TVP pacing.  -will continue to assess after extubation whether the patient warrants PPM implantation.     Chronic CAD without need for intervention  -LHC last night did not show any evidence to suggest ACS played a role in the presenting complaint.     Will follow with you.     Thank you for allowing me to participate in the care of this patient.  If you have any questions, please do not hesitate to contact me.     Isaak Morfin MD, Luite Feliz , [facility], [address] [place] [address], [address] [place] [address]  Artie Rahman 429  [phone]

## 2020-07-19 NOTE — PROGRESS NOTES
7/18/2020  1930 Handoff from [facility] 1729, pt in bed, awake, A&Ox4, R Groin venous sheath with temporary pacemaker, NSR, BP WNL. s, c/o chronic back pain, percocet due at 2100, pt made aware, reminded about bedrest orders and to keep R leg Straight. 2100 Given Percocet with HS meds,   2400 VSS, robaxin given as ordered. 7/19/2020  0115 o2 sats kept dropping to mid 80s while asleep, 02 2l/n administered, sats up to97%  0326 Zofran 4mg IVP given for N/V, pt had just been repositioned up in bed, got a headache and then got nauseated, had clear liquid emesis approximately  50mls,  Pt has not used oxygen in years but says each time he uses it gives him a headache. O2 is off now, sats 93%  0655 c/o Nausea, dry hiving, did take the Pain pill and BP medication ,   0715 noted am Glucose 67, pt given 4oz apple juice and crackers. Handoff to [facility] [address]   Electronically signed by Cam Rey RN on 7/19/2020 at 7:38 AM

## 2020-07-19 NOTE — PLAN OF CARE
Problem: Skin Integrity:  Goal: Will show no infection signs and symptoms  Description: Will show no infection signs and symptoms  Outcome: Ongoing  Goal: Absence of new skin breakdown  Description: Absence of new skin breakdown  Outcome: Ongoing     Problem: Airway Clearance - Ineffective:  Goal: Ability to maintain a clear airway will improve  Description: Ability to maintain a clear airway will improve  7/19/2020 1712 by Jenise Roman RN  Outcome: Ongoing  7/19/2020 0352 by Hanna Galan RN  Outcome: Ongoing     Problem: Anxiety:  Goal: Level of anxiety will decrease  Description: Level of anxiety will decrease  Outcome: Ongoing     Problem: Urinary Elimination:  Goal: Complications related to the disease process, condition or treatment will be avoided or minimized  Description: Complications related to the disease process, condition or treatment will be avoided or minimized  7/19/2020 1712 by Jenise Roman RN  Outcome: Ongoing  7/19/2020 0352 by Hanna Galan RN  Outcome: Ongoing     Problem: Nutrition  Goal: Optimal nutrition therapy  7/19/2020 1712 by Jenise Roman RN  Outcome: Ongoing  7/19/2020 0352 by Hanna Galan RN  Outcome: Ongoing     Problem: Pain:  Goal: Pain level will decrease  Description: Pain level will decrease  7/19/2020 1712 by Jenise Roman RN  Outcome: Ongoing  7/19/2020 0352 by Hanna Galan RN  Outcome: Ongoing  Goal: Control of chronic pain  Description: Control of chronic pain  7/19/2020 1712 by Jenise Roman RN  Outcome: Ongoing  7/19/2020 0352 by Hanna Galan RN  Outcome: Ongoing     Problem: Falls - Risk of:  Goal: Will remain free from falls  Description: Will remain free from falls  7/19/2020 1712 by Jenise Roman RN  Outcome: Ongoing  7/19/2020 0352 by Hanna Galan RN  Outcome: Ongoing  Goal: Absence of physical injury  Description: Absence of physical injury  Outcome: Ongoing

## 2020-07-20 VITALS
HEIGHT: 71 IN | SYSTOLIC BLOOD PRESSURE: 118 MMHG | WEIGHT: 130.51 LBS | BODY MASS INDEX: 18.27 KG/M2 | HEART RATE: 87 BPM | TEMPERATURE: 98.2 F | OXYGEN SATURATION: 93 % | RESPIRATION RATE: 15 BRPM | DIASTOLIC BLOOD PRESSURE: 79 MMHG

## 2020-07-20 LAB
A/G RATIO: 1 (ref 1.1–2.2)
ALBUMIN SERPL-MCNC: 3.6 G/DL (ref 3.4–5)
ALP BLD-CCNC: 110 U/L (ref 40–129)
ALT SERPL-CCNC: 19 U/L (ref 10–40)
ANION GAP SERPL CALCULATED.3IONS-SCNC: 11 MMOL/L (ref 3–16)
AST SERPL-CCNC: 27 U/L (ref 15–37)
BASOPHILS ABSOLUTE: 0.1 K/UL (ref 0–0.2)
BASOPHILS RELATIVE PERCENT: 1.3 %
BILIRUB SERPL-MCNC: 0.3 MG/DL (ref 0–1)
BUN BLDV-MCNC: 13 MG/DL (ref 7–20)
CALCIUM SERPL-MCNC: 9.3 MG/DL (ref 8.3–10.6)
CHLORIDE BLD-SCNC: 96 MMOL/L (ref 99–110)
CO2: 26 MMOL/L (ref 21–32)
CREAT SERPL-MCNC: <0.5 MG/DL (ref 0.8–1.3)
EOSINOPHILS ABSOLUTE: 0.1 K/UL (ref 0–0.6)
EOSINOPHILS RELATIVE PERCENT: 1.3 %
GFR AFRICAN AMERICAN: >60
GFR NON-AFRICAN AMERICAN: >60
GLOBULIN: 3.5 G/DL
GLUCOSE BLD-MCNC: 86 MG/DL (ref 70–99)
HCT VFR BLD CALC: 44.6 % (ref 40.5–52.5)
HEMOGLOBIN: 14.9 G/DL (ref 13.5–17.5)
LV EF: 45 %
LVEF MODALITY: NORMAL
LYMPHOCYTES ABSOLUTE: 2.3 K/UL (ref 1–5.1)
LYMPHOCYTES RELATIVE PERCENT: 24.8 %
MAGNESIUM: 2.1 MG/DL (ref 1.8–2.4)
MCH RBC QN AUTO: 29.1 PG (ref 26–34)
MCHC RBC AUTO-ENTMCNC: 33.3 G/DL (ref 31–36)
MCV RBC AUTO: 87.3 FL (ref 80–100)
MONOCYTES ABSOLUTE: 1.5 K/UL (ref 0–1.3)
MONOCYTES RELATIVE PERCENT: 15.7 %
NEUTROPHILS ABSOLUTE: 5.4 K/UL (ref 1.7–7.7)
NEUTROPHILS RELATIVE PERCENT: 56.9 %
PDW BLD-RTO: 15.3 % (ref 12.4–15.4)
PHOSPHORUS: 3.2 MG/DL (ref 2.5–4.9)
PLATELET # BLD: 379 K/UL (ref 135–450)
PMV BLD AUTO: 7.5 FL (ref 5–10.5)
POTASSIUM REFLEX MAGNESIUM: 4.6 MMOL/L (ref 3.5–5.1)
RBC # BLD: 5.1 M/UL (ref 4.2–5.9)
SODIUM BLD-SCNC: 133 MMOL/L (ref 136–145)
TOTAL PROTEIN: 7.1 G/DL (ref 6.4–8.2)
WBC # BLD: 9.5 K/UL (ref 4–11)

## 2020-07-20 PROCEDURE — 99233 SBSQ HOSP IP/OBS HIGH 50: CPT | Performed by: NURSE PRACTITIONER

## 2020-07-20 PROCEDURE — 84100 ASSAY OF PHOSPHORUS: CPT

## 2020-07-20 PROCEDURE — 93306 TTE W/DOPPLER COMPLETE: CPT

## 2020-07-20 PROCEDURE — 2580000003 HC RX 258: Performed by: INTERNAL MEDICINE

## 2020-07-20 PROCEDURE — 85025 COMPLETE CBC W/AUTO DIFF WBC: CPT

## 2020-07-20 PROCEDURE — 36415 COLL VENOUS BLD VENIPUNCTURE: CPT

## 2020-07-20 PROCEDURE — 80053 COMPREHEN METABOLIC PANEL: CPT

## 2020-07-20 PROCEDURE — 83735 ASSAY OF MAGNESIUM: CPT

## 2020-07-20 PROCEDURE — 6360000002 HC RX W HCPCS: Performed by: INTERNAL MEDICINE

## 2020-07-20 PROCEDURE — 6370000000 HC RX 637 (ALT 250 FOR IP): Performed by: NURSE PRACTITIONER

## 2020-07-20 PROCEDURE — 6370000000 HC RX 637 (ALT 250 FOR IP): Performed by: INTERNAL MEDICINE

## 2020-07-20 PROCEDURE — 94761 N-INVAS EAR/PLS OXIMETRY MLT: CPT

## 2020-07-20 PROCEDURE — 2700000000 HC OXYGEN THERAPY PER DAY

## 2020-07-20 RX ORDER — ASPIRIN 81 MG/1
81 TABLET, CHEWABLE ORAL DAILY
Qty: 30 TABLET | Refills: 3 | Status: ON HOLD | OUTPATIENT
Start: 2020-07-21 | End: 2020-07-25 | Stop reason: HOSPADM

## 2020-07-20 RX ORDER — ATORVASTATIN CALCIUM 40 MG/1
40 TABLET, FILM COATED ORAL DAILY
Qty: 90 TABLET | Refills: 1 | Status: SHIPPED | OUTPATIENT
Start: 2020-07-20 | End: 2020-09-21

## 2020-07-20 RX ORDER — ASPIRIN 81 MG/1
81 TABLET, CHEWABLE ORAL DAILY
Status: DISCONTINUED | OUTPATIENT
Start: 2020-07-20 | End: 2020-07-20 | Stop reason: HOSPADM

## 2020-07-20 RX ORDER — ATORVASTATIN CALCIUM 40 MG/1
40 TABLET, FILM COATED ORAL NIGHTLY
Status: DISCONTINUED | OUTPATIENT
Start: 2020-07-20 | End: 2020-07-20 | Stop reason: HOSPADM

## 2020-07-20 RX ADMIN — ASPIRIN 81 MG: 81 TABLET, CHEWABLE ORAL at 11:03

## 2020-07-20 RX ADMIN — DULOXETINE HYDROCHLORIDE 60 MG: 60 CAPSULE, DELAYED RELEASE ORAL at 08:32

## 2020-07-20 RX ADMIN — LISINOPRIL 40 MG: 40 TABLET ORAL at 08:32

## 2020-07-20 RX ADMIN — GABAPENTIN 600 MG: 300 CAPSULE ORAL at 08:32

## 2020-07-20 RX ADMIN — OXYCODONE HYDROCHLORIDE AND ACETAMINOPHEN 1 TABLET: 10; 325 TABLET ORAL at 14:00

## 2020-07-20 RX ADMIN — GABAPENTIN 600 MG: 300 CAPSULE ORAL at 14:00

## 2020-07-20 RX ADMIN — OXYCODONE HYDROCHLORIDE AND ACETAMINOPHEN 1 TABLET: 10; 325 TABLET ORAL at 07:27

## 2020-07-20 RX ADMIN — ENOXAPARIN SODIUM 40 MG: 40 INJECTION SUBCUTANEOUS at 08:32

## 2020-07-20 RX ADMIN — VERAPAMIL HYDROCHLORIDE 80 MG: 80 TABLET, FILM COATED ORAL at 07:27

## 2020-07-20 RX ADMIN — VERAPAMIL HYDROCHLORIDE 80 MG: 80 TABLET, FILM COATED ORAL at 14:00

## 2020-07-20 RX ADMIN — Medication 10 ML: at 08:32

## 2020-07-20 ASSESSMENT — PAIN SCALES - GENERAL
PAINLEVEL_OUTOF10: 0
PAINLEVEL_OUTOF10: 9
PAINLEVEL_OUTOF10: 9

## 2020-07-20 ASSESSMENT — PAIN DESCRIPTION - PAIN TYPE: TYPE: CHRONIC PAIN

## 2020-07-20 ASSESSMENT — PAIN DESCRIPTION - DESCRIPTORS: DESCRIPTORS: ACHING

## 2020-07-20 ASSESSMENT — PAIN DESCRIPTION - FREQUENCY: FREQUENCY: CONTINUOUS

## 2020-07-20 ASSESSMENT — PAIN DESCRIPTION - LOCATION: LOCATION: BACK

## 2020-07-20 NOTE — CARE COORDINATION
LSW reviewed chart. LSW called for bedside RN to discuss dc planning. She reports possible DC today. Call placed to pt in his room, no answer. Call placed to Wife, Marcie Love, left message asking for non urgent returned call to discuss dc planning.   Sushma Shipley [place]     Case Management   [phone]    7/20/2020  1:58 PM

## 2020-07-20 NOTE — PROGRESS NOTES
1415- Report received from [facility]. Patient awaiting echo results for possible discharge. 1545- Echo results noted, message sent to Dr Siomara Bernal to notify. She will work on discharge. 1625- Discharge instructions reviewed with patient and family member. Patient and family verbalized understanding. All home medications have been reviewed, questions answered and patient voiced understanding. All medication side effects reviewed and patient and family verbalized understanding. Patient given prescriptions, discharge instructions, and appointment times. Patient left with cardiac event monitor to charge at home prior to placing device. Patient discharged to home with family via private car. Taken to lobby via wheelchair.

## 2020-07-20 NOTE — PROGRESS NOTES
7/19/20  1930Handoff from [facility], pt in bed, Sheath site R Groin intact, has not been out of bed yet. NSR, VSS. Having dinner. 7/20/20    0600 Pt has been stable overnight, HR has been about 70, BP controlled with current BP meds. o2 sats dropped to 85% on RA while asleep, 02 1l/nc placed, chronic low back pain tolerable with Percocet Q8 and PRN Robaxin given x1. Pierre almanzar s/p lasix. 0730 Awake, given Percocet, helped him order breakfast, Handoff to [address].   Electronically signed by Sohail Yusuf RN on 7/20/2020 at 7:52 AM  .

## 2020-07-20 NOTE — DISCHARGE SUMMARY
Hospitalist Discharge Summary    Patient ID:  Rex Oneill  [id number]  71 y.o.  1955    Admit date: 7/17/2020    Discharge date: 7/20/2020    Disposition: home    Admission Diagnoses:   Patient Active Problem List   Diagnosis    Respiratory distress    Opiate overdose ([facility] [address]    Pneumonia due to organism    Chronic pain    Sepsis ([facility] [address]    Acute encephalopathy    PNA (pneumonia)    Pneumonia    Abnormal CT of the chest    Status post bronchoscopy    Mediastinal lymphadenopathy    Hilar lymphadenopathy    Status post cholecystectomy    Nephrolithiasis    History of splenectomy    Chronic obstructive pulmonary disease (HCC)    Smoker    Tobacco abuse counseling    Dyspnea    Atrial fibrillation with slow ventricular response (HCC)    Bradycardia    Chest pain    Symptomatic bradycardia    Acute respiratory failure with hypoxia ([facility] [address]       Discharge Diagnoses: Active Problems:    Symptomatic bradycardia    Acute respiratory failure with hypoxia (HCC)  Resolved Problems:    * No resolved hospital problems. *      Code Status:  Full Code    Condition:  Stable    Discharge Diet: Diet:  DIET CARDIAC;    PCP to do list:  Routine follow up    Hospital Course:     Acute hypoxic resp failure, acute pulm edema  Patient called EMS as the patient had been complaining of shortness of breath for the past day or so. [place] had been using his inhalers more frequently than normal.  When EMS arrived his O2 sats were in the 80s and his heart rate was in the 160s.  In route his oxygen decreased down to the 70s and the patient became bradycardic. [id number] gave him atropine a total of 3 times and placed him on a nonrebreather. On arrival the patient was responding to pain but other than that was not able to answer any questions.  Continued to try to externally pace the patient but it would not capture. Intubated in ED. Heavy smoker without formal dx COPD though not hypercarbic on admission.  Treated with lasix with improvement, extubated quickly. Orders for PFT outpatient. Bradycardia, acute HFrEF, CAD  Cardiology was consulted for the placement of a temporary pacemaker due to his profound bradycardia.  Because of his EKG changes and his chest discomfort he also underwent LHC. He was then taken to the Cath Lab where a temporary pacing wire was placed.  The patient was also noted to have coronary artery disease, no acute coronary syndrome. Chronically occluded RCA with collaterals. No PCI. EKG thought to be Wenkebach. Pacemaker removed and now stable. TTE with EF 45%. Discharging with event monitor. Continue ASA, statin. Chronic pain  Continue home meds. Discharge Medications:   Current Discharge Medication List        Current Discharge Medication List        Current Discharge Medication List      CONTINUE these medications which have NOT CHANGED    Details   DULoxetine (CYMBALTA) 60 MG extended release capsule Take 60 mg by mouth daily      methocarbamol (ROBAXIN) 750 MG tablet Take 750 mg by mouth every 6 hours as needed (muscle spasms/pain)      albuterol sulfate  (90 Base) MCG/ACT inhaler Inhale 2 puffs into the lungs every 6 hours as needed      oxyCODONE-acetaminophen (PERCOCET)  MG per tablet Take 1 tablet by mouth every 8 hours as needed for Pain. traZODone (DESYREL) 50 MG tablet Take 50 mg by mouth nightly      medical marijuana Take 1 each by mouth as needed. lisinopril (PRINIVIL;ZESTRIL) 40 MG tablet Take 40 mg by mouth daily      gabapentin (NEURONTIN) 600 MG tablet Take 600 mg by mouth 3 times daily. verapamil (CALAN) 80 MG tablet Take 80 mg by mouth 3 times daily.            Current Discharge Medication List      STOP taking these medications       baclofen (LIORESAL) 5 mg TABS Comments:   Reason for Stopping:               Procedures: None     Assessment on Discharge: Stable, improved     Discharge Exam:  /79   Pulse 87   Temp 98.2 °F (36.8 °C) (Oral) Resp 15   Ht 5' 11\" (1.803 m)   Wt 130 lb 8.2 oz (59.2 kg) Comment: FYI bed was zeroed with Lift pad. SpO2 93%   BMI 18.20 kg/m²     General appearance: No apparent distress, appears stated age and cooperative. HEENT: Pupils equal, round, and reactive to light. Conjunctivae/corneas clear. Neck: Supple, with full range of motion. Trachea midline. Respiratory:  Normal respiratory effort. Clear to auscultation, bilaterally without Rales/Wheezes/Rhonchi. Cardiovascular: Regular rate and rhythm with normal S1/S2 without murmurs, rubs or gallops. Abdomen: Soft, non-tender, non-distended with normal bowel sounds. Musculoskelatal: No clubbing, cyanosis or edema bilaterally. Full range of motion without deformity. Skin: Skin color, texture, turgor normal.  No rashes or lesions. Neurologic:  Neurovascularly intact without any focal sensory/motor deficits. Cranial nerves: II-XII intact, grossly non-focal.  Psychiatric: Alert and oriented, thought content appropriate, normal insight    Pertinent Studies During Hospital Stay:    Radiology:  Xr Chest Portable    Result Date: 7/18/2020  EXAMINATION: ONE XRAY VIEW OF THE CHEST 7/18/2020 2:57 am COMPARISON: 07/18/2020 HISTORY: ORDERING SYSTEM PROVIDED HISTORY: OG placement TECHNOLOGIST PROVIDED HISTORY: Reason for exam:->OG placement Reason for Exam: og placement FINDINGS: The enteric tube has been advanced with the tip and side hole in the stomach. Pulmonary edema has developed. The heart size is within normal limits. There is no large pleural effusion or definite evidence for pneumothorax. Spinal fixation hardware is again seen. Catheter projects over the lumbar spine and the right side of the heart. 1. The enteric tube is in good position. 2. Pulmonary edema.      Xr Chest Portable    Result Date: 7/18/2020  EXAMINATION: ONE XRAY VIEW OF THE CHEST 7/18/2020 12:36 am COMPARISON: 07/17/2020 HISTORY: ORDERING SYSTEM PROVIDED HISTORY: verify orogastric tube insertion TECHNOLOGIST PROVIDED HISTORY: Reason for exam:->verify orogastric tube insertion Reason for Exam: verify orogastric tube insertion Type of Exam: Subsequent/Follow-up FINDINGS: The lung apices were partially excluded. The visualized lungs are clear. The heart size is normal.  There is no large pleural effusion. Enteric tube traverses the esophagus with the tip in the stomach but the proximal side hole above the gastroesophageal junction. Extensive spinal fusion hardware is again seen. The tip of the enteric tube is in the stomach though the proximal side hole is in the distal esophagus. The tube should be advanced 7 cm. Xr Chest Portable    Addendum Date: 7/17/2020    ADDENDUM: Additional images are submitted. The tube projecting over the trachea is presumed to be the endotracheal tube (in normal position). No enteric tube is identified over the esophagus or stomach. Result Date: 7/17/2020  EXAMINATION: ONE XRAY VIEW OF THE CHEST 7/17/2020 7:39 pm COMPARISON: 05/14/2020 HISTORY: ORDERING SYSTEM PROVIDED HISTORY: post intubation TECHNOLOGIST PROVIDED HISTORY: Reason for exam:->post intubation Reason for Exam: post intubation Type of Exam: Subsequent/Follow-up Relevant Medical/Surgical History: hx copd FINDINGS: The endotracheal tube terminates approximately 6 cm cephalad to the isabel. Cardiac silhouette and mediastinal contours are stable. No pneumothorax, focal airspace disease or pleural effusion is seen. Posterior fusion of the thoracic lumbar spine with hardware is again noted. Endotracheal tube projects in normal position. No acute cardiopulmonary disease.        Last Labs on Discharge:     Recent Results (from the past 24 hour(s))   CBC auto differential    Collection Time: 07/20/20  4:25 AM   Result Value Ref Range    WBC 9.5 4.0 - 11.0 K/uL    RBC 5.10 4.20 - 5.90 M/uL    Hemoglobin 14.9 13.5 - 17.5 g/dL    Hematocrit 44.6 40.5 - 52.5 %    MCV 87.3 80.0 - 100.0 fL    MCH 29.1 26.0 - 34.0 pg    MCHC 33.3 31.0 - 36.0 g/dL    RDW 15.3 12.4 - 15.4 %    Platelets 332 562 - 170 K/uL    MPV 7.5 5.0 - 10.5 fL    Neutrophils % 56.9 %    Lymphocytes % 24.8 %    Monocytes % 15.7 %    Eosinophils % 1.3 %    Basophils % 1.3 %    Neutrophils Absolute 5.4 1.7 - 7.7 K/uL    Lymphocytes Absolute 2.3 1.0 - 5.1 K/uL    Monocytes Absolute 1.5 (H) 0.0 - 1.3 K/uL    Eosinophils Absolute 0.1 0.0 - 0.6 K/uL    Basophils Absolute 0.1 0.0 - 0.2 K/uL   Comprehensive Metabolic Panel w/ Reflex to MG    Collection Time: 07/20/20  4:25 AM   Result Value Ref Range    Sodium 133 (L) 136 - 145 mmol/L    Potassium reflex Magnesium 4.6 3.5 - 5.1 mmol/L    Chloride 96 (L) 99 - 110 mmol/L    CO2 26 21 - 32 mmol/L    Anion Gap 11 3 - 16    Glucose 86 70 - 99 mg/dL    BUN 13 7 - 20 mg/dL    CREATININE <0.5 (L) 0.8 - 1.3 mg/dL    GFR Non-African American >60 >60    GFR African American >60 >60    Calcium 9.3 8.3 - 10.6 mg/dL    Total Protein 7.1 6.4 - 8.2 g/dL    Alb 3.6 3.4 - 5.0 g/dL    Albumin/Globulin Ratio 1.0 (L) 1.1 - 2.2    Total Bilirubin 0.3 0.0 - 1.0 mg/dL    Alkaline Phosphatase 110 40 - 129 U/L    ALT 19 10 - 40 U/L    AST 27 15 - 37 U/L    Globulin 3.5 g/dL   Magnesium    Collection Time: 07/20/20  4:25 AM   Result Value Ref Range    Magnesium 2.10 1.80 - 2.40 mg/dL   Phosphorus    Collection Time: 07/20/20  4:25 AM   Result Value Ref Range    Phosphorus 3.2 2.5 - 4.9 mg/dL         Follow up: with MACIE WALTERS    Note that more  than 30 minutes was spent in preparing discharge papers, discussing discharge with patient, medication review, etc.    Thank you Praful Ramos for the opportunity to be involved in this patient's care. If you have any questions or concerns please feel free to contact me at [phone]. Electronically signed by Tierra Lackey MD on 7/20/2020 at 3:27 PM    This note was transcribed using [employer]. Please disregard any translational errors.

## 2020-07-20 NOTE — PROGRESS NOTES
Cardiac Electrophysiology Progress Note     Admit Date: 2020     Reason for follow up: Bradycardia, s/p TVP (now d/c'd)    HPI and Interval History:   Admitted for respiratory failure (unclear etiology), bradycardia. Underwent LHC with results below - no intervention needed. Did have TVP placed which was d/c'd yesterday. Feeling much better today. Dyspnea has improved but has not been out of bed. Denies any CP or palpitations. Was having near syncope at home, none here. Denies any peripheral edema, orthopnea or PND. Review of System:  · General: negative for fever, chills   · Ophthalmic ROS: negative for eye pain or loss of vision  · ENT ROS: negative for headaches, sore throat, nasal drainage  · Respiratory: Positive for SOB - improving, negative for cough, sputum. · Cardiovascular: Positive for chest pain and palpitations which are now resolved.   · Gastrointestinal: negative for abdominal pain, diarrhea, N/V  · Hematology: negative for bleeding, blood clots, bruising or jaundice  · Genito-Urinary:  negative for dysuria or incontinence  · Musculoskeletal: negative for joint swelling, muscle pain  · Neurological: negative for confusion, dizziness, headaches   · Psychiatric: Negative anxiety, depression  · Dermatological: negative for rash      Physical Examination:  Vitals:    20 0800   BP: 138/82   Pulse: 80   Resp: 12   Temp: 98.3 °F (36.8 °C)   SpO2: 95%        Intake/Output Summary (Last 24 hours) at 2020 0909  Last data filed at 2020 3247  Gross per 24 hour   Intake 10 ml   Output 2830 ml   Net -2820 ml     In: 10 [I.V.:10]  Out: 1575    Wt Readings from Last 3 Encounters:   20 130 lb 8.2 oz (59.2 kg)   20 145 lb 11.6 oz (66.1 kg)   19 140 lb 11.2 oz (63.8 kg)     Temp  Av.2 °F (36.8 °C)  Min: 98 °F (36.7 °C)  Max: 98.4 °F (36.9 °C)  Pulse  Av.7  Min: 71  Max: 97  BP  Min: 100/66  Max: 198/85  SpO2  Av.6 %  Min: 84 %  Max: 97 %    · Telemetry: let us know if we can be of further assistance. Assessment and plan were discussed with Dr. Darren Rodriguez.      TORIE Cobos  [facility] [address], [address] [place] [address]  Phone: [phone]  Fax: [phone]

## 2020-07-21 ENCOUNTER — CARE COORDINATION (OUTPATIENT)
Dept: CASE MANAGEMENT | Age: 65
End: 2020-07-21

## 2020-07-21 NOTE — CARE COORDINATION
Date/Time:  7/21/2020 1:32 PM  Attempted to reach patient by telephone. Left HIPPA compliant message requesting a return call. Will attempt to reach patient again.

## 2020-07-22 ENCOUNTER — APPOINTMENT (OUTPATIENT)
Dept: GENERAL RADIOLOGY | Age: 65
DRG: 394 | End: 2020-07-22
Payer: MEDICARE

## 2020-07-22 ENCOUNTER — CARE COORDINATION (OUTPATIENT)
Dept: CASE MANAGEMENT | Age: 65
End: 2020-07-22

## 2020-07-22 ENCOUNTER — HOSPITAL ENCOUNTER (INPATIENT)
Age: 65
LOS: 2 days | Discharge: HOME HEALTH | DRG: 394 | End: 2020-07-25
Attending: EMERGENCY MEDICINE | Admitting: INTERNAL MEDICINE
Payer: MEDICARE

## 2020-07-22 ENCOUNTER — APPOINTMENT (OUTPATIENT)
Dept: CT IMAGING | Age: 65
DRG: 394 | End: 2020-07-22
Payer: MEDICARE

## 2020-07-22 LAB
A/G RATIO: 1.1 (ref 1.1–2.2)
ALBUMIN SERPL-MCNC: 4.1 G/DL (ref 3.4–5)
ALP BLD-CCNC: 109 U/L (ref 40–129)
ALT SERPL-CCNC: 24 U/L (ref 10–40)
AMORPHOUS: ABNORMAL /HPF
ANION GAP SERPL CALCULATED.3IONS-SCNC: 20 MMOL/L (ref 3–16)
AST SERPL-CCNC: 42 U/L (ref 15–37)
BASOPHILS ABSOLUTE: 0 K/UL (ref 0–0.2)
BASOPHILS RELATIVE PERCENT: 0.1 %
BILIRUB SERPL-MCNC: 0.6 MG/DL (ref 0–1)
BILIRUBIN URINE: NEGATIVE
BLOOD, URINE: ABNORMAL
BUN BLDV-MCNC: 14 MG/DL (ref 7–20)
C DIFF TOXIN/ANTIGEN: NORMAL
CALCIUM SERPL-MCNC: 9.9 MG/DL (ref 8.3–10.6)
CHLORIDE BLD-SCNC: 94 MMOL/L (ref 99–110)
CLARITY: ABNORMAL
CO2: 19 MMOL/L (ref 21–32)
COLOR: YELLOW
COMMENT UA: ABNORMAL
CREAT SERPL-MCNC: 1 MG/DL (ref 0.8–1.3)
EOSINOPHILS ABSOLUTE: 0 K/UL (ref 0–0.6)
EOSINOPHILS RELATIVE PERCENT: 0 %
EPITHELIAL CELLS, UA: 4 /HPF (ref 0–5)
GFR AFRICAN AMERICAN: >60
GFR NON-AFRICAN AMERICAN: >60
GLOBULIN: 3.9 G/DL
GLUCOSE BLD-MCNC: 149 MG/DL (ref 70–99)
GLUCOSE URINE: 100 MG/DL
HCT VFR BLD CALC: 43.3 % (ref 40.5–52.5)
HEMOGLOBIN: 14.6 G/DL (ref 13.5–17.5)
HYALINE CASTS: 5 /LPF (ref 0–8)
KETONES, URINE: NEGATIVE MG/DL
LACTIC ACID: 4.2 MMOL/L (ref 0.4–2)
LEUKOCYTE ESTERASE, URINE: NEGATIVE
LIPASE: 14 U/L (ref 13–60)
LYMPHOCYTES ABSOLUTE: 0.6 K/UL (ref 1–5.1)
LYMPHOCYTES RELATIVE PERCENT: 2.2 %
MCH RBC QN AUTO: 29.1 PG (ref 26–34)
MCHC RBC AUTO-ENTMCNC: 33.6 G/DL (ref 31–36)
MCV RBC AUTO: 86.5 FL (ref 80–100)
MICROSCOPIC EXAMINATION: YES
MONOCYTES ABSOLUTE: 2.4 K/UL (ref 0–1.3)
MONOCYTES RELATIVE PERCENT: 9.3 %
NEUTROPHILS ABSOLUTE: 22.9 K/UL (ref 1.7–7.7)
NEUTROPHILS RELATIVE PERCENT: 88.4 %
NITRITE, URINE: NEGATIVE
OCCULT BLOOD SCREENING: ABNORMAL
PDW BLD-RTO: 15.5 % (ref 12.4–15.4)
PH UA: 8 (ref 5–8)
PLATELET # BLD: 401 K/UL (ref 135–450)
PMV BLD AUTO: 8.3 FL (ref 5–10.5)
POTASSIUM REFLEX MAGNESIUM: 5.3 MMOL/L (ref 3.5–5.1)
PRO-BNP: 1410 PG/ML (ref 0–124)
PROCALCITONIN: 0.26 NG/ML (ref 0–0.15)
PROTEIN UA: 100 MG/DL
RBC # BLD: 5 M/UL (ref 4.2–5.9)
RBC UA: 13 /HPF (ref 0–4)
RENAL EPITHELIAL, UA: ABNORMAL /HPF (ref 0–1)
SODIUM BLD-SCNC: 133 MMOL/L (ref 136–145)
SPECIFIC GRAVITY UA: 1.01 (ref 1–1.03)
TOTAL PROTEIN: 8 G/DL (ref 6.4–8.2)
TROPONIN: <0.01 NG/ML
URINE REFLEX TO CULTURE: ABNORMAL
URINE TYPE: ABNORMAL
UROBILINOGEN, URINE: 1 E.U./DL
WBC # BLD: 25.9 K/UL (ref 4–11)
WBC UA: 8 /HPF (ref 0–5)

## 2020-07-22 PROCEDURE — 87449 NOS EACH ORGANISM AG IA: CPT

## 2020-07-22 PROCEDURE — 80053 COMPREHEN METABOLIC PANEL: CPT

## 2020-07-22 PROCEDURE — 74174 CTA ABD&PLVS W/CONTRAST: CPT

## 2020-07-22 PROCEDURE — 87505 NFCT AGENT DETECTION GI: CPT

## 2020-07-22 PROCEDURE — 71045 X-RAY EXAM CHEST 1 VIEW: CPT

## 2020-07-22 PROCEDURE — G0328 FECAL BLOOD SCRN IMMUNOASSAY: HCPCS

## 2020-07-22 PROCEDURE — 85025 COMPLETE CBC W/AUTO DIFF WBC: CPT

## 2020-07-22 PROCEDURE — 83605 ASSAY OF LACTIC ACID: CPT

## 2020-07-22 PROCEDURE — 93005 ELECTROCARDIOGRAM TRACING: CPT | Performed by: NURSE PRACTITIONER

## 2020-07-22 PROCEDURE — 87324 CLOSTRIDIUM AG IA: CPT

## 2020-07-22 PROCEDURE — 81001 URINALYSIS AUTO W/SCOPE: CPT

## 2020-07-22 PROCEDURE — 2580000003 HC RX 258: Performed by: NURSE PRACTITIONER

## 2020-07-22 PROCEDURE — 36415 COLL VENOUS BLD VENIPUNCTURE: CPT

## 2020-07-22 PROCEDURE — 99291 CRITICAL CARE FIRST HOUR: CPT

## 2020-07-22 PROCEDURE — 96375 TX/PRO/DX INJ NEW DRUG ADDON: CPT

## 2020-07-22 PROCEDURE — 83880 ASSAY OF NATRIURETIC PEPTIDE: CPT

## 2020-07-22 PROCEDURE — 6360000002 HC RX W HCPCS: Performed by: NURSE PRACTITIONER

## 2020-07-22 PROCEDURE — 6360000004 HC RX CONTRAST MEDICATION: Performed by: NURSE PRACTITIONER

## 2020-07-22 PROCEDURE — 84145 PROCALCITONIN (PCT): CPT

## 2020-07-22 PROCEDURE — 84484 ASSAY OF TROPONIN QUANT: CPT

## 2020-07-22 PROCEDURE — 83690 ASSAY OF LIPASE: CPT

## 2020-07-22 PROCEDURE — U0002 COVID-19 LAB TEST NON-CDC: HCPCS

## 2020-07-22 PROCEDURE — U0003 INFECTIOUS AGENT DETECTION BY NUCLEIC ACID (DNA OR RNA); SEVERE ACUTE RESPIRATORY SYNDROME CORONAVIRUS 2 (SARS-COV-2) (CORONAVIRUS DISEASE [COVID-19]), AMPLIFIED PROBE TECHNIQUE, MAKING USE OF HIGH THROUGHPUT TECHNOLOGIES AS DESCRIBED BY CMS-2020-01-R: HCPCS

## 2020-07-22 RX ORDER — 0.9 % SODIUM CHLORIDE 0.9 %
1000 INTRAVENOUS SOLUTION INTRAVENOUS ONCE
Status: COMPLETED | OUTPATIENT
Start: 2020-07-22 | End: 2020-07-23

## 2020-07-22 RX ORDER — ONDANSETRON 2 MG/ML
4 INJECTION INTRAMUSCULAR; INTRAVENOUS ONCE
Status: COMPLETED | OUTPATIENT
Start: 2020-07-22 | End: 2020-07-22

## 2020-07-22 RX ORDER — 0.9 % SODIUM CHLORIDE 0.9 %
1000 INTRAVENOUS SOLUTION INTRAVENOUS ONCE
Status: COMPLETED | OUTPATIENT
Start: 2020-07-22 | End: 2020-07-22

## 2020-07-22 RX ORDER — MORPHINE SULFATE 4 MG/ML
4 INJECTION, SOLUTION INTRAMUSCULAR; INTRAVENOUS ONCE
Status: COMPLETED | OUTPATIENT
Start: 2020-07-22 | End: 2020-07-22

## 2020-07-22 RX ORDER — TRAZODONE HYDROCHLORIDE 50 MG/1
50 TABLET ORAL NIGHTLY
Status: DISCONTINUED | OUTPATIENT
Start: 2020-07-23 | End: 2020-07-25 | Stop reason: HOSPADM

## 2020-07-22 RX ADMIN — ONDANSETRON 4 MG: 2 INJECTION INTRAMUSCULAR; INTRAVENOUS at 22:18

## 2020-07-22 RX ADMIN — IOPAMIDOL 75 ML: 755 INJECTION, SOLUTION INTRAVENOUS at 21:00

## 2020-07-22 RX ADMIN — SODIUM CHLORIDE 1000 ML: 9 INJECTION, SOLUTION INTRAVENOUS at 19:59

## 2020-07-22 RX ADMIN — MORPHINE SULFATE 4 MG: 4 INJECTION, SOLUTION INTRAMUSCULAR; INTRAVENOUS at 19:55

## 2020-07-22 RX ADMIN — SODIUM CHLORIDE 1000 ML: 9 INJECTION, SOLUTION INTRAVENOUS at 22:36

## 2020-07-22 ASSESSMENT — PAIN DESCRIPTION - FREQUENCY
FREQUENCY: CONTINUOUS
FREQUENCY: CONTINUOUS

## 2020-07-22 ASSESSMENT — PAIN SCALES - GENERAL
PAINLEVEL_OUTOF10: 10
PAINLEVEL_OUTOF10: 8
PAINLEVEL_OUTOF10: 10

## 2020-07-22 ASSESSMENT — PAIN DESCRIPTION - LOCATION
LOCATION: ABDOMEN;BACK
LOCATION: ABDOMEN;BACK

## 2020-07-22 ASSESSMENT — PAIN DESCRIPTION - ONSET
ONSET: SUDDEN
ONSET: SUDDEN

## 2020-07-22 ASSESSMENT — PAIN DESCRIPTION - DESCRIPTORS
DESCRIPTORS: CRUSHING;DISCOMFORT
DESCRIPTORS: CRUSHING;DISCOMFORT

## 2020-07-22 ASSESSMENT — PAIN DESCRIPTION - PROGRESSION
CLINICAL_PROGRESSION: NOT CHANGED
CLINICAL_PROGRESSION: NOT CHANGED

## 2020-07-22 ASSESSMENT — PAIN DESCRIPTION - PAIN TYPE
TYPE: CHRONIC PAIN;ACUTE PAIN
TYPE: CHRONIC PAIN;ACUTE PAIN

## 2020-07-22 NOTE — CARE COORDINATION
Date/Time:  7/22/2020 9:43 AM  Attempted to reach patient by telephone. Left HIPPA compliant message stating this was final outreach and requesting patient call PCP for any medical needs.

## 2020-07-22 NOTE — ED PROVIDER NOTES
Attending Supervisory Note/Shared Visit   I have personally performed a face to face diagnostic evaluation on this patient. I have reviewed the mid-levels findings and agree. History and Exam by me shows an alert white male no acute distress. He is complaining of diffuse abdominal pain which he states is severe. It is constant and pressure-like. He states he is had multiple episodes of diarrhea today. He was just recently admitted. He was discharged 2 days ago. He was hospitalized with respiratory distress, opiate overdose, pneumonia, sepsis. Patient had a liquid bowel movement here which was brown in color but had some bright red blood mixed in with it. Heart: Regular rate and rhythm. No murmurs or gallops noted. Lungs: Breath sounds equal bilaterally and clear. Abdomen: Soft, nondistended, moderate diffuse tenderness without guarding or rebound. No masses organomegaly. Bowel sounds normal.    Lab reviewed. Lactic acid of 4.2. Urinalysis is unremarkable. Procalcitonin 0.26. BNP of 1410. Troponin less than 0.01. Lipase of 14. Sodium 133, potassium 5.3 with hemolysis. Bicarb of 19. Glucose of 149. BUN and creatinine are normal.  Liver enzymes are normal other than AST of 42. H&H is 14.6 and 43.3. White blood cell count 25,900. C. difficile negative. Stool Hemoccult positive. CT abdomen pelvis: No evidence of active bleeding within the stomach small or large bowel. Aneurysmal dilatation of the infrarenal abdominal aorta again noted. Fusiform dilatation of the right common iliac arteries again noted. Prior cholecystectomy and splenectomy. Interval resolution of the previously noted rectus sheath hematoma and subcutaneous fluid connection within the lower abdominal wall. Chest x-ray: No acute abnormality. This patient presents with diffuse abdominal pain, bloody diarrhea, leukocytosis and lactic acidosis. He is not febrile. He is not tachycardic.   His C. difficile is negative. He has no evidence of colitis or other acute intra-abdominal process on CT scan. IV fluids were administered. The hospitalist will be consulted for admission.       (Please note that portions of this note were completed with a voice recognition program.  Efforts were made to edit the dictations but occasionally words are mis-transcribed.)    Abelardo Osorio MD  Attending Emergency Physician        Kailash Diaz MD  07/22/20 5890

## 2020-07-23 PROBLEM — K62.5 RECTAL BLEEDING: Status: ACTIVE | Noted: 2020-07-23

## 2020-07-23 LAB
EKG ATRIAL RATE: 288 BPM
EKG DIAGNOSIS: NORMAL
EKG P AXIS: 16 DEGREES
EKG Q-T INTERVAL: 346 MS
EKG QRS DURATION: 118 MS
EKG QTC CALCULATION (BAZETT): 381 MS
EKG R AXIS: 57 DEGREES
EKG T AXIS: 67 DEGREES
EKG VENTRICULAR RATE: 73 BPM
GI BACTERIAL PATHOGENS BY PCR: NORMAL
HCT VFR BLD CALC: 43 % (ref 40.5–52.5)
HCT VFR BLD CALC: 43.9 % (ref 40.5–52.5)
HEMOGLOBIN: 14 G/DL (ref 13.5–17.5)
HEMOGLOBIN: 14.7 G/DL (ref 13.5–17.5)
LACTIC ACID: 0.9 MMOL/L (ref 0.4–2)
SARS-COV-2, PCR: NOT DETECTED

## 2020-07-23 PROCEDURE — 6360000002 HC RX W HCPCS: Performed by: INTERNAL MEDICINE

## 2020-07-23 PROCEDURE — 6370000000 HC RX 637 (ALT 250 FOR IP): Performed by: NURSE PRACTITIONER

## 2020-07-23 PROCEDURE — 96365 THER/PROPH/DIAG IV INF INIT: CPT

## 2020-07-23 PROCEDURE — 94761 N-INVAS EAR/PLS OXIMETRY MLT: CPT

## 2020-07-23 PROCEDURE — 6370000000 HC RX 637 (ALT 250 FOR IP): Performed by: INTERNAL MEDICINE

## 2020-07-23 PROCEDURE — 2500000003 HC RX 250 WO HCPCS: Performed by: PHYSICIAN ASSISTANT

## 2020-07-23 PROCEDURE — 83605 ASSAY OF LACTIC ACID: CPT

## 2020-07-23 PROCEDURE — 6360000002 HC RX W HCPCS: Performed by: PHYSICIAN ASSISTANT

## 2020-07-23 PROCEDURE — 97162 PT EVAL MOD COMPLEX 30 MIN: CPT

## 2020-07-23 PROCEDURE — 36415 COLL VENOUS BLD VENIPUNCTURE: CPT

## 2020-07-23 PROCEDURE — 1200000000 HC SEMI PRIVATE

## 2020-07-23 PROCEDURE — 93010 ELECTROCARDIOGRAM REPORT: CPT | Performed by: INTERNAL MEDICINE

## 2020-07-23 PROCEDURE — 97530 THERAPEUTIC ACTIVITIES: CPT

## 2020-07-23 PROCEDURE — 85014 HEMATOCRIT: CPT

## 2020-07-23 PROCEDURE — 2580000003 HC RX 258: Performed by: INTERNAL MEDICINE

## 2020-07-23 PROCEDURE — 6360000002 HC RX W HCPCS: Performed by: NURSE PRACTITIONER

## 2020-07-23 PROCEDURE — 6360000002 HC RX W HCPCS

## 2020-07-23 PROCEDURE — 85018 HEMOGLOBIN: CPT

## 2020-07-23 RX ORDER — MORPHINE SULFATE 2 MG/ML
2 INJECTION, SOLUTION INTRAMUSCULAR; INTRAVENOUS EVERY 4 HOURS PRN
Status: DISCONTINUED | OUTPATIENT
Start: 2020-07-23 | End: 2020-07-25 | Stop reason: HOSPADM

## 2020-07-23 RX ORDER — OXYCODONE AND ACETAMINOPHEN 10; 325 MG/1; MG/1
1 TABLET ORAL EVERY 8 HOURS PRN
Status: DISCONTINUED | OUTPATIENT
Start: 2020-07-23 | End: 2020-07-25 | Stop reason: SDUPTHER

## 2020-07-23 RX ORDER — ALBUTEROL SULFATE 90 UG/1
2 AEROSOL, METERED RESPIRATORY (INHALATION) EVERY 6 HOURS PRN
Status: DISCONTINUED | OUTPATIENT
Start: 2020-07-23 | End: 2020-07-25 | Stop reason: HOSPADM

## 2020-07-23 RX ORDER — GABAPENTIN 300 MG/1
600 CAPSULE ORAL 3 TIMES DAILY
Status: DISCONTINUED | OUTPATIENT
Start: 2020-07-23 | End: 2020-07-25 | Stop reason: HOSPADM

## 2020-07-23 RX ORDER — ONDANSETRON 2 MG/ML
4 INJECTION INTRAMUSCULAR; INTRAVENOUS ONCE
Status: COMPLETED | OUTPATIENT
Start: 2020-07-23 | End: 2020-07-23

## 2020-07-23 RX ORDER — ATORVASTATIN CALCIUM 40 MG/1
40 TABLET, FILM COATED ORAL DAILY
Status: DISCONTINUED | OUTPATIENT
Start: 2020-07-23 | End: 2020-07-25 | Stop reason: HOSPADM

## 2020-07-23 RX ORDER — ONDANSETRON 2 MG/ML
INJECTION INTRAMUSCULAR; INTRAVENOUS
Status: COMPLETED
Start: 2020-07-23 | End: 2020-07-23

## 2020-07-23 RX ORDER — SODIUM CHLORIDE, SODIUM LACTATE, POTASSIUM CHLORIDE, CALCIUM CHLORIDE 600; 310; 30; 20 MG/100ML; MG/100ML; MG/100ML; MG/100ML
INJECTION, SOLUTION INTRAVENOUS CONTINUOUS
Status: DISCONTINUED | OUTPATIENT
Start: 2020-07-23 | End: 2020-07-25 | Stop reason: HOSPADM

## 2020-07-23 RX ORDER — CIPROFLOXACIN 2 MG/ML
400 INJECTION, SOLUTION INTRAVENOUS EVERY 12 HOURS
Status: DISCONTINUED | OUTPATIENT
Start: 2020-07-23 | End: 2020-07-25 | Stop reason: HOSPADM

## 2020-07-23 RX ADMIN — CIPROFLOXACIN 400 MG: 2 INJECTION, SOLUTION INTRAVENOUS at 14:23

## 2020-07-23 RX ADMIN — MORPHINE SULFATE 2 MG: 2 INJECTION, SOLUTION INTRAMUSCULAR; INTRAVENOUS at 03:34

## 2020-07-23 RX ADMIN — METRONIDAZOLE 500 MG: 500 INJECTION, SOLUTION INTRAVENOUS at 14:23

## 2020-07-23 RX ADMIN — ONDANSETRON 4 MG: 2 INJECTION INTRAMUSCULAR; INTRAVENOUS at 03:34

## 2020-07-23 RX ADMIN — MORPHINE SULFATE 2 MG: 2 INJECTION, SOLUTION INTRAMUSCULAR; INTRAVENOUS at 13:20

## 2020-07-23 RX ADMIN — MORPHINE SULFATE 2 MG: 2 INJECTION, SOLUTION INTRAMUSCULAR; INTRAVENOUS at 08:07

## 2020-07-23 RX ADMIN — MORPHINE SULFATE 2 MG: 2 INJECTION, SOLUTION INTRAMUSCULAR; INTRAVENOUS at 20:55

## 2020-07-23 RX ADMIN — SODIUM CHLORIDE, POTASSIUM CHLORIDE, SODIUM LACTATE AND CALCIUM CHLORIDE: 600; 310; 30; 20 INJECTION, SOLUTION INTRAVENOUS at 10:02

## 2020-07-23 RX ADMIN — Medication 25 MG: at 00:29

## 2020-07-23 RX ADMIN — TRAZODONE HYDROCHLORIDE 50 MG: 50 TABLET ORAL at 20:55

## 2020-07-23 RX ADMIN — OXYCODONE HYDROCHLORIDE AND ACETAMINOPHEN 1 TABLET: 10; 325 TABLET ORAL at 17:50

## 2020-07-23 RX ADMIN — GABAPENTIN 600 MG: 300 CAPSULE ORAL at 20:55

## 2020-07-23 RX ADMIN — METRONIDAZOLE 500 MG: 500 INJECTION, SOLUTION INTRAVENOUS at 20:56

## 2020-07-23 RX ADMIN — TRAZODONE HYDROCHLORIDE 50 MG: 50 TABLET ORAL at 00:30

## 2020-07-23 ASSESSMENT — PAIN DESCRIPTION - PROGRESSION
CLINICAL_PROGRESSION: NOT CHANGED
CLINICAL_PROGRESSION: GRADUALLY WORSENING
CLINICAL_PROGRESSION: NOT CHANGED
CLINICAL_PROGRESSION: GRADUALLY IMPROVING

## 2020-07-23 ASSESSMENT — PAIN DESCRIPTION - ONSET
ONSET: ON-GOING

## 2020-07-23 ASSESSMENT — PAIN DESCRIPTION - DESCRIPTORS
DESCRIPTORS: ACHING;SHOOTING

## 2020-07-23 ASSESSMENT — PAIN DESCRIPTION - ORIENTATION
ORIENTATION: LOWER

## 2020-07-23 ASSESSMENT — PAIN DESCRIPTION - PAIN TYPE
TYPE: CHRONIC PAIN

## 2020-07-23 ASSESSMENT — PAIN DESCRIPTION - LOCATION
LOCATION: ABDOMEN;BACK

## 2020-07-23 ASSESSMENT — ENCOUNTER SYMPTOMS
DIARRHEA: 1
SHORTNESS OF BREATH: 1
COLOR CHANGE: 0
ABDOMINAL DISTENTION: 0
VOMITING: 0
ABDOMINAL PAIN: 1
BLOOD IN STOOL: 1
BACK PAIN: 1
COUGH: 0
NAUSEA: 1

## 2020-07-23 ASSESSMENT — PAIN SCALES - GENERAL
PAINLEVEL_OUTOF10: 10
PAINLEVEL_OUTOF10: 8
PAINLEVEL_OUTOF10: 10
PAINLEVEL_OUTOF10: 8
PAINLEVEL_OUTOF10: 9
PAINLEVEL_OUTOF10: 7
PAINLEVEL_OUTOF10: 9
PAINLEVEL_OUTOF10: 0

## 2020-07-23 ASSESSMENT — PAIN DESCRIPTION - FREQUENCY
FREQUENCY: CONTINUOUS

## 2020-07-23 NOTE — ED NOTES
Handoff report given to Robert Goodrich RN to assume care[facility]       Haim Bowman RN  07/23/20 6331

## 2020-07-23 NOTE — ED PROVIDER NOTES
[address] [facility]        Pt Name: Rex Oneill  MRN: [id number]  [place] 1955  Date of evaluation: 7/22/2020  Provider: TORIE Hernandes - ISIDRO  PCP: Susu Moy     I have seen and evaluated this patient with my supervising physician Jeffery Oakley MD.    [address] [place] [address]       Chief Complaint   Patient presents with    Abdominal Pain     mid abdominal pain x 1 day    Back Pain     Pt stated having major surgery 4/2020; pain has been to much to bear.  Shortness of Breath       HISTORY OF PRESENT ILLNESS   (Location, Timing/Onset, Context/Setting, Quality, Duration, Modifying Factors, Severity, Associated Signs and Symptoms)  Note limiting factors. Rex Oneill is a 71 y.o. male with PMH significant for HTN, HLD, GERD, and COPD who presents to the emergency department today complaining of lower abdominal pain associated with bloody diarrhea. Onset was yesterday. Symptom started spontaneously and have been constant. 7 nausea with no vomiting. No fevers. Nursing Notes were all reviewed and agreed with or any disagreements were addressed in the HPI. REVIEW OF SYSTEMS    (2-9 systems for level 4, 10 or more for level 5)     Review of Systems   Constitutional: Negative for chills, diaphoresis and fever. Respiratory: Positive for shortness of breath (chronic). Negative for cough. Cardiovascular: Negative for chest pain. Gastrointestinal: Positive for abdominal pain, blood in stool, diarrhea and nausea. Negative for abdominal distention and vomiting. Genitourinary: Positive for frequency. Negative for dysuria, flank pain and hematuria. Musculoskeletal: Positive for back pain (chronic). Skin: Negative for color change and rash. Allergic/Immunologic: Negative for immunocompromised state. Neurological: Negative for dizziness, syncope and headaches. Psychiatric/Behavioral: Negative for confusion. All other systems reviewed and are negative. Positives and Pertinent negatives as per HPI. Except as noted above in the ROS, all other systems were reviewed and negative. PAST MEDICAL HISTORY     Past Medical History:   Diagnosis Date    Arthritis     Gong's esophagus     Chronic back pain     COPD (chronic obstructive pulmonary disease) ([address]     Depression     GERD (gastroesophageal reflux disease)     HYPERCHOLESTERAEMIA     Hypertension     Prinzmetal angina (HCC)     Spinal stenoses     TIA (transient ischaemic attack)          SURGICAL HISTORY     Past Surgical History:   Procedure Laterality Date    APPENDECTOMY      BACK SURGERY      BRONCHOSCOPY N/A 12/30/2019    BRONCHOSCOPY ALVEOLAR LAVAGE performed by Rebecca Taveras MD at [facility]  12/30/2019    BRONCHOSCOPY BRUSHINGS performed by Rebecca Taveras MD at [address] COLONOSCOPY      WITH BIOPSY    JOINT REPLACEMENT      RIGHT KNEE    SPLENECTOMY      UPPER GASTROINTESTINAL ENDOSCOPY  5-14-10    egd with biopsy    UPPER GASTROINTESTINAL ENDOSCOPY  11-27-12    Esophagogastroduodenoscopy with biopsy, Romero esophageal dilation, and Botulinum injection of the pylorus         CURRENTMEDICATIONS       Previous Medications    ALBUTEROL SULFATE  (90 BASE) MCG/ACT INHALER    Inhale 2 puffs into the lungs every 6 hours as needed    ASPIRIN 81 MG CHEWABLE TABLET    Take 1 tablet by mouth daily    ATORVASTATIN (LIPITOR) 40 MG TABLET    Take 1 tablet by mouth daily    DULOXETINE (CYMBALTA) 60 MG EXTENDED RELEASE CAPSULE    Take 60 mg by mouth daily    GABAPENTIN (NEURONTIN) 600 MG TABLET    Take 600 mg by mouth 3 times daily. LISINOPRIL (PRINIVIL;ZESTRIL) 40 MG TABLET    Take 40 mg by mouth daily    MEDICAL MARIJUANA    Take 1 each by mouth as needed.     METHOCARBAMOL (ROBAXIN) 750 MG TABLET    Take 750 mg by mouth every 6 hours as needed (muscle spasms/pain)    OXYCODONE-ACETAMINOPHEN (PERCOCET)  MG PER TABLET    Take 1 tablet by mouth every 8 hours as needed for Pain. TRAZODONE (DESYREL) 50 MG TABLET    Take 50 mg by mouth nightly    VERAPAMIL (CALAN) 80 MG TABLET    Take 80 mg by mouth 3 times daily. ALLERGIES     Amitriptyline; Codeine; Demerol; Diltiazem hcl; Lansoprazole; Tricyclic antidepressants; and Trilisate [choline magnesium trisalicylate]    FAMILYHISTORY       Family History   Problem Relation Age of Onset    Heart Disease Mother     Cancer Father         lung          SOCIAL HISTORY       Social History     Tobacco Use    Smoking status: Current Every Day Smoker     Packs/day: 1.00     Years: 42.00     Pack years: 42.00    Smokeless tobacco: Never Used   Substance Use Topics    Alcohol use: No    Drug use: Yes     Types: Marijuana     Comment: medical       SCREENINGS    [place] Coma Scale  Eye Opening: Spontaneous  Best Verbal Response: Oriented  Best Motor Response: Obeys commands  Ian Coma Scale Score: 15        PHYSICAL EXAM    (up to 7 for level 4, 8 or more for level 5)     ED Triage Vitals   BP Temp Temp Source Pulse Resp SpO2 Height Weight   07/22/20 1826 07/22/20 1826 07/22/20 1826 07/22/20 1826 07/22/20 1826 07/22/20 1826 07/22/20 1822 07/22/20 1822   (!) 135/53 98.2 °F (36.8 °C) Oral 63 14 100 % 5' 10\" (1.778 m) 150 lb (68 kg)       Physical Exam  Vitals signs and nursing note reviewed. Constitutional:       General: He is in acute distress (secondary to pain). Appearance: Normal appearance. He is well-developed. He is ill-appearing. He is not toxic-appearing. HENT:      Head: Normocephalic and atraumatic. Eyes:      General: No scleral icterus. Conjunctiva/sclera: Conjunctivae normal.   Neck:      Musculoskeletal: Full passive range of motion without pain and neck supple. No neck rigidity. Vascular: No JVD.    Cardiovascular:      Rate and Rhythm: Normal All other components within normal limits    Narrative:     Performed at:  [address] [address]   Phone [phone]   URINE RT REFLEX TO CULTURE - Abnormal; Notable for the following components:    Clarity, UA CLOUDY (*)     Glucose, Ur 100 (*)     Blood, Urine SMALL (*)     Protein,  (*)     All other components within normal limits    Narrative:     Performed at:  [address] [facility] [address]   Phone [phone]   PROCALCITONIN - Abnormal; Notable for the following components:    Procalcitonin 0.26 (*)     All other components within normal limits    Narrative:     Performed at:  [address] [employer] [address]   Phone [phone]   BLOOD OCCULT STOOL SCREEN #1 - Abnormal; Notable for the following components:    Occult Blood Screening   (*)     Value: Result: POSITIVE  Normal range: Negative      All other components within normal limits    Narrative:     ORDER#: [id number]                          ORDERED BY: JESSENIA BACON  SOURCE: Stool                              COLLECTED:  07/22/20 19:11  ANTIBIOTICS AT MAXI.:                      RECEIVED :  07/22/20 19:20  Performed at:  [address] [place] [address]   Phone [phone]   MICROSCOPIC URINALYSIS - Abnormal; Notable for the following components:    WBC, UA 8 (*)     RBC, UA 13 (*)     All other components within normal limits    Narrative:     Performed at:  [address] [employer] [address]   Phone [phone]   LACTIC ACID, PLASMA - Abnormal; Notable for the following components:    Lactic Acid 4.2 (*)     All other components within normal limits    Narrative:     Liliya Jha tel. [phone],  Chemistry results called to and read back by stomach, small or large bowel   2. Fusiform aneurysmal dilatation of the infrarenal abdominal aorta again   noted, now measuring 4.7 cm in greatest transverse dimension, previously   measured 4.2 cm. Continued 6 month surveillance and vascular consultation is   recommended   3. Postsurgical changes involving the thoracic and lumbosacral spine   4. 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 Prior cholecystectomy and splenectomy   6. Interval resolution of the previously noted right rectus sheath hematoma,   and subcutaneous fluid collection within the lower anterior abdominal wall         XR CHEST PORTABLE   Final Result   No acute process. Xr Chest Portable    Result Date: 7/22/2020  EXAMINATION: ONE XRAY VIEW OF THE CHEST 7/22/2020 6:44 pm COMPARISON: 07/18/2020 HISTORY: ORDERING SYSTEM PROVIDED HISTORY: SOB TECHNOLOGIST PROVIDED HISTORY: Reason for exam:->SOB Reason for Exam: sob Acuity: Acute Type of Exam: Initial FINDINGS: The lungs are without acute focal process. There is no effusion or pneumothorax. The cardiomediastinal silhouette is without acute process. The osseous structures are without acute process. Similar hardware. No acute process. Xr Chest Portable    Result Date: 7/18/2020  EXAMINATION: ONE XRAY VIEW OF THE CHEST 7/18/2020 2:57 am COMPARISON: 07/18/2020 HISTORY: ORDERING SYSTEM PROVIDED HISTORY: OG placement TECHNOLOGIST PROVIDED HISTORY: Reason for exam:->OG placement Reason for Exam: og placement FINDINGS: The enteric tube has been advanced with the tip and side hole in the stomach. Pulmonary edema has developed. The heart size is within normal limits. There is no large pleural effusion or definite evidence for pneumothorax. Spinal fixation hardware is again seen. Catheter projects over the lumbar spine and the right side of the heart. 1. The enteric tube is in good position. 2. Pulmonary edema.      Xr Chest Portable    Result Date: 7/18/2020  EXAMINATION: ONE XRAY VIEW OF THE CHEST 7/18/2020 12:36 am COMPARISON: 07/17/2020 HISTORY: ORDERING SYSTEM PROVIDED HISTORY: verify orogastric tube insertion TECHNOLOGIST PROVIDED HISTORY: Reason for exam:->verify orogastric tube insertion Reason for Exam: verify orogastric tube insertion Type of Exam: Subsequent/Follow-up FINDINGS: The lung apices were partially excluded. The visualized lungs are clear. The heart size is normal.  There is no large pleural effusion. Enteric tube traverses the esophagus with the tip in the stomach but the proximal side hole above the gastroesophageal junction. Extensive spinal fusion hardware is again seen. The tip of the enteric tube is in the stomach though the proximal side hole is in the distal esophagus. The tube should be advanced 7 cm. Xr Chest Portable    Addendum Date: 7/17/2020    ADDENDUM: Additional images are submitted. The tube projecting over the trachea is presumed to be the endotracheal tube (in normal position). No enteric tube is identified over the esophagus or stomach. Result Date: 7/17/2020  EXAMINATION: ONE XRAY VIEW OF THE CHEST 7/17/2020 7:39 pm COMPARISON: 05/14/2020 HISTORY: ORDERING SYSTEM PROVIDED HISTORY: post intubation TECHNOLOGIST PROVIDED HISTORY: Reason for exam:->post intubation Reason for Exam: post intubation Type of Exam: Subsequent/Follow-up Relevant Medical/Surgical History: hx copd FINDINGS: The endotracheal tube terminates approximately 6 cm cephalad to the isabel. Cardiac silhouette and mediastinal contours are stable. No pneumothorax, focal airspace disease or pleural effusion is seen. Posterior fusion of the thoracic lumbar spine with hardware is again noted. Endotracheal tube projects in normal position. No acute cardiopulmonary disease.      Cta Abdomen Pelvis W Wo Contrast    Result Date: 7/22/2020  EXAMINATION: CTA OF THE ABDOMEN AND PELVIS WITH AND WITHOUT CONTRAST 7/22/2020 8:43 pm: TECHNIQUE: time was at least 32 minutes. This includes vital sign monitoring, pulse oximetry monitoring, telemetry monitoring, clinical response to the IV medications, reviewing the nursing notes, consultation time, dictation/documentation time, and interpretation of the labwork. This excludes any separately billable procedures performed. CONSULTS:  IP CONSULT TO HOSPITALIST      EMERGENCY DEPARTMENT COURSE and DIFFERENTIAL DIAGNOSIS/MDM:   Vitals:    Vitals:    07/22/20 1900 07/22/20 2030 07/22/20 2100 07/22/20 2300   BP: 129/64 (!) 159/77 (!) 141/67    Pulse: 73 76 68 73   Resp: 16 23 16 21   Temp:       TempSrc:       SpO2:       Weight:       Height:           Patient was given the following medications:  Medications   traZODone (DESYREL) tablet 50 mg (has no administration in time range)   morphine (PF) injection 4 mg (4 mg Intravenous Given 7/22/20 1955)   0.9 % sodium chloride bolus (0 mLs Intravenous Stopped 7/22/20 2136)   iopamidol (ISOVUE-370) 76 % injection 75 mL (75 mLs Intravenous Given 7/22/20 2100)   ondansetron (ZOFRAN) injection 4 mg (4 mg Intravenous Given 7/22/20 2218)   0.9 % sodium chloride bolus (1,000 mLs Intravenous New Bag 7/22/20 2236)           Differential diagnosis: Abdominal Aortic Aneurysm, Ischemic Bowel, Bowel Obstruction, Appendicitis, Diverticulitis, Pyelonephritis, UTI, STD, Ureterolithiasis, Colitis, Gonad Torsion, other    Patient presents with lower abdominal pain with bloody diarrhea. See HPI for full presentation. Physical exam as above. 71year-old lying in bed in acute distress secondary to pain. Lower abdominal guarding and TTP. Abdomen soft without rigidity or peritoneal signs. Lungs are CTA and cardiac exam normal.  Patient retching and having bloody diarrhea while I was in the room. C. difficile negative. CTA abdomen and pelvis showed no acute abnormality. Procalcitonin elevated and lactic acid 4.2.   CBC shows leukocytosis with a white count of 20 with a left

## 2020-07-23 NOTE — H&P
[address] [address]                              HISTORY AND PHYSICAL    PATIENT NAME: Alejandro Siegel                       :        1955  MED REC NO:   [id number]                          ROOM:       5279  ACCOUNT NO:   [de-identified]                           ADMIT DATE: 2020  PROVIDER:     Lawrence Perkins MD    I obtained the history and performed the physical exam on the patient on  the Medical floor on 2020. CHIEF COMPLAINT:  \"I wasn't feeling good. \"    HISTORY OF PRESENT ILLNESS:  The patient is a 79-year-old  male  with known history of hypertension, hyperlipidemia, GERD, COPD, who  presents to the hospital with chief complaint of 3 to 4-day history of  subacute onset of gradually progressive increasing fatigue, weakness,  nausea, increasing abdominal pain which was initially mild and grew  significantly more with multiple episodes of diarrhea. The patient was  recently discharged from the hospital after being here for acute  pulmonary edema and hypoxic respiratory failure. In the ER, the patient  was noted to have bloody stools. At the time of my exam, he still has  the abdominal pain but the diarrhea has gone down. PAST MEDICAL/PAST SURGICAL HISTORY:  1. COPD. 2.  Hypertension. 3.  Dyslipidemia. PAST SURGICAL HISTORY:  Bronchoscopy, back surgeries, splenectomy,  endoscopy. ALLERGIC HISTORY:  CODEINE, AMITRIPTYLINE, DEMEROL, CARDIZEM. FAMILY HISTORY:  Reviewed by me and is currently noncontributory. SOCIAL HISTORY:  Pack-a-day smoker. MEDICATIONS:  The patient's home medication list has been reviewed by  me. It includes Cymbalta, Robaxin, albuterol, Percocet, Desyrel,  gabapentin, verapamil, aspirin, Lipitor, Prinivil. REVIEW OF SYSTEMS:  Significant for the abdominal pain and per the  history of present illness.   All other systems are reviewed and are  negative except for the history of present illness. PHYSICAL EXAMINATION:  VITAL SIGNS:  Temperature 98.5, respiratory rate is 22, pulse 77, blood  pressure 144/78, saturating 96%. CNS:  Alert, awake, and oriented. PSYCH:  Cooperative. HEENT:  Eyes:  Pupils are equal and reactive. ENT:  No oral mucosal  lesions. RESPIRATORY:  Appears regular chest wall movement without any  intercostal retractions. ABDOMEN:  Soft with significant diffuse tenderness to palpation. MUSCULOSKELETAL:  No acute deformities. SKIN:  Does not appear to have pallor. Does appear diaphoretic  slightly. DIAGNOSTIC DATA:  CBC showed white count 25.9. Chest x-ray shows no acute process. COVID-19 is currently pending. The BNP 1410. Potassium 5.3, sodium  133, BUN 14, creatinine 1.0. Procalcitonin 0.2. Hemoccult-positive  stool. Lactic acid 4.2. CT of the abdomen and pelvis without contrast shows no evidence of  obvious acute anomalies but it was done with and without contrast.    The patient had a previously noted right rectus sheath hematoma which  seems to be resolving. CONSULTATIONS:  I have requested a consultation to Gastroenterology. REVIEW OF PREVIOUS MEDICAL RECORDS:  1. An echo from 07/20/2020 that shows an LV ejection fraction of 52%  with diastolic dysfunction. 2.  The patient was also recently admitted to the hospital for acute  respiratory failure which required intubation and mechanical  ventilation. ASSESSMENT:  1. Gastrointestinal bleeding. 2.  COPD. 3.  Coronary artery disease. 4.  Dyslipidemia. 5.  Tobacco dependence. PLAN OF CARE:  The patient is admitted to the Internal Medicine service. N.p.o. with IV cautious hydration. GI consultation. H and H monitoring  every eight hours. Avoid all antiplatelet agents and aspirin and  heparin. Continue supplemental oxygen and breathing treatments as  necessary. CODE STATUS:  Full.     EXPECTED LENGTH OF STAY:  More than two midnights based on the plan of  care above. RISK:  High due to the patient's presentation with the GI bleed. DISPOSITION:  Admit to the Internal Medicine service.         Mario Bryan MD    D: 07/23/2020 7:09:07       T: 07/23/2020 8:48:56     [email or web address]  Job#: [id number]     Doc#: [id number]    CC:

## 2020-07-23 NOTE — CARE COORDINATION
INITIAL CASE MANAGEMENT ASSESSMENT    Reviewed chart, met with patient to assess possible discharge needs. Explained Case Management role/services. Living Situation: Confirmed address, lives with wife in a 2 story home with basement. ADLs: Independent     DME: Has many items but he does not use them since his back operation in April 2020 (walker, W/C, electric W/C, cane). Has a handicapped shower with bench installed in his home. PT/OT Recs: Not ordered at this time     Active Services: None     Transportation: Active , wife will transport home at D/C     Medications: Confirmed insurance, no issues voiced with obtaining or taking medications. Uses [facility] in [place]    PCP: States he does not have one. Given a [place]CP [place] for [id number] [employer] per his request.  He will call to make an appointment. States he has been seeing his back surgeon at [facility]'s clinic for all of his recent needs. HD/PD: N/A    PLAN/COMMENTS: Plans on returning home. Denies D/C needs. CM provided contact information for patient or family to call with any questions. CM will follow and assist as needed.     Electronically signed by Darya Raya RN on 7/23/2020 at 3:15 PM

## 2020-07-23 NOTE — ED NOTES
Pt does not want to speak with his wife, but is okay with me updating her. Updated the pt wife on the POC.       Caro Persaud RN  07/22/20 3419

## 2020-07-23 NOTE — CONSULTS
Dr. Ware Mean   1) Gong's esophagus from 35 cm to 40 cm - Gong's biopsies obtained. 2) Normal stomach and duodenum  3) Botox injection of the pylorus performed  4) Empiric Romero esophageal dilation performed.     Patient reports last colonoscopy was 10+ years ago      PAST MEDICAL, SURGICAL, FAMILY, and SOCIAL HISTORY     Past Medical History:   Diagnosis Date    Arthritis     Gong's esophagus     Chronic back pain     COPD (chronic obstructive pulmonary disease) ([address]     Depression     GERD (gastroesophageal reflux disease)     HYPERCHOLESTERAEMIA     Hypertension     Prinzmetal angina (HCC)     Spinal stenoses     TIA (transient ischaemic attack)      Past Surgical History:   Procedure Laterality Date    APPENDECTOMY      BACK SURGERY      BRONCHOSCOPY N/A 12/30/2019    BRONCHOSCOPY ALVEOLAR LAVAGE performed by Teddy Rodríguez MD at [address]  12/30/2019    BRONCHOSCOPY BRUSHINGS performed by Teddy Rodríguez MD at [address] COLONOSCOPY      WITH BIOPSY    JOINT REPLACEMENT      RIGHT KNEE    SPLENECTOMY      UPPER GASTROINTESTINAL ENDOSCOPY  5-14-10    egd with biopsy    UPPER GASTROINTESTINAL ENDOSCOPY  11-27-12    Esophagogastroduodenoscopy with biopsy, Romero esophageal dilation, and Botulinum injection of the pylorus     Family History   Problem Relation Age of Onset    Heart Disease Mother     Cancer Father         lung     Social History     Socioeconomic History    Marital status:      Spouse name: None    Number of children: None    Years of education: None    Highest education level: None   Occupational History    None   Social Needs    Financial resource strain: None    Food insecurity     Worry: None     Inability: None    Transportation needs     Medical: None     Non-medical: None   Tobacco Use    Smoking status: Current Every Day Smoker     Packs/day: 1.00     Years: 42.00     Pack years: 42.00    Smokeless tobacco: Never Used   Substance and Sexual Activity    Alcohol use: No    Drug use: Yes     Types: Marijuana     Comment: medical    Sexual activity: Not Currently   Lifestyle    Physical activity     Days per week: None     Minutes per session: None    Stress: None   Relationships    Social connections     Talks on phone: None     Gets together: None     Attends Jewish service: None     Active member of club or organization: None     Attends meetings of clubs or organizations: None     Relationship status: None    Intimate partner violence     Fear of current or ex partner: None     Emotionally abused: None     Physically abused: None     Forced sexual activity: None   Other Topics Concern    None   Social History Narrative    None       MEDICATIONS   SCHEDULED:  atorvastatin, 40 mg, Daily  traZODone, 50 mg, Nightly      FLUIDS/DRIPS:     lactated ringers 80 mL/hr at 07/23/20 1002     PRNs: albuterol sulfate HFA, 2 puff, Q6H PRN  morphine, 2 mg, Q4H PRN      ALLERGIES:  He   Allergies   Allergen Reactions    Amitriptyline Other (See Comments)     crying    Codeine Nausea And Vomiting    Demerol Nausea And Vomiting    Diltiazem Hcl Other (See Comments)     crying    Lansoprazole Other (See Comments)     crying    Tricyclic Antidepressants Other (See Comments)     crying    Trilisate [Choline Magnesium Trisalicylate] Other (See Comments)     crying       REVIEW OF SYSTEMS   Pertinent ROS noted in HPI    PHYSICAL EXAM     Vitals:    07/23/20 0200 07/23/20 0430 07/23/20 0925 07/23/20 1000   BP: (!) 144/78 (!) 140/60  (!) 160/90   Pulse: 77 75  93   Resp: 22 20  18   Temp: 98.5 °F (36.9 °C) 99.1 °F (37.3 °C)  99.5 °F (37.5 °C)   TempSrc: Oral Oral  Oral   SpO2: 96% 100% 97% 93%   Weight: 122 lb 5.7 oz (55.5 kg)      Height:           I/O last 3 completed shifts:   In: 1000 [IV Piggyback:1000]  Out: 1930 [Urine:1930]      Physical Exam:  General appearance: alert, cooperative, no distress, appears stated age  Eyes: Anicteric  Head: Normocephalic, without obvious abnormality  Lungs: clear to auscultation bilaterally, Normal Effort  Heart: regular rate and rhythm, normal S1 and S2, no murmurs or rubs  Abdomen: soft, non-distended, non-tender. Bowel sounds normal. No masses,  no organomegaly. Extremities: atraumatic, no cyanosis or edema  Skin: warm and dry, no jaundice  Neuro: Grossly intact, A&OX3      LABS AND IMAGING   Laboratory   Recent Labs     07/22/20 1912 07/23/20  1111   WBC 25.9*  --    HGB 14.6 14.7   HCT 43.3 43.9   MCV 86.5  --      --      Recent Labs     07/22/20 1912   *   K 5.3*   CL 94*   CO2 19*   BUN 14   CREATININE 1.0     Recent Labs     07/22/20 1912   AST 42*   ALT 24   BILITOT 0.6   ALKPHOS 109     Recent Labs     07/22/20 1912   LIPASE 14.0     No results for input(s): PROTIME, INR in the last 72 hours. Imaging  CTA ABDOMEN PELVIS W WO CONTRAST   Final Result   1. No evidence of active bleeding within the stomach, small or large bowel   2. Fusiform aneurysmal dilatation of the infrarenal abdominal aorta again   noted, now measuring 4.7 cm in greatest transverse dimension, previously   measured 4.2 cm. Continued 6 month surveillance and vascular consultation is   recommended   3. Postsurgical changes involving the thoracic and lumbosacral spine   4. 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 Prior cholecystectomy and splenectomy   6. Interval resolution of the previously noted right rectus sheath hematoma,   and subcutaneous fluid collection within the lower anterior abdominal wall         XR CHEST PORTABLE   Final Result   No acute process.                ASSESSMENT AND RECOMMENDATIONS   71 y.o. male with a PMH of PMH of COPD, hypertension, hyperlipidemia, Gong's esophagus, prior cholecystectomy and splenectomy who presented on 7/23/2020 with progressive weakness, nausea, abdominal pain and diarrhea. IMPRESSION:  1. Acute abdominal pain and diarrhea: Cause unclear at this time. He was admitted here last week with symptomatic bradycardia. His lactic acid was elevated on admission. Suspicious for an ischemic colitis although CTA showed no evidence of bowel inflammation. The celiac axis, SMA and ARPIT were normal.  He denies any recent antibiotics. Stool studies were negative for C. difficile and bacterial pathogens. He is having no further diarrhea today however abdominal pain persist.  Last colonoscopy was 10+ years ago  2. Leukocytosis        RECOMMENDATIONS:    Discussed case with Dr. Yann Austin. Supportive care for now. Monitor stool output. Will order a O&P stool test as well as fecal leukocytes. Consider starting empiric Cipro and Flagyl for suspected ischemic vs infectious etiology. Okay for clear liquid diet today. Monitor tolerance. Would benefit from a colonoscopy as he has not had one in over 10 years. If symptoms improve will set up colonoscopy as an outpatient. If you have any questions or need any further information, please feel free to contact our consult team.  Thank you for allowing us to participate in the care of [facility]. The note was completed using Dragon voice recognition transcription. Every effort was made to ensure accuracy; however, inadvertent transcription errors may be present despite my best efforts to edit errors.       Binh Matthew PA-C

## 2020-07-23 NOTE — CARE COORDINATION
Readmission Assessment  Previous Disposition: Home with Family  Who is being Interviewed: Patient  What was the patient's/caregiver's perception as to why they think they needed to return back to the hospital?: Other (Comment)(non-stop rectal bleeding for 2 hours after a bowel movement)  Did you visit your Primary Care Physician after you left the hospital, before you returned this time?: No  Why weren't you able to visit your PCP?: Did not have an appointment  Did you see a specialist, such as Cardiac, Pulmonary, Orthopedic Physician, etc. after you left the hospital?: No  Who advised the patient to return to the hospital?: Self-referral  Does the patient report anything that got in the way of taking their medications?: No  In our efforts to provide the best possible care to you and others like you, can you think of anything that we could have done to help you after you left the hospital the first time, so that you might not have needed to return so soon?: Other (Comment)(this is a different problem that didn't have any thing to do with previous admission)     Electronically signed by Rama Walden RN on 7/23/2020 at 2:44 PM

## 2020-07-23 NOTE — PROGRESS NOTES
Progress Note  Admit Date: 7/22/2020      PCP: Lester Paige     CC: F/U for GI Bleed    Days in hospital:  0     Patient was admitted overnight. Chart reviewed      Data    LABS  CBC:   Recent Labs     07/22/20 1912   WBC 25.9*   HGB 14.6   HCT 43.3   MCV 86.5        BMP:   Recent Labs     07/22/20 1912   *   K 5.3*   CL 94*   CO2 19*   BUN 14   CREATININE 1.0   GLUCOSE 149*     POC GLUCOSE:  No results for input(s): POCGLU in the last 72 hours. LIVER PROFILE:   Recent Labs     07/22/20 1912   AST 42*   ALT 24   LIPASE 14.0   LABALBU 4.1   BILITOT 0.6   ALKPHOS 109     PT/INR: No results for input(s): PROTIME, INR in the last 72 hours. APTT: No results for input(s): APTT in the last 72 hours. UA:  Recent Labs     07/22/20 1912   COLORU YELLOW   PHUR 8.0   WBCUA 8*   RBCUA 13*   CLARITYU CLOUDY*   SPECGRAV 1.009   LEUKOCYTESUR Negative   UROBILINOGEN 1.0   BILIRUBINUR Negative   BLOODU SMALL*   GLUCOSEU 100*   KETUA Negative   AMORPHOUS 1+     Microbiology:  Wound Culture: No results for input(s): WNDABS, ORG in the last 72 hours. Invalid input(s):  LABGRAM  Nasal Culture: No results for input(s): ORG, MRSAPCR in the last 72 hours. Blood Culture: No results for input(s): BC, BLOODCULT2 in the last 72 hours. Fungal Culture:   No results for input(s): FUNGSM in the last 72 hours. No results for input(s): FUNCXBLD in the last 72 hours. CSF Culture:  No results for input(s): COLORCSF, APPEARCSF, CFTUBE, CLOTCSF, WBCCSF, RBCCSF, NEUTCSF, NUMCELLSCSF, LYMPHSCSF, MONOCSF, GLUCCSF, VOLCSF in the last 72 hours. Respiratory Culture:  No results for input(s): Gin Cordia in the last 72 hours. AFB:No results for input(s): AFBSMEAR in the last 72 hours. Urine Culture  No results for input(s): LABURIN in the last 72 hours. RADIOLOGY:    CTA ABDOMEN PELVIS W WO CONTRAST   Final Result   1. No evidence of active bleeding within the stomach, small or large bowel   2.  Fusiform aneurysmal dilatation of the infrarenal abdominal aorta again   noted, now measuring 4.7 cm in greatest transverse dimension, previously   measured 4.2 cm. Continued 6 month surveillance and vascular consultation is   recommended   3. Postsurgical changes involving the thoracic and lumbosacral spine   4. 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 Prior cholecystectomy and splenectomy   6. Interval resolution of the previously noted right rectus sheath hematoma,   and subcutaneous fluid collection within the lower anterior abdominal wall         XR CHEST PORTABLE   Final Result   No acute process. CONSULTS:    IP CONSULT TO HOSPITALIST  IP CONSULT TO GI    ASSESSMENT AND PLAN:      Active Problems:    Rectal bleeding  Resolved Problems:    * No resolved hospital problems. *    Patient is a 79-year-old male with a past medical history of COPD, hypertension, hyperlipidemia, GERD who presented with weakness fatigue diarrhea. Patient was recently discharged was admitted for acute hypoxic respiratory failure with bradycardia. Patient was paced temporarily at that time he was also found to have coronary artery disease with chronically occluded RCA with collaterals and was discharged with aspirin statin    GI bleed-cause unclear  -Monitor H&H  -GI consulted    Coronary artery disease  -Hold antiplatelet therapy till GI bleed improves    Hypertension  -Blood pressure stable    Weakness  -PT OT consulted    DVT Prophylaxis: SCD  Diet: Diet NPO Effective Now  Code Status: Prior        Discharge plan - ct care    The patient and / or the family were informed of the results of any tests, a time was given to answer questions, a plan was proposed and they agreed with plan. Discussed with consulting physicians, nursing and social work     The note was completed using EMR. Every effort was made to ensure accuracy; however, inadvertent computerized transcription errors may be present. Jesica Ayers MD

## 2020-07-23 NOTE — ACP (ADVANCE CARE PLANNING)
Maker regarding differences between Advance Directives and portable DNR orders. Patient wants to discuss the portable DNR with his wife before he obtains that. Encouraged to discuss this with his PCP. Length of ACP Conversation in minutes:  10 minutes    Conversation Outcomes:  [x] ACP discussion completed  [] Existing advance directive reviewed with patient; no changes to patient's previously recorded wishes  [] New Advance Directive completed  [] Portable Do Not Rescitate prepared for Provider review and signature  [] POLST/POST/MOLST/MOST prepared for Provider review and signature      Follow-up plan:    [] Schedule follow-up conversation to continue planning  [x] Referred individual to Provider for additional questions/concerns   [] Advised patient/agent/surrogate to review completed ACP document and update if needed with changes in condition, patient preferences or care setting    [x] This note routed to one or more involved healthcare providers        Perfect serve message sent to Dr. Nghia Franco regarding code status.       Electronically signed by Darya Raya RN on 7/23/2020 at 4:00 PM

## 2020-07-23 NOTE — PROGRESS NOTES
Physical Therapy    Facility/Department: [address] PROGRESSIVE CARE  Initial Assessment/Treatment Session    NAME: Osei Rosales  : 1955  MRN: [id number]    Date of Service: 2020    Discharge Recommendations:  Patient would benefit from continued therapy after discharge, Continue to assess pending progress, 3-5 sessions per week   PT Equipment Recommendations  Other: Will continue to assess    Assessment   Body structures, Functions, Activity limitations: Decreased functional mobility ; Decreased strength;Decreased endurance  Assessment: Pt is a 71 y.o. M. adtmitted  for GI bleed, progressive weakness. He was recently treated at [facility], and states he is having some difficulty managing his self-care/mobility at home. Today he demonstrated mildly decreased (B) UE/LE strength, and severely limited standing tolerance d/t dizziness and fatigue. He was not able to ambulate, and will need to demonstrate improved standing tolerance in order to safely return home. Will continue to assess for D/C plan pending pt progress. Osei Rosales scored a 13/ on the AM-PAC short mobility form. Current research shows that an AM-PAC score of 17 or less is typically not associated with a discharge to the patient's home setting. Based on the patient's AM-PAC score and their current functional mobility deficits, it is recommended that the patient have 3-5 sessions per week of Physical Therapy at d/c to increase the patient's independence. Please see assessment section for further patient specific details. If patient discharges prior to next session this note will serve as a discharge summary. Please see below for the latest assessment towards goals.      Specific instructions for Next Treatment: Improve standing tolerance; ambulate when able  Prognosis: Fair  Decision Making: Medium Complexity  History: See below  Exam: Strength; ROM; Standing tolerance; Transfers  Clinical Presentation: Evolving  Barriers to Learning: Fatigue; Dizziness  REQUIRES PT FOLLOW UP: Yes  Activity Tolerance  Activity Tolerance: Patient limited by fatigue  Activity Tolerance: Limited d/t dizziness       Patient Diagnosis(es): The primary encounter diagnosis was Lower abdominal pain. Diagnoses of Bloody diarrhea, Elevated lactic acid level, and Leukocytosis, unspecified type were also pertinent to this visit. has a past medical history of Arthritis, Gong's esophagus, Chronic back pain, COPD (chronic obstructive pulmonary disease) ([facility] [address] Depression, GERD (gastroesophageal reflux disease), HYPERCHOLESTERAEMIA, Hypertension, Prinzmetal angina ([address] Spinal stenoses, and TIA (transient ischaemic attack). has a past surgical history that includes joint replacement; Colonoscopy; back surgery; Splenectomy; Cholecystectomy; Appendectomy; Upper gastrointestinal endoscopy (5-14-10); Upper gastrointestinal endoscopy (11-27-12); Clavicle surgery; bronchoscopy (N/A, 12/30/2019); and bronchoscopy (12/30/2019). Restrictions  Restrictions/Precautions  Restrictions/Precautions: Fall Risk  Position Activity Restriction  Other position/activity restrictions: COVID negative     Vision/Hearing  Vision: Within Functional Limits  Hearing: Within functional limits       Subjective  General  Chart Reviewed: Yes  Additional Pertinent Hx: Pt is a 71 y.o. M. admitted 7/22 for c/o progressive fatigue, weakness, and increasing abdominal pain. Pt was recently treated at [facility]. PMH includes COPD, TIA, R TKA, unspecified back surgery. Response To Previous Treatment: Not applicable  Referring Practitioner: Dr. Krystina August  Referral Date : 07/23/20  Diagnosis: GI bleed; COPD  Follows Commands: Within Functional Limits  Subjective  Subjective: Pt c/o significant abdominal pain at rest.  Agreeable to evaluation.   Pain Screening  Patient Currently in Pain: Yes    Orientation  Orientation  Overall Orientation Status: Within Functional Limits     Social/Functional History  Social/Functional History  Lives With: Spouse  Type of Home: House  Home Layout: Multi-level  Home Access: Stairs to enter without rails  Entrance Stairs - Number of Steps: 2 KATIE without HR; stair lift to additional levels  Bathroom Shower/Tub: Walk-in shower  Bathroom Toilet: Standard  Bathroom Equipment: Grab bars in shower, Built-in shower seat, Shower chair, Grab bars around toilet  Home Equipment: Rolling walker, Cane, [address] bed  ADL Assistance: Needs assistance(Pt requires assist for washing his feet, and for putting on socks/shoes (wife does not generally help him, so he doesn't do it))  Homemaking Assistance: (Wife performs)  Ambulation Assistance: Independent((I) without device)  Transfer Assistance: Independent  Active : Yes  Mode of Transportation: Car  Occupation: Retired  Additional Comments: Pt denies any recent falls    Objective    AROM RLE (degrees)  RLE AROM: WFL  RLE General AROM: Hip Flex, Knee Flex/Ext, and Ankle PF/DF WFL  AROM LLE (degrees)  LLE AROM : WFL  LLE General AROM: Hip Flex, Knee Flex/Ext, and Ankle PF/DF WFL  AROM RUE (degrees)  RUE AROM : WFL  RUE General AROM: Shoulder Flex, Elbow Flex/Ext WFL  AROM LUE (degrees)  LUE AROM : WFL  LUE General AROM: Shoulder Flex, Elbow Flex/Ext WFL     Strength RLE  Strength RLE: WFL  Comment: Hip Flex, Knee Flex/Ext, and Ankle PF/DF grossly 3+ to 4/5  Strength LLE  Strength LLE: WFL  Comment: Hip Flex, Knee Flex/Ext, and Ankle PF/DF grossly 3+ to 4/5  Strength RUE  Strength RUE: WFL  Comment: Shoulder Flex, Elbow Flex/Ext grossly 3+ to 4/5  Strength LUE  Strength LUE: WFL  Comment: Shoulder Flex, Elbow Flex/Ext grossly 3+ to 4/5     Tone RLE  RLE Tone: Normotonic  Tone LLE  LLE Tone: Normotonic  Motor Control  Gross Motor?: WFL     Bed mobility  Supine to Sit: Stand by assistance  Sit to Supine: Stand by assistance     Transfers  Sit to Stand: Contact guard assistance  Stand to sit: Contact guard assistance Ambulation  Ambulation?: No(Could not tolerate d/t dizziness)  Stairs/Curb  Stairs?: No     Balance  Comments: Pt able to maintain static standing balance x 1 min. with (B) UE support, Min A, limited d/t dizziness and vertigo. Pt took seated rest, then was able to maintain static standing balance with walker support x 30 s., CGA. Plan   Plan  Times per week: 2-3x  Specific instructions for Next Treatment: Improve standing tolerance; ambulate when able  Safety Devices  Type of devices: All fall risk precautions in place, Call light within reach, Bed alarm in place, Left in bed, Nurse notified  Restraints  Initially in place: No    AM-PAC Score  AM-PAC Inpatient Mobility Raw Score : 13 (07/23/20 1204)  AM-PAC Inpatient T-Scale Score : 36.74 (07/23/20 1204)  Mobility Inpatient CMS 0-100% Score: 64.91 (07/23/20 1204)  Mobility Inpatient CMS G-Code Modifier : CL (07/23/20 1204)    Goals  Short term goals  Time Frame for Short term goals: In 2-3 days pt will perform  Short term goal 1: In 2-3 days pt will perform  Short term goal 2: Bed mobility (I)  Short term goal 3: Transfers SBA  Short term goal 4: Ambulation 13' with RW, CGA  Long term goals  Time Frame for Long term goals : LTG = STG  Patient Goals   Patient goals : To get stronger       Therapy Time   Individual Concurrent Group Co-treatment   Time In 1120         Time Out 1200         Minutes 40         Timed Code Treatment Minutes: 25 Minutes   Evaluation time: 15 min.      Gaetano Velez PT    Electronically signed by Gaetano Velez PT [id number] on 7/23/2020 at 12:05 PM

## 2020-07-23 NOTE — CONSULTS
Attending physician addendum:      I have personally seen and examined the patient, reviewed the patient's medical record and pertinent labs and clinical imaging. I have personally staffed the case with Nancy TIMMONS. I agree with her consultation note, exam findings, assessment and plans  as written above. I have made appropriate modifications and edited her assessment and plan where needed to reflect my impression and plans for this patient. 59-year-old with a history of transient ischemic attack, spinal stenosis, hypertension, hypercholesterolemia, GERD, depression, COPD, back pain, Gong's esophagus, arthritis. He has had a splenectomy, joint replacement, cholecystectomy, back surgery, appendectomy. He presented with progressive weakness, nausea, abdominal pain, diarrhea. He was in the hospital week ago with symptomatic bradycardia and had a temporary pacemaker placed. He had acute respiratory failure at that time requiring intubation. He was discharged on Sunday. He has had abdominal discomfort for the past month with a large amount of flatus. Yesterday he developed acute onset of diarrhea. Stool is watery and light brown in color without blood. Prior to this his last bowel movement was a week ago. Admission labs showed urea nitrogen 14 and creatinine 1.0. BNP 1410. ALT 24, AST 42, lipase 14. CBC showed a white blood count 25.9 hemoglobin 14.6 and platelets 061. Occult blood in the stool was positive. Stool culture negative. C. difficile negative. CT angiogram of the chest abdomen and pelvis showed a 4.7 cm infrarenal abdominal aorta, postsurgical changes in the thoracic and lumbar spine, prior cholecystectomy and splenectomy, and throat no resolution of the previously seen right rectus sheath hematoma. COVID negative. Ua with 8 WBC's. Never gets diarrhea usually. Started to get gas 1 month ago.   Normally has regular bowel movements so unusual to go a week without a bowel movement. His entire abdomen has been tender. Started a few days ago. Resolved with bowel movements. No further diarrhea today. has had irizarry. No chest pain. Last EGD 2012 showed 5 cm of Gong's with Dr. Sumeet Dolan and had botox injected at the pylorus. Last colon 10+ years ago. On exam, NAD, A&O x 3, no icterus. RRR, soft, mildly tender on exam, ND, no edema  1. Abdominal pain:  Sounds like he got very constipated with his hospitalization and developed significant abdominal pain and then had the diarrhea to overcome the constipation and this improved the abdominal pain significantly . CT negative for etiology of pain. Labs okay other than the leukocytosis. 2.  Diarrhea: Constipated for 1 week prior and then severe diarrhea. Suspect reactive to constipation. Has resolved now. Stool negative for c. diff and culture. Will monitor for now. Send stool leuks if recurrent diarrhea. 3.  Leukocytosis. Would repeat tomorrow. 4.  heme + stool. No overt bleeding. I am okay with aspirin. Hgb stable. Plan: Outpatient EGD and colonoscopy for Gong's and colon cancer screening/heme + stool. Monitor diarrhea and abdominal pain and leukocytosis. Agree with clear diet. If does okay overnight can likely go home tomorrow. Will follow. Thank you for allowing me to participate in this patient's care. If there are any questions or concerns regarding this patient, or the plan we have set in place, please feel free to contact me at [phone].        Leighann Ibarra MD

## 2020-07-24 ENCOUNTER — ANESTHESIA EVENT (OUTPATIENT)
Dept: ENDOSCOPY | Age: 65
DRG: 394 | End: 2020-07-24
Payer: MEDICARE

## 2020-07-24 ENCOUNTER — ANESTHESIA (OUTPATIENT)
Dept: ENDOSCOPY | Age: 65
DRG: 394 | End: 2020-07-24
Payer: MEDICARE

## 2020-07-24 VITALS
RESPIRATION RATE: 14 BRPM | OXYGEN SATURATION: 99 % | SYSTOLIC BLOOD PRESSURE: 142 MMHG | DIASTOLIC BLOOD PRESSURE: 86 MMHG

## 2020-07-24 LAB
BASOPHILS ABSOLUTE: 0.1 K/UL (ref 0–0.2)
BASOPHILS RELATIVE PERCENT: 0.7 %
EOSINOPHILS ABSOLUTE: 0.1 K/UL (ref 0–0.6)
EOSINOPHILS RELATIVE PERCENT: 0.4 %
HCT VFR BLD CALC: 38.7 % (ref 40.5–52.5)
HCT VFR BLD CALC: 39.7 % (ref 40.5–52.5)
HCT VFR BLD CALC: 40.1 % (ref 40.5–52.5)
HCT VFR BLD CALC: 40.4 % (ref 40.5–52.5)
HEMOGLOBIN: 13 G/DL (ref 13.5–17.5)
HEMOGLOBIN: 13.1 G/DL (ref 13.5–17.5)
HEMOGLOBIN: 13.3 G/DL (ref 13.5–17.5)
HEMOGLOBIN: 13.6 G/DL (ref 13.5–17.5)
LYMPHOCYTES ABSOLUTE: 2.5 K/UL (ref 1–5.1)
LYMPHOCYTES RELATIVE PERCENT: 14.6 %
MCH RBC QN AUTO: 29.1 PG (ref 26–34)
MCHC RBC AUTO-ENTMCNC: 33.6 G/DL (ref 31–36)
MCV RBC AUTO: 86.8 FL (ref 80–100)
MONOCYTES ABSOLUTE: 1.5 K/UL (ref 0–1.3)
MONOCYTES RELATIVE PERCENT: 8.7 %
NEUTROPHILS ABSOLUTE: 12.8 K/UL (ref 1.7–7.7)
NEUTROPHILS RELATIVE PERCENT: 75.6 %
PDW BLD-RTO: 15.5 % (ref 12.4–15.4)
PLATELET # BLD: 349 K/UL (ref 135–450)
PMV BLD AUTO: 8 FL (ref 5–10.5)
RBC # BLD: 4.46 M/UL (ref 4.2–5.9)
WBC # BLD: 17 K/UL (ref 4–11)

## 2020-07-24 PROCEDURE — 36415 COLL VENOUS BLD VENIPUNCTURE: CPT

## 2020-07-24 PROCEDURE — 7100000000 HC PACU RECOVERY - FIRST 15 MIN: Performed by: INTERNAL MEDICINE

## 2020-07-24 PROCEDURE — 85014 HEMATOCRIT: CPT

## 2020-07-24 PROCEDURE — 3609010300 HC COLONOSCOPY W/BIOPSY SINGLE/MULTIPLE: Performed by: INTERNAL MEDICINE

## 2020-07-24 PROCEDURE — 2500000003 HC RX 250 WO HCPCS: Performed by: NURSE ANESTHETIST, CERTIFIED REGISTERED

## 2020-07-24 PROCEDURE — 88305 TISSUE EXAM BY PATHOLOGIST: CPT

## 2020-07-24 PROCEDURE — 2500000003 HC RX 250 WO HCPCS: Performed by: PHYSICIAN ASSISTANT

## 2020-07-24 PROCEDURE — 85025 COMPLETE CBC W/AUTO DIFF WBC: CPT

## 2020-07-24 PROCEDURE — 1200000000 HC SEMI PRIVATE

## 2020-07-24 PROCEDURE — 6370000000 HC RX 637 (ALT 250 FOR IP): Performed by: PHYSICIAN ASSISTANT

## 2020-07-24 PROCEDURE — 97530 THERAPEUTIC ACTIVITIES: CPT

## 2020-07-24 PROCEDURE — 6370000000 HC RX 637 (ALT 250 FOR IP): Performed by: INTERNAL MEDICINE

## 2020-07-24 PROCEDURE — 85018 HEMOGLOBIN: CPT

## 2020-07-24 PROCEDURE — 3609010600 HC COLONOSCOPY POLYPECTOMY SNARE/COLD BIOPSY: Performed by: INTERNAL MEDICINE

## 2020-07-24 PROCEDURE — 3700000001 HC ADD 15 MINUTES (ANESTHESIA): Performed by: INTERNAL MEDICINE

## 2020-07-24 PROCEDURE — 6360000002 HC RX W HCPCS: Performed by: NURSE ANESTHETIST, CERTIFIED REGISTERED

## 2020-07-24 PROCEDURE — 6360000002 HC RX W HCPCS: Performed by: PHYSICIAN ASSISTANT

## 2020-07-24 PROCEDURE — 88342 IMHCHEM/IMCYTCHM 1ST ANTB: CPT

## 2020-07-24 PROCEDURE — 2709999900 HC NON-CHARGEABLE SUPPLY: Performed by: INTERNAL MEDICINE

## 2020-07-24 PROCEDURE — 0DBN8ZX EXCISION OF SIGMOID COLON, VIA NATURAL OR ARTIFICIAL OPENING ENDOSCOPIC, DIAGNOSTIC: ICD-10-PCS | Performed by: INTERNAL MEDICINE

## 2020-07-24 PROCEDURE — 2580000003 HC RX 258: Performed by: NURSE ANESTHETIST, CERTIFIED REGISTERED

## 2020-07-24 PROCEDURE — 97165 OT EVAL LOW COMPLEX 30 MIN: CPT

## 2020-07-24 PROCEDURE — 7100000001 HC PACU RECOVERY - ADDTL 15 MIN: Performed by: INTERNAL MEDICINE

## 2020-07-24 PROCEDURE — 6370000000 HC RX 637 (ALT 250 FOR IP): Performed by: NURSE PRACTITIONER

## 2020-07-24 PROCEDURE — 3700000000 HC ANESTHESIA ATTENDED CARE: Performed by: INTERNAL MEDICINE

## 2020-07-24 RX ORDER — ONDANSETRON 2 MG/ML
4 INJECTION INTRAMUSCULAR; INTRAVENOUS
Status: ACTIVE | OUTPATIENT
Start: 2020-07-24 | End: 2020-07-24

## 2020-07-24 RX ORDER — PROPOFOL 10 MG/ML
INJECTION, EMULSION INTRAVENOUS CONTINUOUS PRN
Status: DISCONTINUED | OUTPATIENT
Start: 2020-07-24 | End: 2020-07-24 | Stop reason: SDUPTHER

## 2020-07-24 RX ORDER — PROPOFOL 10 MG/ML
INJECTION, EMULSION INTRAVENOUS PRN
Status: DISCONTINUED | OUTPATIENT
Start: 2020-07-24 | End: 2020-07-24 | Stop reason: SDUPTHER

## 2020-07-24 RX ORDER — SODIUM CHLORIDE 9 MG/ML
INJECTION, SOLUTION INTRAVENOUS CONTINUOUS PRN
Status: DISCONTINUED | OUTPATIENT
Start: 2020-07-24 | End: 2020-07-24 | Stop reason: SDUPTHER

## 2020-07-24 RX ORDER — LIDOCAINE HYDROCHLORIDE 20 MG/ML
INJECTION, SOLUTION EPIDURAL; INFILTRATION; INTRACAUDAL; PERINEURAL PRN
Status: DISCONTINUED | OUTPATIENT
Start: 2020-07-24 | End: 2020-07-24 | Stop reason: SDUPTHER

## 2020-07-24 RX ADMIN — OXYCODONE HYDROCHLORIDE AND ACETAMINOPHEN 1 TABLET: 10; 325 TABLET ORAL at 19:51

## 2020-07-24 RX ADMIN — OXYCODONE HYDROCHLORIDE AND ACETAMINOPHEN 1 TABLET: 10; 325 TABLET ORAL at 02:14

## 2020-07-24 RX ADMIN — METRONIDAZOLE 500 MG: 500 INJECTION, SOLUTION INTRAVENOUS at 21:41

## 2020-07-24 RX ADMIN — PROPOFOL 120 MCG/KG/MIN: 10 INJECTION, EMULSION INTRAVENOUS at 17:35

## 2020-07-24 RX ADMIN — ATORVASTATIN CALCIUM 40 MG: 40 TABLET, FILM COATED ORAL at 08:29

## 2020-07-24 RX ADMIN — PROPOFOL 100 MG: 10 INJECTION, EMULSION INTRAVENOUS at 17:35

## 2020-07-24 RX ADMIN — POLYETHYLENE GLYCOL-3350 AND ELECTROLYTES 4000 ML: 236; 6.74; 5.86; 2.97; 22.74 POWDER, FOR SOLUTION ORAL at 08:20

## 2020-07-24 RX ADMIN — CIPROFLOXACIN 400 MG: 2 INJECTION, SOLUTION INTRAVENOUS at 14:40

## 2020-07-24 RX ADMIN — CIPROFLOXACIN 400 MG: 2 INJECTION, SOLUTION INTRAVENOUS at 01:23

## 2020-07-24 RX ADMIN — GABAPENTIN 600 MG: 300 CAPSULE ORAL at 08:29

## 2020-07-24 RX ADMIN — METRONIDAZOLE 500 MG: 500 INJECTION, SOLUTION INTRAVENOUS at 06:00

## 2020-07-24 RX ADMIN — GABAPENTIN 600 MG: 300 CAPSULE ORAL at 20:41

## 2020-07-24 RX ADMIN — GABAPENTIN 600 MG: 300 CAPSULE ORAL at 14:13

## 2020-07-24 RX ADMIN — OXYCODONE HYDROCHLORIDE AND ACETAMINOPHEN 1 TABLET: 10; 325 TABLET ORAL at 10:34

## 2020-07-24 RX ADMIN — TRAZODONE HYDROCHLORIDE 50 MG: 50 TABLET ORAL at 20:41

## 2020-07-24 RX ADMIN — LIDOCAINE HYDROCHLORIDE 60 MG: 20 INJECTION, SOLUTION EPIDURAL; INFILTRATION; INTRACAUDAL; PERINEURAL at 17:35

## 2020-07-24 RX ADMIN — SODIUM CHLORIDE: 9 INJECTION, SOLUTION INTRAVENOUS at 17:29

## 2020-07-24 RX ADMIN — METRONIDAZOLE 500 MG: 500 INJECTION, SOLUTION INTRAVENOUS at 14:40

## 2020-07-24 ASSESSMENT — ENCOUNTER SYMPTOMS: SHORTNESS OF BREATH: 1

## 2020-07-24 ASSESSMENT — PAIN SCALES - GENERAL
PAINLEVEL_OUTOF10: 0
PAINLEVEL_OUTOF10: 7
PAINLEVEL_OUTOF10: 0
PAINLEVEL_OUTOF10: 0
PAINLEVEL_OUTOF10: 7
PAINLEVEL_OUTOF10: 0
PAINLEVEL_OUTOF10: 8
PAINLEVEL_OUTOF10: 7

## 2020-07-24 ASSESSMENT — PULMONARY FUNCTION TESTS
PIF_VALUE: 0
PIF_VALUE: 1
PIF_VALUE: 0

## 2020-07-24 ASSESSMENT — PAIN DESCRIPTION - ONSET
ONSET: ON-GOING
ONSET: GRADUAL
ONSET: ON-GOING

## 2020-07-24 ASSESSMENT — PAIN - FUNCTIONAL ASSESSMENT
PAIN_FUNCTIONAL_ASSESSMENT: PREVENTS OR INTERFERES SOME ACTIVE ACTIVITIES AND ADLS
PAIN_FUNCTIONAL_ASSESSMENT: 0-10
PAIN_FUNCTIONAL_ASSESSMENT: PREVENTS OR INTERFERES SOME ACTIVE ACTIVITIES AND ADLS

## 2020-07-24 ASSESSMENT — PAIN DESCRIPTION - DESCRIPTORS
DESCRIPTORS: ACHING
DESCRIPTORS: SHARP
DESCRIPTORS: ACHING;SHOOTING
DESCRIPTORS: SHARP

## 2020-07-24 ASSESSMENT — PAIN DESCRIPTION - PAIN TYPE
TYPE: CHRONIC PAIN;ACUTE PAIN
TYPE: CHRONIC PAIN
TYPE: CHRONIC PAIN;ACUTE PAIN
TYPE: CHRONIC PAIN

## 2020-07-24 ASSESSMENT — PAIN DESCRIPTION - FREQUENCY
FREQUENCY: CONTINUOUS
FREQUENCY: INTERMITTENT
FREQUENCY: CONTINUOUS

## 2020-07-24 ASSESSMENT — PAIN DESCRIPTION - LOCATION
LOCATION: BACK
LOCATION: BACK
LOCATION: ABDOMEN;BACK
LOCATION: ABDOMEN;BACK

## 2020-07-24 ASSESSMENT — PAIN DESCRIPTION - ORIENTATION: ORIENTATION: LOWER

## 2020-07-24 ASSESSMENT — LIFESTYLE VARIABLES: SMOKING_STATUS: 1

## 2020-07-24 NOTE — OP NOTE
[address] [place] [address]  Endoscopy Note    Patient: Roseanna Lua  : 1955  Acct#:     Procedure: Colonoscopy with biopsy    Date:  2020    Surgeon:  Karen Mayes MD      Anesthesia:  TIVA    Indications: This is a 71y.o. year old male who presents today with  Hematochezia. Procedure: An informed consent was obtained from the patient after explanation of indications, benefits, possible risks and complications of the procedure. The patient was then taken to the endoscopy suite, placed in the left lateral decubitus position, and the above IV anesthesia was administered. A digital rectal examination was performed and revealed negative without mass, lesions or tenderness. The Olympus pediatric video colonoscope was placed in the patient's rectum under digital direction and advanced to the cecum. The cecum was identified by characteristic anatomy and ballottment. The prep was good. The ileocecal valve was identified. The ascending colon was examined twice to assure no sessile polyps missed. The scope was then withdrawn back through the cecum, ascending, transverse, descending and sigmoid colons. Carefull circumferential examination of the mucosa in these areas was performed. The scope was then withdrawn into the rectum and retroflexed. The scope was straightened, the colon was decompressed and the scope was withdrawn from the patient. Findings:  1. A 6mm polyp was identified in the proximal transverse colon and removed completely with cold snare polypectomy down to a clean base confirmed by post-polypectomy photograph. All polyp tissue sent off for pathologic evaluation. 2.  There was colitis with the appearance of ischemic colitis in the descending colon and proximal sigmoid colon with lesser colitis in the mid and distal sigmoid colon. Multiple biopsies were obtained. 3.  Mild sigmoid diverticulosis  4.   Small grade 1 internal hemorrhoids    The patient tolerated the procedure well and was taken to Recovery in good condition. No complications. EBL: minimal  Specimens taken: yes      Impression:   6mm proximal transverse colon polyp cold snared and removed. Probable ischemic colitis in the sigmoid and descending colon  Mild sigmoid diverticulosis  Small grade 1 internal hemorrhoids. Recommendations:   1. Clear liquid diet, advance as tolerated. 2.  Repeat colonoscopy in 5 years based on polyps today. 3.  Continue antibiotics  4. Dr. Andrew Flynn to see tomorrow.     Katlyn Youngblood MD   [address]  7/24/2020

## 2020-07-24 NOTE — ANESTHESIA POSTPROCEDURE EVALUATION
Department of Anesthesiology  Postprocedure Note    Patient: Seth Arriaza  MRN: [id number]  YOB: 1955  Date of evaluation: 7/24/2020  Time:  6:13 PM     Procedure Summary     Date:  07/24/20 Room / Location:  [address] [place] [address]    Anesthesia Start:  9658 Anesthesia Stop:  1805    Procedures:       COLONOSCOPY POLYPECTOMY SNARE/COLD BIOPSY (N/A )      COLONOSCOPY WITH BIOPSY Diagnosis:  (Rectal Bleeding)    Surgeon:  Bandar Carey MD Responsible Provider:  Radha Lackey MD    Anesthesia Type:  MAC ASA Status:  3          Anesthesia Type: MAC    Durga Phase I: Durga Score: 10    Durga Phase II:      Last vitals: Reviewed and per EMR flowsheets.        Anesthesia Post Evaluation    Patient location during evaluation: PACU  Patient participation: complete - patient participated  Level of consciousness: awake and alert  Airway patency: patent  Nausea & Vomiting: no nausea and no vomiting  Complications: no  Cardiovascular status: hemodynamically stable  Respiratory status: acceptable  Hydration status: stable

## 2020-07-24 NOTE — ANESTHESIA PRE PROCEDURE
Department of Anesthesiology  Preprocedure Note       Name:  Shell Rolle   Age:  71 y.o.  :  1955                                          MRN:  [id number]         Date:  2020      Surgeon: Kamron Lowe):  Alyse Whitehead MD    Procedure: Procedure(s):  COLONOSCOPY DIAGNOSTIC    Medications prior to admission:   Prior to Admission medications    Medication Sig Start Date End Date Taking? Authorizing Provider   DULoxetine (CYMBALTA) 60 MG extended release capsule Take 60 mg by mouth daily   Yes Historical Provider, MD   methocarbamol (ROBAXIN) 750 MG tablet Take 750 mg by mouth every 6 hours as needed (muscle spasms/pain)   Yes Historical Provider, MD   albuterol sulfate  (90 Base) MCG/ACT inhaler Inhale 2 puffs into the lungs every 6 hours as needed 20  Yes Historical Provider, MD   oxyCODONE-acetaminophen (PERCOCET)  MG per tablet Take 1 tablet by mouth every 8 hours as needed for Pain. 20  Yes Historical Provider, MD   traZODone (DESYREL) 50 MG tablet Take 50 mg by mouth nightly   Yes Historical Provider, MD   medical marijuana Take 1 each by mouth as needed. Yes Historical Provider, MD   gabapentin (NEURONTIN) 600 MG tablet Take 600 mg by mouth 3 times daily. Yes Historical Provider, MD   verapamil (CALAN) 80 MG tablet Take 80 mg by mouth 3 times daily.    Yes Historical Provider, MD   aspirin 81 MG chewable tablet Take 1 tablet by mouth daily 20   Shaylee Liu MD   atorvastatin (LIPITOR) 40 MG tablet Take 1 tablet by mouth daily 20   Shaylee Liu MD   lisinopril (PRINIVIL;ZESTRIL) 40 MG tablet Take 40 mg by mouth daily    Historical Provider, MD       Current medications:    Current Facility-Administered Medications   Medication Dose Route Frequency Provider Last Rate Last Dose    albuterol sulfate  (90 Base) MCG/ACT inhaler 2 puff  2 puff Inhalation Q6H PRN Reece Quintana MD        atorvastatin (LIPITOR) tablet 40 mg  40 mg Oral Daily Reece fibrillation with slow ventricular response ([facility]) I48.91    Bradycardia R00.1    Chest pain R07.9    Symptomatic bradycardia R00.1    Acute respiratory failure with hypoxia ([facility]) J96.01    Rectal bleeding K62.5       Past Medical History:        Diagnosis Date    Arthritis     Gong's esophagus     Chronic back pain     COPD (chronic obstructive pulmonary disease) (HCC)     Depression     GERD (gastroesophageal reflux disease)     HYPERCHOLESTERAEMIA     Hypertension     Prinzmetal angina ([facility])     Spinal stenoses     TIA (transient ischaemic attack)        Past Surgical History:        Procedure Laterality Date    APPENDECTOMY      BACK SURGERY      BRONCHOSCOPY N/A 12/30/2019    BRONCHOSCOPY ALVEOLAR LAVAGE performed by Esthela Duong MD at [place] [address]  12/30/2019    BRONCHOSCOPY BRUSHINGS [facility]      COLONOSCOPY      WITH BIOPSY    JOINT REPLACEMENT      RIGHT KNEE    SPLENECTOMY      UPPER GASTROINTESTINAL ENDOSCOPY  5-14-10    egd with biopsy    UPPER GASTROINTESTINAL ENDOSCOPY  11-27-12    Esophagogastroduodenoscopy with biopsy, Romero esophageal dilation, and Botulinum injection of the pylorus       Social History:    Social History     Tobacco Use    Smoking status: Current Every Day Smoker     Packs/day: 1.00     Years: 42.00     Pack years: 42.00    Smokeless tobacco: Never Used   Substance Use Topics    Alcohol use:  No                                Ready to quit: Not Answered  Counseling given: Not Answered      Vital Signs (Current):   Vitals:    07/24/20 0410 07/24/20 0815 07/24/20 1212 07/24/20 1622   BP: 112/69 (!) 154/91 (!) 165/65 (!) 144/98   Pulse: 77 77 71 78   Resp: 18 14 16 14   Temp: 98 °F (36.7 °C) 98 °F (36.7 °C) 98 °F (36.7 °C) 98.2 °F (36.8 °C)   TempSrc: Oral Oral Oral Temporal   SpO2: 93% 96% 95% 94%   Weight: 126 lb 5.2 oz (57.3 kg)      Height:                                                  BP Readings from Last 3 Encounters:   07/24/20 (!) 144/98   07/20/20 118/79   05/14/20 (!) 119/92       NPO Status: Time of last liquid consumption: 1430                        Time of last solid consumption: 1800                        Date of last liquid consumption: 07/23/20                        Date of last solid food consumption: 07/22/20    BMI:   Wt Readings from Last 3 Encounters:   07/24/20 126 lb 5.2 oz (57.3 kg)   07/20/20 130 lb 8.2 oz (59.2 kg)   05/14/20 145 lb 11.6 oz (66.1 kg)     Body mass index is 18.13 kg/m². CBC:   Lab Results   Component Value Date    WBC 17.0 07/24/2020    RBC 4.46 07/24/2020    HGB 13.3 07/24/2020    HCT 39.7 07/24/2020    MCV 86.8 07/24/2020    RDW 15.5 07/24/2020     07/24/2020       CMP:   Lab Results   Component Value Date     07/22/2020    K 5.3 07/22/2020    CL 94 07/22/2020    CO2 19 07/22/2020    BUN 14 07/22/2020    CREATININE 1.0 07/22/2020    GFRAA >60 07/22/2020    GFRAA >60 08/31/2012    AGRATIO 1.1 07/22/2020    LABGLOM >60 07/22/2020    LABGLOM 109 03/04/2015    GLUCOSE 149 07/22/2020    PROT 8.0 07/22/2020    PROT 6.9 08/31/2012    CALCIUM 9.9 07/22/2020    BILITOT 0.6 07/22/2020    ALKPHOS 109 07/22/2020    AST 42 07/22/2020    ALT 24 07/22/2020       POC Tests: No results for input(s): POCGLU, POCNA, POCK, POCCL, POCBUN, POCHEMO, POCHCT in the last 72 hours.     Coags:   Lab Results   Component Value Date    PROTIME 13.3 07/17/2020    INR 1.14 07/17/2020    APTT 31.8 09/20/2015       HCG (If Applicable): No results found for: PREGTESTUR, PREGSERUM, HCG, HCGQUANT     ABGs:   Lab Results   Component Value Date    PHART 7.383 07/18/2020    PO2ART 322.0 07/18/2020    TMJ3RDD 38.4 07/18/2020    TLS6EUO 22.9 07/18/2020    BEART -2.0 07/18/2020    F7XYTVWA 99.2 07/18/2020        Type & Screen (If Applicable):  No results found for: LABABO, LABRH    Drug/Infectious Status (If Applicable):  No results found for: HIV, HEPCAB    COVID-19 Screening (If Applicable):   Lab Results   Component Value Date    COVID19 Not Detected 07/22/2020         Anesthesia Evaluation  Patient summary reviewed no history of anesthetic complications:   Airway: Mallampati: II  TM distance: >3 FB   Neck ROM: full  Mouth opening: > = 3 FB Dental:    (+) edentulous      Pulmonary:normal exam    (+) COPD:  shortness of breath:  current smoker                           Cardiovascular:    (+) hypertension:, angina:, CAD:, dysrhythmias: atrial fibrillation, hyperlipidemia      ECG reviewed      Echocardiogram reviewed               ROS comment: Summary   Suboptimal image quality. Left ventricular cavity size is normal. Assymetric septal contraction. Aydin-septal hypokinesis. EF   45%. Grade I diastolic dysfunction. Normal right ventricular size and function. Left atrium is of normal size. Trivial Mitral and Tricuspid regurgitation. Signature      ------------------------------------------------------------------   Electronically signed by Laxmi Mcconnell MD (Interpreting   physician) on 07/20/2020 at 02:15 PM     Neuro/Psych:   (+) neuromuscular disease:, TIA, psychiatric history:depression/anxiety             GI/Hepatic/Renal:   (+) GERD:,      (-) PUD and hepatitis       Endo/Other:    (+) blood dyscrasia: arthritis:., .    (-) diabetes mellitus, hypothyroidism, hyperthyroidism               Abdominal:           Vascular:                                        Anesthesia Plan      MAC     ASA 3       Induction: intravenous. Anesthetic plan and risks discussed with patient and spouse. Plan discussed with CRNA. This pre-anesthesia assessment may be used as a history and physical.    DOS STAFF ADDENDUM:    Pt seen and examined, chart reviewed (including anesthesia, drug and allergy history). No interval changes to history and physical examination.   Anesthetic plan, risks, benefits,

## 2020-07-24 NOTE — PROGRESS NOTES
Gastroenterology Progress Note    Seth Arriaza is a 71 y.o. male patient. Active Problems:    Rectal bleeding  Resolved Problems:    * No resolved hospital problems. *      SUBJECTIVE:  Rectal bleeding overnight, large amount. Current Facility-Administered Medications: albuterol sulfate  (90 Base) MCG/ACT inhaler 2 puff, 2 puff, Inhalation, Q6H PRN  atorvastatin (LIPITOR) tablet 40 mg, 40 mg, Oral, Daily  morphine (PF) injection 2 mg, 2 mg, Intravenous, Q4H PRN  lactated ringers infusion, , Intravenous, Continuous  ciprofloxacin (CIPRO) IVPB 400 mg, 400 mg, Intravenous, Q12H  metronidazole (FLAGYL) 500 mg in NaCl 100 mL IVPB premix, 500 mg, Intravenous, Q8H  gabapentin (NEURONTIN) capsule 600 mg, 600 mg, Oral, TID  oxyCODONE-acetaminophen (PERCOCET)  MG per tablet 1 tablet, 1 tablet, Oral, Q8H PRN  traZODone (DESYREL) tablet 50 mg, 50 mg, Oral, Nightly    Physical    VITALS:  /69   Pulse 77   Temp 98 °F (36.7 °C) (Oral)   Resp 18   Ht 5' 10\" (1.778 m)   Wt 126 lb 5.2 oz (57.3 kg)   SpO2 93%   BMI 18.13 kg/m²   TEMPERATURE:  Current - Temp: 98 °F (36.7 °C); Max - Temp  Av.7 °F (37.1 °C)  Min: 97.8 °F (36.6 °C)  Max: 99.5 °F (37.5 °C)    NAD  Eyes: No icterus  RRR  Lungs CTA Bilaterally, normal effort  Abdomen soft, ND, NT, Bowel sounds normal.  Ext: no edema  Neuro: No tremor  Psych: A&Ox3    Data    Data Review:    Recent Labs     20  1111 20  1857 20  023   WBC 25.9*  --   --   --    HGB 14.6 14.7 14.0 13.6   HCT 43.3 43.9 43.0 40.4*   MCV 86.5  --   --   --      --   --   --      Recent Labs     20   *   K 5.3*   CL 94*   CO2 19*   BUN 14   CREATININE 1.0     Recent Labs     20   AST 42*   ALT 24   BILITOT 0.6   ALKPHOS 109     Recent Labs     20   LIPASE 14.0     No results for input(s): PROTIME, INR in the last 72 hours.   No results for input(s): PTT in the last 72 hours.    ASSESSMENT:  56-year-old with a history of transient ischemic attack, spinal stenosis, hypertension, hypercholesterolemia, GERD, depression, COPD, back pain, Gong's esophagus, arthritis. He has had a splenectomy, joint replacement, cholecystectomy, back surgery, appendectomy. He presented with progressive weakness, nausea, abdominal pain, diarrhea. He was in the hospital week ago with symptomatic bradycardia and had a temporary pacemaker placed. He had acute respiratory failure at that time requiring intubation. He was discharged on Sunday. He has had abdominal discomfort for the past month with a large amount of flatus. Yesterday he developed acute onset of diarrhea. Stool is watery and light brown in color without blood. Prior to this his last bowel movement was a week ago. Admission labs showed urea nitrogen 14 and creatinine 1.0. BNP 1410. ALT 24, AST 42, lipase 14. CBC showed a white blood count 25.9 hemoglobin 14.6 and platelets 223. Occult blood in the stool was positive. Stool culture negative. C. difficile negative. CT angiogram of the chest abdomen and pelvis showed a 4.7 cm infrarenal abdominal aorta, postsurgical changes in the thoracic and lumbar spine, prior cholecystectomy and splenectomy, and throat no resolution of the previously seen right rectus sheath hematoma. COVID negative. Ua with 8 WBC's. Never gets diarrhea usually. Started to get gas 1 month ago. Normally has regular bowel movements so unusual to go a week without a bowel movement. His entire abdomen has been tender. Started a few days ago. Resolved with bowel movements. No further diarrhea today. has had irizarry. No chest pain. Last EGD 2012 showed 5 cm of Gong's with Dr. Jose Juan De La Garza and had botox injected at the pylorus. Last colon 10+ years ago. On exam, NAD, A&O x 3, no icterus. RRR, soft, mildly tender on exam, ND, no edema  1.   Abdominal pain:  Sounds like he got very constipated with his hospitalization and developed significant abdominal pain and then had the diarrhea to overcome the constipation and this improved the abdominal pain significantly . CT negative for etiology of pain. Labs okay other than the leukocytosis. 2.  Diarrhea: Constipated for 1 week prior and then severe diarrhea. Suspect reactive to constipation. Has resolved now. Stool negative for c. diff and culture. Will monitor for now. Send stool leuks if recurrent diarrhea. 3.  Leukocytosis. Would repeat tomorrow. 4.  heme + stool. Now with overt hematochezia     Plan: Colonoscopy for the hematochezia today     Thank you for allowing me to participate in the care of your patient. Please feel free to contact me with any concerns.   [address], MD

## 2020-07-24 NOTE — DISCHARGE INSTR - COC
Continuity of Care Form    Patient Name: Santa Israel   :  1955  MRN:  [id number]    Admit date:  2020  Discharge date:  2020    Code Status Order: Prior   Advance Directives:   [facility] Documentation     Date/Time Healthcare Directive Type of Healthcare Directive Copy in [address] Agent's Name Healthcare Agent's Phone Number    [phone]  Yes, patient has an advance directive for healthcare treatment  Durable power of  for health care  No, copy requested from family  Spouse  Lorene Harkins (wife)  --          Admitting Physician:  Elin Blankenship MD  PCP: No primary care provider on file.     Discharging Nurse: [facility] Unit/Room#: K8B-5823/4675-83  Discharging Unit Phone Number: [phone]    Emergency Contact:   Extended Emergency Contact Information  Primary Emergency Contact: Peter Melissa STACIE  Address: [address] [place] [address]  Home Phone: [phone]  Mobile Phone: [phone]  Relation: Spouse    Past Surgical History:  Past Surgical History:   Procedure Laterality Date    APPENDECTOMY      BACK SURGERY      BRONCHOSCOPY N/A 2019    BRONCHOSCOPY ALVEOLAR LAVAGE performed by Anna Haider MD at [address] [place] [address]  2019    BRONCHOSCOPY BRUSHINGS performed by Anna Haider MD at [address] COLONOSCOPY      WITH BIOPSY    JOINT REPLACEMENT      RIGHT KNEE    SPLENECTOMY      UPPER GASTROINTESTINAL ENDOSCOPY  5-14-10    egd with biopsy    UPPER GASTROINTESTINAL ENDOSCOPY  12    Esophagogastroduodenoscopy with biopsy, Romero esophageal dilation, and Botulinum injection of the pylorus       Immunization History:   Immunization History   Administered Date(s) Administered    Influenza Whole 10/01/2012    Pneumococcal Conjugate 7-valent (Aylin Boron) 10/12/2006    Tdap Mobility/ADLs:  Walking   Independent  Transfer  Independent  Bathing  Independent  Dressing  Independent  Toileting  Independent  Feeding  Independent  Med Admin  Independent  Med Delivery   whole    Wound Care Documentation and Therapy:        Elimination:  Continence:   · Bowel: Yes  · Bladder: Yes  Urinary Catheter: None   Colostomy/Ileostomy/Ileal Conduit: No       Date of Last BM: 07/25/2020    Intake/Output Summary (Last 24 hours) at 7/24/2020 1450  Last data filed at 7/24/2020 0959  Gross per 24 hour   Intake --   Output 500 ml   Net -500 ml     I/O last 3 completed shifts:  In: -   Out: 700 [Urine:700]    Safety Concerns: At Risk for Falls    Impairments/Disabilities:      None    Nutrition Therapy:  Current Nutrition Therapy:   - Oral Diet:  Low Fat    Routes of Feeding: Oral  Liquids: Thin Liquids  Daily Fluid Restriction: no  Last Modified Barium Swallow with Video (Video Swallowing Test): not done    Treatments at the Time of Hospital Discharge:     Oxygen Therapy:  is not on home oxygen therapy.   Ventilator:    - No ventilator support        Patient's personal belongings (please select all that are sent with patient):  Dentures    RN SIGNATURE:  Electronically signed by Yaneth Siegel RN on 7/25/20 at 1:07 PM EDT    CASE MANAGEMENT/SOCIAL WORK SECTION    Inpatient Status Date: 07/23/2020    Readmission Risk Assessment Score:  Readmission Risk              Risk of Unplanned Readmission:        15         Discharging to Facility/ Agency   · Name:  [facility]    · Address: [address] [place] [address], [address] [place] [address], [place] [address]  · Phone: [phone]  · Fax: [phone]    / signature: Electronically signed by Venita Palmer RN on 7/24/20 at 2:51 PM EDT    PHYSICIAN SECTION    Prognosis: Good    Condition at Discharge: Stable    Rehab Potential (if transferring to Rehab): Good    Recommended Labs or Other Treatments After Discharge:     Physician Certification: I certify the above information and transfer of Breanne Holter  is necessary for the continuing treatment of the diagnosis listed and that he requires [address] for greater 30 days.      Update Admission H&P: No change in H&P    PHYSICIAN SIGNATURE:  Electronically signed by Brenda Mathis MD on 7/25/20 at 12:56 PM EDT

## 2020-07-24 NOTE — PROGRESS NOTES
Pulses: +2 palpable, equal bilaterally       Labs:   Recent Labs     07/22/20 1912 07/24/20  0232 07/24/20  0622 07/24/20  1021   WBC 25.9*  --   --  17.0*  --    HGB 14.6   < > 13.6 13.0* 13.3*   HCT 43.3   < > 40.4* 38.7* 39.7*     --   --  349  --     < > = values in this interval not displayed. Recent Labs     07/22/20 1912   *   K 5.3*   CL 94*   CO2 19*   BUN 14   CREATININE 1.0   CALCIUM 9.9     Recent Labs     07/22/20 1912   AST 42*   ALT 24   BILITOT 0.6   ALKPHOS 109     No results for input(s): INR in the last 72 hours. Recent Labs     07/22/20 1912   TROPONINI <0.01       Assessment/Plan:    -Rectal bleeding. . H&H is stable  -Constipation with abdominal pain and overflow diarrhea--reportedly passed a large hard ball of stool and was subsequently followed by multiple episodes of diarrhea  -Significant leukocytosis--improving--on Cipro and Flagyl for suspected GI infection  -Hyponatremia, hypokalemia, metabolic acidosis-POA-improved    Chronic issues  COPD  CAD  Dyslipidemia  Chronic tobacco abuse    Plan  EGD/colonoscopy scheduled for this afternoon  Hold antiplatelets for now  Continue IV PPI  Monitor H&H and transfuse as needed  Follow-up on stool studies    DVT Prophylaxis: SCD  Diet: Diet NPO Effective Now  Code Status:full    Arsenio Stroud MD

## 2020-07-24 NOTE — PROGRESS NOTES
Pt awake and oriented. C/o back pain. States stomach pain is much better than it had been. Denies nausea. Report called to floor VINNY Sal.

## 2020-07-24 NOTE — PROGRESS NOTES
Physical Therapy  Progress note  Pt requesting to return to bed. Pt able to stand from chair with SBA. He began walking around bed with the walker (15'), SBA, but then started carrying the walker to show his independence. Pt able to position himself for comfort in bed. PT educated pt to goals of therapy (gradually progressing standing/ambulation tolerance; maximizing stability ambulating). He is intent upon returning home upon D/C, and PT anticipates he will be able to do so. Time In: 1000  Time Coded Tx: 8 min.    Electronically signed by Mindy Ireland, PT [id number] on 7/24/2020 at 10:09 AM

## 2020-07-24 NOTE — PROGRESS NOTES
Physical Therapy  Facility/Department: [address] PROGRESSIVE CARE  Daily Treatment Note - COTX  NAME: Bola Lopez  : 1955  MRN: [id number]    Date of Service: 2020    Discharge Recommendations:  Patient would benefit from continued therapy after discharge, Continue to assess pending progress, 3-5 sessions per week   PT Equipment Recommendations  Equipment Needed: No    Assessment   Body structures, Functions, Activity limitations: Decreased functional mobility ; Decreased strength;Decreased endurance  Assessment: Pt required max encouragement to mobilize, but then was able to stand to walker with SBA, and pivot to chair without difficulty. He did report dizziness, but appears to be more limited by anxiety than anything else. PT anticipates that with continued progress pt will be able to return home with assist from wife, and home PT level 1. No equipment needs. Bola Lopez scored a 18/24 on the AM-PAC short mobility form. Current research shows that an AM-PAC score of 18 or greater is typically associated with a discharge to the patient's home setting. Based on the patient's AM-PAC score and their current functional mobility deficits, it is recommended that the patient have 2-3 sessions per week of Physical Therapy at d/c to increase the patient's independence. At this time, this patient demonstrates the endurance and safety to discharge home with home PT and a follow up treatment frequency of 2-3x/wk. Please see assessment section for further patient specific details. If patient discharges prior to next session this note will serve as a discharge summary. Please see below for the latest assessment towards goals. Specific instructions for Next Treatment: Improve standing tolerance; ambulate when able  Prognosis: Fair  Decision Making: Low Complexity  PT Education: Goals; General Safety;PT Role;Orientation;Plan of Care;Transfer Training;Energy Conservation  Activity Tolerance  Activity 43.63 (07/24/20 0856)  Mobility Inpatient CMS 0-100% Score: 46.58 (07/24/20 0856)  Mobility Inpatient CMS G-Code Modifier : CK (07/24/20 0856)    Goals  Short term goals  Time Frame for Short term goals: In 2-3 days pt will perform  Short term goal 1: In 2-3 days pt will perform  Short term goal 2: Bed mobility (I)  Short term goal 3: Transfers SBA - met 7/24  Short term goal 4: Ambulation 13' with RW, CGA - ongoing 7/24  Long term goals  Time Frame for Long term goals : LTG = STG  Patient Goals   Patient goals : To get stronger    Plan    Plan  Times per week: 2-3x  Specific instructions for Next Treatment: Improve standing tolerance; ambulate when able  Safety Devices  Type of devices:  All fall risk precautions in place, Chair alarm in place, Call light within reach, Gait belt, Left in chair, Nurse notified  Restraints  Initially in place: No     Therapy Time   Individual Concurrent Group Co-treatment   Time In       [facility] 8057   Minutes       15   Timed Code Treatment Minutes: 15 Minutes       Ayaka Patterson PT    Electronically signed by Ayaka Patterson PT [id number] on 7/24/2020 at 8:57 AM

## 2020-07-24 NOTE — H&P
[address] [place] [address]   Pre-operative History and Physical    Patient: Clare Carrel  : 1955  Acct#:     HISTORY OF PRESENT ILLNESS:    The patient is a 71 y.o. male who presents with hematochezia    Past Medical History:        Diagnosis Date    Arthritis     Gong's esophagus     Chronic back pain     [facility]     Depression     GERD (gastroesophageal reflux disease)     HYPERCHOLESTERAEMIA     Hypertension     Prinzmetal angina ([facility]     Spinal stenoses     TIA (transient ischaemic attack)       Past Surgical History:        Procedure Laterality Date    APPENDECTOMY      BACK SURGERY      BRONCHOSCOPY N/A 2019    BRONCHOSCOPY ALVEOLAR LAVAGE performed by Stephen Rabago MD at [address]  2019    BRONCHOSCOPY BRUSHINGS performed by Stephen Rabago MD at [address] COLONOSCOPY      WITH BIOPSY    JOINT REPLACEMENT      RIGHT KNEE    SPLENECTOMY      UPPER GASTROINTESTINAL ENDOSCOPY  5-14-10    egd with biopsy    UPPER GASTROINTESTINAL ENDOSCOPY  12    Esophagogastroduodenoscopy with biopsy, Romero esophageal dilation, and Botulinum injection of the pylorus      Medications Prior to Admission:   No current facility-administered medications on file prior to encounter. Current Outpatient Medications on File Prior to Encounter   Medication Sig Dispense Refill    DULoxetine (CYMBALTA) 60 MG extended release capsule Take 60 mg by mouth daily      methocarbamol (ROBAXIN) 750 MG tablet Take 750 mg by mouth every 6 hours as needed (muscle spasms/pain)      albuterol sulfate  (90 Base) MCG/ACT inhaler Inhale 2 puffs into the lungs every 6 hours as needed      oxyCODONE-acetaminophen (PERCOCET)  MG per tablet Take 1 tablet by mouth every 8 hours as needed for Pain.        traZODone (DESYREL) 50 MG tablet Take 50 mg by mouth nightly      medical marijuana Take 1 each by mouth as needed.  gabapentin (NEURONTIN) 600 MG tablet Take 600 mg by mouth 3 times daily.  verapamil (CALAN) 80 MG tablet Take 80 mg by mouth 3 times daily.  aspirin 81 MG chewable tablet Take 1 tablet by mouth daily 30 tablet 3    atorvastatin (LIPITOR) 40 MG tablet Take 1 tablet by mouth daily 90 tablet 1    lisinopril (PRINIVIL;ZESTRIL) 40 MG tablet Take 40 mg by mouth daily          Allergies:  Amitriptyline;  Codeine; Demerol; Diltiazem hcl; Lansoprazole; Tricyclic antidepressants; and Trilisate [choline magnesium trisalicylate]    Social History:   Social History     Socioeconomic History    Marital status:      Spouse name: Not on file    Number of children: Not on file    Years of education: Not on file    Highest education level: Not on file   Occupational History    Not on file   Social Needs    Financial resource strain: Not on file    Food insecurity     Worry: Not on file     Inability: Not on file    Transportation needs     Medical: Not on file     Non-medical: Not on file   Tobacco Use    Smoking status: Current Every Day Smoker     Packs/day: 1.00     Years: 42.00     Pack years: 42.00    Smokeless tobacco: Never Used   Substance and Sexual Activity    Alcohol use: No    Drug use: Yes     Types: Marijuana     Comment: medical    Sexual activity: Not Currently   Lifestyle    Physical activity     Days per week: Not on file     Minutes per session: Not on file    Stress: Not on file   Relationships    Social connections     Talks on phone: Not on file     Gets together: Not on file     Attends Yazidism service: Not on file     Active member of club or organization: Not on file     Attends meetings of clubs or organizations: Not on file     Relationship status: Not on file    Intimate partner violence     Fear of current or ex partner: Not on file     Emotionally abused: Not on file     Physically abused: Not on file     Forced sexual activity: Not on file   Other Topics Concern    Not on file   Social History Narrative    Not on file      Family History:       Problem Relation Age of Onset    Heart Disease Mother     Cancer Father         lung        PHYSICAL EXAM:      BP (!) 144/98   Pulse 78   Temp 98.2 °F (36.8 °C) (Temporal)   Resp 14   Ht 5' 10\" (1.778 m)   Wt 126 lb 5.2 oz (57.3 kg)   SpO2 94%   BMI 18.13 kg/m²  I        Heart:  RRR    Lungs:  CTA b    Abdomen:  S/NT/ND/+BS      ASSESSMENT AND PLAN:  ASA: per anesthesia  Mallampati: per anesthesia  1. Patient is a 71 y.o. male here for colonoscopy   2. Procedure options, risks and benefits reviewed with the patient. The patient expresses understanding.     Akash Cao

## 2020-07-24 NOTE — PROGRESS NOTES
Diagnoses of Bloody diarrhea, Elevated lactic acid level, and Leukocytosis, unspecified type were also pertinent to this visit. has a past medical history of Arthritis, Gong's esophagus, Chronic back pain, COPD (chronic obstructive pulmonary disease) ([address] Depression, GERD (gastroesophageal reflux disease), HYPERCHOLESTERAEMIA, Hypertension, Prinzmetal angina ([address] Spinal stenoses, and TIA (transient ischaemic attack). has a past surgical history that includes joint replacement; Colonoscopy; back surgery; Splenectomy; Cholecystectomy; Appendectomy; Upper gastrointestinal endoscopy (5-14-10); Upper gastrointestinal endoscopy (11-27-12); Clavicle surgery; bronchoscopy (N/A, 12/30/2019); and bronchoscopy (12/30/2019). Restrictions  Restrictions/Precautions  Restrictions/Precautions: Fall Risk  Position Activity Restriction  Other position/activity restrictions: COVID negative    Subjective   General  Chart Reviewed: Yes  Patient assessed for rehabilitation services?: Yes  Additional Pertinent Hx: per ED note, Ramu Aguilar is a 71 y.o. male with PMH significant for HTN, HLD, GERD, and COPD who presents to the emergency department today complaining of lower abdominal pain associated with bloody diarrhea. Onset was yesterday. Symptom started spontaneously and have been constant. 7 nausea with no vomiting. No fevers. Family / Caregiver Present: No  Referring Practitioner: Casimiro Hernandez MD  Subjective  Subjective: Pt supine in bed upon arrival and agreeable to OT evaluation. Pt reports having bloody BM last night. General Comment  Comments: okay for therapy per RN.   Vital Signs  Temp: 98 °F (36.7 °C)  Temp Source: Oral  Pulse: 77  Heart Rate Source: Monitor  Resp: 14  BP: (!) 154/91  BP Location: Left Arm  BP Upper/Lower: Upper  MAP (mmHg): 112  Oxygen Therapy  SpO2: 96 %  O2 Device: None (Room air)  Social/Functional History  Social/Functional History  Lives With: Spouse  Type of Home: Stand by assistance  Stand to sit: Stand by assistance  Transfer Comments: to/from RW     Cognition  Overall Cognitive Status: WFL        Sensation  Overall Sensation Status: WFL        LUE AROM (degrees)  LUE AROM : WFL  RUE AROM (degrees)  RUE AROM : WFL  LUE Strength  Gross LUE Strength: WFL  RUE Strength  Gross RUE Strength: WFL                   Plan   Plan  Times per week: 3-5  Current Treatment Recommendations: Strengthening, Functional Mobility Training, Gait Training, Endurance Training, Balance Training, Self-Care / ADL, Safety Education & Training, Patient/Caregiver Education & Training      AM-PAC Score        AM-PAC Inpatient Daily Activity Raw Score: 21 (07/24/20 0903)  AM-PAC Inpatient ADL T-Scale Score : 44.27 (07/24/20 0903)  ADL Inpatient CMS 0-100% Score: 32.79 (07/24/20 0903)  ADL Inpatient CMS G-Code Modifier : Mg Peterson (07/24/20 8718)    Goals  Short term goals  Time Frame for Short term goals: prior to D/C  Short term goal 1: complete functional mobility and transfers Ind  Short term goal 2: complete bathing and dressing Ind  Short term goal 3: complete toileting Ind  Short term goal 4: complete grooming in stance at sink Ind  Long term goals  Time Frame for Long term goals : STG=LTG  Patient Goals   Patient goals : return home       Therapy Time   Individual Concurrent Group Co-treatment   Time In 0840         Time Out 0855         Minutes 15         Timed Code Treatment Minutes: 0 Minutes(15 minute eval)       Nevin Villela, OTR/L

## 2020-07-24 NOTE — PROGRESS NOTES
Pt hypertensive. MD Syl Tran at bedside and states pt is fine to return to room.   Report called to floor RN

## 2020-07-25 VITALS
HEART RATE: 72 BPM | OXYGEN SATURATION: 97 % | WEIGHT: 130.29 LBS | TEMPERATURE: 97.9 F | DIASTOLIC BLOOD PRESSURE: 80 MMHG | RESPIRATION RATE: 16 BRPM | SYSTOLIC BLOOD PRESSURE: 143 MMHG | BODY MASS INDEX: 18.65 KG/M2 | HEIGHT: 70 IN

## 2020-07-25 LAB
ANION GAP SERPL CALCULATED.3IONS-SCNC: 12 MMOL/L (ref 3–16)
BASOPHILS ABSOLUTE: 0.1 K/UL (ref 0–0.2)
BASOPHILS RELATIVE PERCENT: 1.2 %
BUN BLDV-MCNC: 8 MG/DL (ref 7–20)
CALCIUM SERPL-MCNC: 8.5 MG/DL (ref 8.3–10.6)
CHLORIDE BLD-SCNC: 101 MMOL/L (ref 99–110)
CO2: 24 MMOL/L (ref 21–32)
CREAT SERPL-MCNC: <0.5 MG/DL (ref 0.8–1.3)
EOSINOPHILS ABSOLUTE: 0.2 K/UL (ref 0–0.6)
EOSINOPHILS RELATIVE PERCENT: 1.8 %
GFR AFRICAN AMERICAN: >60
GFR NON-AFRICAN AMERICAN: >60
GLUCOSE BLD-MCNC: 91 MG/DL (ref 70–99)
HCT VFR BLD CALC: 29.9 % (ref 40.5–52.5)
HCT VFR BLD CALC: 38.8 % (ref 40.5–52.5)
HCT VFR BLD CALC: 40.1 % (ref 40.5–52.5)
HEMOGLOBIN: 13 G/DL (ref 13.5–17.5)
HEMOGLOBIN: 13.2 G/DL (ref 13.5–17.5)
HEMOGLOBIN: 9.3 G/DL (ref 13.5–17.5)
LYMPHOCYTES ABSOLUTE: 1.6 K/UL (ref 1–5.1)
LYMPHOCYTES RELATIVE PERCENT: 13.7 %
MCH RBC QN AUTO: 28.8 PG (ref 26–34)
MCHC RBC AUTO-ENTMCNC: 33.4 G/DL (ref 31–36)
MCV RBC AUTO: 86.1 FL (ref 80–100)
MONOCYTES ABSOLUTE: 1.1 K/UL (ref 0–1.3)
MONOCYTES RELATIVE PERCENT: 9.6 %
NEUTROPHILS ABSOLUTE: 8.8 K/UL (ref 1.7–7.7)
NEUTROPHILS RELATIVE PERCENT: 73.7 %
PDW BLD-RTO: 15.4 % (ref 12.4–15.4)
PLATELET # BLD: 390 K/UL (ref 135–450)
PMV BLD AUTO: 7.6 FL (ref 5–10.5)
POTASSIUM SERPL-SCNC: 3.6 MMOL/L (ref 3.5–5.1)
RBC # BLD: 4.5 M/UL (ref 4.2–5.9)
SODIUM BLD-SCNC: 137 MMOL/L (ref 136–145)
WBC # BLD: 12 K/UL (ref 4–11)

## 2020-07-25 PROCEDURE — 36415 COLL VENOUS BLD VENIPUNCTURE: CPT

## 2020-07-25 PROCEDURE — 80048 BASIC METABOLIC PNL TOTAL CA: CPT

## 2020-07-25 PROCEDURE — 85018 HEMOGLOBIN: CPT

## 2020-07-25 PROCEDURE — 6360000002 HC RX W HCPCS: Performed by: PHYSICIAN ASSISTANT

## 2020-07-25 PROCEDURE — 2500000003 HC RX 250 WO HCPCS: Performed by: PHYSICIAN ASSISTANT

## 2020-07-25 PROCEDURE — 6370000000 HC RX 637 (ALT 250 FOR IP): Performed by: INTERNAL MEDICINE

## 2020-07-25 PROCEDURE — 93228 REMOTE 30 DAY ECG REV/REPORT: CPT | Performed by: INTERNAL MEDICINE

## 2020-07-25 PROCEDURE — 85014 HEMATOCRIT: CPT

## 2020-07-25 PROCEDURE — 85025 COMPLETE CBC W/AUTO DIFF WBC: CPT

## 2020-07-25 PROCEDURE — 94761 N-INVAS EAR/PLS OXIMETRY MLT: CPT

## 2020-07-25 PROCEDURE — 6370000000 HC RX 637 (ALT 250 FOR IP): Performed by: HOSPITALIST

## 2020-07-25 RX ORDER — VERAPAMIL HYDROCHLORIDE 80 MG/1
80 TABLET ORAL 3 TIMES DAILY
Status: DISCONTINUED | OUTPATIENT
Start: 2020-07-25 | End: 2020-07-25 | Stop reason: HOSPADM

## 2020-07-25 RX ORDER — METRONIDAZOLE 500 MG/1
500 TABLET ORAL 3 TIMES DAILY
Qty: 15 TABLET | Refills: 0 | Status: SHIPPED | OUTPATIENT
Start: 2020-07-25 | End: 2020-07-30

## 2020-07-25 RX ORDER — DULOXETIN HYDROCHLORIDE 60 MG/1
60 CAPSULE, DELAYED RELEASE ORAL DAILY
Status: DISCONTINUED | OUTPATIENT
Start: 2020-07-25 | End: 2020-07-25 | Stop reason: HOSPADM

## 2020-07-25 RX ORDER — LISINOPRIL 40 MG/1
40 TABLET ORAL DAILY
Status: DISCONTINUED | OUTPATIENT
Start: 2020-07-25 | End: 2020-07-25 | Stop reason: HOSPADM

## 2020-07-25 RX ORDER — OXYCODONE AND ACETAMINOPHEN 10; 325 MG/1; MG/1
1 TABLET ORAL EVERY 8 HOURS PRN
Status: DISCONTINUED | OUTPATIENT
Start: 2020-07-25 | End: 2020-07-25 | Stop reason: HOSPADM

## 2020-07-25 RX ORDER — METHOCARBAMOL 750 MG/1
750 TABLET, FILM COATED ORAL EVERY 6 HOURS PRN
Status: DISCONTINUED | OUTPATIENT
Start: 2020-07-25 | End: 2020-07-25 | Stop reason: HOSPADM

## 2020-07-25 RX ORDER — CIPROFLOXACIN 500 MG/1
500 TABLET, FILM COATED ORAL 2 TIMES DAILY
Qty: 10 TABLET | Refills: 0 | Status: SHIPPED | OUTPATIENT
Start: 2020-07-25 | End: 2020-07-30

## 2020-07-25 RX ORDER — BISACODYL 5 MG
5 TABLET, DELAYED RELEASE (ENTERIC COATED) ORAL DAILY PRN
Qty: 30 TABLET | Refills: 0 | Status: SHIPPED | OUTPATIENT
Start: 2020-07-25

## 2020-07-25 RX ADMIN — GABAPENTIN 600 MG: 300 CAPSULE ORAL at 08:16

## 2020-07-25 RX ADMIN — OXYCODONE HYDROCHLORIDE AND ACETAMINOPHEN 1 TABLET: 10; 325 TABLET ORAL at 07:21

## 2020-07-25 RX ADMIN — METRONIDAZOLE 500 MG: 500 INJECTION, SOLUTION INTRAVENOUS at 06:27

## 2020-07-25 RX ADMIN — GABAPENTIN 600 MG: 300 CAPSULE ORAL at 13:38

## 2020-07-25 RX ADMIN — ATORVASTATIN CALCIUM 40 MG: 40 TABLET, FILM COATED ORAL at 08:16

## 2020-07-25 RX ADMIN — LISINOPRIL 40 MG: 40 TABLET ORAL at 08:44

## 2020-07-25 RX ADMIN — DULOXETINE HYDROCHLORIDE 60 MG: 60 CAPSULE, DELAYED RELEASE ORAL at 08:44

## 2020-07-25 RX ADMIN — METHOCARBAMOL TABLETS 750 MG: 750 TABLET, COATED ORAL at 08:44

## 2020-07-25 RX ADMIN — VERAPAMIL HYDROCHLORIDE 80 MG: 80 TABLET, FILM COATED ORAL at 12:49

## 2020-07-25 RX ADMIN — CIPROFLOXACIN 400 MG: 2 INJECTION, SOLUTION INTRAVENOUS at 01:38

## 2020-07-25 ASSESSMENT — PAIN DESCRIPTION - DESCRIPTORS
DESCRIPTORS: SHARP
DESCRIPTORS: SHARP

## 2020-07-25 ASSESSMENT — PAIN DESCRIPTION - LOCATION
LOCATION: BACK
LOCATION: BACK

## 2020-07-25 ASSESSMENT — PAIN DESCRIPTION - FREQUENCY
FREQUENCY: CONTINUOUS
FREQUENCY: CONTINUOUS

## 2020-07-25 ASSESSMENT — PAIN DESCRIPTION - PROGRESSION
CLINICAL_PROGRESSION: NOT CHANGED
CLINICAL_PROGRESSION: NOT CHANGED

## 2020-07-25 ASSESSMENT — PAIN DESCRIPTION - PAIN TYPE
TYPE: CHRONIC PAIN
TYPE: CHRONIC PAIN

## 2020-07-25 ASSESSMENT — PAIN SCALES - GENERAL
PAINLEVEL_OUTOF10: 9
PAINLEVEL_OUTOF10: 4
PAINLEVEL_OUTOF10: 7
PAINLEVEL_OUTOF10: 0
PAINLEVEL_OUTOF10: 0

## 2020-07-25 ASSESSMENT — PAIN DESCRIPTION - ONSET
ONSET: ON-GOING
ONSET: ON-GOING

## 2020-07-25 ASSESSMENT — PAIN DESCRIPTION - ORIENTATION
ORIENTATION: LOWER
ORIENTATION: LOWER

## 2020-07-25 NOTE — PROGRESS NOTES
INPATIENT CONSULTATION:    IDENTIFYING DATA/REASON FOR CONSULTATION   PATIENT:  Marce Mcrae  MRN:  [id number]  ADMIT DATE: 7/22/2020  TIME OF EVALUATION: 7/25/2020 8:14 AM  HOSPITAL STAY:   LOS: 2 days   CONSULTING PHYSICIAN: America Lino MD   REASON FOR CONSULTATION: Abdominal pain, bloody diarrhea    Subjective:    Patient seen and examined in follow-up. Patient reports feeling wonderful. He tolerated colonoscopy yesterday without complaints. Following the procedure, the patient reports producing a small, nonbloody bowel movement yesterday evening. He is tolerating clear liquid diet and denies any abdominal pain. He would like to advance his diet return home. MEDICATIONS   SCHEDULED:  atorvastatin, 40 mg, Daily  ciprofloxacin, 400 mg, Q12H  metroNIDAZOLE, 500 mg, Q8H  gabapentin, 600 mg, TID  traZODone, 50 mg, Nightly      FLUIDS/DRIPS:     lactated ringers 80 mL/hr at 07/23/20 1002     PRNs: albuterol sulfate HFA, 2 puff, Q6H PRN  morphine, 2 mg, Q4H PRN  oxyCODONE-acetaminophen, 1 tablet, Q8H PRN      ALLERGIES:    Allergies   Allergen Reactions    Amitriptyline Other (See Comments)     crying    Codeine Nausea And Vomiting    Demerol Nausea And Vomiting    Diltiazem Hcl Other (See Comments)     crying    Lansoprazole Other (See Comments)     crying    Tricyclic Antidepressants Other (See Comments)     crying    Trilisate [Choline Magnesium Trisalicylate] Other (See Comments)     crying         PHYSICAL EXAM     Vitals:    07/25/20 0530 07/25/20 0625 07/25/20 0715 07/25/20 0806   BP:    (!) 169/85   Pulse: 76 72  76   Resp: 16 16 16 14   Temp:    98.9 °F (37.2 °C)   TempSrc:    Oral   SpO2: 94% 93%     Weight:       Height:           I/O last 3 completed shifts: In: 2733.3 [P.O.:1120; I.V.:1313.3; IV Piggyback:300]  Out: 1100 [Urine:1100]    Physical Exam:  Gen: Resting in bed, NAD   HEENT: normocephalic, atraumatic. No scleral icterus.    CV: RRR no MRG   Pul: CTAB, normal work of breathing  Abd: Good bowel sounds throughout, soft, NT/ND, no masses, no HSM   Ext: No edema, atraumatic. Well healed midline posterior spinal scar. Neuro: No deficits, follows commands, moves all 4 extremities  Skin: No jaundice, spider angiomas, hernandez erythema     LABS AND IMAGING     Recent Results (from the past 24 hour(s))   Hemoglobin and Hematocrit, Blood    Collection Time: 07/24/20 10:21 AM   Result Value Ref Range    Hemoglobin 13.3 (L) 13.5 - 17.5 g/dL    Hematocrit 39.7 (L) 40.5 - 52.5 %   Hemoglobin and Hematocrit, Blood    Collection Time: 07/24/20  6:27 PM   Result Value Ref Range    Hemoglobin 13.1 (L) 13.5 - 17.5 g/dL    Hematocrit 40.1 (L) 40.5 - 52.5 %   Hemoglobin and Hematocrit, Blood    Collection Time: 07/25/20  2:40 AM   Result Value Ref Range    Hemoglobin 9.3 (L) 13.5 - 17.5 g/dL    Hematocrit 29.9 (L) 40.5 - 52.5 %   CBC Auto Differential    Collection Time: 07/25/20  7:03 AM   Result Value Ref Range    WBC 12.0 (H) 4.0 - 11.0 K/uL    RBC 4.50 4. 20 - 5.90 M/uL    Hemoglobin 13.0 (L) 13.5 - 17.5 g/dL    Hematocrit 38.8 (L) 40.5 - 52.5 %    MCV 86.1 80.0 - 100.0 fL    MCH 28.8 26.0 - 34.0 pg    MCHC 33.4 31.0 - 36.0 g/dL    RDW 15.4 12.4 - 15.4 %    Platelets 895 753 - 383 K/uL    MPV 7.6 5.0 - 10.5 fL    Neutrophils % 73.7 %    Lymphocytes % 13.7 %    Monocytes % 9.6 %    Eosinophils % 1.8 %    Basophils % 1.2 %    Neutrophils Absolute 8.8 (H) 1.7 - 7.7 K/uL    Lymphocytes Absolute 1.6 1.0 - 5.1 K/uL    Monocytes Absolute 1.1 0.0 - 1.3 K/uL    Eosinophils Absolute 0.2 0.0 - 0.6 K/uL    Basophils Absolute 0.1 0.0 - 0.2 K/uL   Basic Metabolic Panel    Collection Time: 07/25/20  7:03 AM   Result Value Ref Range    Sodium 137 136 - 145 mmol/L    Potassium 3.6 3.5 - 5.1 mmol/L    Chloride 101 99 - 110 mmol/L    CO2 24 21 - 32 mmol/L    Anion Gap 12 3 - 16    Glucose 91 70 - 99 mg/dL    BUN 8 7 - 20 mg/dL    CREATININE <0.5 (L) 0.8 - 1.3 mg/dL    GFR Non-African American >60 >60    GFR  American >60 >60    Calcium 8.5 8.3 - 10.6 mg/dL     Other Labs    Imaging    ASSESSMENT AND RECOMMENDATIONS   Danica Marquez is a 71 y.o. male with a PMH of COPD, hypertension, hyperlipidemia, Gong's esophagus, prior cholecystectomy and splenectomy who presented on 7/23/2020 with progressive weakness, nausea, abdominal pain and diarrhea. Colonoscopy 7/24/20 notable for ischemic colitis. 1. Rectal bleeding due to ischemic colitis: On Cipro/Flagyl  2. Leukocytosis, 2/2 #1  3. Normocytic anemia: Likely 2/2 blood loss from #1. Hgb stable. RECOMMENDATIONS:    Continue Cipro/Flagyl to complete a 7 day course, ok to switch to PO  Continue low fat diet  Ok for discharge from Gi standpoint. If you have any questions or need any further information, please feel free to contact anyone on our consult team.  Thank you for allowing us to participate in the care of Danica Marquez. Hao Whitfield.  [address]

## 2020-07-25 NOTE — PROGRESS NOTES
Sharif Dumas notified related to pt`s request for some of his home medications.  Electronically signed by Magda Soliz RN on 7/25/2020 at 8:21 AM

## 2020-07-25 NOTE — PROGRESS NOTES
Discharge instructions reviewed with pt. Spoke with pt about medications, follow up appt, and importance of completing ATB. Pt had a monitor at bedside from previous admission. Assisted pt with setting up device. Pt wheeled on unit in wheelchair to private car.  Electronically signed by Herrera Dong RN on 7/25/2020 at 3:44 PM

## 2020-07-25 NOTE — DISCHARGE SUMMARY
Hospital Discharge Summary    Patient's PCP: No primary care provider on file. Admit Date: 7/22/2020   Discharge Date: 7/25/2020    Admitting Physician: Dr. Lakia Mooney MD  Discharge Physician: Dr. Pauline Archibald   Consults: GI    Brief HPI/ hospital course:        23-year-old with a history of transient ischemic attack, spinal stenosis, hypertension, hypercholesterolemia, GERD, depression, COPD, back pain  presented with progressive weakness, nausea, abdominal pain, diarrhea. CT of the abdomen and pelvis without contrast shows no evidence of  obvious acute anomalies . Barb Boop Barb Boop Barb Boop Lab reviewed. Lactic acid of 4.2. Urinalysis is unremarkable. Procalcitonin 0.26. BNP of 1410. Troponin less than 0.01. Lipase of 14. Sodium 133, potassium 5.3 with hemolysis. Bicarb of 19. Glucose of 149. BUN and creatinine are normal.  Liver enzymes are normal other than AST of 42. H&H is 14.6 and 43.3. White blood cell count 25,900. C. difficile negative. Stool Hemoccult positive. Patient was started on IV Cipro and Flagyl empirically  Patient's vital signs were stable. .. On admission he developed rectal bleeding/hematochezia but H&H remained stable. . GI was consulted. Barb Jane He underwent colonoscopy on 7/24/20 and noted to have ischemic colitis. .. He had no further bleeding episodes in the hospital..    Invasive procedures:  Colonoscopy with polypectomy        Discharge Diagnoses:     Rectal bleeding due to ischemic colitis  Leukocytosis  abdominal pain and  diarrhea-resolved  Hyponatremia, hypokalemia, metabolic acidosis  COPD  CAD  Dyslipidemia  Chronic tobacco abuse      Physical Exam: BP (!) 143/80   Pulse 72   Temp 97.9 °F (36.6 °C) (Oral)   Resp 16   Ht 5' 10\" (1.778 m)   Wt 130 lb 4.7 oz (59.1 kg)   SpO2 97%   BMI 18.69 kg/m²   Gen/overall appearance: Not in acute distress. Alert.   Head: Normocephalic, atraumatic  Eyes: EOMI, good acuity  ENT:- Oral mucosa moist  Neck: No JVD, thyromegaly  CVS: Nml S1S2, no Medications      These medications were sent to [place] [address] [place] [address] [facility] [address], [facility] [address]    Phone:  [phone]   · bisacodyl 5 MG EC tablet  · ciprofloxacin 500 MG tablet  · metroNIDAZOLE 500 MG tablet         Activity: activity as tolerated  Diet: DIET LOW FAT;      Disposition: home  Discharged Condition: Stable  Follow Up:   Laura Garibay [address]  [address] [place] [address]  [phone]              Code status:  Prior         Total time spent on discharge, finalizing medications, referrals and arranging outpatient follow up was more than 45 minutes      Thank you Dr. Choudhury primary care provider on file. for the opportunity to be involved in this patients care.

## 2020-07-25 NOTE — PLAN OF CARE
Problem: Pain:  Goal: Pain level will decrease  Description: Pain level will decrease  Outcome: Ongoing     Problem: DAILY CARE  Goal: Daily care needs are met  Outcome: Ongoing     Problem: PAIN  Goal: Patient's pain/discomfort is manageable  Outcome: Ongoing     Problem: SKIN INTEGRITY  Goal: Skin integrity is maintained or improved  Outcome: Ongoing     Problem: KNOWLEDGE DEFICIT  Goal: Patient/S.O. demonstrates understanding of disease process, treatment plan, medications, and discharge instructions.   Outcome: Ongoing
Problem: Pain:  Goal: Pain level will decrease  Description: Pain level will decrease  Outcome: Ongoing  Goal: Control of chronic pain  Description: Control of chronic pain  Outcome: Ongoing     Problem: SAFETY  Goal: Free from accidental physical injury  Outcome: Ongoing  Goal: Free from intentional harm  Outcome: Ongoing     Problem: DAILY CARE  Goal: Daily care needs are met  Outcome: Ongoing     Problem: PAIN  Goal: Patient's pain/discomfort is manageable  Outcome: Ongoing     Problem: SKIN INTEGRITY  Goal: Skin integrity is maintained or improved  Outcome: Ongoing     Problem: KNOWLEDGE DEFICIT  Goal: Patient/S.O. demonstrates understanding of disease process, treatment plan, medications, and discharge instructions.   Outcome: Ongoing     Problem: Skin Integrity:  Goal: Will show no infection signs and symptoms  Description: Will show no infection signs and symptoms  Outcome: Ongoing  Goal: Absence of new skin breakdown  Description: Absence of new skin breakdown  Outcome: Ongoing     Problem: Falls - Risk of:  Goal: Will remain free from falls  Description: Will remain free from falls  Outcome: Ongoing  Goal: Absence of physical injury  Description: Absence of physical injury  Outcome: Ongoing
Problem: SAFETY  Goal: Free from accidental physical injury  Outcome: Ongoing     Problem: DAILY CARE  Goal: Daily care needs are met  Outcome: Ongoing     Problem: PAIN  Goal: Patient's pain/discomfort is manageable  Outcome: Ongoing     Problem: SKIN INTEGRITY  Goal: Skin integrity is maintained or improved  Outcome: Ongoing
disease process, treatment plan, medications, and disc  Problem: DISCHARGE BARRIERS  Goal: Patient's continuum of care needs are met  Note: Patients continuum of care needs are mets. Electronically signed by Manuelito Colón RN on 7/23/2020 at 7:48 AM     juaquin instructions.   Electronically signed by Manuelito Colón RN on 7/23/2020 at 7:48 AM

## 2020-07-27 ENCOUNTER — CARE COORDINATION (OUTPATIENT)
Dept: CASE MANAGEMENT | Age: 65
End: 2020-07-27

## 2020-07-27 NOTE — CARE COORDINATION
Date/Time:  7/27/2020 2:51 PM  Attempted to reach patient by telephone. Left HIPPA compliant message requesting a return call. Will attempt to reach patient again. Writer called [facility] and spoke with King Elif who confirmed they had a referral for this patient.

## 2020-07-28 ENCOUNTER — CARE COORDINATION (OUTPATIENT)
Dept: CASE MANAGEMENT | Age: 65
End: 2020-07-28

## 2020-07-28 NOTE — CARE COORDINATION
Date/Time:  7/28/2020 4:09 PM  Final attempt to reach patient by telephone. Left HIPPA compliant message stating this was final attempt and for patient to call PCP for any medical needs.

## 2020-09-21 ENCOUNTER — HOSPITAL ENCOUNTER (INPATIENT)
Age: 65
LOS: 2 days | Discharge: HOME | DRG: 391 | End: 2020-09-23
Attending: EMERGENCY MEDICINE | Admitting: STUDENT IN AN ORGANIZED HEALTH CARE EDUCATION/TRAINING PROGRAM
Payer: MEDICARE

## 2020-09-21 ENCOUNTER — APPOINTMENT (OUTPATIENT)
Dept: CT IMAGING | Age: 65
DRG: 391 | End: 2020-09-21
Payer: MEDICARE

## 2020-09-21 PROBLEM — D72.829 LEUKOCYTOSIS: Status: ACTIVE | Noted: 2020-09-21

## 2020-09-21 PROBLEM — R79.89 ELEVATED TROPONIN: Status: ACTIVE | Noted: 2020-09-21

## 2020-09-21 PROBLEM — N17.9 AKI (ACUTE KIDNEY INJURY) (HCC): Status: ACTIVE | Noted: 2020-09-21

## 2020-09-21 PROBLEM — R11.2 INTRACTABLE NAUSEA AND VOMITING: Status: ACTIVE | Noted: 2020-09-21

## 2020-09-21 PROBLEM — R77.8 ELEVATED TROPONIN: Status: ACTIVE | Noted: 2020-09-21

## 2020-09-21 PROBLEM — E87.20 LACTIC ACIDOSIS: Status: ACTIVE | Noted: 2020-09-21

## 2020-09-21 PROBLEM — A09 INFECTIOUS DIARRHEA: Status: ACTIVE | Noted: 2020-09-21

## 2020-09-21 PROBLEM — E87.6 HYPOKALEMIA: Status: ACTIVE | Noted: 2020-09-21

## 2020-09-21 LAB
A/G RATIO: 1.4 (ref 1.1–2.2)
ALBUMIN SERPL-MCNC: 5.3 G/DL (ref 3.4–5)
ALP BLD-CCNC: 133 U/L (ref 40–129)
ALT SERPL-CCNC: 17 U/L (ref 10–40)
ANION GAP SERPL CALCULATED.3IONS-SCNC: 23 MMOL/L (ref 3–16)
AST SERPL-CCNC: 25 U/L (ref 15–37)
BASOPHILS ABSOLUTE: 0.1 K/UL (ref 0–0.2)
BASOPHILS RELATIVE PERCENT: 0.3 %
BILIRUB SERPL-MCNC: 0.8 MG/DL (ref 0–1)
BUN BLDV-MCNC: 26 MG/DL (ref 7–20)
C DIFF TOXIN/ANTIGEN: NORMAL
CALCIUM SERPL-MCNC: 11.3 MG/DL (ref 8.3–10.6)
CHLORIDE BLD-SCNC: 89 MMOL/L (ref 99–110)
CO2: 20 MMOL/L (ref 21–32)
CREAT SERPL-MCNC: 1.6 MG/DL (ref 0.8–1.3)
EOSINOPHILS ABSOLUTE: 0 K/UL (ref 0–0.6)
EOSINOPHILS RELATIVE PERCENT: 0.1 %
GFR AFRICAN AMERICAN: 53
GFR NON-AFRICAN AMERICAN: 43
GLOBULIN: 3.8 G/DL
GLUCOSE BLD-MCNC: 130 MG/DL (ref 70–99)
HCT VFR BLD CALC: 53.1 % (ref 40.5–52.5)
HEMOGLOBIN: 17.8 G/DL (ref 13.5–17.5)
LACTIC ACID: 6.6 MMOL/L (ref 0.4–2)
LYMPHOCYTES ABSOLUTE: 1.4 K/UL (ref 1–5.1)
LYMPHOCYTES RELATIVE PERCENT: 6.1 %
MAGNESIUM: 2.3 MG/DL (ref 1.8–2.4)
MCH RBC QN AUTO: 29.8 PG (ref 26–34)
MCHC RBC AUTO-ENTMCNC: 33.5 G/DL (ref 31–36)
MCV RBC AUTO: 89 FL (ref 80–100)
MONOCYTES ABSOLUTE: 1.6 K/UL (ref 0–1.3)
MONOCYTES RELATIVE PERCENT: 7 %
NEUTROPHILS ABSOLUTE: 19.6 K/UL (ref 1.7–7.7)
NEUTROPHILS RELATIVE PERCENT: 86.5 %
PDW BLD-RTO: 18.4 % (ref 12.4–15.4)
PLATELET # BLD: 667 K/UL (ref 135–450)
PMV BLD AUTO: 7.8 FL (ref 5–10.5)
POTASSIUM REFLEX MAGNESIUM: 2.8 MMOL/L (ref 3.5–5.1)
RBC # BLD: 5.96 M/UL (ref 4.2–5.9)
SODIUM BLD-SCNC: 132 MMOL/L (ref 136–145)
TOTAL PROTEIN: 9.1 G/DL (ref 6.4–8.2)
TROPONIN: 0.03 NG/ML
WBC # BLD: 22.6 K/UL (ref 4–11)
WHITE BLOOD CELLS (WBC), STOOL: NORMAL

## 2020-09-21 PROCEDURE — 87449 NOS EACH ORGANISM AG IA: CPT

## 2020-09-21 PROCEDURE — 96365 THER/PROPH/DIAG IV INF INIT: CPT

## 2020-09-21 PROCEDURE — 99285 EMERGENCY DEPT VISIT HI MDM: CPT

## 2020-09-21 PROCEDURE — 96375 TX/PRO/DX INJ NEW DRUG ADDON: CPT

## 2020-09-21 PROCEDURE — 74177 CT ABD & PELVIS W/CONTRAST: CPT

## 2020-09-21 PROCEDURE — 87506 IADNA-DNA/RNA PROBE TQ 6-11: CPT

## 2020-09-21 PROCEDURE — 83605 ASSAY OF LACTIC ACID: CPT

## 2020-09-21 PROCEDURE — 85025 COMPLETE CBC W/AUTO DIFF WBC: CPT

## 2020-09-21 PROCEDURE — 6360000002 HC RX W HCPCS: Performed by: EMERGENCY MEDICINE

## 2020-09-21 PROCEDURE — 80053 COMPREHEN METABOLIC PANEL: CPT

## 2020-09-21 PROCEDURE — 6360000004 HC RX CONTRAST MEDICATION: Performed by: EMERGENCY MEDICINE

## 2020-09-21 PROCEDURE — 83735 ASSAY OF MAGNESIUM: CPT

## 2020-09-21 PROCEDURE — 96366 THER/PROPH/DIAG IV INF ADDON: CPT

## 2020-09-21 PROCEDURE — 2580000003 HC RX 258: Performed by: EMERGENCY MEDICINE

## 2020-09-21 PROCEDURE — 83630 LACTOFERRIN FECAL (QUAL): CPT

## 2020-09-21 PROCEDURE — 6370000000 HC RX 637 (ALT 250 FOR IP): Performed by: EMERGENCY MEDICINE

## 2020-09-21 PROCEDURE — 96361 HYDRATE IV INFUSION ADD-ON: CPT

## 2020-09-21 PROCEDURE — 87324 CLOSTRIDIUM AG IA: CPT

## 2020-09-21 PROCEDURE — 6370000000 HC RX 637 (ALT 250 FOR IP): Performed by: STUDENT IN AN ORGANIZED HEALTH CARE EDUCATION/TRAINING PROGRAM

## 2020-09-21 PROCEDURE — 1200000000 HC SEMI PRIVATE

## 2020-09-21 PROCEDURE — 84484 ASSAY OF TROPONIN QUANT: CPT

## 2020-09-21 RX ORDER — ONDANSETRON 2 MG/ML
4 INJECTION INTRAMUSCULAR; INTRAVENOUS EVERY 6 HOURS PRN
Status: DISCONTINUED | OUTPATIENT
Start: 2020-09-21 | End: 2020-09-23 | Stop reason: HOSPADM

## 2020-09-21 RX ORDER — IPRATROPIUM BROMIDE AND ALBUTEROL SULFATE 2.5; .5 MG/3ML; MG/3ML
1 SOLUTION RESPIRATORY (INHALATION) EVERY 4 HOURS PRN
Status: DISCONTINUED | OUTPATIENT
Start: 2020-09-21 | End: 2020-09-23 | Stop reason: HOSPADM

## 2020-09-21 RX ORDER — ONDANSETRON 2 MG/ML
8 INJECTION INTRAMUSCULAR; INTRAVENOUS ONCE
Status: COMPLETED | OUTPATIENT
Start: 2020-09-21 | End: 2020-09-21

## 2020-09-21 RX ORDER — SODIUM CHLORIDE 0.9 % (FLUSH) 0.9 %
10 SYRINGE (ML) INJECTION EVERY 12 HOURS SCHEDULED
Status: DISCONTINUED | OUTPATIENT
Start: 2020-09-21 | End: 2020-09-23 | Stop reason: HOSPADM

## 2020-09-21 RX ORDER — VERAPAMIL HYDROCHLORIDE 80 MG/1
80 TABLET ORAL 4 TIMES DAILY
Status: DISCONTINUED | OUTPATIENT
Start: 2020-09-21 | End: 2020-09-23 | Stop reason: HOSPADM

## 2020-09-21 RX ORDER — TIZANIDINE 4 MG/1
4 TABLET ORAL EVERY 8 HOURS PRN
COMMUNITY

## 2020-09-21 RX ORDER — TRAZODONE HYDROCHLORIDE 50 MG/1
50 TABLET ORAL NIGHTLY
Status: DISCONTINUED | OUTPATIENT
Start: 2020-09-21 | End: 2020-09-23 | Stop reason: HOSPADM

## 2020-09-21 RX ORDER — PROMETHAZINE HYDROCHLORIDE 25 MG/1
25 TABLET ORAL EVERY 6 HOURS PRN
COMMUNITY

## 2020-09-21 RX ORDER — 0.9 % SODIUM CHLORIDE 0.9 %
30 INTRAVENOUS SOLUTION INTRAVENOUS ONCE
Status: COMPLETED | OUTPATIENT
Start: 2020-09-21 | End: 2020-09-21

## 2020-09-21 RX ORDER — RABEPRAZOLE SODIUM 20 MG/1
20 TABLET, DELAYED RELEASE ORAL 4 TIMES DAILY
COMMUNITY
End: 2021-12-23

## 2020-09-21 RX ORDER — LISINOPRIL 40 MG/1
40 TABLET ORAL DAILY
Status: DISCONTINUED | OUTPATIENT
Start: 2020-09-22 | End: 2020-09-23 | Stop reason: HOSPADM

## 2020-09-21 RX ORDER — POTASSIUM CHLORIDE 20 MEQ/1
40 TABLET, EXTENDED RELEASE ORAL ONCE
Status: COMPLETED | OUTPATIENT
Start: 2020-09-21 | End: 2020-09-21

## 2020-09-21 RX ORDER — ONDANSETRON 2 MG/ML
4 INJECTION INTRAMUSCULAR; INTRAVENOUS EVERY 30 MIN PRN
Status: DISCONTINUED | OUTPATIENT
Start: 2020-09-21 | End: 2020-09-21

## 2020-09-21 RX ORDER — DULOXETIN HYDROCHLORIDE 60 MG/1
60 CAPSULE, DELAYED RELEASE ORAL DAILY
Status: DISCONTINUED | OUTPATIENT
Start: 2020-09-22 | End: 2020-09-23 | Stop reason: HOSPADM

## 2020-09-21 RX ORDER — VANCOMYCIN HYDROCHLORIDE 125 MG/1
125 CAPSULE ORAL EVERY 6 HOURS SCHEDULED
Status: DISCONTINUED | OUTPATIENT
Start: 2020-09-22 | End: 2020-09-22

## 2020-09-21 RX ORDER — SODIUM CHLORIDE 9 MG/ML
INJECTION, SOLUTION INTRAVENOUS CONTINUOUS
Status: ACTIVE | OUTPATIENT
Start: 2020-09-21 | End: 2020-09-22

## 2020-09-21 RX ORDER — POTASSIUM CHLORIDE 750 MG/1
20 TABLET, FILM COATED, EXTENDED RELEASE ORAL ONCE
Status: COMPLETED | OUTPATIENT
Start: 2020-09-21 | End: 2020-09-21

## 2020-09-21 RX ORDER — POTASSIUM CHLORIDE 7.45 MG/ML
10 INJECTION INTRAVENOUS ONCE
Status: COMPLETED | OUTPATIENT
Start: 2020-09-21 | End: 2020-09-21

## 2020-09-21 RX ORDER — SODIUM CHLORIDE 0.9 % (FLUSH) 0.9 %
10 SYRINGE (ML) INJECTION PRN
Status: DISCONTINUED | OUTPATIENT
Start: 2020-09-21 | End: 2020-09-23 | Stop reason: HOSPADM

## 2020-09-21 RX ORDER — PANTOPRAZOLE SODIUM 40 MG/1
40 TABLET, DELAYED RELEASE ORAL
Status: DISCONTINUED | OUTPATIENT
Start: 2020-09-22 | End: 2020-09-23 | Stop reason: HOSPADM

## 2020-09-21 RX ADMIN — POTASSIUM CHLORIDE 20 MEQ: 750 TABLET, FILM COATED, EXTENDED RELEASE ORAL at 19:43

## 2020-09-21 RX ADMIN — ONDANSETRON 8 MG: 2 INJECTION INTRAMUSCULAR; INTRAVENOUS at 18:33

## 2020-09-21 RX ADMIN — POTASSIUM CHLORIDE 10 MEQ: 7.46 INJECTION, SOLUTION INTRAVENOUS at 19:43

## 2020-09-21 RX ADMIN — IOPAMIDOL 75 ML: 755 INJECTION, SOLUTION INTRAVENOUS at 19:31

## 2020-09-21 RX ADMIN — SODIUM CHLORIDE 1659 ML: 9 INJECTION, SOLUTION INTRAVENOUS at 18:33

## 2020-09-21 RX ADMIN — HYDROMORPHONE HYDROCHLORIDE 1 MG: 1 INJECTION, SOLUTION INTRAMUSCULAR; INTRAVENOUS; SUBCUTANEOUS at 18:57

## 2020-09-21 RX ADMIN — POTASSIUM CHLORIDE 40 MEQ: 1500 TABLET, EXTENDED RELEASE ORAL at 22:22

## 2020-09-21 ASSESSMENT — PAIN DESCRIPTION - LOCATION: LOCATION: ABDOMEN;BACK

## 2020-09-21 ASSESSMENT — PAIN DESCRIPTION - ONSET: ONSET: ON-GOING

## 2020-09-21 ASSESSMENT — PAIN SCALES - GENERAL
PAINLEVEL_OUTOF10: 7
PAINLEVEL_OUTOF10: 8
PAINLEVEL_OUTOF10: 7

## 2020-09-21 ASSESSMENT — PAIN DESCRIPTION - PAIN TYPE: TYPE: ACUTE PAIN

## 2020-09-21 ASSESSMENT — PAIN DESCRIPTION - DESCRIPTORS: DESCRIPTORS: ACHING;CONSTANT

## 2020-09-21 ASSESSMENT — PAIN DESCRIPTION - FREQUENCY: FREQUENCY: CONTINUOUS

## 2020-09-21 ASSESSMENT — PAIN DESCRIPTION - PROGRESSION: CLINICAL_PROGRESSION: GRADUALLY WORSENING

## 2020-09-21 NOTE — ED PROVIDER NOTES
[address] [facility]      Pt Name: Kaylah Desai  MRN: [id number]  Maria Eugeniagfstephanie 1955  Date of evaluation: 9/21/2020  Provider: Diann Haskins, [facility]  Chief Complaint   Patient presents with    Diarrhea     2 times today but also has nausea        I wore personal protective equipment when I was in the room the entire time. This includes gloves, N95 mask, face shield, and a glove over my stethoscope for protection. HPI  Kaylah Client is a 76 y.o. male who presents with nausea vomiting and diarrhea is been present for couple weeks. He was admitted to the hospital 1 week ago when he got better. He started vomiting again today according to what he told the nurse. However, he told me that he started vomiting a week ago he been vomiting 2-3 times per day. He has had a 10 pound weight loss over the past month. He denies any new medications or medications over-the-counter that are new prior to the onset of symptoms. He denies any fevers or chills. Denies any vomiting of blood or blood in his stools. Nothing makes it better or worse. He describes his symptoms as severe. Denies any radiation of his pain. ? REVIEW OF SYSTEMS  All systems negative except as noted in the HPI. Reviewed Nurses' notes and concur. No LMP for male patient. PAST MEDICAL HISTORY  Past Medical History:   Diagnosis Date    Arthritis     Gong's esophagus     Chronic back pain     [facility]ronic obstructive [facility] disease) [facility] [address]     Depression     GERD (gastroesophageal reflux disease)     HYPERCHOLESTERAEMIA     Hypertension     Prinzmetal angina (HCC)     Spinal stenoses     TIA (transient ischaemic attack)        FAMILY HISTORY  Family History   Problem Relation Age of Onset    Heart Disease Mother     Cancer Father         lung       SOCIAL HISTORY   reports that he has been smoking. He has a 42.00 pack-year smoking history.  He has normal, Judgment normal, Mood normal.    LABORATORY  Labs Reviewed   CBC WITH AUTO DIFFERENTIAL - Abnormal; Notable for the following components:       Result Value    WBC 22.6 (*)     RBC 5.96 (*)     Hemoglobin 17.8 (*)     Hematocrit 53.1 (*)     RDW 18.4 (*)     Platelets 661 (*)     Neutrophils Absolute 19.6 (*)     Monocytes Absolute 1.6 (*)     All other components within normal limits    Narrative:     Performed at:  [place] [address]   Phone [phone]   LACTIC ACID, PLASMA - Abnormal; Notable for the following components:    Lactic Acid 6.6 (*)     All other components within normal limits    Narrative:     Licha Hardy tel. [phone],  Chemistry results called to and read back by anupama soria rn, 09/21/2020  18:54, by [facility]  Performed at:  [address] [address]   Phone [phone]   COMPREHENSIVE METABOLIC PANEL W/ REFLEX TO MG FOR LOW K - Abnormal; Notable for the following components:    Sodium 132 (*)     Potassium reflex Magnesium 2.8 (*)     Chloride 89 (*)     CO2 20 (*)     Anion Gap 23 (*)     Glucose 130 (*)     BUN 26 (*)     CREATININE 1.6 (*)     GFR Non- 43 (*)     GFR  53 (*)     Calcium 11.3 (*)     Total Protein 9.1 (*)     Alb 5.3 (*)     Alkaline Phosphatase 133 (*)     All other components within normal limits    Narrative:     Licha Hardy tel. [phone],  Chemistry results called to and read back by anupama aragon rn, 09/21/2020  18:54, by [facility]  Performed at:  [address] [address]   Phone [phone]   TROPONIN - Abnormal; Notable for the following components:    Troponin 0.03 (*)     All other components within normal limits    Narrative:     Performed at:  [facility]  [address] [place] [address], [address] [place] [address]   Phone [phone]   C DIFF TOXIN/ANTIGEN   GASTROINTESTINAL PANEL, MOLECULAR   MAGNESIUM    Narrative:     Niyah Goel tel. [phone],  Chemistry results called to and read back by anupama aragon rn, 09/21/2020  18:54, by [facility]  Performed at:  [address] [facility] [address]   Phone [phone]   LIPASE       RADIOLOGY/PROCEDURES  I personally reviewed the images for this case. CT ABDOMEN PELVIS W IV CONTRAST Additional Contrast? None   Final Result   1. No acute findings within the abdomen or pelvis   2. Prior cholecystectomy and splenectomy   3. Stable aneurysmal dilatation of the infrarenal abdominal aorta, measuring   4.7 cm. Continued six-month surveillance recommended              COURSE & MEDICAL DECISION MAKING  Pertinent Labs & Imaging studies reviewed.  (See chart for details)    Vitals:    09/21/20 1746   BP: (!) 157/87   Pulse: 71   Resp: 20   Temp: 97.5 °F (36.4 °C)   TempSrc: Oral   SpO2: 100%   Weight: 121 lb 14.6 oz (55.3 kg)   Height: 5' 10\" (1.778 m)       Medications   ondansetron (ZOFRAN) injection 4 mg (has no administration in time range)   ondansetron (ZOFRAN) injection 8 mg (8 mg Intravenous Given 9/21/20 1833)   HYDROmorphone (DILAUDID) injection 1 mg (1 mg Intravenous Given 9/21/20 1857)   0.9 % sodium chloride bolus (1,659 mLs Intravenous New Bag 9/21/20 1833)   iopamidol (ISOVUE-370) 76 % injection 75 mL (75 mLs Intravenous Given 9/21/20 1931)   potassium chloride 10 mEq/100 mL IVPB (Peripheral Line) (10 mEq Intravenous New Bag 9/21/20 1943)   potassium chloride (KLOR-CON) extended release tablet 20 mEq (20 mEq Oral Given 9/21/20 1943)       New Prescriptions    No medications on file       SEP-1 CORE MEASURE DATA  Classification: severe sepsis  Amount of fluids ordered: at least 30mL/kg based on entered actual weight at time of triage  Time at which sepsis was identified: 1900  Broad-spectrum 1945  Critical Care Time: 35 minutes not including billable procedures. Diagnostic considerations include but are not limited to:  gastritis, kidney stone, pyelonephritis, UTI, cholecystitis, cholelithiasis, testicular torsion, orchitis, epididymitis, prostatitis, appendicitis, STD, abdominal contusion, bowel infarction, pancreatitis, gastritis, peptic ulcer disease, gastroenteritis, AAA.        Dez Mccullough DO  09/21/20 2000

## 2020-09-21 NOTE — ED NOTES
Bed: B-  Expected date: 9/21/20  Expected time: 5:38 PM  Means of arrival: [facility]  Comments:  65m diarrhea     Burke Becerra RN  09/21/20 2926

## 2020-09-22 PROBLEM — E43 SEVERE MALNUTRITION (HCC): Chronic | Status: ACTIVE | Noted: 2020-09-22

## 2020-09-22 LAB
AMPHETAMINE SCREEN, URINE: ABNORMAL
ANION GAP SERPL CALCULATED.3IONS-SCNC: 9 MMOL/L (ref 3–16)
BARBITURATE SCREEN URINE: ABNORMAL
BASOPHILS ABSOLUTE: 0.1 K/UL (ref 0–0.2)
BASOPHILS RELATIVE PERCENT: 0.7 %
BENZODIAZEPINE SCREEN, URINE: ABNORMAL
BUN BLDV-MCNC: 16 MG/DL (ref 7–20)
CALCIUM SERPL-MCNC: 9.1 MG/DL (ref 8.3–10.6)
CANNABINOID SCREEN URINE: ABNORMAL
CHLORIDE BLD-SCNC: 100 MMOL/L (ref 99–110)
CO2: 23 MMOL/L (ref 21–32)
COCAINE METABOLITE SCREEN URINE: ABNORMAL
CREAT SERPL-MCNC: 0.7 MG/DL (ref 0.8–1.3)
EOSINOPHILS ABSOLUTE: 0.1 K/UL (ref 0–0.6)
EOSINOPHILS RELATIVE PERCENT: 0.5 %
GFR AFRICAN AMERICAN: >60
GFR NON-AFRICAN AMERICAN: >60
GIARDIA ANTIGEN STOOL: NORMAL
GLUCOSE BLD-MCNC: 94 MG/DL (ref 70–99)
HCT VFR BLD CALC: 44.4 % (ref 40.5–52.5)
HEMOGLOBIN: 15 G/DL (ref 13.5–17.5)
LIPASE: 19 U/L (ref 13–60)
LYMPHOCYTES ABSOLUTE: 1.6 K/UL (ref 1–5.1)
LYMPHOCYTES RELATIVE PERCENT: 15.4 %
Lab: ABNORMAL
MCH RBC QN AUTO: 29.8 PG (ref 26–34)
MCHC RBC AUTO-ENTMCNC: 33.7 G/DL (ref 31–36)
MCV RBC AUTO: 88.3 FL (ref 80–100)
METHADONE SCREEN, URINE: ABNORMAL
MONOCYTES ABSOLUTE: 0.9 K/UL (ref 0–1.3)
MONOCYTES RELATIVE PERCENT: 8.9 %
NEUTROPHILS ABSOLUTE: 7.6 K/UL (ref 1.7–7.7)
NEUTROPHILS RELATIVE PERCENT: 74.5 %
OPIATE SCREEN URINE: POSITIVE
OXYCODONE URINE: POSITIVE
PDW BLD-RTO: 18.1 % (ref 12.4–15.4)
PH UA: 6
PHENCYCLIDINE SCREEN URINE: ABNORMAL
PLATELET # BLD: 514 K/UL (ref 135–450)
PMV BLD AUTO: 7.6 FL (ref 5–10.5)
POTASSIUM REFLEX MAGNESIUM: 4 MMOL/L (ref 3.5–5.1)
PROPOXYPHENE SCREEN: ABNORMAL
RBC # BLD: 5.03 M/UL (ref 4.2–5.9)
SODIUM BLD-SCNC: 132 MMOL/L (ref 136–145)
T3 FREE: 2.6 PG/ML (ref 2.3–4.2)
T4 FREE: 1.5 NG/DL (ref 0.9–1.8)
TSH REFLEX: 0.58 UIU/ML (ref 0.27–4.2)
WBC # BLD: 10.2 K/UL (ref 4–11)

## 2020-09-22 PROCEDURE — 83690 ASSAY OF LIPASE: CPT

## 2020-09-22 PROCEDURE — 97165 OT EVAL LOW COMPLEX 30 MIN: CPT

## 2020-09-22 PROCEDURE — 6370000000 HC RX 637 (ALT 250 FOR IP): Performed by: INTERNAL MEDICINE

## 2020-09-22 PROCEDURE — 6370000000 HC RX 637 (ALT 250 FOR IP): Performed by: STUDENT IN AN ORGANIZED HEALTH CARE EDUCATION/TRAINING PROGRAM

## 2020-09-22 PROCEDURE — 84439 ASSAY OF FREE THYROXINE: CPT

## 2020-09-22 PROCEDURE — 84443 ASSAY THYROID STIM HORMONE: CPT

## 2020-09-22 PROCEDURE — 6370000000 HC RX 637 (ALT 250 FOR IP): Performed by: NURSE PRACTITIONER

## 2020-09-22 PROCEDURE — 87506 IADNA-DNA/RNA PROBE TQ 6-11: CPT

## 2020-09-22 PROCEDURE — 36415 COLL VENOUS BLD VENIPUNCTURE: CPT

## 2020-09-22 PROCEDURE — 2580000003 HC RX 258: Performed by: STUDENT IN AN ORGANIZED HEALTH CARE EDUCATION/TRAINING PROGRAM

## 2020-09-22 PROCEDURE — 97530 THERAPEUTIC ACTIVITIES: CPT

## 2020-09-22 PROCEDURE — 85025 COMPLETE CBC W/AUTO DIFF WBC: CPT

## 2020-09-22 PROCEDURE — 97162 PT EVAL MOD COMPLEX 30 MIN: CPT

## 2020-09-22 PROCEDURE — 1200000000 HC SEMI PRIVATE

## 2020-09-22 PROCEDURE — 80048 BASIC METABOLIC PNL TOTAL CA: CPT

## 2020-09-22 PROCEDURE — 80307 DRUG TEST PRSMV CHEM ANLYZR: CPT

## 2020-09-22 PROCEDURE — 6360000002 HC RX W HCPCS: Performed by: STUDENT IN AN ORGANIZED HEALTH CARE EDUCATION/TRAINING PROGRAM

## 2020-09-22 PROCEDURE — 84481 FREE ASSAY (FT-3): CPT

## 2020-09-22 PROCEDURE — 97116 GAIT TRAINING THERAPY: CPT

## 2020-09-22 RX ORDER — OXYCODONE HYDROCHLORIDE AND ACETAMINOPHEN 5; 325 MG/1; MG/1
1 TABLET ORAL EVERY 6 HOURS PRN
Status: DISCONTINUED | OUTPATIENT
Start: 2020-09-22 | End: 2020-09-23 | Stop reason: HOSPADM

## 2020-09-22 RX ORDER — PSEUDOEPHEDRINE HYDROCHLORIDE 30 MG/1
30 TABLET ORAL 3 TIMES DAILY PRN
Status: DISCONTINUED | OUTPATIENT
Start: 2020-09-22 | End: 2020-09-23 | Stop reason: HOSPADM

## 2020-09-22 RX ORDER — METHOCARBAMOL 750 MG/1
750 TABLET, FILM COATED ORAL NIGHTLY PRN
Status: DISCONTINUED | OUTPATIENT
Start: 2020-09-22 | End: 2020-09-23 | Stop reason: HOSPADM

## 2020-09-22 RX ORDER — LIDOCAINE 4 G/G
1 PATCH TOPICAL DAILY
Status: DISCONTINUED | OUTPATIENT
Start: 2020-09-22 | End: 2020-09-23 | Stop reason: HOSPADM

## 2020-09-22 RX ORDER — DICYCLOMINE HYDROCHLORIDE 10 MG/ML
20 INJECTION INTRAMUSCULAR 3 TIMES DAILY PRN
Status: DISCONTINUED | OUTPATIENT
Start: 2020-09-22 | End: 2020-09-23 | Stop reason: HOSPADM

## 2020-09-22 RX ORDER — FLUTICASONE PROPIONATE 50 MCG
2 SPRAY, SUSPENSION (ML) NASAL DAILY
Status: DISCONTINUED | OUTPATIENT
Start: 2020-09-22 | End: 2020-09-23 | Stop reason: HOSPADM

## 2020-09-22 RX ORDER — MAGNESIUM HYDROXIDE/ALUMINUM HYDROXICE/SIMETHICONE 120; 1200; 1200 MG/30ML; MG/30ML; MG/30ML
30 SUSPENSION ORAL EVERY 6 HOURS PRN
Status: DISCONTINUED | OUTPATIENT
Start: 2020-09-22 | End: 2020-09-23 | Stop reason: HOSPADM

## 2020-09-22 RX ADMIN — VERAPAMIL HYDROCHLORIDE 80 MG: 80 TABLET ORAL at 19:41

## 2020-09-22 RX ADMIN — ONDANSETRON 4 MG: 2 INJECTION INTRAMUSCULAR; INTRAVENOUS at 18:07

## 2020-09-22 RX ADMIN — SODIUM CHLORIDE: 9 INJECTION, SOLUTION INTRAVENOUS at 00:27

## 2020-09-22 RX ADMIN — OXYCODONE HYDROCHLORIDE AND ACETAMINOPHEN 1 TABLET: 5; 325 TABLET ORAL at 17:12

## 2020-09-22 RX ADMIN — VANCOMYCIN HYDROCHLORIDE 125 MG: 125 CAPSULE ORAL at 00:26

## 2020-09-22 RX ADMIN — TRAZODONE HYDROCHLORIDE 50 MG: 50 TABLET ORAL at 00:26

## 2020-09-22 RX ADMIN — Medication 10 ML: at 21:27

## 2020-09-22 RX ADMIN — OXYCODONE HYDROCHLORIDE AND ACETAMINOPHEN 1 TABLET: 5; 325 TABLET ORAL at 10:52

## 2020-09-22 RX ADMIN — LISINOPRIL 40 MG: 40 TABLET ORAL at 09:07

## 2020-09-22 RX ADMIN — VERAPAMIL HYDROCHLORIDE 80 MG: 80 TABLET ORAL at 13:24

## 2020-09-22 RX ADMIN — TRAZODONE HYDROCHLORIDE 50 MG: 50 TABLET ORAL at 21:26

## 2020-09-22 RX ADMIN — SODIUM CHLORIDE: 9 INJECTION, SOLUTION INTRAVENOUS at 10:05

## 2020-09-22 RX ADMIN — ENOXAPARIN SODIUM 40 MG: 40 INJECTION SUBCUTANEOUS at 09:06

## 2020-09-22 RX ADMIN — VERAPAMIL HYDROCHLORIDE 80 MG: 80 TABLET ORAL at 00:47

## 2020-09-22 RX ADMIN — VANCOMYCIN HYDROCHLORIDE 125 MG: 125 CAPSULE ORAL at 06:11

## 2020-09-22 RX ADMIN — METHOCARBAMOL TABLETS 750 MG: 750 TABLET, COATED ORAL at 21:26

## 2020-09-22 RX ADMIN — DULOXETINE HYDROCHLORIDE 60 MG: 60 CAPSULE, DELAYED RELEASE ORAL at 09:07

## 2020-09-22 RX ADMIN — PANTOPRAZOLE SODIUM 40 MG: 40 TABLET, DELAYED RELEASE ORAL at 06:11

## 2020-09-22 RX ADMIN — FLUTICASONE PROPIONATE 2 SPRAY: 50 SPRAY, METERED NASAL at 10:52

## 2020-09-22 ASSESSMENT — PAIN SCALES - GENERAL
PAINLEVEL_OUTOF10: 8
PAINLEVEL_OUTOF10: 4
PAINLEVEL_OUTOF10: 7

## 2020-09-22 ASSESSMENT — PAIN DESCRIPTION - FREQUENCY
FREQUENCY: CONTINUOUS
FREQUENCY: INTERMITTENT
FREQUENCY: CONTINUOUS
FREQUENCY: CONTINUOUS

## 2020-09-22 ASSESSMENT — PAIN DESCRIPTION - PROGRESSION
CLINICAL_PROGRESSION: NOT CHANGED
CLINICAL_PROGRESSION: GRADUALLY WORSENING
CLINICAL_PROGRESSION: NOT CHANGED

## 2020-09-22 ASSESSMENT — PAIN DESCRIPTION - ONSET
ONSET: ON-GOING

## 2020-09-22 ASSESSMENT — PAIN DESCRIPTION - LOCATION
LOCATION: ABDOMEN;BACK
LOCATION: ABDOMEN;BACK
LOCATION: ABDOMEN
LOCATION: BACK

## 2020-09-22 ASSESSMENT — PAIN DESCRIPTION - DESCRIPTORS
DESCRIPTORS: ACHING;CONSTANT
DESCRIPTORS: ACHING
DESCRIPTORS: SHARP
DESCRIPTORS: ACHING;CONSTANT
DESCRIPTORS: ACHING;CONSTANT

## 2020-09-22 ASSESSMENT — PAIN DESCRIPTION - PAIN TYPE
TYPE: ACUTE PAIN

## 2020-09-22 ASSESSMENT — PAIN - FUNCTIONAL ASSESSMENT: PAIN_FUNCTIONAL_ASSESSMENT: PREVENTS OR INTERFERES SOME ACTIVE ACTIVITIES AND ADLS

## 2020-09-22 NOTE — PROGRESS NOTES
Hospitalist Progress Note      PCP: No primary care provider on file. Date of Admission: 9/21/2020    CC nausea, vomiting, diarrhea  Hospital course  Patient is 23-year-old gentleman with history of COPD not on home oxygen, depression, GERD was admitted yesterday with a complaint of over a month of diarrhea, vomiting, abdominal pain. Patient endorses that he has lost 45 pounds in last 1 year. Subjective  Seen and examined today. Reported that nausea and vomiting is better. Denies abdominal pain. As per nursing he did had a bowel movement  Assessment and plan  #Nausea vomiting, abdominal pain etiology to be ruled out. C. difficile negative. Giardia negative, FOBT negative fecal leukocyte negative. Stop p.o. vancomycin. Symptomatic management. #Weight loss with a history of smoking  CT chest in July 2020 did not reveal any pulmonary masses or nodules. Do not need to repeat one.  /Within normal limit. Counseled regarding nutrition. Dietitian has been consulted. #Attention is stable  Continue lisinopril. #Depression  ?   Psych consult      Medications:  Reviewed    Infusion Medications    sodium chloride 100 mL/hr at 09/22/20 1005     Scheduled Medications    lidocaine  1 patch Transdermal Daily    fluticasone  2 spray Each Nostril Daily    sodium chloride flush  10 mL Intravenous 2 times per day    enoxaparin  40 mg Subcutaneous Daily    DULoxetine  60 mg Oral Daily    lisinopril  40 mg Oral Daily    pantoprazole  40 mg Oral QAM AC    traZODone  50 mg Oral Nightly    verapamil  80 mg Oral 4x Daily     PRN Meds: dicyclomine, aluminum & magnesium hydroxide-simethicone, oxyCODONE-acetaminophen, sodium chloride flush, ondansetron, ipratropium-albuterol      Intake/Output Summary (Last 24 hours) at 9/22/2020 1411  Last data filed at 9/22/2020 1007  Gross per 24 hour   Intake --   Output 350 ml   Net -350 ml       Physical Exam Performed:    /89   Pulse 104   Temp 98.2 °F (36.8 °C) (Oral)   Resp 16   Ht 5' 10\" (1.778 m)   Wt 123 lb 3.8 oz (55.9 kg)   SpO2 97%   BMI 17.68 kg/m²     General appearance: No apparent distress, appears stated age and cooperative. HEENT: Pupils equal, round, and reactive to light. Conjunctivae/corneas clear. Neck: Supple, with full range of motion. No jugular venous distention. Trachea midline. Respiratory:  Normal respiratory effort. Clear to auscultation, bilaterally without Rales/Wheezes/Rhonchi. Cardiovascular: Regular rate and rhythm with normal S1/S2 without murmurs, rubs or gallops. Abdomen: Soft, non-tender, non-distended with normal bowel sounds. Musculoskeletal: No clubbing, cyanosis or edema bilaterally. Full range of motion without deformity. Skin: Skin color, texture, turgor normal.  No rashes or lesions. Neurologic:  Neurovascularly intact without any focal sensory/motor deficits. Cranial nerves: II-XII intact, grossly non-focal.  Psychiatric: Alert and oriented, thought content appropriate, low mood. Capillary Refill: Brisk,< 3 seconds   Peripheral Pulses: +2 palpable, equal bilaterally       Labs:   Recent Labs     09/21/20  1804 09/22/20  0958   WBC 22.6* 10.2   HGB 17.8* 15.0   HCT 53.1* 44.4   * 514*     Recent Labs     09/21/20  1804 09/22/20  0958   * 132*   K 2.8* 4.0   CL 89* 100   CO2 20* 23   BUN 26* 16   CREATININE 1.6* 0.7*   CALCIUM 11.3* 9.1     Recent Labs     09/21/20  1804   AST 25   ALT 17   BILITOT 0.8   ALKPHOS 133*     No results for input(s): INR in the last 72 hours. Recent Labs     09/21/20  1804   TROPONINI 0.03*       Urinalysis:      Lab Results   Component Value Date    NITRU Negative 07/22/2020    WBCUA 8 07/22/2020    RBCUA 13 07/22/2020    BLOODU SMALL 07/22/2020    SPECGRAV 1.009 07/22/2020    GLUCOSEU 100 07/22/2020       Radiology:  CT ABDOMEN PELVIS W IV CONTRAST Additional Contrast? None   Final Result   1. No acute findings within the abdomen or pelvis   2.  Prior cholecystectomy and splenectomy   3. Stable aneurysmal dilatation of the infrarenal abdominal aorta, measuring   4.7 cm. Continued six-month surveillance recommended                 Assessment/Plan:    Active Hospital Problems    Diagnosis Date Noted    Severe malnutrition ([facility] [address] [E43] 09/22/2020    Infectious diarrhea [A09] 09/21/2020    Leukocytosis [D72.829] 09/21/2020    Lactic acidosis [E87.2] 09/21/2020    Elevated troponin [R79.89] 09/21/2020    Hypokalemia [E87.6] 09/21/2020    ELIZABET (acute kidney injury) ([facility] [address] [N17.9] 09/21/2020    Intractable nausea and vomiting [R11.2] 09/21/2020    [facility] [A41.9] 09/21/2015         DVT Prophylaxis: Lovenox  Diet: Dietary Nutrition Supplements: Standard High Calorie Oral Supplement  DIET GENERAL;  Code Status: Full Code    PT/OT Eval Status: Consult    Dispo -inpatient. Monitor for diarrhea.   Likely discharge in 1 to 2 days    This chart was likely completed using voice recognition technology and may contain unintended grammatical , phraseology,and/or punctuation errors    Susu Cowan MD

## 2020-09-22 NOTE — H&P
Hospital Medicine History & Physical      PCP: No primary care provider on file. Date of Admission: 9/21/2020    Date of Service: Pt seen/examined on 9/21/2020 and Admitted to Inpatient      Chief Complaint: Nausea/vomiting/diarrhea      History Of Present Illness: The patient is a 76 y.o. male with past medical history of current Gong's esophagus, COPD not on oxygen, depression, GERD, HLD, hypertension, previous Prinzmetal angina, previous TIAs, and previous spinal stenoses significant back pain requiring use of medical marijuana who presents to [facility] with worsening progressive symptoms of nausea with nonbloody bouts of vomitus as well as persistent recurrent bouts of watery frequent diarrhea. She has also noted at least 30 pound weight loss at least within the past 4 to 5 months. However, patient notes that these GI symptoms started within the past week to 2 weeks. No specific food or other entities seem to be the trigger. Patient denies any previous ingestion of unpasteurized milk nor any well water or other contributory causes to GI enteritis. Patient has never been tested for H. pylori but notes that he has never been on antibiotics for any kind of acid reflux. Patient notes that the GI symptoms have progressively gotten worse and he has been unable to tolerate eating or drinking very much these past few weeks. Patient denies any blood in the vomitus nor in the stool. Patient has felt more and more progressive generalized weakness since all of this is started. Patient denies any fever, sick contacts, chills, night sweats, rashes, bites from any animal or insect, worsening leg swelling, chest pain, shortness of breath.     Past Medical History:        Diagnosis Date    Arthritis     Gong's esophagus     Chronic back pain     COPD (chronic puffs into the lungs every 6 hours as needed 5/1/20  Yes Historical Provider, MD   traZODone (DESYREL) 50 MG tablet Take 50 mg by mouth nightly   Yes Historical Provider, MD   lisinopril (PRINIVIL;ZESTRIL) 40 MG tablet Take 40 mg by mouth daily   Yes Historical Provider, MD   verapamil (CALAN) 80 MG tablet Take 80 mg by mouth 4 times daily    Yes Historical Provider, MD   medical marijuana Take 1 each by mouth as needed. Historical Provider, MD       Allergies:  Amitriptyline; Codeine; Demerol; Diltiazem hcl; Lansoprazole; Tricyclic antidepressants; and Trilisate [choline magnesium trisalicylate]    Social History:  The patient currently lives home    TOBACCO:   reports that he has been smoking. He has a 42.00 pack-year smoking history. He has never used smokeless tobacco.  ETOH:   reports no history of alcohol use. Substance abuse: Only medical marijuana and not to excess or recreation    Family History:  Reviewed in detail and negative for DM, Early CAD, Cancer, CVA. Positive as follows:        Problem Relation Age of Onset    Heart Disease Mother     Cancer Father         lung       REVIEW OF SYSTEMS: As noted in the HPI. All other systems reviewed and negative. PHYSICAL EXAM:    BP (!) 160/98   Pulse 73   Temp 97.8 °F (36.6 °C) (Oral)   Resp 16   Ht 5' 10\" (1.778 m)   Wt 123 lb 3.8 oz (55.9 kg)   SpO2 95%   BMI 17.68 kg/m²     General appearance: Fatigued, chronically ill-appearing, no acute respiratory distress  HEENT Normal cephalic, atraumatic without obvious deformity. Pupils equal, round, and reactive to light. Extra ocular muscles intact. Conjunctivae/corneas clear. Very dry mucous membrane  Neck: Supple, no JVD  Lungs: Clear to auscultation, bilaterally without Rales/Wheezes/Rhonchi with good respiratory effort.   Heart: Regular rate and rhythm with Normal S1/S2 without murmurs, rubs or gallops, point of maximum impulse non-displaced  Abdomen: Mild abdominal tenderness to all quadrants, [R11.2] 09/21/2020     Priority: High    Sepsis ([address] [A41.9] 09/21/2015     Priority: High    Elevated troponin [R79.89] 09/21/2020     Priority: Medium    Leukocytosis [D72.829] 09/21/2020    Lactic acidosis [E87.2] 09/21/2020    Hypokalemia [E87.6] 09/21/2020         PHYSICIANS CERTIFICATION:    I certify that Mario Zaidi is expected to be hospitalized for greater than 2 midnights based on the following assessment and plan:      ASSESSMENT/PLAN:  · Obtaining C. difficile test as well as full GI stool panel with fecal leukocytes and Giardia, if work-up for this stool is negative may need to look for other etiologies (possible neoplasm causing diarrheal illness) they could be tripping to diarrheal illness as well as notable weight loss  · We will for now treat empirically with vancomycin oral solution 125 every 6 hours, if C. difficile test comes back negative will very likely be able to discontinue this therapy as patient may not very well need it  · Start full liquid diet, IV fluids NS at 100/h, may advance diet if patient tolerating  · Restart home medications  · Although meeting criteria for septic shock, vitals appear to be stable at this time and is receiving IV fluids as well as antibiotic orally for diarrheal illness, repeat labs in the morning  · Troponin mildly elevated 0.03, likely secondary to dehydration and no suspicion for ACS etiology at this time, consider repeat troponin if patient starts having active chest pain  · If GI work-up of stool is negative, looking for other etiologies of diarrheal illnesses warranted and may consider GI consult for further guidance    DVT Prophylaxis: Lovenox  Diet: DIET FULL LIQUID;  Code Status: Full Code  PT/OT Eval Status: Ambulatory    Dispo -pending stool studies, pending clinical course       Manpreet Tillman, DO    Thank you No primary care provider on file. for the opportunity to be involved in this patient's care.  If you have any questions or concerns please feel free to contact me at [phone].

## 2020-09-22 NOTE — PROGRESS NOTES
Pt admitted to room. Alert and oriented. Abd flat with bowel sounds. Nausea better but not gone. Pt stated has had symptoms for over a month. Stated has lost 40 lbs over last year. Pain in mid abd. Call light in reach.

## 2020-09-22 NOTE — PROGRESS NOTES
4 Eyes Skin Assessment     The patient is being assess for ADMISSION      I agree that 2 RN's have performed a thorough Head to Toe Skin Assessment on the patient. ALL assessment sites listed below have been assessed. Areas assessed by both nurses:   [x]   Head, Face, and Ears   [x]   Shoulders, Back, and Chest  [x]   Arms, Elbows, and Hands   [x]   Coccyx, Sacrum, and IschIum  [x]   Legs, Feet, and Heels        Does the Patient have Skin Breakdown?   NO         Cesar Prevention initiated:  NO  Wound Care Orders initiated:  NO      [facility] nurse consulted for Pressure Injury (Stage 3,4, Unstageable, DTI, NWPT, and Complex wounds), New and Established Ostomies:  NO    Nurse 1 eSignature: Electronically signed by Ruthy Gordon RN on 9/22/20 at 12:36 AM EDT        **SHARE this note so that the co-signing nurse is able to place an eSignature**    Nurse 2 eSignature: Electronically signed by Lakeisha Padilla RN on 9/22/20 at 12:57 AM EDT

## 2020-09-22 NOTE — PROGRESS NOTES
Physical Therapy    Facility/Department: [address] MED SURG  Initial Assessment  If patient discharges prior to next session this note will serve as a discharge summary. Please see below for the latest assessment towards goals. NAME: Shabana Brandon  : 1955  MRN: [id number]    Date of Service: 2020    Discharge Recommendations:  Home with assist PRN, Home with Home health PT, Patient would benefit from continued therapy after discharge, S Estephania Jaramillo scored a 22/24 on the AM-PAC short mobility form. Current research shows that an AM-PAC score of 18 or greater is typically associated with a discharge to the patient's home setting. Based on the patient's AM-PAC score and their current functional mobility deficits, it is recommended that the patient have 2-3 sessions per week of Physical Therapy at d/c to increase the patient's independence. At this time, this patient demonstrates the endurance and safety to discharge home with prn assist and home PT, level 1 (home vs OP services) and a follow up treatment frequency of 2-3x/wk. Please see assessment section for further patient specific details. PT Equipment Recommendations  Equipment Needed: No  Other: he has a cane, a walker, w/c    Assessment   Assessment: The pt is a 77 yo male who came to the ED with worsening symptoms of nausea and non-bloody vomiting. He has had a 30 pound weight loss in 4-5 months. C-diff was negative. The pt reports he lives with his wife in a 3 floor house with his bed and bath on the 3rd level. The pt reports he has a stair lift to his bed/bath level; he uses a cane for mobility and he has been indep in self-care PTA. He does indicate that his wife does not really assist him and that he last went to his sister's house to take a shower.     PMHx: Barretts, COPD, depression, HTN, angina, TIA, spinal stenosis        Today, the pt presented with good strength in B LE's, he was mod I for bed mobility, Supervision for transfers and CGA/SBA for ambulation in the room. He carries a SPC when walking but does not actually use it. The pt does fatigue easily and did not want to walk in the hallways today. The pt appears to be functioning below his baseline but anticipate that he will be safe for home with home PT and prn assist. Will con't to follow. Prognosis: Good  Decision Making: Medium Complexity  History: see above  Exam: see above  Clinical Presentation: evolving  PT Education: PT Role;General Safety  Barriers to Learning: [place]  REQUIRES PT FOLLOW UP: Yes  Activity Tolerance  Activity Tolerance: Patient Tolerated treatment well       Patient Diagnosis(es): The primary encounter diagnosis was Hypokalemia. Diagnoses of Nausea vomiting and diarrhea, Generalized abdominal pain, Dehydration, and Lactic acidosis were also pertinent to this visit. has a past medical history of Arthritis, Gong's esophagus, Chronic back pain, COPD (chronic obstructive pulmonary disease) ([address] Depression, GERD (gastroesophageal reflux disease), HYPERCHOLESTERAEMIA, Hypertension, Prinzmetal angina ([address] Spinal stenoses, and TIA (transient ischaemic attack). has a past surgical history that includes joint replacement; Colonoscopy; back surgery; Splenectomy; Cholecystectomy; Appendectomy; Upper gastrointestinal endoscopy (5-14-10); Upper gastrointestinal endoscopy (11-27-12); Clavicle surgery; bronchoscopy (N/A, 12/30/2019); bronchoscopy (12/30/2019); Colonoscopy (N/A, 7/24/2020); and Colonoscopy (7/24/2020). Restrictions  Restrictions/Precautions  Restrictions/Precautions: Fall Risk  Vision/Hearing  Vision: Within Functional Limits  Hearing: Exceptions to [facility]  Hearing Exceptions: Hard of hearing/hearing concerns; No hearing aid     Subjective  General  Chart Reviewed: Yes  Additional Pertinent Hx: Per H&P on 9- of Nav Rodríguez MD: The pt is a 75 yo male who came to the ED with worsening symptoms of nausea and non-bloody vomiting.  He has had a 30 pound weight loss in 4-5 months. C-diff was negative. PMHx: Barretts, COPD, depression, HTN, angina, TIA, spinal stenosis  Response To Previous Treatment: Not applicable  Family / Caregiver Present: No  Referring Practitioner: Natalia Lopes MD  Referral Date : 09/22/20  Diagnosis: nausea, vomiting, diarrhea  Follows Commands: Within Functional Limits  Subjective  Subjective: the pt found to be in the bed, he was agreeable to therapy; he is reporting 7/10 chronic back pain  Pain Screening  Patient Currently in Pain: Yes          Orientation  Orientation  Overall Orientation Status: Within Functional Limits  Social/Functional History  Social/Functional History  Lives With: Spouse(the pt reports his spouse does not really assist him with much and he goes to his sister's house sometimes)  Type of Home: House  Home Layout: Multi-level, Bed/Bath upstairs(3-story house, pt's bedroom/bathroom on third floor; laundry on first floor)  Home Access: Stairs to enter without rails  Entrance Stairs - Number of Steps: 2 KATIE without HR; stair lift to additional levels  Bathroom Shower/Tub: Walk-in shower, Shower chair without back  Bathroom Toilet: Standard  Bathroom Equipment: Shower chair, Built-in shower seat, Grab bars in shower, Hand-held shower  Bathroom Accessibility: Walker accessible  Home Equipment: Rolling walker, Cane, [address]  ADL Assistance: Independent  Homemaking Assistance: Needs assistance(wife does laundry and grocery shopping. Pt does some cleaning and cooking.)  Ambulation Assistance: Independent(with cane prn)  Transfer Assistance: Independent  Active : Yes  Additional Comments: Pt denies recent falls.   Cognition   Cognition  Overall Cognitive Status: WFL    Objective          AROM RLE (degrees)  RLE AROM: WFL  AROM LLE (degrees)  LLE AROM : WFL  Strength RLE  Strength RLE: WFL  Strength LLE  Strength LLE: WFL        Bed mobility  Supine to Sit: Modified independent(HOB elevated)  Sit to Supine: Unable to assess(pt OOB to recliner at end of session)  Transfers  Sit to Stand: Supervision  Stand to sit: Supervision  Comment: the pt has been getting hmself to and from the [facility] on his own  Ambulation  Ambulation?: Yes  Ambulation 1  Surface: level tile  Device: No Device(he has a SPC that he carries with him but doesn't really use)  Assistance: Stand by assistance;Contact guard assistance  Quality of Gait: no LOB, slowed pace, fatigues easily and did not want to walk in the hallway, able to change direction w/o difficulty  Distance: 35 feet x 1, 25 feet x 1  Stairs/Curb  Stairs?: No     Balance  Sitting - Static: Good  Sitting - Dynamic: Good  Standing - Static: Good  Standing - Dynamic: Good;-(no device)  Comments: the pt able to stand at the sink to wash hands and comb hair with SBA, no LOB        Plan   Plan  Times per week: 2-3x/week  Current Treatment Recommendations: Functional Mobility Training, Transfer Training, Strengthening, Gait Training, Stair training  Safety Devices  Type of devices: Call light within reach, Gait belt, Patient at risk for falls, Left in chair(the pt reprots he has been getting self to and from the [facility], no alarm activiated when therapy entered the room)      AM-PAC Score  AM-PAC Inpatient Mobility Raw Score : 22 (09/22/20 1437)  AM-PAC Inpatient T-Scale Score : 53.28 (09/22/20 1437)  Mobility Inpatient CMS 0-100% Score: 20.91 (09/22/20 1437)  Mobility Inpatient CMS G-Code Modifier : CJ (09/22/20 1437)          Goals  Short term goals  Time Frame for Short term goals: upon d/c  Short term goal 1: Bed mobility Indep. Short term goal 2: Transfers sit <> stand Indep. Short term goal 3: Ambulate with/without cane 100 feet with Mod I/Indep  Short term goal 4: Negotiate stairs with rail and cane with Sup/mod I.   Patient Goals   Patient goals : to get stronger       Therapy Time   Individual Concurrent Group Co-treatment   Time In  [id number] Time Out 1404         Minutes 25         Timed Code Treatment Minutes: 10 Minutes       Electronically signed by Cali Roy, PT [id number] on 9/22/2020 at 2:48 PM

## 2020-09-22 NOTE — PLAN OF CARE
Problem: Falls - Risk of:  Goal: Will remain free from falls  Description: Will remain free from falls  9/22/2020 1021 by Daniella Patel RN  Outcome: Ongoing  9/22/2020 0034 by Ameena Becerra RN  Outcome: Ongoing     Problem: Falls - Risk of:  Goal: Absence of physical injury  Description: Absence of physical injury  9/22/2020 1021 by Daniella Patel RN  Outcome: Ongoing  9/22/2020 0034 by Ameena Becerra RN  Outcome: Ongoing

## 2020-09-22 NOTE — PROGRESS NOTES
Comprehensive Nutrition Assessment    Type and Reason for Visit:  Positive Nutrition Screen(MST5)    Nutrition Recommendations/Plan:   Continue Full liquid diet. Advance diet as able per MD.  Start Ensure TID. Nutrition Assessment:  Pt presented with N/V and diarrhea x 2 weeks. Pt with PMH of COPD, GERD, and Steven's esophagus. Pt reports ongoing nausea for the past month. Noted pt with 22# wt loss over last 4 months per EMR. Currently on full liquids as tolerated. Will trial ONS.     Malnutrition Assessment:  Malnutrition Status:  Severe malnutrition    Context:  Chronic Illness     Findings of the 6 clinical characteristics of malnutrition:  Energy Intake:  7 - 75% or less estimated energy requirements for 1 month or longer  Weight Loss:  7 - Greater than 10% over 6 months     Body Fat Loss:  Unable to assess     Muscle Mass Loss:  Unable to assess    Fluid Accumulation:  No significant fluid accumulation     Strength:  Not Performed    Estimated Daily Nutrient Needs:  Energy (kcal):  7196-6117 kcal (30-35kcal/kg CBW)  Protein (g):  83 gm (1.5gm/kg CBW)     Fluid (ml/day):  1 mL/kcal    Nutrition Related Findings:  BM 9/22; no edema      Wounds:  None       Current Nutrition Therapies:    DIET FULL LIQUID;  Dietary Nutrition Supplements: Standard High Calorie Oral Supplement    Anthropometric Measures:  · Height: 5' 10\" (177.8 cm)  · Current Body Weight: 123 lb (55.8 kg)   · Admission Body Weight: 121 lb (54.9 kg)      · Ideal Body Weight: 166 lbs; % Ideal Body Weight 74.1 %   · BMI: 17.6  · BMI Categories: Underweight (BMI less than 22) age over 72       Nutrition Diagnosis:   · Severe malnutrition related to inadequate protein-energy intake as evidenced by weight loss greater than or equal to 10% in 6 months, poor intake prior to admission      Nutrition Interventions:   Food and/or Nutrient Delivery:  Continue Current Diet, Start Oral Nutrition Supplement  Nutrition Education/Counseling:  No recommendation at this time   Coordination of Nutrition Care:  Continued Inpatient Monitoring    Goals:  consume >50% of meals and supplement during admission       Nutrition Monitoring and Evaluation:   Food/Nutrient Intake Outcomes:  Diet Advancement/Tolerance, Food and Nutrient Intake, Supplement Intake  Physical Signs/Symptoms Outcomes:  Biochemical Data, Chewing or Swallowing, Nausea or Vomiting, Weight, Skin, Nutrition Focused Physical Findings     Discharge Planning:     Too soon to determine     Electronically signed by Chang Sarmiento RD, ODILIA on 9/22/20 at 10:27 AM EDT    Contact: [phone]

## 2020-09-22 NOTE — PLAN OF CARE
Problem: Nutrition  Goal: Optimal nutrition therapy  Outcome: Ongoing   Nutrition Problem #1: Severe malnutrition  Intervention: Food and/or Nutrient Delivery: Continue Current Diet, Start Oral Nutrition Supplement  Nutritional Goals: consume >50% of meals and supplement during admission

## 2020-09-22 NOTE — PROGRESS NOTES
Occupational Therapy   Occupational Therapy Initial Assessment  Date: 2020   Patient Name: Clare Carrel  MRN: [id number]     : 1955    Date of Service: 2020    Discharge Recommendations:  Home with assist PRN, Home with Home health OT  OT Equipment Recommendations  Other: TBD     Clare Carrel scored a 21/24 on the AM-PAC ADL Inpatient form. Current research shows that an AM-PAC score of 18 or greater is typically associated with a discharge to the patient's home setting. Based on the patient's AM-PAC score, and their current ADL deficits, it is recommended that the patient have 2-3 sessions per week of Occupational Therapy at d/c to increase the patient's independence. At this time, this patient demonstrates the endurance and safety to discharge home with assist prn and a home OT follow up treatment frequency of 2-3x/wk. Please see assessment section for further patient specific details. If patient discharges prior to next session this note will serve as a discharge summary. Please see below for the latest assessment towards goals. Assessment   Performance deficits / Impairments: Decreased functional mobility ; Decreased ADL status; Decreased strength;Decreased endurance;Decreased high-level IADLs;Decreased balance  Assessment: Pt is a 76 y.o. male admitted with N/V/D. At baseline, pt lives with wife and independent ADLs and fxl mobility with cane. Pt shares IADLs with wife. Pt currently functioning below baseline d/t the above deficits, today requiring supervision fxl transfers, SBA/CGA fxl mobility with cane, and anticipate pt would require overall SBA/CGA for ADLs. Pt c/o fatigue and weakness during session. Will cont to see on acute to address the above limitations and maximize pt's safety and independence. Anticipate pt will be safe to return home with assist prn and home OT at d/c.   Prognosis: Good  Decision Making: Low Complexity  History: see above  Exam: self-care, ROM/strength, fxl mobility  Assistance / Modification: anticipate overall SBA/CGA for ADLs  OT Education: OT Role;Plan of Care;Transfer Training;ADL Adaptive Strategies  REQUIRES OT FOLLOW UP: Yes  Activity Tolerance  Activity Tolerance: Patient limited by fatigue  Safety Devices  Safety Devices in place: Yes  Type of devices: Call light within reach; Left in chair(no chair alarm on upon OT arrival, pt reports [facility] to [facility])           Patient Diagnosis(es): The primary encounter diagnosis was Hypokalemia. Diagnoses of Nausea vomiting and diarrhea, Generalized abdominal pain, Dehydration, and Lactic acidosis were also pertinent to this visit. has a past medical history of Arthritis, Gong's esophagus, Chronic back pain, COPD (chronic obstructive pulmonary disease) ([facility] [facility] Depression, GERD (gastroesophageal reflux disease), HYPERCHOLESTERAEMIA, Hypertension, Prinzmetal angina ([facility] Spinal stenoses, and TIA (transient ischaemic attack). has a past surgical history that includes joint replacement; Colonoscopy; back surgery; Splenectomy; Cholecystectomy; Appendectomy; Upper gastrointestinal endoscopy (5-14-10); Upper gastrointestinal endoscopy (11-27-12); Clavicle surgery; bronchoscopy (N/A, 12/30/2019); bronchoscopy (12/30/2019); Colonoscopy (N/A, 7/24/2020); and Colonoscopy (7/24/2020). Restrictions  Restrictions/Precautions  Restrictions/Precautions: Fall Risk    Subjective   General  Chart Reviewed: Yes  Additional Pertinent Hx: Per H&P: \"The patient is a 76 y.o. male with past medical history of current Gong's esophagus, COPD not on oxygen, depression, GERD, HLD, hypertension, previous Prinzmetal angina, previous TIAs, and previous spinal stenoses significant back pain requiring use of medical marijuana who presents to [facility] with worsening progressive symptoms of nausea with nonbloody bouts of vomitus as well as persistent recurrent bouts of watery frequent diarrhea. \" C-diff negative.   Family / Caregiver Present: No  Referring Practitioner: Shahriar Perez MD  Diagnosis: N/V/D  Subjective  Subjective: Pt met b/s for OT eval/tx with PT. Pt in bed on arrivla, agreeable to participate in therapy. Pt reports 7/10 chronic back pain. Social/Functional History  Social/Functional History  Lives With: Spouse(the pt reports his spouse does not really assist him with much and he goes to his sister's house sometimes)  Type of Home: House  Home Layout: Multi-level, Bed/Bath upstairs(3-story house, pt's bedroom/bathroom on third floor; laundry on first floor)  Home Access: Stairs to enter without rails  Entrance Stairs - Number of Steps: 2 KATIE without HR; stair lift to additional levels  Bathroom Shower/Tub: Walk-in shower, Shower chair without back  Bathroom Toilet: Standard  Bathroom Equipment: Shower chair, Built-in shower seat, Grab bars in shower, Hand-held shower  Bathroom Accessibility: Walker accessible  Home Equipment: Rolling walker, Cane, Wheelchair-electric, [address] bed  ADL Assistance: [facility]: Needs assistance(wife does laundry and grocery shopping. Pt does some cleaning and cooking.)  Ambulation Assistance: Independent(with cane prn)  Transfer Assistance: Independent  Active : Yes  Additional Comments: Pt denies recent falls. Objective   Vision: Within Functional Limits  Hearing: Exceptions to [facility]  Hearing Exceptions: Hard of hearing/hearing concerns; No hearing aid      Orientation  Overall Orientation Status: Within Functional Limits  Orientation Level: Oriented to person;Oriented to place;Oriented to time;Oriented to situation     Balance  Sitting Balance: Independent  Standing Balance: Stand by assistance  Functional Mobility  Functional - Mobility Device: Cane  Activity: To/from bathroom  Assist Level: Contact guard assistance  Functional Mobility Comments: Pt completed fxl mobility to/from BR with SPC and SBA/CGA, pt mostly carries the strength/endurance for ADLs. Long term goals  Time Frame for Long term goals : STG=LTG  Patient Goals   Patient goals : to get stronger.        Therapy Time   Individual Concurrent Group Co-treatment   Time In 1339         Time Out 1404         Minutes 25         Timed Code Treatment Minutes: [id number], OTR/L 1268

## 2020-09-23 VITALS
OXYGEN SATURATION: 95 % | RESPIRATION RATE: 16 BRPM | DIASTOLIC BLOOD PRESSURE: 98 MMHG | WEIGHT: 123.24 LBS | SYSTOLIC BLOOD PRESSURE: 178 MMHG | BODY MASS INDEX: 17.64 KG/M2 | HEIGHT: 70 IN | HEART RATE: 80 BPM | TEMPERATURE: 97.4 F

## 2020-09-23 PROCEDURE — 2580000003 HC RX 258: Performed by: STUDENT IN AN ORGANIZED HEALTH CARE EDUCATION/TRAINING PROGRAM

## 2020-09-23 PROCEDURE — 6360000002 HC RX W HCPCS: Performed by: STUDENT IN AN ORGANIZED HEALTH CARE EDUCATION/TRAINING PROGRAM

## 2020-09-23 PROCEDURE — 6370000000 HC RX 637 (ALT 250 FOR IP): Performed by: NURSE PRACTITIONER

## 2020-09-23 PROCEDURE — 6370000000 HC RX 637 (ALT 250 FOR IP): Performed by: INTERNAL MEDICINE

## 2020-09-23 PROCEDURE — 6370000000 HC RX 637 (ALT 250 FOR IP): Performed by: STUDENT IN AN ORGANIZED HEALTH CARE EDUCATION/TRAINING PROGRAM

## 2020-09-23 PROCEDURE — 94760 N-INVAS EAR/PLS OXIMETRY 1: CPT

## 2020-09-23 RX ADMIN — FLUTICASONE PROPIONATE 2 SPRAY: 50 SPRAY, METERED NASAL at 09:57

## 2020-09-23 RX ADMIN — Medication 10 ML: at 09:59

## 2020-09-23 RX ADMIN — PSEUDOEPHEDRINE HCL 30 MG: 30 TABLET, FILM COATED ORAL at 12:09

## 2020-09-23 RX ADMIN — ENOXAPARIN SODIUM 40 MG: 40 INJECTION SUBCUTANEOUS at 09:56

## 2020-09-23 RX ADMIN — VERAPAMIL HYDROCHLORIDE 80 MG: 80 TABLET ORAL at 14:24

## 2020-09-23 RX ADMIN — DULOXETINE HYDROCHLORIDE 60 MG: 60 CAPSULE, DELAYED RELEASE ORAL at 09:56

## 2020-09-23 RX ADMIN — ONDANSETRON 4 MG: 2 INJECTION INTRAMUSCULAR; INTRAVENOUS at 13:09

## 2020-09-23 RX ADMIN — VERAPAMIL HYDROCHLORIDE 80 MG: 80 TABLET ORAL at 09:56

## 2020-09-23 RX ADMIN — PANTOPRAZOLE SODIUM 40 MG: 40 TABLET, DELAYED RELEASE ORAL at 05:05

## 2020-09-23 RX ADMIN — OXYCODONE HYDROCHLORIDE AND ACETAMINOPHEN 1 TABLET: 5; 325 TABLET ORAL at 05:09

## 2020-09-23 RX ADMIN — LISINOPRIL 40 MG: 40 TABLET ORAL at 09:56

## 2020-09-23 ASSESSMENT — PAIN DESCRIPTION - ONSET
ONSET: PROGRESSIVE

## 2020-09-23 ASSESSMENT — PAIN - FUNCTIONAL ASSESSMENT
PAIN_FUNCTIONAL_ASSESSMENT: PREVENTS OR INTERFERES SOME ACTIVE ACTIVITIES AND ADLS

## 2020-09-23 ASSESSMENT — PAIN DESCRIPTION - FREQUENCY
FREQUENCY: INTERMITTENT

## 2020-09-23 ASSESSMENT — PAIN DESCRIPTION - PAIN TYPE
TYPE: ACUTE PAIN

## 2020-09-23 ASSESSMENT — PAIN DESCRIPTION - DESCRIPTORS
DESCRIPTORS: CONSTANT;SHARP
DESCRIPTORS: CONSTANT
DESCRIPTORS: CONSTANT

## 2020-09-23 ASSESSMENT — PAIN DESCRIPTION - LOCATION
LOCATION: BACK

## 2020-09-23 ASSESSMENT — PAIN DESCRIPTION - PROGRESSION
CLINICAL_PROGRESSION: NOT CHANGED

## 2020-09-23 ASSESSMENT — PAIN SCALES - GENERAL
PAINLEVEL_OUTOF10: 6
PAINLEVEL_OUTOF10: 7
PAINLEVEL_OUTOF10: 8

## 2020-09-23 ASSESSMENT — PAIN DESCRIPTION - ORIENTATION
ORIENTATION: OTHER (COMMENT)
ORIENTATION: OTHER (COMMENT)

## 2020-09-23 NOTE — PROGRESS NOTES
Patient resting with eyes closed. Respirations wnl. Fall precautions in place. Call light within reach. Pt educated on calling for assistance before getting up. Walkway free of clutter. Will continue to monitor.    Electronically signed by Bernadette Lyn RN on 9/23/2020 at 4:50 AM

## 2020-09-23 NOTE — PLAN OF CARE
Problem: Falls - Risk of:  Goal: Will remain free from falls  Description: Will remain free from falls  Outcome: Ongoing     Problem: Falls - Risk of:  Goal: Absence of physical injury  Description: Absence of physical injury  Outcome: Ongoing     Problem: Pain:  Goal: Pain level will decrease  Description: Pain level will decrease  Outcome: Ongoing   Pain/Discomfort is being managed with PRN analgesics per MD orders (See MAR). Patient is able to express and rate pain using numerical scale.
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
## 2020-09-23 NOTE — PROGRESS NOTES
Reviewed dc instructions with pt. Pt verbalized understanding. PIV removed. Dressing clean dry and intact. Pt dc to private residence. Wheelchair to transport pt. To [employer] service. Pt dc with personal belongings.

## 2020-09-23 NOTE — PLAN OF CARE
Problem: Falls - Risk of:  Goal: Will remain free from falls  Description: Will remain free from falls  9/22/2020 2034 by Carina Lechuga RN  Outcome: Ongoing  9/22/2020 1021 by Carmela Casillas RN  Outcome: Ongoing  Goal: Absence of physical injury  Description: Absence of physical injury  9/22/2020 2034 by Carina Lechuga RN  Outcome: Ongoing  9/22/2020 1021 by Carmela Casillas RN  Outcome: Ongoing     Problem: Pain:  Description: Pain management should include both nonpharmacologic and pharmacologic interventions.   Goal: Pain level will decrease  Description: Pain level will decrease  9/22/2020 2034 by Carina Lechuga RN  Outcome: Ongoing  9/22/2020 1021 by Carmela Casillas RN  Outcome: Ongoing  Goal: Control of acute pain  Description: Control of acute pain  9/22/2020 2034 by Carina Lechuga RN  Outcome: Ongoing  9/22/2020 1021 by Carmela Casillas RN  Outcome: Ongoing  Goal: Control of chronic pain  Description: Control of chronic pain  9/22/2020 2034 by Carina Lechuga RN  Outcome: Ongoing  9/22/2020 1021 by Carmela Casillas RN  Outcome: Ongoing     Problem: Nutrition  Goal: Optimal nutrition therapy  9/22/2020 2034 by Carina Lechuga RN  Outcome: Ongoing  9/22/2020 1028 by Kadi Martino RD, LD  Outcome: Ongoing

## 2020-09-25 LAB
CAMPYLOBACTER JEJUNI/COLI PCR: NOT DETECTED
CAMPYLOBACTER UPSALIENSIS: NOT DETECTED
E COLI SHIGELLA/ENTEROINVASIVE PCR: NOT DETECTED
SALMONELLA PCR: NOT DETECTED
SHIGA TOXIN I: NOT DETECTED
SHIGA TOXIN II: NOT DETECTED

## 2020-10-22 PROBLEM — R79.89 ELEVATED TROPONIN: Status: RESOLVED | Noted: 2020-09-21 | Resolved: 2020-10-22

## 2020-10-22 PROBLEM — R77.8 ELEVATED TROPONIN: Status: RESOLVED | Noted: 2020-09-21 | Resolved: 2020-10-22

## 2020-11-03 PROBLEM — J18.9 PNEUMONIA DUE TO ORGANISM: Status: RESOLVED | Noted: 2020-11-03 | Resolved: 2020-11-03

## 2020-11-03 PROBLEM — J18.9 PNA (PNEUMONIA): Status: RESOLVED | Noted: 2019-12-06 | Resolved: 2020-11-03

## 2020-11-13 NOTE — DISCHARGE INSTR - COC
Esophagogastroduodenoscopy with biopsy, Romero esophageal dilation, and Botulinum injection of the pylorus       Immunization History:   Immunization History   Administered Date(s) Administered    Influenza Whole 10/01/2012    Pneumococcal Conjugate 7-valent (Prevnar7) 10/12/2006    Tdap (Boostrix, Adacel) 2018       Active Problems:  Patient Active Problem List   Diagnosis Code    Respiratory distress R06.03    [facility] T40.601A    Pneumonia due to organism J18.9    Chronic pain G89.29    Sepsis ([facility] A41.9    Acute encephalopathy G93.40    PNA (pneumonia) J18.9    Pneumonia J18.9    Abnormal CT of the chest R93.89    Status post bronchoscopy Z98.890    Mediastinal lymphadenopathy R59.0    Hilar lymphadenopathy R59.0    Status post cholecystectomy Z90.49    Nephrolithiasis N20.0    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    Chronic obstructive pulmonary disease ([facility] J44.9    Smoker F17.200    Tobacco abuse counseling Z71.6    Dyspnea R06.00    Bradycardia R00.1    Chest pain R07.9    Symptomatic bradycardia R00.1    Acute respiratory failure with hypoxia (HCC) J96.01    Rectal bleeding K62.5    Infectious diarrhea A09    Leukocytosis D72.829    Lactic acidosis E87.2    Elevated troponin R79.89    Hypokalemia E87.6    ELIZABET (acute kidney injury) ([facility] N17.9    Intractable nausea and vomiting R11.2    Severe malnutrition (HCC) E43       Isolation/Infection:   Isolation          No Isolation        Patient Infection Status     Infection Onset Added Last Indicated Last Indicated By Review Planned Expiration Resolved Resolved By    None active    Resolved    C-diff Rule Out 20 GI Bacterial Pathogens By PCR (Ordered)   20 Rule-Out Test Resulted    COVID-19 Rule Out 20 COVID-19 (Ordered)   20     C-diff Rule Out 20 Clostridium Difficile Toxin/Antigen (Ordered)   20 Rule-Out Test Resulted    COVID-19 Rule Out 07/22/20 07/22/20 07/22/20 COVID-19 (Ordered)   07/23/20 Rule-Out Test Resulted    COVID-19 Rule Out 07/17/20 07/17/20 07/17/20 COVID-19 (Ordered)   07/17/20 Rule-Out Test Resulted          Nurse Assessment:  Last Vital Signs: BP (!) 178/98   Pulse 80   Temp 97.4 °F (36.3 °C) (Oral)   Resp 16   Ht 5' 10\" (1.778 m)   Wt 123 lb 3.8 oz (55.9 kg)   SpO2 95%   BMI 17.68 kg/m²     Last documented pain score (0-10 scale): Pain Level: 7  Last Weight:   Wt Readings from Last 1 Encounters:   09/23/20 123 lb 3.8 oz (55.9 kg)     Mental Status:  {IP PT MENTAL STATUS:20030}    IV Access:  { BETTY IV ACCESS:975461066}    Nursing Mobility/ADLs:  Walking   {[facility] DME BIUN:731132507}  Transfer  {CHP DME QESB:297354303}  Bathing  {CHP DME UENK:725175470}  Dressing  {P DME ZFAI:998019342}  Toileting  {P DME GHMY:696237911}  Feeding  {[facility] DME COHU:451253073}  Med Admin  {P DME JRTW:057890175}  Med Delivery   { BETTY MED Delivery:517262851}    Wound Care Documentation and Therapy:        Elimination:  Continence:   · Bowel: {YES / KK:85652}  · Bladder: {YES / KU:22957}  Urinary Catheter: {Urinary Catheter:446073343}   Colostomy/Ileostomy/Ileal Conduit: {YES / XR:04959}       Date of Last BM: ***    Intake/Output Summary (Last 24 hours) at 9/23/2020 1444  Last data filed at 9/23/2020 1009  Gross per 24 hour   Intake 520 ml   Output 1925 ml   Net -1405 ml     I/O last 3 completed shifts: In: 520 [P.O.:120;  I.V.:400]  Out: 1100 [Urine:1100]    Safety Concerns:     [facility] BETTY Safety Concerns:129886619}    Impairments/Disabilities:      {[facility] BETTY Impairments/Disabilities:129253299}    Nutrition Therapy:  Current Nutrition Therapy:   [facility] Diet List:041619894}    Routes of Feeding: {CHP DME Other Feedings:660812676}  Liquids: {Slp liquid thickness:77497}  Daily Fluid Restriction: {CHP DME Yes amt example:342449819}  Last Modified Barium Swallow with Video (Video Swallowing Test): {Done Not Done [facility]}    Treatments at the Time of Hospital Discharge:   Respiratory Treatments: ***  Oxygen Therapy:  {Therapy; copd oxygen:95827}  Ventilator:    {[facility] Vent OZQB:172165119}    Rehab Therapies: {THERAPEUTIC INTERVENTION:1904615240}  Weight Bearing Status/Restrictions: {[facility] Weight Bearin}  Other Medical Equipment (for information only, NOT a DME order):  {EQUIPMENT:450165116}  Other Treatments: ***    Patient's personal belongings (please select all that are sent with patient):  {[facility] DME Belongings:614342477}    RN SIGNATURE:  {Esignature:389968146}    CASE MANAGEMENT/SOCIAL WORK SECTION    Inpatient Status Date: ***    Readmission Risk Assessment Score:  Readmission Risk              Risk of Unplanned Readmission:        22           Discharging to Facility/ Agency   · Name:   · Address:  · Phone:  · Fax:    Dialysis Facility (if applicable)   · Name:  · Address:  · Dialysis Schedule:  · Phone:  · Fax:    / signature: {Esignature:672437022}    PHYSICIAN SECTION    Prognosis: {Prognosis:8885709171}    Condition at Discharge: [address] [place] [address] Patient Condition:353610245}    Rehab Potential (if transferring to Rehab): {Prognosis:8625490275}    Recommended Labs or Other Treatments After Discharge: ***    Physician Certification: I certify the above information and transfer of Nadine Paula  is necessary for the continuing treatment of the diagnosis listed and that he requires {Admit to Appropriate Level of Care:47148} for {GREATER/LESS:039078872} 30 days.      Update Admission H&P: {CHP DME Changes in FCWQ:535359153}    PHYSICIAN SIGNATURE:  {Esignature:889253463} alert

## 2021-12-23 ENCOUNTER — APPOINTMENT (OUTPATIENT)
Dept: GENERAL RADIOLOGY | Age: 66
DRG: 192 | End: 2021-12-23
Payer: OTHER GOVERNMENT

## 2021-12-23 ENCOUNTER — APPOINTMENT (OUTPATIENT)
Dept: CT IMAGING | Age: 66
DRG: 192 | End: 2021-12-23
Payer: OTHER GOVERNMENT

## 2021-12-23 ENCOUNTER — HOSPITAL ENCOUNTER (INPATIENT)
Age: 66
LOS: 2 days | Discharge: HOME | DRG: 192 | End: 2021-12-25
Attending: EMERGENCY MEDICINE | Admitting: STUDENT IN AN ORGANIZED HEALTH CARE EDUCATION/TRAINING PROGRAM
Payer: OTHER GOVERNMENT

## 2021-12-23 DIAGNOSIS — J44.1 COPD EXACERBATION (HCC): Primary | ICD-10-CM

## 2021-12-23 DIAGNOSIS — R91.8 PULMONARY NODULES: ICD-10-CM

## 2021-12-23 DIAGNOSIS — R93.89 ABNORMAL CHEST CT: ICD-10-CM

## 2021-12-23 DIAGNOSIS — R09.02 HYPOXIA: ICD-10-CM

## 2021-12-23 LAB
A/G RATIO: 1.3 (ref 1.1–2.2)
ALBUMIN SERPL-MCNC: 4.2 G/DL (ref 3.4–5)
ALP BLD-CCNC: 118 U/L (ref 40–129)
ALT SERPL-CCNC: 13 U/L (ref 10–40)
ANION GAP SERPL CALCULATED.3IONS-SCNC: 11 MMOL/L (ref 3–16)
AST SERPL-CCNC: 16 U/L (ref 15–37)
BASE EXCESS VENOUS: 0.6 MMOL/L (ref -3–3)
BASOPHILS ABSOLUTE: 0 K/UL (ref 0–0.2)
BASOPHILS RELATIVE PERCENT: 0 %
BILIRUB SERPL-MCNC: 0.4 MG/DL (ref 0–1)
BUN BLDV-MCNC: 7 MG/DL (ref 7–20)
CALCIUM SERPL-MCNC: 9.3 MG/DL (ref 8.3–10.6)
CHLORIDE BLD-SCNC: 101 MMOL/L (ref 99–110)
CO2: 27 MMOL/L (ref 21–32)
CREAT SERPL-MCNC: <0.5 MG/DL (ref 0.8–1.3)
EOSINOPHILS ABSOLUTE: 0.3 K/UL (ref 0–0.6)
EOSINOPHILS RELATIVE PERCENT: 2.3 %
GFR AFRICAN AMERICAN: >60
GFR NON-AFRICAN AMERICAN: >60
GLUCOSE BLD-MCNC: 95 MG/DL (ref 70–99)
HCO3 VENOUS: 25 MMOL/L (ref 23–29)
HCT VFR BLD CALC: 46 % (ref 40.5–52.5)
HEMOGLOBIN: 14.9 G/DL (ref 13.5–17.5)
LACTIC ACID, SEPSIS: 1.2 MMOL/L (ref 0.4–1.9)
LYMPHOCYTES ABSOLUTE: 2.7 K/UL (ref 1–5.1)
LYMPHOCYTES RELATIVE PERCENT: 20.1 %
MAGNESIUM: 2 MG/DL (ref 1.8–2.4)
MCH RBC QN AUTO: 29.1 PG (ref 26–34)
MCHC RBC AUTO-ENTMCNC: 32.4 G/DL (ref 31–36)
MCV RBC AUTO: 90 FL (ref 80–100)
MONOCYTES ABSOLUTE: 0.9 K/UL (ref 0–1.3)
MONOCYTES RELATIVE PERCENT: 6.6 %
NEUTROPHILS ABSOLUTE: 9.4 K/UL (ref 1.7–7.7)
NEUTROPHILS RELATIVE PERCENT: 71 %
O2 SAT, VEN: 89 %
O2 THERAPY: ABNORMAL
PCO2, VEN: 37.9 MMHG (ref 40–50)
PDW BLD-RTO: 13 % (ref 12.4–15.4)
PH VENOUS: 7.43 (ref 7.35–7.45)
PLATELET # BLD: 462 K/UL (ref 135–450)
PMV BLD AUTO: 7.6 FL (ref 5–10.5)
PO2, VEN: 54.4 MMHG (ref 25–40)
POTASSIUM REFLEX MAGNESIUM: 3.5 MMOL/L (ref 3.5–5.1)
PRO-BNP: 1202 PG/ML (ref 0–124)
RAPID INFLUENZA  B AGN: NEGATIVE
RAPID INFLUENZA A AGN: NEGATIVE
RBC # BLD: 5.11 M/UL (ref 4.2–5.9)
SODIUM BLD-SCNC: 139 MMOL/L (ref 136–145)
TCO2 CALC VENOUS: 26 MMOL/L
TOTAL PROTEIN: 7.4 G/DL (ref 6.4–8.2)
TROPONIN: <0.01 NG/ML
WBC # BLD: 13.3 K/UL (ref 4–11)

## 2021-12-23 PROCEDURE — 83605 ASSAY OF LACTIC ACID: CPT

## 2021-12-23 PROCEDURE — 87804 INFLUENZA ASSAY W/OPTIC: CPT

## 2021-12-23 PROCEDURE — 6360000004 HC RX CONTRAST MEDICATION: Performed by: EMERGENCY MEDICINE

## 2021-12-23 PROCEDURE — 87040 BLOOD CULTURE FOR BACTERIA: CPT

## 2021-12-23 PROCEDURE — U0003 INFECTIOUS AGENT DETECTION BY NUCLEIC ACID (DNA OR RNA); SEVERE ACUTE RESPIRATORY SYNDROME CORONAVIRUS 2 (SARS-COV-2) (CORONAVIRUS DISEASE [COVID-19]), AMPLIFIED PROBE TECHNIQUE, MAKING USE OF HIGH THROUGHPUT TECHNOLOGIES AS DESCRIBED BY CMS-2020-01-R: HCPCS

## 2021-12-23 PROCEDURE — 83735 ASSAY OF MAGNESIUM: CPT

## 2021-12-23 PROCEDURE — 71260 CT THORAX DX C+: CPT

## 2021-12-23 PROCEDURE — U0005 INFEC AGEN DETEC AMPLI PROBE: HCPCS

## 2021-12-23 PROCEDURE — 6360000002 HC RX W HCPCS: Performed by: EMERGENCY MEDICINE

## 2021-12-23 PROCEDURE — 1200000000 HC SEMI PRIVATE

## 2021-12-23 PROCEDURE — 85025 COMPLETE CBC W/AUTO DIFF WBC: CPT

## 2021-12-23 PROCEDURE — 2580000003 HC RX 258: Performed by: EMERGENCY MEDICINE

## 2021-12-23 PROCEDURE — 6370000000 HC RX 637 (ALT 250 FOR IP): Performed by: EMERGENCY MEDICINE

## 2021-12-23 PROCEDURE — 71045 X-RAY EXAM CHEST 1 VIEW: CPT

## 2021-12-23 PROCEDURE — 36415 COLL VENOUS BLD VENIPUNCTURE: CPT

## 2021-12-23 PROCEDURE — 82803 BLOOD GASES ANY COMBINATION: CPT

## 2021-12-23 PROCEDURE — 96365 THER/PROPH/DIAG IV INF INIT: CPT

## 2021-12-23 PROCEDURE — 96361 HYDRATE IV INFUSION ADD-ON: CPT

## 2021-12-23 PROCEDURE — 80053 COMPREHEN METABOLIC PANEL: CPT

## 2021-12-23 PROCEDURE — 84484 ASSAY OF TROPONIN QUANT: CPT

## 2021-12-23 PROCEDURE — 99285 EMERGENCY DEPT VISIT HI MDM: CPT

## 2021-12-23 PROCEDURE — 83880 ASSAY OF NATRIURETIC PEPTIDE: CPT

## 2021-12-23 RX ORDER — SODIUM CHLORIDE 0.9 % (FLUSH) 0.9 %
5-40 SYRINGE (ML) INJECTION PRN
Status: DISCONTINUED | OUTPATIENT
Start: 2021-12-23 | End: 2021-12-25 | Stop reason: HOSPADM

## 2021-12-23 RX ORDER — SODIUM CHLORIDE 0.9 % (FLUSH) 0.9 %
5-40 SYRINGE (ML) INJECTION EVERY 12 HOURS SCHEDULED
Status: DISCONTINUED | OUTPATIENT
Start: 2021-12-23 | End: 2021-12-25 | Stop reason: HOSPADM

## 2021-12-23 RX ORDER — VERAPAMIL HYDROCHLORIDE 80 MG/1
80 TABLET ORAL 4 TIMES DAILY
Status: DISCONTINUED | OUTPATIENT
Start: 2021-12-24 | End: 2021-12-25 | Stop reason: HOSPADM

## 2021-12-23 RX ORDER — LISINOPRIL 40 MG/1
40 TABLET ORAL DAILY
Status: DISCONTINUED | OUTPATIENT
Start: 2021-12-24 | End: 2021-12-25 | Stop reason: HOSPADM

## 2021-12-23 RX ORDER — ACETAMINOPHEN 325 MG/1
650 TABLET ORAL EVERY 6 HOURS PRN
Status: DISCONTINUED | OUTPATIENT
Start: 2021-12-23 | End: 2021-12-25 | Stop reason: HOSPADM

## 2021-12-23 RX ORDER — PREDNISONE 20 MG/1
40 TABLET ORAL DAILY
Status: DISCONTINUED | OUTPATIENT
Start: 2021-12-26 | End: 2021-12-25 | Stop reason: HOSPADM

## 2021-12-23 RX ORDER — PREDNISONE 20 MG/1
60 TABLET ORAL ONCE
Status: COMPLETED | OUTPATIENT
Start: 2021-12-23 | End: 2021-12-23

## 2021-12-23 RX ORDER — IPRATROPIUM BROMIDE AND ALBUTEROL SULFATE 2.5; .5 MG/3ML; MG/3ML
1 SOLUTION RESPIRATORY (INHALATION)
Status: DISCONTINUED | OUTPATIENT
Start: 2021-12-24 | End: 2021-12-23

## 2021-12-23 RX ORDER — TRAZODONE HYDROCHLORIDE 50 MG/1
50 TABLET ORAL NIGHTLY
Status: DISCONTINUED | OUTPATIENT
Start: 2021-12-24 | End: 2021-12-25 | Stop reason: HOSPADM

## 2021-12-23 RX ORDER — 0.9 % SODIUM CHLORIDE 0.9 %
500 INTRAVENOUS SOLUTION INTRAVENOUS ONCE
Status: COMPLETED | OUTPATIENT
Start: 2021-12-23 | End: 2021-12-23

## 2021-12-23 RX ORDER — ACETAMINOPHEN 650 MG/1
650 SUPPOSITORY RECTAL EVERY 6 HOURS PRN
Status: DISCONTINUED | OUTPATIENT
Start: 2021-12-23 | End: 2021-12-25 | Stop reason: HOSPADM

## 2021-12-23 RX ORDER — ONDANSETRON 2 MG/ML
4 INJECTION INTRAMUSCULAR; INTRAVENOUS EVERY 6 HOURS PRN
Status: DISCONTINUED | OUTPATIENT
Start: 2021-12-23 | End: 2021-12-25 | Stop reason: HOSPADM

## 2021-12-23 RX ORDER — IPRATROPIUM BROMIDE AND ALBUTEROL SULFATE 2.5; .5 MG/3ML; MG/3ML
1 SOLUTION RESPIRATORY (INHALATION)
Status: DISCONTINUED | OUTPATIENT
Start: 2021-12-23 | End: 2021-12-23

## 2021-12-23 RX ORDER — METHYLPREDNISOLONE SODIUM SUCCINATE 40 MG/ML
40 INJECTION, POWDER, LYOPHILIZED, FOR SOLUTION INTRAMUSCULAR; INTRAVENOUS EVERY 8 HOURS
Status: COMPLETED | OUTPATIENT
Start: 2021-12-24 | End: 2021-12-25

## 2021-12-23 RX ORDER — ALBUTEROL SULFATE 90 UG/1
2 AEROSOL, METERED RESPIRATORY (INHALATION) 4 TIMES DAILY
Status: DISCONTINUED | OUTPATIENT
Start: 2021-12-24 | End: 2021-12-25 | Stop reason: HOSPADM

## 2021-12-23 RX ORDER — DULOXETIN HYDROCHLORIDE 60 MG/1
60 CAPSULE, DELAYED RELEASE ORAL DAILY
Status: DISCONTINUED | OUTPATIENT
Start: 2021-12-24 | End: 2021-12-25 | Stop reason: HOSPADM

## 2021-12-23 RX ORDER — SODIUM CHLORIDE 9 MG/ML
25 INJECTION, SOLUTION INTRAVENOUS PRN
Status: DISCONTINUED | OUTPATIENT
Start: 2021-12-23 | End: 2021-12-25 | Stop reason: HOSPADM

## 2021-12-23 RX ADMIN — PREDNISONE 60 MG: 20 TABLET ORAL at 16:43

## 2021-12-23 RX ADMIN — IPRATROPIUM BROMIDE AND ALBUTEROL SULFATE 1 AMPULE: .5; 2.5 SOLUTION RESPIRATORY (INHALATION) at 16:44

## 2021-12-23 RX ADMIN — AZITHROMYCIN MONOHYDRATE 500 MG: 500 INJECTION, POWDER, LYOPHILIZED, FOR SOLUTION INTRAVENOUS at 19:55

## 2021-12-23 RX ADMIN — CEFTRIAXONE 1000 MG: 1 INJECTION, POWDER, FOR SOLUTION INTRAMUSCULAR; INTRAVENOUS at 19:25

## 2021-12-23 RX ADMIN — SODIUM CHLORIDE 500 ML: 9 INJECTION, SOLUTION INTRAVENOUS at 17:39

## 2021-12-23 RX ADMIN — IOPAMIDOL 100 ML: 755 INJECTION, SOLUTION INTRAVENOUS at 17:45

## 2021-12-23 RX ADMIN — IPRATROPIUM BROMIDE AND ALBUTEROL SULFATE 1 AMPULE: .5; 2.5 SOLUTION RESPIRATORY (INHALATION) at 22:05

## 2021-12-23 ASSESSMENT — PAIN SCALES - GENERAL: PAINLEVEL_OUTOF10: 7

## 2021-12-23 ASSESSMENT — ENCOUNTER SYMPTOMS
COUGH: 1
RHINORRHEA: 0
SORE THROAT: 1
EYE REDNESS: 0
ABDOMINAL PAIN: 0
SHORTNESS OF BREATH: 1

## 2021-12-23 ASSESSMENT — PAIN DESCRIPTION - LOCATION: LOCATION: BACK

## 2021-12-23 ASSESSMENT — PAIN DESCRIPTION - FREQUENCY: FREQUENCY: CONTINUOUS

## 2021-12-23 ASSESSMENT — PAIN DESCRIPTION - PROGRESSION: CLINICAL_PROGRESSION: NOT CHANGED

## 2021-12-23 ASSESSMENT — PAIN DESCRIPTION - ORIENTATION: ORIENTATION: MID

## 2021-12-23 ASSESSMENT — PAIN DESCRIPTION - PAIN TYPE: TYPE: ACUTE PAIN

## 2021-12-23 ASSESSMENT — PAIN DESCRIPTION - DESCRIPTORS: DESCRIPTORS: DISCOMFORT

## 2021-12-23 ASSESSMENT — PAIN DESCRIPTION - ONSET: ONSET: ON-GOING

## 2021-12-23 ASSESSMENT — PAIN - FUNCTIONAL ASSESSMENT: PAIN_FUNCTIONAL_ASSESSMENT: ACTIVITIES ARE NOT PREVENTED

## 2021-12-23 NOTE — ED NOTES
Walked pt. In estrada, O2 sat dropped to 88%, heart rate 97, pt. Felt dizzy, back to room.      Jan Morrow RN  12/23/21 1625

## 2021-12-23 NOTE — ED PROVIDER NOTES
[facility]  eMERGENCY dEPARTMENT eNCOUnter      Pt Name: Yissel Daniel  MRN: [id number]  [place] 1955  Date of evaluation: 12/23/2021  Provider: Julienne Guevara MD    CHIEF COMPLAINT       Chief Complaint   Patient presents with    Shortness of Breath     at rest started last Friday.  Facial Pain    Cough     clear productive. HISTORY OF PRESENT ILLNESS   (Location/Symptom, Timing/Onset,Context/Setting, Quality, Duration, Modifying Factors, Severity)  Note limiting factors. Yissel Daniel is a 78 y.o. male who presents to the emergency department complaint of shortness of breath. For the past 6 days the patient's had a headache, cough, body aches, sore throat. No fever. No chills. No hemoptysis. He states he is using inhaler for many years but does not carry the diagnosis of COPD or emphysema that he is aware of. No abdominal pain. No nausea or vomiting. He comes in due to continued shortness of breath. HPI    NursingNotes were reviewed. REVIEW OF SYSTEMS    (2-9 systems for level 4, 10 or more for level 5)     Review of Systems   Constitutional: Negative for fever. HENT: Positive for sore throat. Negative for rhinorrhea. Eyes: Negative for redness. Respiratory: Positive for cough and shortness of breath. Cardiovascular: Negative for chest pain. Gastrointestinal: Negative for abdominal pain. Genitourinary: Negative for flank pain. Musculoskeletal: Positive for myalgias. Neurological: Positive for headaches. Hematological: Negative for adenopathy. Psychiatric/Behavioral: Negative for confusion. Except as noted above the remainder of the review of systems was reviewed and negative.        PAST MEDICAL HISTORY     Past Medical History:   Diagnosis Date    Arthritis     Gong's esophagus     Chronic back pain     COPD (chronic obstructive pulmonary disease) ([facility]     Depression     GERD (gastroesophageal reflux Demerol, Diltiazem hcl, Lansoprazole, Tricyclic antidepressants, and Trilisate [choline magnesium trisalicylate]    FAMILY HISTORY       Family History   Problem Relation Age of Onset    Heart Disease Mother     Cancer Father         lung          SOCIAL HISTORY       Social History     Socioeconomic History    Marital status:      Spouse name: None    Number of children: None    Years of education: None    Highest education level: None   Occupational History    None   Tobacco Use    Smoking status: Current Every Day Smoker     Packs/day: 1.00     Years: 42.00     Pack years: 42.00    Smokeless tobacco: Never Used   Vaping Use    Vaping Use: Never used   Substance and Sexual Activity    Alcohol use: No    Drug use: Yes     Types: Marijuana Maurita Handsome)     Comment: medical    Sexual activity: Not Currently   Other Topics Concern    None   Social History Narrative    None     Social Determinants of Health     Financial Resource Strain:     Difficulty of Paying Living Expenses: Not on file   Food Insecurity:     Worried About Running Out of Food in the Last Year: Not on file    Frank of Food in the Last Year: Not on file   Transportation Needs:     Lack of Transportation (Medical): Not on file    Lack of Transportation (Non-Medical):  Not on file   Physical Activity:     Days of Exercise per Week: Not on file    Minutes of Exercise per Session: Not on file   Stress:     Feeling of Stress : Not on file   Social Connections:     Frequency of Communication with Friends and Family: Not on file    Frequency of Social Gatherings with Friends and Family: Not on file    Attends Mandaen Services: Not on file    Active Member of Clubs or Organizations: Not on file    Attends Club or Organization Meetings: Not on file    Marital Status: Not on file   Intimate Partner Violence:     Fear of Current or Ex-Partner: Not on file    Emotionally Abused: Not on file    Physically Abused: Not on file  Sexually Abused: Not on file   Housing Stability:     Unable to Pay for Housing in the Last Year: Not on file    Number of Places Lived in the Last Year: Not on file    Unstable Housing in the Last Year: Not on file       SCREENINGS             PHYSICAL EXAM    (up to 7 for level 4, 8 or more for level 5)     ED Triage Vitals   BP Temp Temp src Pulse Resp SpO2 Height Weight   -- -- -- -- -- -- -- --       Physical Exam  Vitals and nursing note reviewed. Constitutional:       Appearance: He is well-developed. He is not diaphoretic. HENT:      Head: Normocephalic and atraumatic. Mouth/Throat:      Mouth: Mucous membranes are moist.   Eyes:      General:         Right eye: No discharge. Left eye: No discharge. Conjunctiva/sclera: Conjunctivae normal.   Cardiovascular:      Rate and Rhythm: Normal rate. Pulses: Normal pulses. Pulmonary:      Effort: Pulmonary effort is normal. No respiratory distress. Comments: Decreased breath sounds left lower lobe and left lingula. Some expiratory wheezes are present. Breath sounds present throughout. No rales or rhonchi. Abdominal:      Palpations: Abdomen is soft. Tenderness: There is no abdominal tenderness. There is no guarding or rebound. Musculoskeletal:         General: Normal range of motion. Cervical back: Neck supple. Comments: No leg swelling or calf tenderness. Skin:     General: Skin is warm and dry. Capillary Refill: Capillary refill takes less than 2 seconds. Neurological:      Mental Status: He is alert and oriented to person, place, and time.    Psychiatric:         Behavior: Behavior normal.         DIAGNOSTIC RESULTS     EKG: All EKG's are interpreted by the Emergency Department Physician who either signs or Co-signsthis chart in the absence of a cardiologist.        RADIOLOGY:   Non-plain filmimages such as CT, Ultrasound and MRI are read by the radiologist. Plain radiographic images are visualized and preliminarily interpreted by the emergency physician with the below findings:        Interpretation per the Radiologist below, if available at the time ofthis note:    CT CHEST PULMONARY EMBOLISM W CONTRAST   Final Result   No evidence of a pulmonary embolus      Mildly dilated and atherosclerotic thoracic aorta with no aneurysm or   dissection      Probable mild mucous plugging scattered in the bronchi to the left lower lobe   with associated mild postobstructive infiltrates and/or atelectasis   posteriorly. Suggest pulmonology correlation and follow-up to assure   resolution      Small pulmonary nodules right upper lobe measuring up to 9 mm which are more   prominent. Recommend follow-up      Small lymph nodes in the mediastinum which are not pathologic by size criteria      COPD with fibrosis and scarring along the upper lobes and both lung bases   which is unchanged. Extensive postop changes in the spine and lower mediastinum which is   unchanged. Fleischner Society guidelines for follow-up and management of incidentally   detected pulmonary nodules      Multiple Solid Nodules:      Nodule size less than 6 mm   In a low-risk patient, no routine follow-up. In a high-risk patient, optional CT at 12 months. Nodule size equals 6-8 mm   In a low-risk patient, CT at 3-6 months, then consider CT at 18-24 months. In a high-risk patient, CT at 3-6 months, then CT at 18-24 months. Nodule size greater than 8 mm   In a low-risk patient, CT at 3-6 months, then consider CT at 18-24 months. In a high-risk patient, CT at 3-6 months, then CT at 18-24 months. - Low risk patients include individuals with minimal or absent history of   smoking and other known risk factors. - High risk patients include individuals with a history or smoking or known   risk factors. Radiology 2017 http://pubs. rsna.org/doi/full/10.1148/radiol. 4412345056         XR CHEST PORTABLE   Final Result No radiographic evidence of acute pulmonary disease.                ED BEDSIDE ULTRASOUND:   Performed by ED Physician - none    LABS:  Labs Reviewed   CBC WITH AUTO DIFFERENTIAL - Abnormal; Notable for the following components:       Result Value    WBC 13.3 (*)     Platelets 336 (*)     Neutrophils Absolute 9.4 (*)     All other components within normal limits    Narrative:     Performed at:  [facility]  [address] [facility]   Phone [phone]   COMPREHENSIVE METABOLIC PANEL W/ REFLEX TO MG FOR LOW K - Abnormal; Notable for the following components:    CREATININE <0.5 (*)     All other components within normal limits    Narrative:     Performed at:  [facility]  [address] [facility]   Phone [phone]   BRAIN NATRIURETIC PEPTIDE - Abnormal; Notable for the following components:    Pro-BNP 1,202 (*)     All other components within normal limits    Narrative:     Performed at:  [facility]  [address] [facility]   Phone [phone]   BLOOD GAS, VENOUS - Abnormal; Notable for the following components:    pCO2, Fidel 37.9 (*)     pO2, Fidel 54.4 (*)     All other components within normal limits    Narrative:     Performed at:  [facility]  [address] [facility]   Phone [phone]   RAPID INFLUENZA A/B ANTIGENS    Narrative:     Performed at:  [facility]  [address] [facility]   Phone [phone]   CULTURE, BLOOD 1   CULTURE, BLOOD 2   TROPONIN    Narrative:     Performed at:  [facility]  [address] [facility]   Phone [phone]   LACTATE, SEPSIS    Narrative:     Performed at:  [facility]  [address] [facility]   Phone [phone]   MAGNESIUM    Narrative:     Performed at:  [address] [place] [address] Laboratory  [address] [facility],  [facility]   Phone [phone]       All other labs were within normal range or not returned as of this dictation. EMERGENCY DEPARTMENT COURSE and DIFFERENTIAL DIAGNOSIS/MDM:   Vitals:    Vitals:    12/23/21 1544 12/23/21 1734 12/23/21 1825   BP: (!) 173/99 (!) 178/92 (!) 162/93   Pulse: 77 77 76   Resp: 18 18 18   Temp: 98 °F (36.7 °C) 98.3 °F (36.8 °C) 98.8 °F (37.1 °C)   TempSrc: Oral Oral Tympanic   SpO2: 92% 92% 92%   Weight: 143 lb 11.8 oz (65.2 kg)     Height: 5' 9\" (1.753 m)             MDM  Number of Diagnoses or Management Options  Abnormal chest CT  COPD exacerbation (HCC)  Hypoxia  Diagnosis management comments: DDX: COPD, pneumonia, pleural effusion, Covid, PE, other    Patient denies any known history of COPD. His work-up is negative for influenza, proBNP is elevated at 1202 pg/mL, magnesium normal, troponin normal, CMP unremarkable, lactic acid 1.2, venous blood gas shows a pH of 7.427, PCO2 of 37.9, PO2 venous of 54.4. Patient's white blood cell count is elevated and the chest x-ray shows no acute disease. After the breathing treatment the patient states he felt like he was breathing close to his normal; however, with ambulation he desaturated to 88% on room air. And the patient was symptomatic with his hypoxia. CT scan was then performed to ensure that the patient did not have a pulmonary emboli.       CT Impression  No evidence of a pulmonary embolus     Mildly dilated and atherosclerotic thoracic aorta with no aneurysm or  dissection     Probable mild mucous plugging scattered in the bronchi to the left lower lobe  with associated mild postobstructive infiltrates and/or atelectasis  posteriorly.  Suggest pulmonology correlation and follow-up to assure  resolution     Small pulmonary nodules right upper lobe measuring up to 9 mm which are more  prominent.  Recommend follow-up    At this time I feel the patient most likely has a COPD exacerbation with hypoxia. He has already received steroids in the ED. His Covid test is pending. He is not hypoxic on room air at rest.  He will be started on rocephin and azithromycin to cover for CAP. Does not meet sepsis criteria at this time. I will consult the hospitalist for the patient to be evaluated for admission. CRITICAL CARE TIME   Total Critical Care time was 20 minutes, excluding separately reportable procedures. There was a high probability of clinically significant/life threatening deterioration in the patient's condition which required my urgent intervention. CONSULTS:  None    PROCEDURES:  Unless otherwise noted below, none     Procedures    FINAL IMPRESSION      1. COPD exacerbation ([address]    2. Hypoxia    3. Abnormal chest CT    4. Pulmonary nodules          DISPOSITION/PLAN   DISPOSITION Decision To Admit 12/23/2021 06:37:24 PM      PATIENT REFERRED TO:  No follow-up provider specified.     DISCHARGE MEDICATIONS:  New Prescriptions    No medications on file          (Please note that portions of this note were completed with a voice recognition program.Efforts were made to edit the dictations but occasionally words are mis-transcribed.)    Kaitlin Spence MD (electronically signed)  Attending Emergency Physician          Kaitlin Spence MD  12/23/21 1854

## 2021-12-24 LAB
ANION GAP SERPL CALCULATED.3IONS-SCNC: 14 MMOL/L (ref 3–16)
BASOPHILS ABSOLUTE: 0 K/UL (ref 0–0.2)
BASOPHILS RELATIVE PERCENT: 0.2 %
BUN BLDV-MCNC: 10 MG/DL (ref 7–20)
CALCIUM SERPL-MCNC: 9.2 MG/DL (ref 8.3–10.6)
CHLORIDE BLD-SCNC: 103 MMOL/L (ref 99–110)
CO2: 22 MMOL/L (ref 21–32)
CREAT SERPL-MCNC: <0.5 MG/DL (ref 0.8–1.3)
EOSINOPHILS ABSOLUTE: 0 K/UL (ref 0–0.6)
EOSINOPHILS RELATIVE PERCENT: 0 %
GFR AFRICAN AMERICAN: >60
GFR NON-AFRICAN AMERICAN: >60
GLUCOSE BLD-MCNC: 144 MG/DL (ref 70–99)
HCT VFR BLD CALC: 45.7 % (ref 40.5–52.5)
HEMOGLOBIN: 15.5 G/DL (ref 13.5–17.5)
LYMPHOCYTES ABSOLUTE: 0.9 K/UL (ref 1–5.1)
LYMPHOCYTES RELATIVE PERCENT: 6.6 %
MAGNESIUM: 2.5 MG/DL (ref 1.8–2.4)
MCH RBC QN AUTO: 30.9 PG (ref 26–34)
MCHC RBC AUTO-ENTMCNC: 34 G/DL (ref 31–36)
MCV RBC AUTO: 91 FL (ref 80–100)
MONOCYTES ABSOLUTE: 0.1 K/UL (ref 0–1.3)
MONOCYTES RELATIVE PERCENT: 0.9 %
NEUTROPHILS ABSOLUTE: 13 K/UL (ref 1.7–7.7)
NEUTROPHILS RELATIVE PERCENT: 92.3 %
PDW BLD-RTO: 13.9 % (ref 12.4–15.4)
PLATELET # BLD: 486 K/UL (ref 135–450)
PMV BLD AUTO: 7.4 FL (ref 5–10.5)
POTASSIUM SERPL-SCNC: 3.8 MMOL/L (ref 3.5–5.1)
RBC # BLD: 5.02 M/UL (ref 4.2–5.9)
SARS-COV-2: NOT DETECTED
SODIUM BLD-SCNC: 139 MMOL/L (ref 136–145)
WBC # BLD: 14.1 K/UL (ref 4–11)

## 2021-12-24 PROCEDURE — 99223 1ST HOSP IP/OBS HIGH 75: CPT | Performed by: INTERNAL MEDICINE

## 2021-12-24 PROCEDURE — 2580000003 HC RX 258: Performed by: STUDENT IN AN ORGANIZED HEALTH CARE EDUCATION/TRAINING PROGRAM

## 2021-12-24 PROCEDURE — 6370000000 HC RX 637 (ALT 250 FOR IP): Performed by: HOSPITALIST

## 2021-12-24 PROCEDURE — 6360000002 HC RX W HCPCS: Performed by: STUDENT IN AN ORGANIZED HEALTH CARE EDUCATION/TRAINING PROGRAM

## 2021-12-24 PROCEDURE — 1200000000 HC SEMI PRIVATE

## 2021-12-24 PROCEDURE — 94640 AIRWAY INHALATION TREATMENT: CPT

## 2021-12-24 PROCEDURE — 6370000000 HC RX 637 (ALT 250 FOR IP): Performed by: STUDENT IN AN ORGANIZED HEALTH CARE EDUCATION/TRAINING PROGRAM

## 2021-12-24 PROCEDURE — 94761 N-INVAS EAR/PLS OXIMETRY MLT: CPT

## 2021-12-24 PROCEDURE — 36415 COLL VENOUS BLD VENIPUNCTURE: CPT

## 2021-12-24 PROCEDURE — 83735 ASSAY OF MAGNESIUM: CPT

## 2021-12-24 PROCEDURE — 85025 COMPLETE CBC W/AUTO DIFF WBC: CPT

## 2021-12-24 PROCEDURE — 80048 BASIC METABOLIC PNL TOTAL CA: CPT

## 2021-12-24 PROCEDURE — 6370000000 HC RX 637 (ALT 250 FOR IP): Performed by: INTERNAL MEDICINE

## 2021-12-24 RX ORDER — TIZANIDINE 4 MG/1
4 TABLET ORAL EVERY 8 HOURS PRN
Status: DISCONTINUED | OUTPATIENT
Start: 2021-12-24 | End: 2021-12-25 | Stop reason: HOSPADM

## 2021-12-24 RX ORDER — OXYCODONE HYDROCHLORIDE AND ACETAMINOPHEN 5; 325 MG/1; MG/1
1 TABLET ORAL EVERY 4 HOURS PRN
Status: DISCONTINUED | OUTPATIENT
Start: 2021-12-24 | End: 2021-12-25 | Stop reason: HOSPADM

## 2021-12-24 RX ORDER — BUDESONIDE AND FORMOTEROL FUMARATE DIHYDRATE 160; 4.5 UG/1; UG/1
2 AEROSOL RESPIRATORY (INHALATION) 2 TIMES DAILY
Status: DISCONTINUED | OUTPATIENT
Start: 2021-12-24 | End: 2021-12-25 | Stop reason: HOSPADM

## 2021-12-24 RX ADMIN — VERAPAMIL HYDROCHLORIDE 80 MG: 80 TABLET, FILM COATED ORAL at 17:40

## 2021-12-24 RX ADMIN — VERAPAMIL HYDROCHLORIDE 80 MG: 80 TABLET, FILM COATED ORAL at 14:20

## 2021-12-24 RX ADMIN — SODIUM CHLORIDE, PRESERVATIVE FREE 10 ML: 5 INJECTION INTRAVENOUS at 01:23

## 2021-12-24 RX ADMIN — METHYLPREDNISOLONE SODIUM SUCCINATE 40 MG: 40 INJECTION, POWDER, FOR SOLUTION INTRAMUSCULAR; INTRAVENOUS at 23:03

## 2021-12-24 RX ADMIN — IPRATROPIUM BROMIDE 2 PUFF: 17 AEROSOL, METERED RESPIRATORY (INHALATION) at 20:06

## 2021-12-24 RX ADMIN — OXYCODONE AND ACETAMINOPHEN 1 TABLET: 5; 325 TABLET ORAL at 20:52

## 2021-12-24 RX ADMIN — METHYLPREDNISOLONE SODIUM SUCCINATE 40 MG: 40 INJECTION, POWDER, FOR SOLUTION INTRAMUSCULAR; INTRAVENOUS at 01:22

## 2021-12-24 RX ADMIN — ACETAMINOPHEN 650 MG: 325 TABLET ORAL at 14:20

## 2021-12-24 RX ADMIN — ALBUTEROL SULFATE 2 PUFF: 90 AEROSOL, METERED RESPIRATORY (INHALATION) at 12:53

## 2021-12-24 RX ADMIN — ALBUTEROL SULFATE 2 PUFF: 90 AEROSOL, METERED RESPIRATORY (INHALATION) at 07:39

## 2021-12-24 RX ADMIN — SODIUM CHLORIDE, PRESERVATIVE FREE 10 ML: 5 INJECTION INTRAVENOUS at 08:53

## 2021-12-24 RX ADMIN — ALBUTEROL SULFATE 2 PUFF: 90 AEROSOL, METERED RESPIRATORY (INHALATION) at 16:28

## 2021-12-24 RX ADMIN — ENOXAPARIN SODIUM 40 MG: 100 INJECTION SUBCUTANEOUS at 08:53

## 2021-12-24 RX ADMIN — ALBUTEROL SULFATE 2 PUFF: 90 AEROSOL, METERED RESPIRATORY (INHALATION) at 20:06

## 2021-12-24 RX ADMIN — LISINOPRIL 40 MG: 40 TABLET ORAL at 08:53

## 2021-12-24 RX ADMIN — TRAZODONE HYDROCHLORIDE 50 MG: 50 TABLET ORAL at 20:51

## 2021-12-24 RX ADMIN — IPRATROPIUM BROMIDE 2 PUFF: 17 AEROSOL, METERED RESPIRATORY (INHALATION) at 16:28

## 2021-12-24 RX ADMIN — AZITHROMYCIN MONOHYDRATE 500 MG: 500 INJECTION, POWDER, LYOPHILIZED, FOR SOLUTION INTRAVENOUS at 20:51

## 2021-12-24 RX ADMIN — BUDESONIDE AND FORMOTEROL FUMARATE DIHYDRATE 2 PUFF: 160; 4.5 AEROSOL RESPIRATORY (INHALATION) at 20:06

## 2021-12-24 RX ADMIN — TIZANIDINE 4 MG: 4 TABLET ORAL at 14:20

## 2021-12-24 RX ADMIN — OXYCODONE AND ACETAMINOPHEN 1 TABLET: 5; 325 TABLET ORAL at 16:50

## 2021-12-24 RX ADMIN — METHYLPREDNISOLONE SODIUM SUCCINATE 40 MG: 40 INJECTION, POWDER, FOR SOLUTION INTRAMUSCULAR; INTRAVENOUS at 08:53

## 2021-12-24 RX ADMIN — DULOXETINE HYDROCHLORIDE 60 MG: 60 CAPSULE, DELAYED RELEASE ORAL at 08:52

## 2021-12-24 RX ADMIN — VERAPAMIL HYDROCHLORIDE 80 MG: 80 TABLET, FILM COATED ORAL at 23:03

## 2021-12-24 RX ADMIN — IPRATROPIUM BROMIDE 2 PUFF: 17 AEROSOL, METERED RESPIRATORY (INHALATION) at 12:53

## 2021-12-24 RX ADMIN — IPRATROPIUM BROMIDE 2 PUFF: 17 AEROSOL, METERED RESPIRATORY (INHALATION) at 07:39

## 2021-12-24 RX ADMIN — SODIUM CHLORIDE, PRESERVATIVE FREE 10 ML: 5 INJECTION INTRAVENOUS at 20:52

## 2021-12-24 RX ADMIN — METHYLPREDNISOLONE SODIUM SUCCINATE 40 MG: 40 INJECTION, POWDER, FOR SOLUTION INTRAMUSCULAR; INTRAVENOUS at 16:50

## 2021-12-24 RX ADMIN — VERAPAMIL HYDROCHLORIDE 80 MG: 80 TABLET, FILM COATED ORAL at 01:22

## 2021-12-24 RX ADMIN — BUDESONIDE AND FORMOTEROL FUMARATE DIHYDRATE 2 PUFF: 160; 4.5 AEROSOL RESPIRATORY (INHALATION) at 12:53

## 2021-12-24 ASSESSMENT — PAIN DESCRIPTION - DESCRIPTORS
DESCRIPTORS: ACHING;DISCOMFORT
DESCRIPTORS: DISCOMFORT

## 2021-12-24 ASSESSMENT — PAIN DESCRIPTION - FREQUENCY
FREQUENCY: CONTINUOUS

## 2021-12-24 ASSESSMENT — PAIN SCALES - GENERAL
PAINLEVEL_OUTOF10: 4
PAINLEVEL_OUTOF10: 0
PAINLEVEL_OUTOF10: 0
PAINLEVEL_OUTOF10: 7
PAINLEVEL_OUTOF10: 6
PAINLEVEL_OUTOF10: 7
PAINLEVEL_OUTOF10: 1
PAINLEVEL_OUTOF10: 7

## 2021-12-24 ASSESSMENT — PAIN DESCRIPTION - ONSET
ONSET: ON-GOING

## 2021-12-24 ASSESSMENT — PAIN DESCRIPTION - LOCATION
LOCATION: BACK

## 2021-12-24 ASSESSMENT — PAIN - FUNCTIONAL ASSESSMENT
PAIN_FUNCTIONAL_ASSESSMENT: ACTIVITIES ARE NOT PREVENTED

## 2021-12-24 ASSESSMENT — PAIN DESCRIPTION - ORIENTATION
ORIENTATION: MID

## 2021-12-24 ASSESSMENT — PAIN DESCRIPTION - PROGRESSION
CLINICAL_PROGRESSION: NOT CHANGED
CLINICAL_PROGRESSION: GRADUALLY IMPROVING

## 2021-12-24 ASSESSMENT — PAIN DESCRIPTION - PAIN TYPE
TYPE: CHRONIC PAIN
TYPE: ACUTE PAIN
TYPE: CHRONIC PAIN
TYPE: ACUTE PAIN

## 2021-12-24 NOTE — CARE COORDINATION
SW spoke to registration and verified patient's VA benefits. He stated that he would like them billed for this hospital stay. They are now set as coverage. AWILDA submitted a 72 hour notification on the [facility] and received the following message: Thank you for reporting emergency care. Your notification ID is:  [id number]    Please make note of this ID as it will be important for all future communication regarding this notification and the resulting decision. Respectfully submitted,    YAHIR Cintron  [facility]   [phone]    Electronically signed by YAHIR Moctezuma on 12/24/2021 at 9:22 AM

## 2021-12-24 NOTE — ED NOTES
Patient states he feels better- able to take deeper breaths- nebulizer treatment in process now- wife at bedside aware of [facility] room number     Niya Jacobs RN  12/23/21 9427

## 2021-12-24 NOTE — PROGRESS NOTES
4 Eyes Skin Assessment     NAME:  Stalin Espinoza  YOB: 1955  MEDICAL RECORD NUMBER:  [id number]    The patient is being assess for  Admission    I agree that 2 RN's have performed a thorough Head to Toe Skin Assessment on the patient. ALL assessment sites listed below have been assessed. Areas assessed by both nurses:    Head, Face, Ears, Shoulders, Back, Chest, Arms, Elbows, Hands, Sacrum. Buttock, Coccyx, Ischium and Legs. Feet and Heels        Does the Patient have a Wound?  No noted wound(s)       Cesar Prevention initiated:  No   Wound Care Orders initiated:  No    Pressure Injury (Stage 3,4, Unstageable, DTI, NWPT, and Complex wounds) if present place consult order under [de-identified] No    New and Established Ostomies if present place consult order under : No      Nurse 1 eSignature: Electronically signed by Ann Marie Mcguire RN on 12/23/21 at 11:47 PM EST    **SHARE this note so that the co-signing nurse is able to place an eSignature**    Nurse 2 eSignature: Electronically signed by Naseem Barrett RN on 12/24/21 at 7:24 AM EST

## 2021-12-24 NOTE — H&P
Hospital Medicine History & Physical      PCP: No primary care provider on file. Date of Admission: 12/23/2021    Date of Service: Pt seen/examined on 12/23/2021 and Admitted to Inpatient with expected LOS greater than two midnights due to medical therapy. Chief Complaint:  Shortness of breath      History Of Present Illness:      78 y.o. male with PMHx of Chronic pain, COPD, GERD, HLD, HTN and tobacco abuse presented to [facility] in transfer from [facility] for COPD exacerbation. Patient reports shortness of breath worsening over the last 5 days. He complains of cough with clear sputum production generalized body aches with sore throat. No fever or chills. No nausea or vomiting. Wheezing has improved after breathing treatments in the emergency department. CT scan revealed probable mild mucous plugging scattered in the bronchi to the left lower lobe with associated post obstructive infiltrates.     Past Medical History:          Diagnosis Date    Arthritis     Gong's esophagus     Chronic back pain     COPD (chronic obstructive pulmonary disease) (HCC)     Depression     GERD (gastroesophageal reflux disease)     HYPERCHOLESTERAEMIA     Hypertension     Prinzmetal angina (HCC)     Spinal stenoses     TIA (transient ischaemic attack)        Past Surgical History:          Procedure Laterality Date    APPENDECTOMY      BACK SURGERY      BRONCHOSCOPY N/A 12/30/2019    BRONCHOSCOPY ALVEOLAR LAVAGE performed by Janes Herman MD at [facility]  12/30/2019    BRONCHOSCOPY BRUSHINGS performed by Janes Herman MD at [address] [place] [address]    COLONOSCOPY N/A 7/24/2020    COLONOSCOPY POLYPECTOMY SNARE/COLD BIOPSY performed by Lynnann Spurling, MD at [address] [place] [address]  7/24/2020    COLONOSCOPY WITH BIOPSY performed by Lynnann Spurling, MD at [facility] ENDOSCOPY    JOINT REPLACEMENT      RIGHT KNEE    SPLENECTOMY      UPPER GASTROINTESTINAL ENDOSCOPY  5-14-10    egd with biopsy    UPPER GASTROINTESTINAL ENDOSCOPY  11-27-12    Esophagogastroduodenoscopy with biopsy, Romero esophageal dilation, and Botulinum injection of the pylorus       Medications Prior to Admission:      Prior to Admission medications    Medication Sig Start Date End Date Taking? Authorizing Provider   DULoxetine (CYMBALTA) 60 MG extended release capsule Take 60 mg by mouth daily   Yes Historical Provider, MD   albuterol sulfate  (90 Base) MCG/ACT inhaler Inhale 2 puffs into the lungs every 6 hours as needed 5/1/20  Yes Historical Provider, MD   traZODone (DESYREL) 50 MG tablet Take 50 mg by mouth nightly   Yes Historical Provider, MD   medical marijuana Take 1 each by mouth as needed. Yes Historical Provider, MD   lisinopril (PRINIVIL;ZESTRIL) 40 MG tablet Take 40 mg by mouth daily   Yes Historical Provider, MD   verapamil (CALAN) 80 MG tablet Take 80 mg by mouth 4 times daily    Yes Historical Provider, MD   tiZANidine (ZANAFLEX) 4 MG tablet Take 4 mg by mouth every 8 hours as needed    Historical Provider, MD   promethazine (PHENERGAN) 25 MG tablet Take 25 mg by mouth every 6 hours as needed for Nausea    Historical Provider, MD   bisacodyl (BISACODYL) 5 MG EC tablet Take 1 tablet by mouth daily as needed for Constipation 7/25/20   Deon Angel MD       Allergies:  Amitriptyline, Codeine, Demerol, Diltiazem hcl, Lansoprazole, Tricyclic antidepressants, and Trilisate [choline magnesium trisalicylate]    Social History:        TOBACCO:   reports that he has been smoking. He has a 42.00 pack-year smoking history. He has never used smokeless tobacco.  ETOH:   reports no history of alcohol use.       Family History:      Reviewed in detail positive as follows:        Problem Relation Age of Onset    Heart Disease Mother     Cancer Father         lung       REVIEW OF SYSTEMS: Pertinent positives as noted in the HPI. All other systems reviewed and negative. PHYSICAL EXAM PERFORMED:    BP (!) 148/87   Pulse 84   Temp 98 °F (36.7 °C) (Oral)   Resp 20   Ht 5' 9\" (1.753 m)   Wt 143 lb 11.8 oz (65.2 kg)   SpO2 92%   BMI 21.23 kg/m²     General appearance: Thin, cachectic elderly  male sitting upright on hospital bed in no apparent distress, appears stated age and cooperative. HEENT:  Normal cephalic, atraumatic without obvious deformity. Pupils equal, round, and reactive to light. Conjunctivae/corneas clear. Neck: Supple, with full range of motion. No jugular venous distention. Trachea midline. Respiratory:  Normal respiratory effort. Diminished breath sounds no accessory muscle use. Cardiovascular:  Regular rate and rhythm without murmurs, no lower extremity edema. Abdomen: Soft, non-tender, non-distended, without rebound or guarding. Normal bowel sounds. Musculoskeletal:  Moves all extremities equally. Full range of motion without deformity. Skin: Skin warm, dry and intact. No rashes or lesions. Neurologic:  Neurovascularly intact without any focal sensory/motor deficits. Cranial nerves: II-XII intact, grossly non-focal.  Psychiatric:  Alert and oriented, thought content appropriate, normal insight  Capillary Refill: Brisk,< 3 seconds   Peripheral Pulses: +2 palpable, equal bilaterally       Labs:     Recent Labs     12/23/21  1615   WBC 13.3*   HGB 14.9   HCT 46.0   *     Recent Labs     12/23/21  1615      K 3.5      CO2 27   BUN 7   CREATININE <0.5*   CALCIUM 9.3     Recent Labs     12/23/21  1615   AST 16   ALT 13   BILITOT 0.4   ALKPHOS 118     No results for input(s): INR in the last 72 hours.   Recent Labs     12/23/21  1615   TROPONINI <0.01       Urinalysis:      Lab Results   Component Value Date    NITRU Negative 07/22/2020    WBCUA 8 07/22/2020    RBCUA 13 07/22/2020    BLOODU SMALL 07/22/2020    SPECGRAV 1.009 07/22/2020 [facility] [J44.1] 12/23/2021         PLAN:    COPD (acute exacerbation)   - Nebulizer treatments every 4 hours and every 2 hours prn   - Solumedrol and Antibiotics have been prescribed. - Solumedrol will be tapered as the patient improves. CT chest: No PE, probable mild mucous plugging scattered in the bronchi to the left lower lobe with associated mild postobstructive infiltrates and/or atelectasis posteriorly. Small pulmonary nodules right upper lobe  - consult pulm  - Patient will be monitored closely, and deep breathing and coughing will be encouraged while awake. Essential (primary) hypertension   - monitor blood pressure  - continue home meds     Hyperlipidemia   - continue statin    DVT Prophylaxis: Lovenox  Diet: ADULT DIET; Regular; Low Sodium (2 gm)  Code Status: Full Code    Dispo - Inpatient       [address], APRN - CNP    Thank you No primary care provider on file. for the opportunity to be involved in this patient's care. If you have any questions or concerns please feel free to contact me at [phone].

## 2021-12-24 NOTE — PLAN OF CARE
Problem: Falls - Risk of:  Goal: Will remain free from falls  Description: Will remain free from falls  12/24/2021 0942 by Becca Marti RN  Outcome: Ongoing     Problem: Falls - Risk of:  Goal: Absence of physical injury  Description: Absence of physical injury  12/24/2021 0942 by Becca Marti RN  Outcome: Ongoing     Problem: Pain:  Goal: Pain level will decrease  Description: Pain level will decrease  12/24/2021 0942 by Becca Marti RN  Outcome: Ongoing     Problem: Pain:  Goal: Control of acute pain  Description: Control of acute pain  12/24/2021 0942 by Becca Marti RN  Outcome: Ongoing     Problem: Pain:  Goal: Control of chronic pain  Description: Control of chronic pain  12/24/2021 0942 by Becca Marti RN  Outcome: Ongoing

## 2021-12-24 NOTE — PROGRESS NOTES
Upon arrival to the unit, pt was on 4L oxygen. Throughout the night, pt has taken off the 4L and is currently on room air. Pt's spo2 on room air has been 90-92%.  Electronically signed by Erinn Darling RN on 12/24/2021 at 5:50 AM

## 2021-12-24 NOTE — CONSULTS
REASON FOR CONSULTATION/CC: copd      Consult at request of Chyna Patel MD for     PCP: Yuki Buck Pulmonologist:  [place] Messi Solares is a 78y.o. year old male with a history of Tobacco abuse who presents with     increasea shortness of breath and dyspnea on exertion who has albuterol at home but denies knowledge of COPD / emphysema diagnosis who continued to smoke (no interest in quitting )       This note may have been  transcribed using [id number] JDCPhosphate. Please disregard any translational errors. Assessment:        [facility] patient   COPD  HTN  HLD    Plan:      Hospital Day 1     Copd exacerbation   * Solumedrol   * Duoneb's    * Azithromycin  *  Add  Symbicort. Able to fill at the [place]       Pulmonary nodules   * likely benign. * follow up at [place]  ~ 3 months      Toboacco abuse  * advised to quit  * not interested in quitting    Social History     Tobacco Use   Smoking Status Current Every Day Smoker    Packs/day: 1.00    Years: 42.00    Pack years: 42.00   Smokeless Tobacco Never Used          Nutrition  - ADULT DIET; Regular; Low Sodium (2 gm)             This note was transcribed using [id number] JDCPhosphate. Please disregard any translational errors.     Thank you for the consult    [facility]  [phone]             Data:     PAST MEDICAL HISTORY:  Past Medical History:   Diagnosis Date    Arthritis     Gong's esophagus     Chronic back pain     COPD (chronic obstructive pulmonary disease) ([address]     Depression     GERD (gastroesophageal reflux disease)     HYPERCHOLESTERAEMIA     Hypertension     Prinzmetal angina ([address]     Spinal stenoses     TIA (transient ischaemic attack)        PAST SURGICAL HISTORY:  Past Surgical History:   Procedure Laterality Date    APPENDECTOMY      BACK SURGERY      BRONCHOSCOPY N/A 12/30/2019    BRONCHOSCOPY ALVEOLAR LAVAGE performed by Julio Garrett MD at [address]  12/30/2019    BRONCHOSCOPY BRUSHINGS performed by Julio Garrett MD at [address] COLONOSCOPY      WITH BIOPSY    COLONOSCOPY N/A 7/24/2020    COLONOSCOPY POLYPECTOMY SNARE/COLD BIOPSY performed by Laura De Paz MD at [address]  7/24/2020    COLONOSCOPY WITH BIOPSY performed by Laura De Paz MD at [facility] ENDOSCOPY  5-14-10    egd with biopsy    UPPER GASTROINTESTINAL ENDOSCOPY  11-27-12    Esophagogastroduodenoscopy with biopsy, Romero esophageal dilation, and Botulinum injection of the pylorus       FAMILY HISTORY:  family history includes Cancer in his father; Heart Disease in his mother. SOCIAL HISTORY:   reports that he has been smoking. He has a 42.00 pack-year smoking history. He has never used smokeless tobacco.    Scheduled Meds:   DULoxetine  60 mg Oral Daily    lisinopril  40 mg Oral Daily    verapamil  80 mg Oral 4x Daily    traZODone  50 mg Oral Nightly    sodium chloride flush  5-40 mL IntraVENous 2 times per day    enoxaparin  40 mg SubCUTAneous Daily    methylPREDNISolone  40 mg IntraVENous Q8H    Followed by   [place] [facility] 12/26/2021] predniSONE  40 mg Oral Daily    azithromycin  500 mg IntraVENous Q24H    albuterol sulfate HFA  2 puff Inhalation 4x daily    And    ipratropium  2 puff Inhalation 4x daily       Continuous Infusions:   sodium chloride         PRN Meds:  sodium chloride flush, sodium chloride, acetaminophen **OR** acetaminophen, ondansetron    ALLERGIES:  Patient is allergic to amitriptyline, codeine, demerol, diltiazem hcl, lansoprazole, tricyclic antidepressants, and trilisate [choline magnesium trisalicylate].     REVIEW OF SYSTEMS:  Constitutional: Negative for fever    HENT: Negative for sore throat  Eyes: Negative for redness   Respiratory: ++ for dyspnea, + cough  Cardiovascular: Negative for chest pain  Gastrointestinal: Negative for vomiting, diarrhea    Genitourinary: Negative for hematuria   Musculoskeletal: Negative for arthralgias   Skin: Negative for rash  Neurological: Negative for syncope  Hematological: Negative for adenopathy  Psychiatric/Behavorial: Negative for anxiety    Objective:   PHYSICAL EXAM:  Blood pressure 128/71, pulse 75, temperature 98.5 °F (36.9 °C), temperature source Oral, resp. rate 16, height 5' 9\" (1.753 m), weight 147 lb 11.3 oz (67 kg), SpO2 90 %.'  Gen: No distress. Eyes: PERRL. No sclera icterus. No conjunctival injection. ENT: No discharge. Pharynx clear. External appearance of ears and nose normal.  Neck: Trachea midline. No obvious mass. Resp: No accessory muscle use. No crackles. No wheezes. No rhonchi. Distant breath sounds   CV: Regular rate. Regular rhythm. No murmur or rub. No edema. GI: Non-tender. Non-distended. No hernia. Skin: Warm, dry, normal texture and turgor. No nodule on exposed extremities. Lymph: No cervical LAD. No supraclavicular LAD. M/S: No cyanosis. No clubbing. No joint deformity. Neuro: Moves all four extremities. Psych: Oriented x 3. No anxiety. Awake. Alert. Intact judgement and insight. Data Reviewed:   LABS:  CBC:   Recent Labs     12/23/21  1615 12/24/21  0635   WBC 13.3* 14.1*   HGB 14.9 15.5   HCT 46.0 45.7   MCV 90.0 91.0   * 486*     BMP:   Recent Labs     12/23/21  1615 12/24/21  0635    139   K 3.5 3.8    103   CO2 27 22   BUN 7 10   CREATININE <0.5* <0.5*     LIVER PROFILE:   Recent Labs     12/23/21  1615   AST 16   ALT 13   BILITOT 0.4   ALKPHOS 118     PT/INR: No results for input(s): PROTIME, INR in the last 72 hours. APTT: No results for input(s): APTT in the last 72 hours.   UA:No results for input(s): NITRITE, COLORU, PHUR, LABCAST, WBCUA, RBCUA, MUCUS, TRICHOMONAS, YEAST, BACTERIA, CLARITYU, SPECGRAV, LEUKOCYTESUR, UROBILINOGEN, BILIRUBINUR, BLOODU, GLUCOSEU, AMORPHOUS in the last 72 hours. Invalid input(s): KETONESU  No results for input(s): PHART, CNP9FSO, PO2ART in the last 72 hours. Vent Information  SpO2: 90 %    Radiology Review:  Pertinent images / reports were reviewed as a part of this visit. CT Chest w/ contrast: No results found for this or any previous visit. CT Chest w/o contrast: No results found for this or any previous visit. CTPA: Results for orders placed during the hospital encounter of 12/23/21    CT CHEST PULMONARY EMBOLISM W CONTRAST    Narrative  EXAMINATION:  CTA OF THE CHEST 12/23/2021 5:35 pm    TECHNIQUE:  CTA of the chest was performed after the administration of intravenous  contrast.  Multiplanar reformatted images are provided for review. MIP  images are provided for review. Dose modulation, iterative reconstruction,  and/or weight based adjustment of the mA/kV was utilized to reduce the  radiation dose to as low as reasonably achievable. COMPARISON:  05/14/2020    HISTORY:  ORDERING SYSTEM PROVIDED HISTORY: Evaluate for PE. Leatha Wheeler has not  been fully excluded  TECHNOLOGIST PROVIDED HISTORY:  Reason for exam:->Evaluate for PE. Low O2.  COVID has not been fully excluded  Decision Support Exception - unselect if not a suspected or confirmed  emergency medical condition->Emergency Medical Condition (MA)  Reason for Exam: low O2-possible covid, cough and SOB x 6 days    FINDINGS:  Pulmonary Arteries: Pulmonary arteries are adequately opacified for  evaluation. No evidence of intraluminal filling defect to suggest pulmonary  embolism. Main pulmonary artery is normal in caliber. Mediastinum: There are small lymph nodes along the pretracheal region and AP  window measuring up to 1 cm which are unchanged. The heart and pericardium  demonstrate no acute abnormality.   There is mild calcified plaque throughout  the aorta which is mildly dilated throughout with no aneurysm or dissection  and is unchanged. Lungs/pleura: The lungs are hyperinflated and emphysematous with mild  biapical pleural thickening and linear scarring extending into the upper  lobes inferiorly. There are some small irregular pulmonary nodules along the  right upper lobe laterally with the largest measuring 9 mm and are more  prominent. There is mild linear scarring and parenchymal fibrosis along the  lung bases posterolaterally which is prominent. There is mild mucous  plugging in bronchi to the left lower lobe minimal parenchymal opacity  inferiorly which is more prominent. No effusion is seen. Upper Abdomen: The adrenals are normal.  The gallbladder has been removed  with clips in the gallbladder fossa. The spleen has been removed. Soft Tissues/Bones: There are extensive postop changes along the thoracic  spine with surgical fixation devices posteriorly and pedicle screws seen  throughout which is unchanged. There is metallic artifact along the lower  mediastinum on the left causing streak artifact which is unchanged. Impression  No evidence of a pulmonary embolus    Mildly dilated and atherosclerotic thoracic aorta with no aneurysm or  dissection    Probable mild mucous plugging scattered in the bronchi to the left lower lobe  with associated mild postobstructive infiltrates and/or atelectasis  posteriorly. Suggest pulmonology correlation and follow-up to assure  resolution    Small pulmonary nodules right upper lobe measuring up to 9 mm which are more  prominent. Recommend follow-up    Small lymph nodes in the mediastinum which are not pathologic by size criteria    COPD with fibrosis and scarring along the upper lobes and both lung bases  which is unchanged. Extensive postop changes in the spine and lower mediastinum which is  unchanged.     Fleischner Society guidelines for follow-up and management of incidentally  detected pulmonary nodules    Multiple Solid Nodules:    Nodule size less than 6 mm  In a low-risk patient, no routine follow-up. In a high-risk patient, optional CT at 12 months. Nodule size equals 6-8 mm  In a low-risk patient, CT at 3-6 months, then consider CT at 18-24 months. In a high-risk patient, CT at 3-6 months, then CT at 18-24 months. Nodule size greater than 8 mm  In a low-risk patient, CT at 3-6 months, then consider CT at 18-24 months. In a high-risk patient, CT at 3-6 months, then CT at 18-24 months. - Low risk patients include individuals with minimal or absent history of  smoking and other known risk factors. - High risk patients include individuals with a history or smoking or known  risk factors. Radiology 2017 http://pubs. rsna.org/doi/full/10.1148/radiol. 7408932530      CXR PA/LAT: Results for orders placed during the hospital encounter of 12/27/19    XR CHEST STANDARD (2 VW)    Narrative  EXAMINATION:  TWO XRAY VIEWS OF THE CHEST    12/27/2019 4:34 pm    COMPARISON:  CT chest, 12/06/2019    HISTORY:  ORDERING SYSTEM PROVIDED HISTORY: Cough  TECHNOLOGIST PROVIDED HISTORY:  Reason for exam:->Cough  Reason for Exam: cough  Acuity: Acute  Type of Exam: Ongoing    FINDINGS:  The cardiac silhouette is normal.  Mediastinal and hilar contours are normal.    The lungs are hyperinflated. There is a consolidation noted in the left  upper lung. There is improved aeration of the left perihilar region. Right  lung is clear. Cortical plate is fixed to the mid clavicle with multiple cortical screws. No acute osseous abnormality. Impression  Residual opacities in the perihilar and apical regions of the left lung. Given some improvement, infectious etiology appears most likely. As noted  previously, atypical etiologies, such as fungal pneumonia, are considered. Although less likely, neoplasm is still considered in the differential.    Changes consistent with COPD.     RECOMMENDATION:  Surveillance for hemorrhoidectomy

## 2021-12-24 NOTE — ED NOTES
[id number]  Called to notify of [phone] ready bed-spoke with Gilda Michael- will work on transport     [facility]  12/23/21 2041

## 2021-12-24 NOTE — PROGRESS NOTES
Hospitalist Progress Note  12/24/2021 11:36 AM    PCP: Nicole Byers    [id number]     Date of Admission: 12/23/2021                                                                                                                     HOSPITAL COURSE    Patient demographics:  The patient  Yuri Greene is a 78 y.o. male     Significant past medical history:   Patient Active Problem List   Diagnosis    Respiratory distress    [facility]    Chronic pain    Sepsis ([facility]    Acute encephalopathy    Pneumonia    Abnormal CT of the chest    Status post bronchoscopy    Mediastinal lymphadenopathy    Hilar lymphadenopathy    Status post cholecystectomy    Nephrolithiasis    History of splenectomy    Chronic obstructive pulmonary disease (HCC)    Smoker    Tobacco abuse counseling    Dyspnea    Bradycardia    Chest pain    Symptomatic bradycardia    Acute respiratory failure with hypoxia (HCC)    Rectal bleeding    Infectious diarrhea    Leukocytosis    Lactic acidosis    Hypokalemia    ELIZABET (acute kidney injury) (HCC)    Intractable nausea and vomiting    Severe malnutrition (HCC)    COPD exacerbation (HCC)         Presenting symptoms:  Shortness of breath    Diagnostic workup:      CONSULTS DURING ADMISSION :   IP CONSULT TO PULMONOLOGY      Patient was diagnosed with:  COPD (acute exacerbation)   Essential (primary) hypertension   Hyperlipidemia       Treatment while inpatient:  Pt presented to [facility] in transfer from [facility] for COPD exacerbation. Pulmonary nodules. Likely benign patient is advised to follow-up at [place] in 3 months                                                    ----------------------------------------------------------      SUBJECTIVE COMPLAINTS- follow up for Shortness of breath    Diet: ADULT DIET; Regular;  Low Sodium (2 gm)      OBJECTIVE:   Patient Active Problem List   Diagnosis    Respiratory distress    [facility] ([facility]    Chronic pain    Sepsis ([facility]    Acute encephalopathy    Pneumonia    Abnormal CT of the chest    Status post bronchoscopy    Mediastinal lymphadenopathy    Hilar lymphadenopathy    Status post cholecystectomy    Nephrolithiasis    History of splenectomy    Chronic obstructive pulmonary disease (HCC)    Smoker    Tobacco abuse counseling    Dyspnea    Bradycardia    Chest pain    Symptomatic bradycardia    Acute respiratory failure with hypoxia (HCC)    Rectal bleeding    Infectious diarrhea    Leukocytosis    Lactic acidosis    Hypokalemia    ELIZABET (acute kidney injury) (HCC)    Intractable nausea and vomiting    Severe malnutrition (HCC)    COPD exacerbation (HCC)       Allergies  Amitriptyline, Codeine, Demerol, Diltiazem hcl, Lansoprazole, Tricyclic antidepressants, and Trilisate [choline magnesium trisalicylate]    Medications    Scheduled Meds:   budesonide-formoterol  2 puff Inhalation BID    DULoxetine  60 mg Oral Daily    lisinopril  40 mg Oral Daily    verapamil  80 mg Oral 4x Daily    traZODone  50 mg Oral Nightly    sodium chloride flush  5-40 mL IntraVENous 2 times per day    enoxaparin  40 mg SubCUTAneous Daily    methylPREDNISolone  40 mg IntraVENous Q8H    Followed by   Pee Knapp ON 12/26/2021] predniSONE  40 mg Oral Daily    azithromycin  500 mg IntraVENous Q24H    albuterol sulfate HFA  2 puff Inhalation 4x daily    And    ipratropium  2 puff Inhalation 4x daily     Continuous Infusions:   sodium chloride       PRN Meds:  sodium chloride flush, sodium chloride, acetaminophen **OR** acetaminophen, ondansetron    Vitals   Vitals /wt   Patient Vitals for the past 8 hrs:   BP Temp Temp src Pulse Resp SpO2 Weight   12/24/21 0912 -- -- -- -- -- 92 % --   12/24/21 0900 -- -- -- -- -- (!) 86 % --   12/24/21 0852 128/71 98.5 °F (36.9 °C) Oral 75 16 90 % --   12/24/21 0740 -- -- -- -- 14 91 % --   12/24/21 0551 -- -- -- -- -- -- 147 lb 11.3 oz (67 kg) 12/24/21 0438 (!) 147/81 98.7 °F (37.1 °C) Oral 83 18 90 % --        72HR INTAKE/OUTPUT:      Intake/Output Summary (Last 24 hours) at 12/24/2021 1136  Last data filed at 12/24/2021 1030  Gross per 24 hour   Intake 240 ml   Output 500 ml   Net -260 ml       Exam:    Gen:   Alert and oriented ×3   Eyes: PERRL. No sclera icterus. No conjunctival injection. ENT: No discharge. Pharynx clear. External appearance of ears and nose normal.  Neck: Trachea midline. No obvious mass. Resp: No accessory muscle use. No crackles. No wheezes. No rhonchi. CV: Regular rate. Regular rhythm. No murmur or rub. No edema. GI: Non-tender. Non-distended. No hernia. Skin: Warm, dry, normal texture and turgor. Lymph: No cervical LAD. No supraclavicular LAD. M/S: / Ext. No cyanosis. No clubbing. No joint deformity. Neuro: CN 2-12 are intact,  no neurologic deficits noted. PT/INR: No results for input(s): PROTIME, INR in the last 72 hours. APTT: No results for input(s): APTT in the last 72 hours. CBC:   Recent Labs     12/23/21  1615 12/24/21  0635   WBC 13.3* 14.1*   HGB 14.9 15.5   HCT 46.0 45.7   MCV 90.0 91.0   * 486*       BMP:   Recent Labs     12/23/21  1615 12/24/21  0635    139   K 3.5 3.8    103   CO2 27 22   BUN 7 10   CREATININE <0.5* <0.5*       LIVER PROFILE:   Recent Labs     12/23/21 1615   ALKPHOS 118   AST 16   ALT 13   BILITOT 0.4     No results for input(s): AMYLASE in the last 72 hours. No results for input(s): LIPASE in the last 72 hours. UA:No results for input(s): NITRITE, LABCAST, WBCUA, RBCUA, MUCUS in the last 72 hours. TROPONIN:   Recent Labs     12/23/21 1615   TROPONINI <0.01       Lab Results   Component Value Date/Time    URRFLXCULT Not Indicated 07/22/2020 07:12 PM       No results for input(s): TSHREFLEX in the last 72 hours. No components found for: CQS1749  POC GLUCOSE:  No results for input(s): POCGLU in the last 72 hours.   No results for input(s): LABA1C in the last 72 hours. No results found for: LABA1C  Left ventricular cavity size is normal. Assymetric septal contraction. Aydin-septal hypokinesis. EF   45%. Grade I diastolic [id number](7/80/8262)    ASSESSMENT AND PLAN    COPD Exacerbation J44.1  Steroids and bronchodilators and antibiotics  bronchial higiene  CT chest: No PE, probable mild mucous plugging scattered in the bronchi to the left lower lobe with associated mild postobstructive infiltrates and/or atelectasis posteriorly. Small pulmonary nodules right upper lobe  - consult pulm    Patient will be monitored closely, and deep breathing and coughing will be encouraged while awake.      Essential (primary) hypertension   - monitor blood pressure  - continue home meds      Hyperlipidemia   - continue statin    Code Status: Full Code        Dispo - cc        The patient and / or the family were informed of the results of any tests, a time was given to answer questions, a plan was proposed and they agreed with plan. Arabella Flores MD    This note was transcribed using [id number]. Please disregard any translational errors.

## 2021-12-24 NOTE — ACP (ADVANCE CARE PLANNING)
Advance Care Planning     Advance Care Planning Activator (Inpatient)  Conversation Note      Date of ACP Conversation: 12/24/2021     Conversation Conducted with: Patient with Decision Making Capacity    ACP Activator: YAHIR Orosco    Health Care Decision Maker:     Current Designated Health Care Decision Maker:     Primary Decision Maker: Arun Rutledge  [phone]    Care Preferences    Ventilation: \"If you were in your present state of health and suddenly became very ill and were unable to breathe on your own, what would your preference be about the use of a ventilator (breathing machine) if it were available to you? \"      Would the patient desire the use of ventilator (breathing machine)?: no    \"If your health worsens and it becomes clear that your chance of recovery is unlikely, what would your preference be about the use of a ventilator (breathing machine) if it were available to you? \"     Would the patient desire the use of ventilator (breathing machine)?: No      Resuscitation  \"CPR works best to restart the heart when there is a sudden event, like a heart attack, in someone who is otherwise healthy. Unfortunately, CPR does not typically restart the heart for people who have serious health conditions or who are very sick. \"    \"In the event your heart stopped as a result of an underlying serious health condition, would you want attempts to be made to restart your heart (answer \"yes\" for attempt to resuscitate) or would you prefer a natural death (answer \"no\" for do not attempt to resuscitate)? \" no       [] Yes   [] No   Educated Patient / Marci Landry regarding differences between Advance Directives and portable DNR orders.     Length of ACP Conversation in minutes:  2    Conversation Outcomes:  [x] ACP discussion completed  [] Existing advance directive reviewed with patient; no changes to patient's previously recorded wishes  [] New Advance Directive completed  [] Portable Do Not

## 2021-12-24 NOTE — RT PROTOCOL NOTE
RT Inhaler-Nebulizer Bronchodilator Protocol Note    There is a bronchodilator order in the chart from a provider indicating to follow the RT Bronchodilator Protocol and there is an Initiate RT Inhaler-Nebulizer Bronchodilator Protocol order as well (see protocol at bottom of note). CXR Findings:  XR CHEST PORTABLE    Result Date: 12/23/2021  No radiographic evidence of acute pulmonary disease. The findings from the last RT Protocol Assessment were as follows:   History Pulmonary Disease: None or smoker <15 pack years  Respiratory Pattern: Dyspnea on exertion or RR 21-25 bpm  Breath Sounds: Slightly diminished and/or crackles  Cough: Strong, productive  Indication for Bronchodilator Therapy: Decreased or absent breath sounds,On home bronchodilators  Bronchodilator Assessment Score: 5    Aerosolized bronchodilator medication orders have been revised according to the RT Inhaler-Nebulizer Bronchodilator Protocol below. Respiratory Therapist to perform RT Therapy Protocol Assessment initially then follow the protocol. Repeat RT Therapy Protocol Assessment PRN for score 0-3 or on second treatment, BID, and PRN for scores above 3. No Indications - adjust the frequency to every 6 hours PRN wheezing or bronchospasm, if no treatments needed after 48 hours then discontinue using Per Protocol order mode. If indication present, adjust the RT bronchodilator orders based on the Bronchodilator Assessment Score as indicated below. Use Inhaler orders unless patient has one or more of the following: on home nebulizer, not able to hold breath for 10 seconds, is not alert and oriented, cannot activate and use MDI correctly, or respiratory rate 25 breaths per minute or more, then use the equivalent nebulizer order(s) with same Frequency and PRN reasons based on the score. If a patient is on this medication at home then do not decrease Frequency below that used at home.     0-3 - enter or revise RT bronchodilator order(s) to equivalent RT Bronchodilator order with Frequency of every 4 hours PRN for wheezing or increased work of breathing using Per Protocol order mode. 4-6 - enter or revise RT Bronchodilator order(s) to two equivalent RT bronchodilator orders with one order with BID Frequency and one order with Frequency of every 4 hours PRN wheezing or increased work of breathing using Per Protocol order mode. 7-10 - enter or revise RT Bronchodilator order(s) to two equivalent RT bronchodilator orders with one order with TID Frequency and one order with Frequency of every 4 hours PRN wheezing or increased work of breathing using Per Protocol order mode. 11-13 - enter or revise RT Bronchodilator order(s) to one equivalent RT bronchodilator order with QID Frequency and an Albuterol order with Frequency of every 4 hours PRN wheezing or increased work of breathing using Per Protocol order mode. Greater than 13 - enter or revise RT Bronchodilator order(s) to one equivalent RT bronchodilator order with every 4 hours Frequency and an Albuterol order with Frequency of every 2 hours PRN wheezing or increased work of breathing using Per Protocol order mode. RT to enter RT Home Evaluation for COPD & MDI Assessment order using Per Protocol order mode.     Electronically signed by Ke Velazco RCP on 12/24/2021 at 9:11 AM

## 2021-12-24 NOTE — PROGRESS NOTES
Pt currently in bed, showing no signs of distress. VVS. Pt provided orientation to the room and all questions answered at this time.  Electronically signed by Valentino Westbrook RN on 12/23/2021 at 11:46 PM

## 2021-12-24 NOTE — CARE COORDINATION
INITIAL CASE MANAGEMENT ASSESSMENT     Reviewed chart, spoke with patient to assess possible discharge needs. Explained Case Management role/services. SW interviewed patient via telephone today as he is currently placed in droplet/plus precautions.     Living Situation: Patient resides in a tri level home with his wife and four dogs. There is entry level admissions. Prior to this hospital stay he reports that he had no trouble getting in and out of the property.        ADLs: Prior to medical admission patient reports that he was independent. He stated that he doesn't anticipate any needs at discharge.      DME: Prior to medical admission patient reports that he had two canes that he doesn't use. He was on oxygen which was new. We will continue to monitor for needs until discharge.      PT/OT Recs: Not ordered.      Active Services: Prior to medical admission patient reports that he had no active services in place. He stated that he doesn't anticipate any needs at discharge.      Transportation:Prior to medical admission patient reports that he was an active . He stated that his wife will likely transport him home at discharge.      Medications: Mail order from [facility]. At this time he reports no issues with access or affordability.       PCP: Ana Rater [phone] (phone) and  [phone] (fax number). Patient reports that his last appointment with this provider was over three years ago.   Shannon Kam  HD/PD: N/A     PLAN/COMMENTS:   1) Monitor for home oxygen needs. 2) Submit a 72 hour notification for [place] billing. 3) Discharge to home with family.     SW/CM provided contact information for patient or family to call with any questions. SW/CM will follow and assist as needed. Respectfully submitted,    PATRICK FaustinS  [facility]   [phone]    Electronically signed by YAHIR Ashby on 12/24/2021 at 8:56 AM

## 2021-12-25 VITALS
BODY MASS INDEX: 21.88 KG/M2 | DIASTOLIC BLOOD PRESSURE: 71 MMHG | OXYGEN SATURATION: 91 % | WEIGHT: 147.71 LBS | HEIGHT: 69 IN | HEART RATE: 60 BPM | SYSTOLIC BLOOD PRESSURE: 140 MMHG | RESPIRATION RATE: 18 BRPM | TEMPERATURE: 97.4 F

## 2021-12-25 LAB
ANION GAP SERPL CALCULATED.3IONS-SCNC: 11 MMOL/L (ref 3–16)
BASOPHILS ABSOLUTE: 0 K/UL (ref 0–0.2)
BASOPHILS RELATIVE PERCENT: 0.1 %
BUN BLDV-MCNC: 16 MG/DL (ref 7–20)
CALCIUM SERPL-MCNC: 9.5 MG/DL (ref 8.3–10.6)
CHLORIDE BLD-SCNC: 104 MMOL/L (ref 99–110)
CO2: 23 MMOL/L (ref 21–32)
CREAT SERPL-MCNC: <0.5 MG/DL (ref 0.8–1.3)
EOSINOPHILS ABSOLUTE: 0 K/UL (ref 0–0.6)
EOSINOPHILS RELATIVE PERCENT: 0 %
GFR AFRICAN AMERICAN: >60
GFR NON-AFRICAN AMERICAN: >60
GLUCOSE BLD-MCNC: 136 MG/DL (ref 70–99)
HCT VFR BLD CALC: 43.7 % (ref 40.5–52.5)
HEMOGLOBIN: 14.3 G/DL (ref 13.5–17.5)
LYMPHOCYTES ABSOLUTE: 1.7 K/UL (ref 1–5.1)
LYMPHOCYTES RELATIVE PERCENT: 9.1 %
MAGNESIUM: 2.6 MG/DL (ref 1.8–2.4)
MCH RBC QN AUTO: 29.8 PG (ref 26–34)
MCHC RBC AUTO-ENTMCNC: 32.7 G/DL (ref 31–36)
MCV RBC AUTO: 91.2 FL (ref 80–100)
MONOCYTES ABSOLUTE: 0.8 K/UL (ref 0–1.3)
MONOCYTES RELATIVE PERCENT: 4 %
NEUTROPHILS ABSOLUTE: 16.2 K/UL (ref 1.7–7.7)
NEUTROPHILS RELATIVE PERCENT: 86.8 %
PDW BLD-RTO: 14.2 % (ref 12.4–15.4)
PLATELET # BLD: 481 K/UL (ref 135–450)
PMV BLD AUTO: 7.3 FL (ref 5–10.5)
POTASSIUM SERPL-SCNC: 3.7 MMOL/L (ref 3.5–5.1)
RBC # BLD: 4.79 M/UL (ref 4.2–5.9)
SODIUM BLD-SCNC: 138 MMOL/L (ref 136–145)
WBC # BLD: 18.7 K/UL (ref 4–11)

## 2021-12-25 PROCEDURE — 2580000003 HC RX 258: Performed by: STUDENT IN AN ORGANIZED HEALTH CARE EDUCATION/TRAINING PROGRAM

## 2021-12-25 PROCEDURE — 6370000000 HC RX 637 (ALT 250 FOR IP): Performed by: HOSPITALIST

## 2021-12-25 PROCEDURE — 85025 COMPLETE CBC W/AUTO DIFF WBC: CPT

## 2021-12-25 PROCEDURE — 6360000002 HC RX W HCPCS: Performed by: STUDENT IN AN ORGANIZED HEALTH CARE EDUCATION/TRAINING PROGRAM

## 2021-12-25 PROCEDURE — 36415 COLL VENOUS BLD VENIPUNCTURE: CPT

## 2021-12-25 PROCEDURE — 83735 ASSAY OF MAGNESIUM: CPT

## 2021-12-25 PROCEDURE — 6370000000 HC RX 637 (ALT 250 FOR IP): Performed by: STUDENT IN AN ORGANIZED HEALTH CARE EDUCATION/TRAINING PROGRAM

## 2021-12-25 PROCEDURE — 94640 AIRWAY INHALATION TREATMENT: CPT

## 2021-12-25 PROCEDURE — 80048 BASIC METABOLIC PNL TOTAL CA: CPT

## 2021-12-25 PROCEDURE — 94760 N-INVAS EAR/PLS OXIMETRY 1: CPT

## 2021-12-25 RX ORDER — PREDNISONE 20 MG/1
40 TABLET ORAL DAILY
Qty: 10 TABLET | Refills: 0 | Status: SHIPPED | OUTPATIENT
Start: 2021-12-26 | End: 2021-12-31

## 2021-12-25 RX ORDER — AZITHROMYCIN 250 MG/1
250 TABLET, FILM COATED ORAL ONCE
Status: COMPLETED | OUTPATIENT
Start: 2021-12-25 | End: 2021-12-25

## 2021-12-25 RX ORDER — AZITHROMYCIN 250 MG/1
250 TABLET, FILM COATED ORAL DAILY
Qty: 3 TABLET | Refills: 0 | Status: SHIPPED | OUTPATIENT
Start: 2021-12-25

## 2021-12-25 RX ADMIN — AZITHROMYCIN 250 MG: 250 TABLET, FILM COATED ORAL at 15:15

## 2021-12-25 RX ADMIN — DULOXETINE HYDROCHLORIDE 60 MG: 60 CAPSULE, DELAYED RELEASE ORAL at 08:28

## 2021-12-25 RX ADMIN — METHYLPREDNISOLONE SODIUM SUCCINATE 40 MG: 40 INJECTION, POWDER, FOR SOLUTION INTRAMUSCULAR; INTRAVENOUS at 14:34

## 2021-12-25 RX ADMIN — OXYCODONE AND ACETAMINOPHEN 1 TABLET: 5; 325 TABLET ORAL at 14:33

## 2021-12-25 RX ADMIN — ALBUTEROL SULFATE 2 PUFF: 90 AEROSOL, METERED RESPIRATORY (INHALATION) at 12:24

## 2021-12-25 RX ADMIN — OXYCODONE AND ACETAMINOPHEN 1 TABLET: 5; 325 TABLET ORAL at 10:41

## 2021-12-25 RX ADMIN — SODIUM CHLORIDE, PRESERVATIVE FREE 10 ML: 5 INJECTION INTRAVENOUS at 08:30

## 2021-12-25 RX ADMIN — VERAPAMIL HYDROCHLORIDE 80 MG: 80 TABLET, FILM COATED ORAL at 13:10

## 2021-12-25 RX ADMIN — LISINOPRIL 40 MG: 40 TABLET ORAL at 08:28

## 2021-12-25 RX ADMIN — ALBUTEROL SULFATE 2 PUFF: 90 AEROSOL, METERED RESPIRATORY (INHALATION) at 08:19

## 2021-12-25 RX ADMIN — IPRATROPIUM BROMIDE 2 PUFF: 17 AEROSOL, METERED RESPIRATORY (INHALATION) at 08:20

## 2021-12-25 RX ADMIN — VERAPAMIL HYDROCHLORIDE 80 MG: 80 TABLET, FILM COATED ORAL at 08:28

## 2021-12-25 RX ADMIN — TIZANIDINE 4 MG: 4 TABLET ORAL at 13:10

## 2021-12-25 RX ADMIN — BUDESONIDE AND FORMOTEROL FUMARATE DIHYDRATE 2 PUFF: 160; 4.5 AEROSOL RESPIRATORY (INHALATION) at 08:20

## 2021-12-25 RX ADMIN — METHYLPREDNISOLONE SODIUM SUCCINATE 40 MG: 40 INJECTION, POWDER, FOR SOLUTION INTRAMUSCULAR; INTRAVENOUS at 08:29

## 2021-12-25 RX ADMIN — IPRATROPIUM BROMIDE 2 PUFF: 17 AEROSOL, METERED RESPIRATORY (INHALATION) at 12:24

## 2021-12-25 ASSESSMENT — PAIN - FUNCTIONAL ASSESSMENT
PAIN_FUNCTIONAL_ASSESSMENT: ACTIVITIES ARE NOT PREVENTED

## 2021-12-25 ASSESSMENT — PAIN DESCRIPTION - ONSET
ONSET: ON-GOING
ONSET: GRADUAL
ONSET: ON-GOING
ONSET: GRADUAL
ONSET: PROGRESSIVE

## 2021-12-25 ASSESSMENT — PAIN DESCRIPTION - LOCATION
LOCATION: BACK

## 2021-12-25 ASSESSMENT — PAIN SCALES - GENERAL
PAINLEVEL_OUTOF10: 0
PAINLEVEL_OUTOF10: 7
PAINLEVEL_OUTOF10: 1
PAINLEVEL_OUTOF10: 0
PAINLEVEL_OUTOF10: 7
PAINLEVEL_OUTOF10: 3
PAINLEVEL_OUTOF10: 2

## 2021-12-25 ASSESSMENT — PAIN DESCRIPTION - DESCRIPTORS
DESCRIPTORS: ACHING;DISCOMFORT

## 2021-12-25 ASSESSMENT — PAIN DESCRIPTION - PAIN TYPE
TYPE: CHRONIC PAIN

## 2021-12-25 ASSESSMENT — PAIN DESCRIPTION - ORIENTATION
ORIENTATION: MID

## 2021-12-25 ASSESSMENT — PAIN DESCRIPTION - FREQUENCY
FREQUENCY: CONTINUOUS
FREQUENCY: INTERMITTENT
FREQUENCY: INTERMITTENT
FREQUENCY: CONTINUOUS
FREQUENCY: CONTINUOUS

## 2021-12-25 NOTE — PLAN OF CARE
Problem: Falls - Risk of:  Goal: Will remain free from falls  Description: Will remain free from falls  12/25/2021 0758 by Malachi Lomax RN  Outcome: Ongoing     Problem: Falls - Risk of:  Goal: Absence of physical injury  Description: Absence of physical injury  12/25/2021 0758 by Malachi Lomax RN  Outcome: Ongoing     Problem: Pain:  Goal: Pain level will decrease  Description: Pain level will decrease  12/25/2021 0758 by Malachi Lomax RN  Outcome: Ongoing     Problem: Pain:  Goal: Control of acute pain  Description: Control of acute pain  12/25/2021 0758 by Malachi Lomax RN  Outcome: Ongoing     Problem: Pain:  Goal: Control of chronic pain  Description: Control of chronic pain  12/25/2021 0758 by Malachi Lmoax RN  Outcome: Ongoing

## 2021-12-25 NOTE — PROGRESS NOTES
Discharge instructions reviewed with patient, denies any questions or concerns. IV and telemetry removed. Pt's wife here to pick him up. Pt ambulatory at d/c, taken to vehicle via wheelchair by this RN.  Electronically signed by Corie Sin RN on 12/25/2021 at 4:05 PM

## 2021-12-25 NOTE — PLAN OF CARE
Problem: Falls - Risk of:  Goal: Will remain free from falls  Description: Will remain free from falls  12/24/2021 2118 by Alena Contreras RN  Outcome: Ongoing  12/24/2021 0942 by Andrews Fuchs RN  Outcome: Ongoing  Goal: Absence of physical injury  Description: Absence of physical injury  12/24/2021 2118 by Alena Contreras RN  Outcome: Ongoing  12/24/2021 0942 by Andrews Fuchs RN  Outcome: Ongoing     Problem: Pain:  Goal: Pain level will decrease  Description: Pain level will decrease  12/24/2021 2118 by Alena Contreras RN  Outcome: Ongoing  12/24/2021 0942 by Andrews Fuchs RN  Outcome: Ongoing  Goal: Control of acute pain  Description: Control of acute pain  12/24/2021 2118 by Alena Contreras RN  Outcome: Ongoing  12/24/2021 0942 by Andrews Fuchs RN  Outcome: Ongoing  Goal: Control of chronic pain  Description: Control of chronic pain  12/24/2021 2118 by Alena Contreras RN  Outcome: Ongoing  12/24/2021 0942 by Andrews Fuchs RN  Outcome: Ongoing

## 2021-12-27 LAB
BLOOD CULTURE, ROUTINE: NORMAL
CULTURE, BLOOD 2: NORMAL

## 2021-12-29 NOTE — DISCHARGE SUMMARY
Hospital Medicine Discharge Summary      Patient ID: Rain Malcolm , [id number]     Patient's PCP: Anjel Ng    Admit Date: 12/23/2021     Discharge Date: 12/25/2021      Admitting Physician: Moira Lorenzo DO    Discharge Physician: Keila Bailey MD     Discharge Diagnoses: Active Hospital Problems    Diagnosis Date Noted    COPD exacerbation ([address] [J44.1] 12/23/2021         The patient was seen and examined on the day of discharge and this discharge summary is in conjunction with any daily progress note from day of discharge. HOSPITAL COURSE    Patient demographics:  The patient  Rain Malcolm is a 78 y.o. male      Significant past medical history:       Patient Active Problem List   Diagnosis    Respiratory distress    Opiate overdose ([address]    Chronic pain    Sepsis ([address]    Acute encephalopathy    Pneumonia    Abnormal CT of the chest    Status post bronchoscopy    Mediastinal lymphadenopathy    Hilar lymphadenopathy    Status post cholecystectomy    Nephrolithiasis    History of splenectomy    Chronic obstructive pulmonary disease (HCC)    Smoker    Tobacco abuse counseling    Dyspnea    Bradycardia    Chest pain    Symptomatic bradycardia    Acute respiratory failure with hypoxia (HCC)    Rectal bleeding    Infectious diarrhea    Leukocytosis    Lactic acidosis    Hypokalemia    ELIZABET (acute kidney injury) (HCC)    Intractable nausea and vomiting    Severe malnutrition (HCC)    COPD exacerbation (HCC)            Presenting symptoms:  Shortness of breath     Diagnostic workup:   CT CHEST PULMONARY EMBOLISM W CONTRAST         CONSULTS DURING ADMISSION :   IP CONSULT TO PULMONOLOGY        Patient was diagnosed with:  COPD (acute exacerbation)   Essential (primary) hypertension   Hyperlipidemia         Treatment while inpatient:  Pt presented to [facility] transfer from [facility] for shortness of breath.   Patient was diagnosed with COPD exacerbation and treated with steroids bronchodilators                    and antibiotics. Patient responded to the treatment well and he is breathing comfortably  On room air. Patient was noted to have pulmonary nodules for which he was advised to follow-up at [facility] in the next 3 months. Nodules appear to be benign but patient does have history of smoking.           Discharge Condition:  stable      Discharged to:  Home      Activity:   as tolerated:     Follow Up: Follow-up with PCP in 1-2 weeks         Labs:  For convenience and continuity at follow-up the following most recent labs are provided:      CBC:   Lab Results   Component Value Date    WBC 18.7 12/25/2021    HGB 14.3 12/25/2021    HCT 43.7 12/25/2021     12/25/2021       RENAL:   Lab Results   Component Value Date     12/25/2021    K 3.7 12/25/2021    K 3.5 12/23/2021     12/25/2021    CO2 23 12/25/2021    BUN 16 12/25/2021    CREATININE <0.5 12/25/2021           Discharge Medications:      Medication List      START taking these medications    azithromycin 250 MG tablet  Commonly known as: Zithromax  Take 1 tablet by mouth daily     predniSONE 20 MG tablet  Commonly known as: DELTASONE  Take 2 tablets by mouth daily for 5 days        CONTINUE taking these medications    albuterol sulfate  (90 Base) MCG/ACT inhaler     bisacodyl 5 MG EC tablet  Generic drug: bisacodyl  Take 1 tablet by mouth daily as needed for Constipation     DULoxetine 60 MG extended release capsule  Commonly known as: CYMBALTA     lisinopril 40 MG tablet  Commonly known as: PRINIVIL;ZESTRIL     medical marijuana     promethazine 25 MG tablet  Commonly known as: PHENERGAN     tiZANidine 4 MG tablet  Commonly known as: ZANAFLEX     traZODone 50 MG tablet  Commonly known as: DESYREL     verapamil 80 MG tablet  Commonly known as: CALAN           Where to Get Your Medications      These medications were sent to [place] [id number] [facility], [facility] [address] [facility]  [address] [address]    Phone: [phone]   · azithromycin 250 MG tablet  · predniSONE 20 MG tablet            Time Spent on discharge is more than 30 min in the examination, evaluation, counseling and review of medications and discharge plan. Signed:  Shavonne Hernandez MD   12/29/2021      Thank you Yuki Mak for the opportunity to be involved in this patient's care. If you have any questions or concerns please feel free to contact me at [phone]. This note was transcribed using [id number] [employer]. Please disregard any translational errors.
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
## 2023-04-22 ENCOUNTER — APPOINTMENT (OUTPATIENT)
Dept: GENERAL RADIOLOGY | Age: 68
End: 2023-04-22
Payer: COMMERCIAL

## 2023-04-22 ENCOUNTER — HOSPITAL ENCOUNTER (EMERGENCY)
Age: 68
Discharge: HOME | End: 2023-04-22
Attending: EMERGENCY MEDICINE
Payer: COMMERCIAL

## 2023-04-22 VITALS
TEMPERATURE: 98.8 F | WEIGHT: 145.94 LBS | BODY MASS INDEX: 22.12 KG/M2 | HEIGHT: 68 IN | OXYGEN SATURATION: 95 % | SYSTOLIC BLOOD PRESSURE: 170 MMHG | DIASTOLIC BLOOD PRESSURE: 92 MMHG | RESPIRATION RATE: 17 BRPM | HEART RATE: 85 BPM

## 2023-04-22 DIAGNOSIS — E86.0 DEHYDRATION: ICD-10-CM

## 2023-04-22 DIAGNOSIS — R19.7 VOMITING AND DIARRHEA: ICD-10-CM

## 2023-04-22 DIAGNOSIS — R11.10 VOMITING AND DIARRHEA: ICD-10-CM

## 2023-04-22 DIAGNOSIS — J10.1 INFLUENZA B: Primary | ICD-10-CM

## 2023-04-22 LAB
ALBUMIN SERPL-MCNC: 4.8 G/DL (ref 3.4–5)
ALP SERPL-CCNC: 129 U/L (ref 40–129)
ALT SERPL-CCNC: 17 U/L (ref 10–40)
ANION GAP SERPL CALCULATED.3IONS-SCNC: 14 MMOL/L (ref 3–16)
AST SERPL-CCNC: 27 U/L (ref 15–37)
BASOPHILS # BLD: 0 K/UL (ref 0–0.2)
BASOPHILS NFR BLD: 0 %
BILIRUB DIRECT SERPL-MCNC: <0.2 MG/DL (ref 0–0.3)
BILIRUB INDIRECT SERPL-MCNC: NORMAL MG/DL (ref 0–1)
BILIRUB SERPL-MCNC: 0.8 MG/DL (ref 0–1)
BUN SERPL-MCNC: 34 MG/DL (ref 7–20)
CALCIUM SERPL-MCNC: 9.9 MG/DL (ref 8.3–10.6)
CHLORIDE SERPL-SCNC: 93 MMOL/L (ref 99–110)
CO2 SERPL-SCNC: 26 MMOL/L (ref 21–32)
CREAT SERPL-MCNC: 0.8 MG/DL (ref 0.8–1.3)
DEPRECATED RDW RBC AUTO: 13.8 % (ref 12.4–15.4)
EOSINOPHIL # BLD: 0 K/UL (ref 0–0.6)
EOSINOPHIL NFR BLD: 0 %
FLUAV RNA UPPER RESP QL NAA+PROBE: NEGATIVE
FLUBV AG NPH QL: POSITIVE
GFR SERPLBLD CREATININE-BSD FMLA CKD-EPI: >60 ML/MIN/{1.73_M2}
GLUCOSE SERPL-MCNC: 113 MG/DL (ref 70–99)
HCT VFR BLD AUTO: 52 % (ref 40.5–52.5)
HGB BLD-MCNC: 18.2 G/DL (ref 13.5–17.5)
LIPASE SERPL-CCNC: 17 U/L (ref 13–60)
LYMPHOCYTES # BLD: 2.4 K/UL (ref 1–5.1)
LYMPHOCYTES NFR BLD: 14 %
MAGNESIUM SERPL-MCNC: 2.5 MG/DL (ref 1.8–2.4)
MCH RBC QN AUTO: 30.5 PG (ref 26–34)
MCHC RBC AUTO-ENTMCNC: 35 G/DL (ref 32–36.4)
MCV RBC AUTO: 87.2 FL (ref 80–100)
MONOCYTES # BLD: 2.4 K/UL (ref 0–1.3)
MONOCYTES NFR BLD: 16 %
NEUTROPHILS # BLD: 10.2 K/UL (ref 1.7–7.7)
NEUTROPHILS NFR BLD: 68 %
PATH INTERP BLD-IMP: YES
PLATELET # BLD AUTO: 578 K/UL (ref 135–450)
PMV BLD AUTO: 9.1 FL (ref 5–10.5)
POTASSIUM SERPL-SCNC: 3.5 MMOL/L (ref 3.5–5.1)
PROT SERPL-MCNC: 8.2 G/DL (ref 6.4–8.2)
RBC # BLD AUTO: 5.96 M/UL (ref 4.2–5.9)
SARS-COV-2 RDRP RESP QL NAA+PROBE: NOT DETECTED
SODIUM SERPL-SCNC: 133 MMOL/L (ref 136–145)
VARIANT LYMPHS NFR BLD MANUAL: 2 % (ref 0–6)
WBC # BLD AUTO: 15 K/UL (ref 4–11)

## 2023-04-22 PROCEDURE — 6360000002 HC RX W HCPCS: Performed by: EMERGENCY MEDICINE

## 2023-04-22 PROCEDURE — 83690 ASSAY OF LIPASE: CPT

## 2023-04-22 PROCEDURE — 96361 HYDRATE IV INFUSION ADD-ON: CPT

## 2023-04-22 PROCEDURE — 87635 SARS-COV-2 COVID-19 AMP PRB: CPT

## 2023-04-22 PROCEDURE — 36415 COLL VENOUS BLD VENIPUNCTURE: CPT

## 2023-04-22 PROCEDURE — 80076 HEPATIC FUNCTION PANEL: CPT

## 2023-04-22 PROCEDURE — 96374 THER/PROPH/DIAG INJ IV PUSH: CPT

## 2023-04-22 PROCEDURE — 2580000003 HC RX 258: Performed by: EMERGENCY MEDICINE

## 2023-04-22 PROCEDURE — 87804 INFLUENZA ASSAY W/OPTIC: CPT

## 2023-04-22 PROCEDURE — 93005 ELECTROCARDIOGRAM TRACING: CPT | Performed by: EMERGENCY MEDICINE

## 2023-04-22 PROCEDURE — 96375 TX/PRO/DX INJ NEW DRUG ADDON: CPT

## 2023-04-22 PROCEDURE — 99285 EMERGENCY DEPT VISIT HI MDM: CPT

## 2023-04-22 PROCEDURE — 71046 X-RAY EXAM CHEST 2 VIEWS: CPT

## 2023-04-22 PROCEDURE — 80048 BASIC METABOLIC PNL TOTAL CA: CPT

## 2023-04-22 PROCEDURE — 85025 COMPLETE CBC W/AUTO DIFF WBC: CPT

## 2023-04-22 PROCEDURE — 83735 ASSAY OF MAGNESIUM: CPT

## 2023-04-22 RX ORDER — 0.9 % SODIUM CHLORIDE 0.9 %
1000 INTRAVENOUS SOLUTION INTRAVENOUS ONCE
Status: COMPLETED | OUTPATIENT
Start: 2023-04-22 | End: 2023-04-22

## 2023-04-22 RX ORDER — ONDANSETRON 2 MG/ML
8 INJECTION INTRAMUSCULAR; INTRAVENOUS ONCE
Status: COMPLETED | OUTPATIENT
Start: 2023-04-22 | End: 2023-04-22

## 2023-04-22 RX ORDER — ONDANSETRON 4 MG/1
4-8 TABLET, FILM COATED ORAL EVERY 8 HOURS PRN
Qty: 20 TABLET | Refills: 0 | Status: SHIPPED | OUTPATIENT
Start: 2023-04-22

## 2023-04-22 RX ORDER — LISINOPRIL 40 MG/1
40 TABLET ORAL DAILY
Qty: 30 TABLET | Refills: 0 | Status: SHIPPED | OUTPATIENT
Start: 2023-04-22

## 2023-04-22 RX ORDER — KETOROLAC TROMETHAMINE 30 MG/ML
15 INJECTION, SOLUTION INTRAMUSCULAR; INTRAVENOUS ONCE
Status: COMPLETED | OUTPATIENT
Start: 2023-04-22 | End: 2023-04-22

## 2023-04-22 RX ADMIN — KETOROLAC TROMETHAMINE 15 MG: 30 INJECTION, SOLUTION INTRAMUSCULAR at 11:27

## 2023-04-22 RX ADMIN — SODIUM CHLORIDE 1000 ML: 9 INJECTION, SOLUTION INTRAVENOUS at 12:24

## 2023-04-22 RX ADMIN — ONDANSETRON 8 MG: 2 INJECTION INTRAMUSCULAR; INTRAVENOUS at 11:25

## 2023-04-22 RX ADMIN — SODIUM CHLORIDE 1000 ML: 9 INJECTION, SOLUTION INTRAVENOUS at 11:25

## 2023-04-22 ASSESSMENT — PAIN SCALES - GENERAL
PAINLEVEL_OUTOF10: 1
PAINLEVEL_OUTOF10: 0
PAINLEVEL_OUTOF10: 6
PAINLEVEL_OUTOF10: 0

## 2023-04-22 ASSESSMENT — PAIN DESCRIPTION - DESCRIPTORS: DESCRIPTORS: CRAMPING

## 2023-04-22 ASSESSMENT — PAIN DESCRIPTION - LOCATION
LOCATION: ABDOMEN
LOCATION: ABDOMEN

## 2023-04-22 ASSESSMENT — PAIN - FUNCTIONAL ASSESSMENT
PAIN_FUNCTIONAL_ASSESSMENT: PREVENTS OR INTERFERES SOME ACTIVE ACTIVITIES AND ADLS
PAIN_FUNCTIONAL_ASSESSMENT: 0-10
PAIN_FUNCTIONAL_ASSESSMENT: NONE - DENIES PAIN
PAIN_FUNCTIONAL_ASSESSMENT: 0-10

## 2023-04-22 ASSESSMENT — PAIN DESCRIPTION - PAIN TYPE: TYPE: ACUTE PAIN

## 2023-04-22 ASSESSMENT — LIFESTYLE VARIABLES: HOW OFTEN DO YOU HAVE A DRINK CONTAINING ALCOHOL: NEVER

## 2023-04-22 ASSESSMENT — PAIN DESCRIPTION - FREQUENCY: FREQUENCY: INTERMITTENT

## 2023-04-22 NOTE — ED PROVIDER NOTES
Decision Making, Pt Expectation of Test or Tx.): Appropriate for outpatient management    I estimate there is low risk for sepsis, MI, stroke, tamponade, PTX, toxicity, or other life threatening etiology. Given the best available information and clinical assessment I estimate the risk of hospitalization to be greater than risk of treatment at home. We discussed and I explained the risk could rapidly change and return precautions and instructions given. Discharge disposition is reasonable. I am the Primary Clinician of Record. FINAL IMPRESSION      1. Influenza B    2. Dehydration    3. Vomiting and diarrhea          DISPOSITION/PLAN   DISPOSITION Decision To Discharge 04/22/2023 12:22:23 PM      PATIENT REFERRED TO:  Namrata Romeo MD  [address] [facility]  [phone]    Call in 1 week  For follow up appointment, As needed      DISCHARGE MEDICATIONS:  New Prescriptions    LISINOPRIL (PRINIVIL;ZESTRIL) 40 MG TABLET    Take 1 tablet by mouth daily    ONDANSETRON (ZOFRAN) 4 MG TABLET    Take 1-2 tablets by mouth every 8 hours as needed for Nausea or Vomiting          (Please note that portions of this note were completed with a voice recognition program.  Efforts were made to edit the dictations but occasionally words are mis-transcribed.)    Asa Guzman MD (electronically signed)  Attending Emergency Physician     Asa Guzman MD  04/22/23 7159

## 2023-04-22 NOTE — DISCHARGE INSTRUCTIONS
Your rapid flu test today was positive. Continue to isolate/quarantine/wear a mask around others to prevent spread of infection. Take over the counter medications like NSAIDs, tylenol, sudafed for symptoms of body aches, congestion, fevers. We have prescribed zofran for nausea. Follow up with primary care as needed. Return to ED for any new or worsening symptoms or concerns.

## 2023-04-22 NOTE — ED NOTES
Wife at bedside to take him home. Reinforced discharge instructions with her. Patient tolerating ice chips.      Jose Moore RN  04/22/23 9508

## 2023-04-22 NOTE — ED TRIAGE NOTES
Has had vomiting for three days. The last 24 hours it has only been dry heaves. No diarrhea, prior to coming to the ED, when he had to stop his registration to defecate loose stool.   Complained of some intermittent abdominal cramps

## 2023-04-23 LAB
EKG ATRIAL RATE: 90 BPM
EKG DIAGNOSIS: NORMAL
EKG P AXIS: 76 DEGREES
EKG P-R INTERVAL: 148 MS
EKG Q-T INTERVAL: 392 MS
EKG QRS DURATION: 104 MS
EKG QTC CALCULATION (BAZETT): 479 MS
EKG R AXIS: 56 DEGREES
EKG T AXIS: 71 DEGREES
EKG VENTRICULAR RATE: 90 BPM

## 2023-04-23 PROCEDURE — 93010 ELECTROCARDIOGRAM REPORT: CPT | Performed by: INTERNAL MEDICINE

## 2023-04-24 LAB — PATH INTERP BLD-IMP: NORMAL

## 2023-08-28 ENCOUNTER — HOSPITAL ENCOUNTER (EMERGENCY)
Age: 68
Discharge: HOME | End: 2023-08-28
Attending: EMERGENCY MEDICINE
Payer: COMMERCIAL

## 2023-08-28 ENCOUNTER — APPOINTMENT (OUTPATIENT)
Dept: GENERAL RADIOLOGY | Age: 68
End: 2023-08-28
Payer: COMMERCIAL

## 2023-08-28 VITALS
HEART RATE: 83 BPM | BODY MASS INDEX: 22.81 KG/M2 | SYSTOLIC BLOOD PRESSURE: 166 MMHG | WEIGHT: 150 LBS | RESPIRATION RATE: 24 BRPM | TEMPERATURE: 97.8 F | DIASTOLIC BLOOD PRESSURE: 95 MMHG | OXYGEN SATURATION: 97 %

## 2023-08-28 DIAGNOSIS — J44.1 COPD EXACERBATION (HCC): Primary | ICD-10-CM

## 2023-08-28 LAB
ALBUMIN SERPL-MCNC: 4.2 G/DL (ref 3.4–5)
ALBUMIN/GLOB SERPL: 1.2 {RATIO} (ref 1.1–2.2)
ALP SERPL-CCNC: 122 U/L (ref 40–129)
ALT SERPL-CCNC: 11 U/L (ref 10–40)
ANION GAP SERPL CALCULATED.3IONS-SCNC: 13 MMOL/L (ref 3–16)
AST SERPL-CCNC: 18 U/L (ref 15–37)
BASOPHILS # BLD: 0.1 K/UL (ref 0–0.2)
BASOPHILS NFR BLD: 0.4 %
BILIRUB SERPL-MCNC: 0.5 MG/DL (ref 0–1)
BUN SERPL-MCNC: 12 MG/DL (ref 7–20)
CALCIUM SERPL-MCNC: 10 MG/DL (ref 8.3–10.6)
CHLORIDE SERPL-SCNC: 98 MMOL/L (ref 99–110)
CO2 SERPL-SCNC: 22 MMOL/L (ref 21–32)
CREAT SERPL-MCNC: <0.5 MG/DL (ref 0.8–1.3)
DEPRECATED RDW RBC AUTO: 13.4 % (ref 12.4–15.4)
EKG ATRIAL RATE: 82 BPM
EKG DIAGNOSIS: NORMAL
EKG P AXIS: 66 DEGREES
EKG P-R INTERVAL: 166 MS
EKG Q-T INTERVAL: 514 MS
EKG QRS DURATION: 110 MS
EKG QTC CALCULATION (BAZETT): 600 MS
EKG R AXIS: 54 DEGREES
EKG T AXIS: 87 DEGREES
EKG VENTRICULAR RATE: 82 BPM
EOSINOPHIL # BLD: 0 K/UL (ref 0–0.6)
EOSINOPHIL NFR BLD: 0 %
GFR SERPLBLD CREATININE-BSD FMLA CKD-EPI: >60 ML/MIN/{1.73_M2}
GLUCOSE SERPL-MCNC: 120 MG/DL (ref 70–99)
HCT VFR BLD AUTO: 48.7 % (ref 40.5–52.5)
HGB BLD-MCNC: 16.7 G/DL (ref 13.5–17.5)
LYMPHOCYTES # BLD: 1.3 K/UL (ref 1–5.1)
LYMPHOCYTES NFR BLD: 11 %
MCH RBC QN AUTO: 31.4 PG (ref 26–34)
MCHC RBC AUTO-ENTMCNC: 34.2 G/DL (ref 31–36)
MCV RBC AUTO: 91.8 FL (ref 80–100)
MONOCYTES # BLD: 1 K/UL (ref 0–1.3)
MONOCYTES NFR BLD: 8.8 %
NEUTROPHILS # BLD: 9.4 K/UL (ref 1.7–7.7)
NEUTROPHILS NFR BLD: 79.8 %
PLATELET # BLD AUTO: 530 K/UL (ref 135–450)
PMV BLD AUTO: 7 FL (ref 5–10.5)
POTASSIUM SERPL-SCNC: 3.9 MMOL/L (ref 3.5–5.1)
PROT SERPL-MCNC: 7.7 G/DL (ref 6.4–8.2)
RBC # BLD AUTO: 5.31 M/UL (ref 4.2–5.9)
SARS-COV-2 RDRP RESP QL NAA+PROBE: NOT DETECTED
SODIUM SERPL-SCNC: 133 MMOL/L (ref 136–145)
TROPONIN, HIGH SENSITIVITY: 13 NG/L (ref 0–22)
TROPONIN, HIGH SENSITIVITY: 13 NG/L (ref 0–22)
WBC # BLD AUTO: 11.8 K/UL (ref 4–11)

## 2023-08-28 PROCEDURE — 6360000002 HC RX W HCPCS: Performed by: EMERGENCY MEDICINE

## 2023-08-28 PROCEDURE — 84484 ASSAY OF TROPONIN QUANT: CPT

## 2023-08-28 PROCEDURE — 80053 COMPREHEN METABOLIC PANEL: CPT

## 2023-08-28 PROCEDURE — 93010 ELECTROCARDIOGRAM REPORT: CPT | Performed by: INTERNAL MEDICINE

## 2023-08-28 PROCEDURE — 99285 EMERGENCY DEPT VISIT HI MDM: CPT

## 2023-08-28 PROCEDURE — 85025 COMPLETE CBC W/AUTO DIFF WBC: CPT

## 2023-08-28 PROCEDURE — 96375 TX/PRO/DX INJ NEW DRUG ADDON: CPT

## 2023-08-28 PROCEDURE — 71045 X-RAY EXAM CHEST 1 VIEW: CPT

## 2023-08-28 PROCEDURE — 36415 COLL VENOUS BLD VENIPUNCTURE: CPT

## 2023-08-28 PROCEDURE — 96365 THER/PROPH/DIAG IV INF INIT: CPT

## 2023-08-28 PROCEDURE — 94640 AIRWAY INHALATION TREATMENT: CPT

## 2023-08-28 PROCEDURE — 87635 SARS-COV-2 COVID-19 AMP PRB: CPT

## 2023-08-28 PROCEDURE — 93005 ELECTROCARDIOGRAM TRACING: CPT | Performed by: EMERGENCY MEDICINE

## 2023-08-28 PROCEDURE — 6370000000 HC RX 637 (ALT 250 FOR IP): Performed by: EMERGENCY MEDICINE

## 2023-08-28 RX ORDER — ALBUTEROL SULFATE 90 UG/1
2 AEROSOL, METERED RESPIRATORY (INHALATION) EVERY 6 HOURS PRN
Qty: 18 G | Refills: 0 | Status: SHIPPED | OUTPATIENT
Start: 2023-08-28

## 2023-08-28 RX ORDER — MAGNESIUM SULFATE IN WATER 40 MG/ML
2000 INJECTION, SOLUTION INTRAVENOUS ONCE
Status: COMPLETED | OUTPATIENT
Start: 2023-08-28 | End: 2023-08-28

## 2023-08-28 RX ORDER — METHYLPREDNISOLONE SODIUM SUCCINATE 40 MG/ML
40 INJECTION, POWDER, LYOPHILIZED, FOR SOLUTION INTRAMUSCULAR; INTRAVENOUS DAILY
Status: DISCONTINUED | OUTPATIENT
Start: 2023-08-28 | End: 2023-08-28

## 2023-08-28 RX ORDER — PREDNISONE 20 MG/1
20 TABLET ORAL 2 TIMES DAILY
Qty: 10 TABLET | Refills: 0 | Status: SHIPPED | OUTPATIENT
Start: 2023-08-28 | End: 2023-09-02

## 2023-08-28 RX ORDER — METHYLPREDNISOLONE SODIUM SUCCINATE 40 MG/ML
40 INJECTION, POWDER, LYOPHILIZED, FOR SOLUTION INTRAMUSCULAR; INTRAVENOUS ONCE
Status: COMPLETED | OUTPATIENT
Start: 2023-08-28 | End: 2023-08-28

## 2023-08-28 RX ORDER — IPRATROPIUM BROMIDE AND ALBUTEROL SULFATE 2.5; .5 MG/3ML; MG/3ML
1 SOLUTION RESPIRATORY (INHALATION) ONCE
Status: COMPLETED | OUTPATIENT
Start: 2023-08-28 | End: 2023-08-28

## 2023-08-28 RX ORDER — ALBUTEROL SULFATE 2.5 MG/3ML
2.5 SOLUTION RESPIRATORY (INHALATION) ONCE
Status: COMPLETED | OUTPATIENT
Start: 2023-08-28 | End: 2023-08-28

## 2023-08-28 RX ADMIN — ALBUTEROL SULFATE 2.5 MG: 2.5 SOLUTION RESPIRATORY (INHALATION) at 05:11

## 2023-08-28 RX ADMIN — METHYLPREDNISOLONE SODIUM SUCCINATE 40 MG: 40 INJECTION INTRAMUSCULAR; INTRAVENOUS at 05:53

## 2023-08-28 RX ADMIN — MAGNESIUM SULFATE HEPTAHYDRATE 2000 MG: 40 INJECTION, SOLUTION INTRAVENOUS at 06:00

## 2023-08-28 RX ADMIN — IPRATROPIUM BROMIDE AND ALBUTEROL SULFATE 1 DOSE: .5; 3 SOLUTION RESPIRATORY (INHALATION) at 05:11

## 2023-08-28 ASSESSMENT — PAIN DESCRIPTION - PAIN TYPE: TYPE: CHRONIC PAIN

## 2023-08-28 ASSESSMENT — PAIN DESCRIPTION - LOCATION: LOCATION: BACK

## 2023-08-28 ASSESSMENT — PAIN DESCRIPTION - DESCRIPTORS: DESCRIPTORS: ACHING

## 2023-08-28 ASSESSMENT — PAIN - FUNCTIONAL ASSESSMENT: PAIN_FUNCTIONAL_ASSESSMENT: 0-10

## 2023-08-28 NOTE — ED PROVIDER NOTES
EMERGENCY MEDICINE ATTENDING NOTE  Eric Urena. Marina Alvarez.DO, ELVIN, [facility]        CHIEF COMPLAINT  Chief Complaint   Patient presents with    Shortness of Breath     Pt arrives via ems for eval of sob onset a few days ago, sts hx of [address] is a 70 y.o. male who presents to the ED for evaluation of shortness of breath. Is been going on for couple days and getting progressively worse. Is having some chest tightness and back pain with it as well. Has a slight cough but not more than his usual.  Denies any fevers or chills. Uncertain of any sick contacts. Has advanced COPD and thinks it may be a flare of that. Denying any nausea or vomiting. Denies any other significant complaints or concerns. Nursing/triage notes reviewed. No other complaints, modifying factors or associated symptoms. REVIEW OF SYSTEMS:  All systems are reviewed and are negative unless noted in the HPI.     PAST MEDICAL HISTORY  Past Medical History:   Diagnosis Date    Arthritis     Gong's esophagus     Chronic back pain     COPD (chronic obstructive pulmonary disease) (HCC)     Depression     GERD (gastroesophageal reflux disease)     HYPERCHOLESTERAEMIA     Hypertension     Prinzmetal angina (HCC)     Spinal stenoses     TIA (transient ischaemic attack)        SURGICAL HISTORY  Past Surgical History:   Procedure Laterality Date    APPENDECTOMY      BACK SURGERY      BRONCHOSCOPY N/A 12/30/2019    BRONCHOSCOPY ALVEOLAR LAVAGE performed by Hartwell Brunner, MD at [address] [place] [address]  12/30/2019    BRONCHOSCOPY BRUSHINGS performed by Hartwell Brunner, MD at [facility]    COLONOSCOPY N/A 7/24/2020    COLONOSCOPY POLYPECTOMY SNARE/COLD BIOPSY performed by Ian Denton MD at [facility]  7/24/2020    COLONOSCOPY WITH BIOPSY performed by Norbert Hess
Emergency Department Encounter  Location: [facility] [address] [place] [address]    Patient: Naveed Saucedo  MRN: [id number]  : 1955  Date of evaluation: 2023  ED Provider: Kacey Moody MD    07:00a.m. Naveed Saucedo was checked out to me by Dr. Minerva Lucero. Please see his/her initial documentation for details of the patient's initial ED presentation, physical exam and completed studies. In brief, Naveed Saucedo is a 70 y.o. male that presented to the emergency department for shortness of breath found to have likely COPD exacerbation. Vital signs relatively stable besides hypertension. Upon signout patient had relatively normal laboratory work-up. Troponin was negative COVID was negative. Labs are otherwise unremarkable patient was given breathing treatment, steroids. Plan at this time is to reevaluate patient for respiratory status as well as repeat his troponin. We will likely discharge patient however if patient has worsening symptoms and or worsening cardiac status we will plan on admission for observation.     I have reviewed and interpreted all of the currently available lab results and diagnostics from this visit:  Results for orders placed or performed during the hospital encounter of 23   COVID-19, Rapid    Specimen: Nasopharyngeal Swab   Result Value Ref Range    SARS-CoV-2, NAAT Not Detected Not Detected   CBC with Auto Differential   Result Value Ref Range    WBC 11.8 (H) 4.0 - 11.0 K/uL    RBC 5.31 4.20 - 5.90 M/uL    Hemoglobin 16.7 13.5 - 17.5 g/dL    Hematocrit 48.7 40.5 - 52.5 %    MCV 91.8 80.0 - 100.0 fL    MCH 31.4 26.0 - 34.0 pg    MCHC 34.2 31.0 - 36.0 g/dL    RDW 13.4 12.4 - 15.4 %    Platelets 866 (H) 821 - 450 K/uL    MPV 7.0 5.0 - 10.5 fL    Neutrophils % 79.8 %    Lymphocytes % 11.0 %    Monocytes % 8.8 %    Eosinophils % 0.0 %    Basophils % 0.4 %    Neutrophils Absolute 9.4 (H) 1.7 - 7.7 K/uL    Lymphocytes Absolute 1.3 1.0 - 5.1 K/uL    Monocytes Absolute
Never

## 2023-08-28 NOTE — ED TRIAGE NOTES
Pt arrives via ems for eval of sob onset a few days ago, sts hx of copd. Pt is a/ox4, rsp nonlabored and wpd.

## 2023-11-08 ENCOUNTER — HOSPITAL ENCOUNTER (INPATIENT)
Age: 68
LOS: 1 days | Discharge: HOME | DRG: 917 | End: 2023-11-10
Attending: EMERGENCY MEDICINE | Admitting: HOSPITALIST
Payer: MEDICARE

## 2023-11-08 DIAGNOSIS — E86.0 DEHYDRATION: ICD-10-CM

## 2023-11-08 DIAGNOSIS — N17.9 AKI (ACUTE KIDNEY INJURY) (HCC): Primary | ICD-10-CM

## 2023-11-08 PROCEDURE — 96360 HYDRATION IV INFUSION INIT: CPT

## 2023-11-08 PROCEDURE — 99285 EMERGENCY DEPT VISIT HI MDM: CPT

## 2023-11-08 PROCEDURE — 96361 HYDRATE IV INFUSION ADD-ON: CPT

## 2023-11-08 ASSESSMENT — PAIN - FUNCTIONAL ASSESSMENT: PAIN_FUNCTIONAL_ASSESSMENT: NONE - DENIES PAIN

## 2023-11-09 ENCOUNTER — APPOINTMENT (OUTPATIENT)
Dept: CT IMAGING | Age: 68
DRG: 917 | End: 2023-11-09
Payer: MEDICARE

## 2023-11-09 ENCOUNTER — APPOINTMENT (OUTPATIENT)
Dept: MRI IMAGING | Age: 68
DRG: 917 | End: 2023-11-09
Payer: MEDICARE

## 2023-11-09 PROBLEM — I71.43 INFRARENAL ABDOMINAL AORTIC ANEURYSM (AAA) WITHOUT RUPTURE (HCC): Status: ACTIVE | Noted: 2023-11-09

## 2023-11-09 PROBLEM — R00.1 SYMPTOMATIC BRADYCARDIA: Status: RESOLVED | Noted: 2020-07-17 | Resolved: 2023-11-09

## 2023-11-09 PROBLEM — T50.901A OD (OVERDOSE OF DRUG), ACCIDENTAL OR UNINTENTIONAL, INITIAL ENCOUNTER: Status: ACTIVE | Noted: 2023-11-09

## 2023-11-09 PROBLEM — E87.6 HYPOKALEMIA: Status: RESOLVED | Noted: 2020-09-21 | Resolved: 2023-11-09

## 2023-11-09 PROBLEM — J18.9 PNEUMONIA: Status: RESOLVED | Noted: 2019-12-27 | Resolved: 2023-11-09

## 2023-11-09 PROBLEM — T50.901A ACCIDENTAL DRUG OVERDOSE: Status: ACTIVE | Noted: 2023-11-09

## 2023-11-09 PROBLEM — D72.829 LEUKOCYTOSIS: Status: RESOLVED | Noted: 2020-09-21 | Resolved: 2023-11-09

## 2023-11-09 PROBLEM — N32.3 BLADDER DIVERTICULUM: Status: ACTIVE | Noted: 2023-11-09

## 2023-11-09 PROBLEM — Z87.19 HISTORY OF RECTAL BLEEDING: Status: ACTIVE | Noted: 2023-11-09

## 2023-11-09 PROBLEM — Z87.442 HISTORY OF NEPHROLITHIASIS: Status: ACTIVE | Noted: 2023-11-09

## 2023-11-09 PROBLEM — J44.1 COPD EXACERBATION (HCC): Status: RESOLVED | Noted: 2021-12-23 | Resolved: 2023-11-09

## 2023-11-09 PROBLEM — A09 INFECTIOUS DIARRHEA: Status: RESOLVED | Noted: 2020-09-21 | Resolved: 2023-11-09

## 2023-11-09 PROBLEM — K62.5 RECTAL BLEEDING: Status: RESOLVED | Noted: 2020-07-23 | Resolved: 2023-11-09

## 2023-11-09 PROBLEM — E87.8 ELECTROLYTE ABNORMALITY: Status: ACTIVE | Noted: 2023-11-09

## 2023-11-09 PROBLEM — R11.2 INTRACTABLE NAUSEA AND VOMITING: Status: RESOLVED | Noted: 2020-09-21 | Resolved: 2023-11-09

## 2023-11-09 LAB
ALBUMIN SERPL-MCNC: 3.4 G/DL (ref 3.4–5)
ALP SERPL-CCNC: 76 U/L (ref 40–129)
ALT SERPL-CCNC: 28 U/L (ref 10–40)
AMPHETAMINES UR QL SCN>1000 NG/ML: ABNORMAL
ANION GAP SERPL CALCULATED.3IONS-SCNC: 13 MMOL/L (ref 3–16)
AST SERPL-CCNC: 11 U/L (ref 15–37)
BACTERIA URNS QL MICRO: ABNORMAL /HPF
BARBITURATES UR QL SCN>200 NG/ML: ABNORMAL
BASE EXCESS BLDV CALC-SCNC: -5.6 MMOL/L
BASOPHILS # BLD: 0.1 K/UL (ref 0–0.2)
BASOPHILS NFR BLD: 0.3 %
BENZODIAZ UR QL SCN>200 NG/ML: ABNORMAL
BILIRUB DIRECT SERPL-MCNC: <0.2 MG/DL (ref 0–0.3)
BILIRUB INDIRECT SERPL-MCNC: ABNORMAL MG/DL (ref 0–1)
BILIRUB SERPL-MCNC: 0.4 MG/DL (ref 0–1)
BILIRUB UR QL STRIP.AUTO: NEGATIVE
BUN SERPL-MCNC: 57 MG/DL (ref 7–20)
CALCIUM SERPL-MCNC: 8.1 MG/DL (ref 8.3–10.6)
CANNABINOIDS UR QL SCN>50 NG/ML: POSITIVE
CHLORIDE SERPL-SCNC: 88 MMOL/L (ref 99–110)
CK SERPL-CCNC: 73 U/L (ref 39–308)
CLARITY UR: CLEAR
CO2 BLDV-SCNC: 21 MMOL/L
CO2 SERPL-SCNC: 22 MMOL/L (ref 21–32)
COCAINE UR QL SCN: ABNORMAL
COHGB MFR BLDV: 4.9 %
COLOR UR: YELLOW
CREAT SERPL-MCNC: 2.2 MG/DL (ref 0.8–1.3)
DEPRECATED RDW RBC AUTO: 14.2 % (ref 12.4–15.4)
DRUG SCREEN COMMENT UR-IMP: ABNORMAL
EKG ATRIAL RATE: 56 BPM
EKG DIAGNOSIS: NORMAL
EKG P AXIS: 63 DEGREES
EKG P-R INTERVAL: 186 MS
EKG Q-T INTERVAL: 598 MS
EKG QRS DURATION: 112 MS
EKG QTC CALCULATION (BAZETT): 577 MS
EKG R AXIS: 75 DEGREES
EKG T AXIS: 78 DEGREES
EKG VENTRICULAR RATE: 56 BPM
EOSINOPHIL # BLD: 0.2 K/UL (ref 0–0.6)
EOSINOPHIL NFR BLD: 1 %
EPI CELLS #/AREA URNS AUTO: 2 /HPF (ref 0–5)
FENTANYL SCREEN, URINE: ABNORMAL
GFR SERPLBLD CREATININE-BSD FMLA CKD-EPI: 32 ML/MIN/{1.73_M2}
GLUCOSE SERPL-MCNC: 109 MG/DL (ref 70–99)
GLUCOSE UR STRIP.AUTO-MCNC: NEGATIVE MG/DL
HCO3 BLDV-SCNC: 20 MMOL/L (ref 23–29)
HCT VFR BLD AUTO: 42.3 % (ref 40.5–52.5)
HGB BLD-MCNC: 14.6 G/DL (ref 13.5–17.5)
HGB UR QL STRIP.AUTO: NEGATIVE
HYALINE CASTS #/AREA URNS AUTO: 21 /LPF (ref 0–8)
HYALINE CASTS: PRESENT
KETONES UR STRIP.AUTO-MCNC: NEGATIVE MG/DL
LACTATE BLDV-SCNC: 2.3 MMOL/L (ref 0.4–2)
LACTATE BLDV-SCNC: 2.5 MMOL/L (ref 0.4–2)
LEUKOCYTE ESTERASE UR QL STRIP.AUTO: ABNORMAL
LIPASE SERPL-CCNC: 21 U/L (ref 13–60)
LYMPHOCYTES # BLD: 2 K/UL (ref 1–5.1)
LYMPHOCYTES NFR BLD: 10.4 %
MAGNESIUM SERPL-MCNC: 2.6 MG/DL (ref 1.8–2.4)
MCH RBC QN AUTO: 31.6 PG (ref 26–34)
MCHC RBC AUTO-ENTMCNC: 34.6 G/DL (ref 31–36)
MCV RBC AUTO: 91.4 FL (ref 80–100)
METHADONE UR QL SCN>300 NG/ML: ABNORMAL
METHGB MFR BLDV: 0.6 %
MONOCYTES # BLD: 2.2 K/UL (ref 0–1.3)
MONOCYTES NFR BLD: 11.3 %
NEUTROPHILS # BLD: 15 K/UL (ref 1.7–7.7)
NEUTROPHILS NFR BLD: 77 %
NITRITE UR QL STRIP.AUTO: NEGATIVE
NT-PROBNP SERPL-MCNC: 406 PG/ML (ref 0–124)
O2 THERAPY: ABNORMAL
OPIATES UR QL SCN>300 NG/ML: ABNORMAL
OXYCODONE UR QL SCN: ABNORMAL
PATH INTERP BLD-IMP: NO
PCO2 BLDV: 37 MMHG (ref 40–50)
PCP UR QL SCN>25 NG/ML: ABNORMAL
PH BLDV: 7.33 [PH] (ref 7.35–7.45)
PH UR STRIP.AUTO: 5 [PH] (ref 5–8)
PH UR STRIP: 5 [PH]
PHOSPHATE SERPL-MCNC: 7 MG/DL (ref 2.5–4.9)
PLATELET # BLD AUTO: 399 K/UL (ref 135–450)
PMV BLD AUTO: 7.8 FL (ref 5–10.5)
PO2 BLDV: 42 MMHG
POTASSIUM SERPL-SCNC: 3.5 MMOL/L (ref 3.5–5.1)
PROT SERPL-MCNC: 5.5 G/DL (ref 6.4–8.2)
PROT UR STRIP.AUTO-MCNC: 30 MG/DL
RBC # BLD AUTO: 4.62 M/UL (ref 4.2–5.9)
RBC CLUMPS #/AREA URNS AUTO: 1 /HPF (ref 0–4)
SAO2 % BLDV: 73 %
SODIUM SERPL-SCNC: 123 MMOL/L (ref 136–145)
SP GR UR STRIP.AUTO: 1.02 (ref 1–1.03)
TROPONIN, HIGH SENSITIVITY: 41 NG/L (ref 0–22)
UA COMPLETE W REFLEX CULTURE PNL UR: ABNORMAL
UA DIPSTICK W REFLEX MICRO PNL UR: YES
URN SPEC COLLECT METH UR: ABNORMAL
UROBILINOGEN UR STRIP-ACNC: 1 E.U./DL
WBC # BLD AUTO: 19.5 K/UL (ref 4–11)
WBC #/AREA URNS AUTO: 4 /HPF (ref 0–5)

## 2023-11-09 PROCEDURE — 6370000000 HC RX 637 (ALT 250 FOR IP): Performed by: HOSPITALIST

## 2023-11-09 PROCEDURE — 96372 THER/PROPH/DIAG INJ SC/IM: CPT

## 2023-11-09 PROCEDURE — 6370000000 HC RX 637 (ALT 250 FOR IP): Performed by: STUDENT IN AN ORGANIZED HEALTH CARE EDUCATION/TRAINING PROGRAM

## 2023-11-09 PROCEDURE — 51798 US URINE CAPACITY MEASURE: CPT

## 2023-11-09 PROCEDURE — 70551 MRI BRAIN STEM W/O DYE: CPT

## 2023-11-09 PROCEDURE — G0378 HOSPITAL OBSERVATION PER HR: HCPCS

## 2023-11-09 PROCEDURE — 2580000003 HC RX 258: Performed by: STUDENT IN AN ORGANIZED HEALTH CARE EDUCATION/TRAINING PROGRAM

## 2023-11-09 PROCEDURE — 83735 ASSAY OF MAGNESIUM: CPT

## 2023-11-09 PROCEDURE — 2580000003 HC RX 258: Performed by: EMERGENCY MEDICINE

## 2023-11-09 PROCEDURE — 81001 URINALYSIS AUTO W/SCOPE: CPT

## 2023-11-09 PROCEDURE — 93010 ELECTROCARDIOGRAM REPORT: CPT | Performed by: INTERNAL MEDICINE

## 2023-11-09 PROCEDURE — 71275 CT ANGIOGRAPHY CHEST: CPT

## 2023-11-09 PROCEDURE — 85025 COMPLETE CBC W/AUTO DIFF WBC: CPT

## 2023-11-09 PROCEDURE — 70450 CT HEAD/BRAIN W/O DYE: CPT

## 2023-11-09 PROCEDURE — 80307 DRUG TEST PRSMV CHEM ANLYZR: CPT

## 2023-11-09 PROCEDURE — 2500000003 HC RX 250 WO HCPCS: Performed by: STUDENT IN AN ORGANIZED HEALTH CARE EDUCATION/TRAINING PROGRAM

## 2023-11-09 PROCEDURE — 80048 BASIC METABOLIC PNL TOTAL CA: CPT

## 2023-11-09 PROCEDURE — 82550 ASSAY OF CK (CPK): CPT

## 2023-11-09 PROCEDURE — 83605 ASSAY OF LACTIC ACID: CPT

## 2023-11-09 PROCEDURE — 93005 ELECTROCARDIOGRAM TRACING: CPT | Performed by: STUDENT IN AN ORGANIZED HEALTH CARE EDUCATION/TRAINING PROGRAM

## 2023-11-09 PROCEDURE — 83690 ASSAY OF LIPASE: CPT

## 2023-11-09 PROCEDURE — APPNB30 APP NON BILLABLE TIME 0-30 MINS: Performed by: NURSE PRACTITIONER

## 2023-11-09 PROCEDURE — 94760 N-INVAS EAR/PLS OXIMETRY 1: CPT

## 2023-11-09 PROCEDURE — 93308 TTE F-UP OR LMTD: CPT

## 2023-11-09 PROCEDURE — 96361 HYDRATE IV INFUSION ADD-ON: CPT

## 2023-11-09 PROCEDURE — 84484 ASSAY OF TROPONIN QUANT: CPT

## 2023-11-09 PROCEDURE — 99204 OFFICE O/P NEW MOD 45 MIN: CPT | Performed by: SURGERY

## 2023-11-09 PROCEDURE — 96365 THER/PROPH/DIAG IV INF INIT: CPT

## 2023-11-09 PROCEDURE — 6360000002 HC RX W HCPCS: Performed by: STUDENT IN AN ORGANIZED HEALTH CARE EDUCATION/TRAINING PROGRAM

## 2023-11-09 PROCEDURE — 93325 DOPPLER ECHO COLOR FLOW MAPG: CPT

## 2023-11-09 PROCEDURE — 84100 ASSAY OF PHOSPHORUS: CPT

## 2023-11-09 PROCEDURE — 6360000004 HC RX CONTRAST MEDICATION: Performed by: EMERGENCY MEDICINE

## 2023-11-09 PROCEDURE — 83880 ASSAY OF NATRIURETIC PEPTIDE: CPT

## 2023-11-09 PROCEDURE — 80076 HEPATIC FUNCTION PANEL: CPT

## 2023-11-09 PROCEDURE — 6370000000 HC RX 637 (ALT 250 FOR IP)

## 2023-11-09 PROCEDURE — 82803 BLOOD GASES ANY COMBINATION: CPT

## 2023-11-09 RX ORDER — ONDANSETRON 2 MG/ML
4 INJECTION INTRAMUSCULAR; INTRAVENOUS EVERY 6 HOURS PRN
Status: DISCONTINUED | OUTPATIENT
Start: 2023-11-09 | End: 2023-11-10 | Stop reason: HOSPADM

## 2023-11-09 RX ORDER — GABAPENTIN 300 MG/1
300 CAPSULE ORAL 3 TIMES DAILY
Status: DISCONTINUED | OUTPATIENT
Start: 2023-11-09 | End: 2023-11-10 | Stop reason: HOSPADM

## 2023-11-09 RX ORDER — TAMSULOSIN HYDROCHLORIDE 0.4 MG/1
0.4 CAPSULE ORAL DAILY
Status: DISCONTINUED | OUTPATIENT
Start: 2023-11-09 | End: 2023-11-10 | Stop reason: HOSPADM

## 2023-11-09 RX ORDER — POTASSIUM CHLORIDE 20 MEQ/1
40 TABLET, EXTENDED RELEASE ORAL PRN
Status: DISCONTINUED | OUTPATIENT
Start: 2023-11-09 | End: 2023-11-09 | Stop reason: SDUPTHER

## 2023-11-09 RX ORDER — SODIUM CHLORIDE 0.9 % (FLUSH) 0.9 %
5-40 SYRINGE (ML) INJECTION PRN
Status: DISCONTINUED | OUTPATIENT
Start: 2023-11-09 | End: 2023-11-10 | Stop reason: HOSPADM

## 2023-11-09 RX ORDER — SODIUM CHLORIDE 0.9 % (FLUSH) 0.9 %
5-40 SYRINGE (ML) INJECTION EVERY 12 HOURS SCHEDULED
Status: DISCONTINUED | OUTPATIENT
Start: 2023-11-09 | End: 2023-11-10 | Stop reason: HOSPADM

## 2023-11-09 RX ORDER — VERAPAMIL HYDROCHLORIDE 80 MG/1
80 TABLET ORAL 4 TIMES DAILY
Status: DISCONTINUED | OUTPATIENT
Start: 2023-11-09 | End: 2023-11-10 | Stop reason: HOSPADM

## 2023-11-09 RX ORDER — GABAPENTIN 600 MG/1
600 TABLET ORAL 3 TIMES DAILY
COMMUNITY

## 2023-11-09 RX ORDER — SODIUM CHLORIDE 9 MG/ML
INJECTION, SOLUTION INTRAVENOUS PRN
Status: DISCONTINUED | OUTPATIENT
Start: 2023-11-09 | End: 2023-11-10 | Stop reason: HOSPADM

## 2023-11-09 RX ORDER — ENOXAPARIN SODIUM 100 MG/ML
40 INJECTION SUBCUTANEOUS DAILY
Status: DISCONTINUED | OUTPATIENT
Start: 2023-11-09 | End: 2023-11-10 | Stop reason: HOSPADM

## 2023-11-09 RX ORDER — ACETAMINOPHEN 325 MG/1
650 TABLET ORAL EVERY 6 HOURS PRN
Status: DISCONTINUED | OUTPATIENT
Start: 2023-11-09 | End: 2023-11-10 | Stop reason: HOSPADM

## 2023-11-09 RX ORDER — LISINOPRIL 40 MG/1
40 TABLET ORAL DAILY
Status: DISCONTINUED | OUTPATIENT
Start: 2023-11-09 | End: 2023-11-10 | Stop reason: HOSPADM

## 2023-11-09 RX ORDER — MAGNESIUM SULFATE IN WATER 40 MG/ML
2000 INJECTION, SOLUTION INTRAVENOUS PRN
Status: DISCONTINUED | OUTPATIENT
Start: 2023-11-09 | End: 2023-11-10 | Stop reason: HOSPADM

## 2023-11-09 RX ORDER — ONDANSETRON 4 MG/1
4 TABLET, ORALLY DISINTEGRATING ORAL EVERY 8 HOURS PRN
Status: DISCONTINUED | OUTPATIENT
Start: 2023-11-09 | End: 2023-11-10 | Stop reason: HOSPADM

## 2023-11-09 RX ORDER — ACETAMINOPHEN 650 MG/1
650 SUPPOSITORY RECTAL EVERY 6 HOURS PRN
Status: DISCONTINUED | OUTPATIENT
Start: 2023-11-09 | End: 2023-11-10 | Stop reason: HOSPADM

## 2023-11-09 RX ORDER — PROMETHAZINE HYDROCHLORIDE 25 MG/1
25 TABLET ORAL EVERY 6 HOURS PRN
COMMUNITY

## 2023-11-09 RX ORDER — MAGNESIUM SULFATE IN WATER 40 MG/ML
2000 INJECTION, SOLUTION INTRAVENOUS PRN
Status: DISCONTINUED | OUTPATIENT
Start: 2023-11-09 | End: 2023-11-09 | Stop reason: SDUPTHER

## 2023-11-09 RX ORDER — VERAPAMIL HYDROCHLORIDE 80 MG/1
80 TABLET ORAL 4 TIMES DAILY
COMMUNITY

## 2023-11-09 RX ORDER — CETIRIZINE HYDROCHLORIDE 10 MG/1
5 TABLET ORAL DAILY PRN
Status: DISCONTINUED | OUTPATIENT
Start: 2023-11-09 | End: 2023-11-10 | Stop reason: HOSPADM

## 2023-11-09 RX ORDER — 0.9 % SODIUM CHLORIDE 0.9 %
1000 INTRAVENOUS SOLUTION INTRAVENOUS ONCE
Status: COMPLETED | OUTPATIENT
Start: 2023-11-09 | End: 2023-11-09

## 2023-11-09 RX ORDER — POTASSIUM CHLORIDE 7.45 MG/ML
10 INJECTION INTRAVENOUS PRN
Status: DISCONTINUED | OUTPATIENT
Start: 2023-11-09 | End: 2023-11-09 | Stop reason: SDUPTHER

## 2023-11-09 RX ORDER — SODIUM CHLORIDE 9 MG/ML
INJECTION, SOLUTION INTRAVENOUS CONTINUOUS
Status: DISCONTINUED | OUTPATIENT
Start: 2023-11-09 | End: 2023-11-10 | Stop reason: HOSPADM

## 2023-11-09 RX ORDER — POTASSIUM CHLORIDE 7.45 MG/ML
10 INJECTION INTRAVENOUS PRN
Status: DISCONTINUED | OUTPATIENT
Start: 2023-11-09 | End: 2023-11-10 | Stop reason: HOSPADM

## 2023-11-09 RX ORDER — POLYETHYLENE GLYCOL 3350 17 G/17G
17 POWDER, FOR SOLUTION ORAL DAILY PRN
Status: DISCONTINUED | OUTPATIENT
Start: 2023-11-09 | End: 2023-11-10 | Stop reason: HOSPADM

## 2023-11-09 RX ORDER — POTASSIUM CHLORIDE 20 MEQ/1
40 TABLET, EXTENDED RELEASE ORAL PRN
Status: DISCONTINUED | OUTPATIENT
Start: 2023-11-09 | End: 2023-11-10 | Stop reason: HOSPADM

## 2023-11-09 RX ORDER — CALCIUM GLUCONATE 20 MG/ML
1000 INJECTION, SOLUTION INTRAVENOUS ONCE
Status: COMPLETED | OUTPATIENT
Start: 2023-11-09 | End: 2023-11-09

## 2023-11-09 RX ORDER — DULOXETIN HYDROCHLORIDE 60 MG/1
60 CAPSULE, DELAYED RELEASE ORAL NIGHTLY
Status: DISCONTINUED | OUTPATIENT
Start: 2023-11-09 | End: 2023-11-10 | Stop reason: HOSPADM

## 2023-11-09 RX ADMIN — DULOXETINE HYDROCHLORIDE 60 MG: 60 CAPSULE, DELAYED RELEASE ORAL at 20:14

## 2023-11-09 RX ADMIN — VERAPAMIL HYDROCHLORIDE 80 MG: 80 TABLET ORAL at 20:14

## 2023-11-09 RX ADMIN — Medication 10 ML: at 20:14

## 2023-11-09 RX ADMIN — CALCIUM GLUCONATE 1000 MG: 20 INJECTION, SOLUTION INTRAVENOUS at 06:45

## 2023-11-09 RX ADMIN — ENOXAPARIN SODIUM 40 MG: 100 INJECTION SUBCUTANEOUS at 08:26

## 2023-11-09 RX ADMIN — LISINOPRIL 40 MG: 40 TABLET ORAL at 16:30

## 2023-11-09 RX ADMIN — SODIUM CHLORIDE 1000 ML: 9 INJECTION, SOLUTION INTRAVENOUS at 02:50

## 2023-11-09 RX ADMIN — SODIUM CHLORIDE: 9 INJECTION, SOLUTION INTRAVENOUS at 02:51

## 2023-11-09 RX ADMIN — GABAPENTIN 300 MG: 300 CAPSULE ORAL at 16:30

## 2023-11-09 RX ADMIN — GABAPENTIN 300 MG: 300 CAPSULE ORAL at 20:14

## 2023-11-09 RX ADMIN — CETIRIZINE HYDROCHLORIDE 5 MG: 10 TABLET ORAL at 17:32

## 2023-11-09 RX ADMIN — SODIUM CHLORIDE 1000 ML: 9 INJECTION, SOLUTION INTRAVENOUS at 01:51

## 2023-11-09 RX ADMIN — IOPAMIDOL 75 ML: 755 INJECTION, SOLUTION INTRAVENOUS at 00:39

## 2023-11-09 RX ADMIN — VERAPAMIL HYDROCHLORIDE 80 MG: 80 TABLET ORAL at 17:32

## 2023-11-09 RX ADMIN — TAMSULOSIN HYDROCHLORIDE 0.4 MG: 0.4 CAPSULE ORAL at 08:26

## 2023-11-09 ASSESSMENT — ENCOUNTER SYMPTOMS
GASTROINTESTINAL NEGATIVE: 1
ALLERGIC/IMMUNOLOGIC NEGATIVE: 1
EYES NEGATIVE: 1
SHORTNESS OF BREATH: 1
DIARRHEA: 1
ABDOMINAL PAIN: 1
BACK PAIN: 1

## 2023-11-09 ASSESSMENT — PAIN SCALES - GENERAL
PAINLEVEL_OUTOF10: 0
PAINLEVEL_OUTOF10: 7
PAINLEVEL_OUTOF10: 7

## 2023-11-09 ASSESSMENT — PAIN DESCRIPTION - PAIN TYPE
TYPE: CHRONIC PAIN
TYPE: CHRONIC PAIN

## 2023-11-09 ASSESSMENT — PAIN DESCRIPTION - DESCRIPTORS
DESCRIPTORS: SHARP
DESCRIPTORS: SHARP

## 2023-11-09 ASSESSMENT — PAIN DESCRIPTION - ORIENTATION
ORIENTATION: LOWER
ORIENTATION: UPPER;LOWER

## 2023-11-09 ASSESSMENT — PAIN DESCRIPTION - LOCATION
LOCATION: BACK
LOCATION: BACK

## 2023-11-09 ASSESSMENT — PAIN DESCRIPTION - FREQUENCY: FREQUENCY: CONTINUOUS

## 2023-11-09 ASSESSMENT — PAIN - FUNCTIONAL ASSESSMENT: PAIN_FUNCTIONAL_ASSESSMENT: ACTIVITIES ARE NOT PREVENTED

## 2023-11-09 ASSESSMENT — PAIN DESCRIPTION - ONSET: ONSET: ON-GOING
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
## 2023-11-09 NOTE — ED NOTES
Report called to [facility]. All questions answers.       Tariq Kwong RN  11/09/23 7827

## 2023-11-09 NOTE — CARE COORDINATION
Case Management Assessment  Initial Evaluation    Date/Time of Evaluation: 11/9/2023 12:20 PM  Assessment Completed by: EAN Kaur, MATTHEWW    If patient is discharged prior to next notation, then this note serves as note for discharge by case management. Patient Name: Allyn Albarran                   YOB: 1955  Diagnosis: Dehydration [E86.0]  ELIZABET (acute kidney injury) ([address]) [N17.9]  OD (overdose of drug), accidental or unintentional, initial encounter Mini Nguyen                   Date / Time: 11/8/2023 11:49 PM    Patient Admission Status: Observation   Readmission Risk (Low < 19, Mod (19-27), High > 27): No data recorded  Current PCP: TORIE Slaughter  PCP verified by CM? Yes    Chart Reviewed: Yes      History Provided by: Patient  Patient Orientation: Alert and Oriented    Patient Cognition: Alert    Hospitalization in the last 30 days (Readmission):  No    If yes, Readmission Assessment in CM Navigator will be completed. Advance Directives:      Code Status: Full Code   Patient's Primary Decision Maker is: Legal Next of Kin    Primary Decision Maker: Darlene Fox - [phone]    Discharge Planning:    Patient lives with: Other (Comment) (4 dogs.) Type of Home: House  Primary Care Giver: Self  Patient Support Systems include: Spouse/Significant Other   Current Financial resources: [employer] ([place]), Medicare  Current community resources: None  Current services prior to admission: None            Current DME:              Type of Home Care services:  None    ADLS  Prior functional level: Independent in ADLs/IADLs  Current functional level: Independent in ADLs/IADLs    PT AM-PAC:   /24  OT AM-PAC:   /24    Family can provide assistance at DC: Yes  Would you like Case Management to discuss the discharge plan with any other family members/significant others, and if so, who?  Yes (wife if needed.)  Plans to Return to Present Housing: Yes  Other Identified Issues/Barriers to

## 2023-11-09 NOTE — PROGRESS NOTES
Pharmacy Medication Reconciliation Note     List of medications patient is currently taking is complete. Source of information:   1. Patient interview      Notes regarding home medications:   1. Added Gabapentin 600mg tid    2.  Added Verapamil 80mg QID      Denies taking any other OTC or herbal medications    Mark Diaz, [address] [place] [address], [facility] 11/9/2023 10:51 AM

## 2023-11-09 NOTE — ED NOTES
D/t multiple low BP readings, this RN started 2L of NS fluids. Pt also placed in Trendelenburg to assist with raising the pt's BP.      Pastora Campos RN  11/09/23 0025

## 2023-11-09 NOTE — CONSULTS
Consulting Physician: Willem Waddell DO     Reason for Consult: bladder diverticulum    History of Present Illness: Naveed Saucedo is a 70 y.o. male PMH COPD, HTN, spinal stenosis, TIAs, chronic back pain. Typically receives care at the [address] [place] [address]. Presented to ER yesterday w/ AMS & hypotension after smoking THC, responded to narcan. He is admitted for management of OD, sepsis w/ ELIZABET (likely prerenal), hyponatremia. CTCAP revealed 3.2cm left lateral bladder diverticulum, moderately distended bladder, circumferential bladder wall thickening, prostatomegaly, no hydro noted. UA negative for infection, Cr 2.2. Vitals stable. Patient follows with urology at the [address] [place] [address] but if he needs further urologic care, he would like to continue with [facility]. He reports baseline urinary frequency, urgency, intermittent weak urinary stream, nocturia x3, feeling of incomplete bladder emptying. PVR today >600ml.      Past Medical History:   Past Medical History:   Diagnosis Date    Arthritis     Gong's esophagus     Chronic back pain     COPD (chronic obstructive pulmonary disease) (HCC)     Depression     GERD (gastroesophageal reflux disease)     HYPERCHOLESTERAEMIA     Hypertension     Prinzmetal angina (HCC)     Spinal stenoses     TIA (transient ischaemic attack)        Past Surgical History:  Past Surgical History:   Procedure Laterality Date    APPENDECTOMY      BACK SURGERY      BRONCHOSCOPY N/A 12/30/2019    BRONCHOSCOPY ALVEOLAR LAVAGE performed by Hartwell Brunner, MD at [address] [place] [address]  12/30/2019    BRONCHOSCOPY BRUSHINGS performed by Hartwell Brunner, MD at [facility]    COLONOSCOPY N/A 7/24/2020    COLONOSCOPY POLYPECTOMY SNARE/COLD BIOPSY performed by Ian Denton MD at [facility]  7/24/2020    COLONOSCOPY WITH BIOPSY performed by Ian Denton MD at [address] [place] [address] osseous abnormality identified. Extensive lumbosacral  stabilization hardware in place. Impression/Plan:   - 77yo male admitted for OD, ELIZABET, hyponatremia. Urology consulted for evaluation of bladder diverticulum noted on inmaging  - CTCAP reveals 3.2cm left lateral bladder wall diverticulum, distended bladder  - Measure and document PVR to ensure patient able to empty bladder.   - Start flomax, place sosa catheter  - Bladder diverticulum is a result of chronic high bladder pressure r/t urinary retention likely from ZAPATA. He will need to follow up OP with our office for evaluation for TOV and/or ZAPATA procedure.     TORIE Foss - CNP 11/9/20238:05 AM

## 2023-11-09 NOTE — ED PROVIDER NOTES
Motor: No abnormal muscle tone. Coordination: Coordination normal.         DIAGNOSTIC RESULTS     ED BEDSIDE ULTRASOUND:   Performed by ED Physician - none    LABS:  Labs Reviewed   CBC WITH AUTO DIFFERENTIAL - Abnormal; Notable for the following components:       Result Value    WBC 19.5 (*)     Neutrophils Absolute 15.0 (*)     Monocytes Absolute 2.2 (*)     All other components within normal limits   BASIC METABOLIC PANEL W/ REFLEX TO MG FOR LOW K - Abnormal; Notable for the following components:    Sodium 123 (*)     Chloride 88 (*)     Glucose 109 (*)     BUN 57 (*)     Creatinine 2.2 (*)     Est, Glom Filt Rate 32 (*)     Calcium 8.1 (*)     All other components within normal limits   TROPONIN - Abnormal; Notable for the following components:    Troponin, High Sensitivity 41 (*)     All other components within normal limits   BRAIN NATRIURETIC PEPTIDE - Abnormal; Notable for the following components:    Pro- (*)     All other components within normal limits   HEPATIC FUNCTION PANEL - Abnormal; Notable for the following components:     Total Protein 5.5 (*)     AST 11 (*)     All other components within normal limits   LACTIC ACID - Abnormal; Notable for the following components:    Lactic Acid 2.3 (*)     All other components within normal limits   URINALYSIS WITH REFLEX TO CULTURE - Abnormal; Notable for the following components:    Protein, UA 30 (*)     Leukocyte Esterase, Urine SMALL (*)     All other components within normal limits   BLOOD GAS, VENOUS - Abnormal; Notable for the following components:    pH, Fidel 7.335 (*)     pCO2, Fidel 37.0 (*)     HCO3, Venous 20 (*)     All other components within normal limits   URINE DRUG SCREEN - Abnormal; Notable for the following components:    Cannabinoid Scrn, Ur POSITIVE (*)     All other components within normal limits   MAGNESIUM - Abnormal; Notable for the following components:    Magnesium 2.60 (*)     All other components within normal limits

## 2023-11-09 NOTE — ACP (ADVANCE CARE PLANNING)
Advance Care Planning     Advance Care Planning Activator (Inpatient)  Conversation Note      Date of ACP Conversation: 11/9/2023     Conversation Conducted with: Patient with Decision Making Capacity    ACP Activator: EAN Vo, [address] Maker:     Current Designated Health Care Decision Maker:     Primary Decision Maker: Mariluz Lemus Spouse - [phone]    Care Preferences    Ventilation: \"If you were in your present state of health and suddenly became very ill and were unable to breathe on your own, what would your preference be about the use of a ventilator (breathing machine) if it were available to you? \"      Would the patient desire the use of ventilator (breathing machine)?: no    \"If your health worsens and it becomes clear that your chance of recovery is unlikely, what would your preference be about the use of a ventilator (breathing machine) if it were available to you? \"     Would the patient desire the use of ventilator (breathing machine)?: No      Resuscitation  \"CPR works best to restart the heart when there is a sudden event, like a heart attack, in someone who is otherwise healthy. Unfortunately, CPR does not typically restart the heart for people who have serious health conditions or who are very sick. \"    \"In the event your heart stopped as a result of an underlying serious health condition, would you want attempts to be made to restart your heart (answer \"yes\" for attempt to resuscitate) or would you prefer a natural death (answer \"no\" for do not attempt to resuscitate)? \" no       [] Yes   [] No   Educated Patient / Onel Sails regarding differences between Advance Directives and portable DNR orders.     Length of ACP Conversation in minutes:  2    Conversation Outcomes:  ACP discussion completed    Follow-up plan:    [] Schedule follow-up conversation to continue planning  [] Referred individual to Provider for additional questions/concerns   [] Advised

## 2023-11-09 NOTE — PLAN OF CARE
Problem: Safety - Adult  Goal: Free from fall injury  Outcome: Progressing  Flowsheets (Taken 11/9/2023 2157)  Free From Fall Injury: Instruct family/caregiver on patient safety

## 2023-11-09 NOTE — ED TRIAGE NOTES
Pt arrived to ED via EMS after pt smoked marijuana and have a change on mental status. EMS reports pt was with wife when he became minimally responsive. Pt is now alert and oriented. Pt reports he smoked a new kind of marijuana from the [employer]. EMS gave 2mg narcan en rounte. Pt is hypotensive at 55/40. Pt denies taking any other drugs.

## 2023-11-09 NOTE — CARE COORDINATION
A quick chart review reveals that this patient is a known  . SW faxed a face sheet to [facility] so they can review and make appropriate updates. SW entered patient's info on the 72 hour [place] notification web site and received the following response:    Success  Thank you for reporting emergency care. Your notification ID is:  [id number]    Please make note of this ID as it will be important for all future communication regarding this notification and the resulting decision. Respectfully submitted,    Rosalinda MCKOY, [facility]   [phone].     Electronically signed by EAN Leung, DALLIN on 11/9/2023 at 10:57 AM

## 2023-11-09 NOTE — H&P
Hospitalist History and Physical      PCP: Sushila Ireland MD    Date of Admission: 11/9/2023     Anticipated Discharge Day/Date - 11/9/23    Anticipated Discharge Location:  [x]Home  []HHC  []SNF  []Acute Rehab  []Hospice    Assessment/Plan:      Accidental drug overdose    Drug overdose, accidental/unintentional. Reporting unresponsiveness with THC. History opiate OD. CT head no acuity  CTA chest/abd/pelvis (received contrast) shows 3.2 cm left bladder diverticulum. Suggest follow up with urologist. 5.7 cm infrarenal AAA (previously 4.7 cm in 2020, has been interval expansion without dissection. Do request eval by vascular to follow outpatient. Reported convulsions, reported neg EEG at bedside in ED. Obtain MRI brain  Sepsis with ELIZABET scr 2.2 where baseline <0.5, hypotension - IVF. No infectious source defined. No aspiration on Chest imaging, no abdominopelvic source. Likely related to drug OD, only THC is positive on UDS. BP meds held at this time. Hyponatremia 123 suspect dehydration, Lactic acidosis 2.3, Hypocalcemia 8.1 - replete. COPD, history bronchoscopy. RT protocol    Other chronic medical conditions reviewed and managed. DVT Prophylaxis:  [x]PPx LMWH  []SQ Heparin  []IPC/SCDs  []Eliquis  []Xarelto  []Coumadin  []Other -        Diet: ADULT DIET; Regular  Code Status: Full Code      75 Minutes were spent solely for medical decision making for this patient including documentation, ordering and interpreting labs imaging and studies, independently reviewing the chart as well as discussing plan of care with the family patient ancillary staff and coordinating their care. Chief Complaint, History of Present Illness, Review of Systems       Chief Complaint   drug OD    History of Present Illness   Alejandro Muñoz is a 70 y.o. male with a history of substance abuse with opiate overdose, copd, HTN who presented with change in mental status after smoking THC per patient's wife.  Became minimally

## 2023-11-09 NOTE — PROGRESS NOTES
4 Eyes Skin Assessment     NAME:  Freddy Triplett  YOB: 1955  MEDICAL RECORD NUMBER:  [id number]    The patient is being assessed for  Admission    I agree that at least one RN has performed a thorough Head to Toe Skin Assessment on the patient. ALL assessment sites listed below have been assessed. Areas assessed by both nurses:    Head, Face, Ears, Shoulders, Back, Chest, Arms, Elbows, Hands, Sacrum. Buttock, Coccyx, Ischium, and Legs. Feet and Heels  Scattered bruising and abrasions to extremities. Skin tear to left elbow, mepilex applied. Healed surgical scar to spine. Does the Patient have a Wound?  No noted wound(s)       Cesar Prevention initiated by RN: No  Wound Care Orders initiated by RN: No    Pressure Injury (Stage 3,4, Unstageable, DTI, NWPT, and Complex wounds) if present, place Wound referral order by RN under : No    New Ostomies, if present place, Ostomy referral order under : No     Nurse 1 eSignature: Electronically signed by Shirley Melchor RN on 11/9/23 at 6:27 AM EST    **SHARE this note so that the co-signing nurse can place an eSignature**    Nurse 2 eSignature: Electronically signed by Lizett Urbina RN on 11/9/23 at 6:41 AM EST

## 2023-11-10 VITALS
HEART RATE: 72 BPM | WEIGHT: 149.47 LBS | BODY MASS INDEX: 22.65 KG/M2 | OXYGEN SATURATION: 94 % | RESPIRATION RATE: 18 BRPM | HEIGHT: 68 IN | TEMPERATURE: 98.3 F | SYSTOLIC BLOOD PRESSURE: 123 MMHG | DIASTOLIC BLOOD PRESSURE: 79 MMHG

## 2023-11-10 PROBLEM — Z91.89 HX OF DRUG OVERDOSE: Status: ACTIVE | Noted: 2023-11-10

## 2023-11-10 LAB
ANION GAP SERPL CALCULATED.3IONS-SCNC: 5 MMOL/L (ref 3–16)
BUN SERPL-MCNC: 15 MG/DL (ref 7–20)
CALCIUM SERPL-MCNC: 7.9 MG/DL (ref 8.3–10.6)
CHLORIDE SERPL-SCNC: 108 MMOL/L (ref 99–110)
CO2 SERPL-SCNC: 24 MMOL/L (ref 21–32)
CREAT SERPL-MCNC: <0.5 MG/DL (ref 0.8–1.3)
GFR SERPLBLD CREATININE-BSD FMLA CKD-EPI: >60 ML/MIN/{1.73_M2}
GLUCOSE SERPL-MCNC: 78 MG/DL (ref 70–99)
MAGNESIUM SERPL-MCNC: 2.1 MG/DL (ref 1.8–2.4)
POTASSIUM SERPL-SCNC: 3.4 MMOL/L (ref 3.5–5.1)
SODIUM SERPL-SCNC: 137 MMOL/L (ref 136–145)

## 2023-11-10 PROCEDURE — 6370000000 HC RX 637 (ALT 250 FOR IP)

## 2023-11-10 PROCEDURE — 97530 THERAPEUTIC ACTIVITIES: CPT

## 2023-11-10 PROCEDURE — 6360000002 HC RX W HCPCS: Performed by: STUDENT IN AN ORGANIZED HEALTH CARE EDUCATION/TRAINING PROGRAM

## 2023-11-10 PROCEDURE — 96372 THER/PROPH/DIAG INJ SC/IM: CPT

## 2023-11-10 PROCEDURE — 80048 BASIC METABOLIC PNL TOTAL CA: CPT

## 2023-11-10 PROCEDURE — 1200000000 HC SEMI PRIVATE

## 2023-11-10 PROCEDURE — 51702 INSERT TEMP BLADDER CATH: CPT

## 2023-11-10 PROCEDURE — 83735 ASSAY OF MAGNESIUM: CPT

## 2023-11-10 PROCEDURE — 6370000000 HC RX 637 (ALT 250 FOR IP): Performed by: HOSPITALIST

## 2023-11-10 PROCEDURE — 36415 COLL VENOUS BLD VENIPUNCTURE: CPT

## 2023-11-10 PROCEDURE — 97161 PT EVAL LOW COMPLEX 20 MIN: CPT

## 2023-11-10 PROCEDURE — 94760 N-INVAS EAR/PLS OXIMETRY 1: CPT

## 2023-11-10 PROCEDURE — 85025 COMPLETE CBC W/AUTO DIFF WBC: CPT

## 2023-11-10 RX ADMIN — GABAPENTIN 300 MG: 300 CAPSULE ORAL at 12:36

## 2023-11-10 RX ADMIN — GABAPENTIN 300 MG: 300 CAPSULE ORAL at 08:33

## 2023-11-10 RX ADMIN — CETIRIZINE HYDROCHLORIDE 5 MG: 10 TABLET ORAL at 10:40

## 2023-11-10 RX ADMIN — TAMSULOSIN HYDROCHLORIDE 0.4 MG: 0.4 CAPSULE ORAL at 08:33

## 2023-11-10 RX ADMIN — LISINOPRIL 40 MG: 40 TABLET ORAL at 08:33

## 2023-11-10 RX ADMIN — VERAPAMIL HYDROCHLORIDE 80 MG: 80 TABLET ORAL at 08:33

## 2023-11-10 RX ADMIN — VERAPAMIL HYDROCHLORIDE 80 MG: 80 TABLET ORAL at 12:36

## 2023-11-10 RX ADMIN — ENOXAPARIN SODIUM 40 MG: 100 INJECTION SUBCUTANEOUS at 08:33

## 2023-11-10 ASSESSMENT — PAIN SCALES - GENERAL: PAINLEVEL_OUTOF10: 0

## 2023-11-10 NOTE — CARE COORDINATION
SW received a response back from the [address] [place]:    Dear [facility] Provider,   Thank you for notifying [facility] of the emergent event referenced below. The emergent episode of care has been approved for payment. This approval is for care up until discharge, transfer to another community facility, or until such a time that the [employer] refuses to transfer to [place]. Please submit claims to the [facility]). All authorized claims submitted to [place] and [facility] must include the corresponding Referral ID. Notification ID: [id number]  Provider Name: [facility]  Episode of Care Start Date: 11/09/2023 3:23 AM  Episode of Care End Date:   Referral ID: [id number]  Third Party : [employer]  In-network providers seeking authorization numbers may also refer to their [facility] portal, [email or web address] for [employer] and [employer] for [employer]. Providers may also call the centralized call center at [facility] ([phone]) to check the status of the notification. For more information to coordinate care directly with a local [facility] please visit: Request and [address] [place] [address] (va.gov)  For more information on Emergency Care eligibility please visit:  [email or web address]  [place] [employer] [place]     Respectfully submitted,    Rosalinda MCKOY, NKECHIWS  [facility]   [phone].     Electronically signed by EAN Shelton, DALLIN on 11/10/2023 at 8:35 AM

## 2023-11-10 NOTE — CARE COORDINATION
Case Management Discharge Note          Date / Time of Note: 11/10/2023 3:36 PM                  Patient Name: Andrea Hernandez   YOB: 1955  Diagnosis: Dehydration [E86.0]  ELIZABET (acute kidney injury) ([address]) [N17.9]  OD (overdose of drug), accidental or unintentional, initial encounter [T50.901A]  Hx of drug overdose [Z91.89]   Date / Time: 11/8/2023 11:49 PM    Financial:  Payor: MEDICARE / Plan: MEDICARE PART A AND B / Product Type: *No Product type* /      Pharmacy:    [facility] [place], [address]. [place] [phone] - F [phone]  [address] [place] [address]  Phone: [phone] Fax: [phone]    [facility] [phone] - [address], [address] [facility] [phone] [facility] [phone]  [address] [facility] [address]  Phone: [phone] Fax: [address] [facility], [address] [facility]  [address] [address]  Phone: [phone] Fax: [phone]    [address], [address] [facility],[address] [facility] [phone] - F [phone]  [address] [facility]  Phone: [phone] Fax: [address] [facility] [address], [address] [address]  Phone: [phone] Fax: [phone]      Assistance purchasing medications?: Potential Assistance Purchasing Medications: No  Assistance provided by Case Management: None at this time    DISCHARGE Disposition: Home- No Services Needed    Transportation:  Transportation PLAN for discharge: family   Mode of Transport: Private Car  Time of Transport: TBD    IMM Completed:   Yes, Case management has presented and reviewed IMM letter #2.    IMM Letter given to Patient/Family/Significant other/Guardian/POA/by[de-identified] To patient by Caswell Drain, LMSW   IMM Letter date given[de-identified] 11/10/23  IMM Letter time given[de-identified] 1771. Patient and/or family/POA verbalized understanding of their medicare rights and appeal process if needed. Patient and/or family/POA has signed, initialed and placed the date and time on [facility] letter #2 on the the appropriate lines. Copy of letter offered and they are aware that the original copy of IMM letter #2 is available prior to discharge from the paper chart on the unit. Electronic documentation has been entered into epic for IMM letter #2 and original paper copy has been added to the paper chart at the nurses station. Additional CM Notes:   Discharge order noted. Patient to discharge home with family. Denied any discharge needs. The Plan for Transition of Care is related to the following treatment goals of Dehydration [E86.0]  ELIZABET (acute kidney injury) ([address]) [N17.9]  OD (overdose of drug), accidental or unintentional, initial encounter [T50.901A]  Hx of drug overdose [Z91.89]    The Patient and/or patient representative Demetrius Greene and his family were provided with a choice of provider and agrees with the discharge plan Yes    Freedom of choice list was provided with basic dialogue that supports the patient's individualized plan of care/goals and shares the quality data associated with the providers.  Not Indicated    Aldair Murphy, RN  [address] [facility]   Case Management Department  Ph: [phone]

## 2023-11-10 NOTE — PROGRESS NOTES
Physical Therapy  Facility/Department: [facility] 5 PROGRESSIVE CARE  Physical Therapy Initial Assessment    Name: Antonio Gannon  : 1955  MRN: [id number]  Date of Service: 11/10/2023    Discharge Recommendations:  Patient would benefit from continued therapy after discharge, Home with assist PRN   PT Equipment Recommendations  Equipment Needed: No      Patient Diagnosis(es): The primary encounter diagnosis was ELIZABET (acute kidney injury) ([address]). A diagnosis of Dehydration was also pertinent to this visit. Past Medical History:  has a past medical history of Arthritis, Gong's esophagus, Chronic back pain, COPD (chronic obstructive pulmonary disease) ([address]), Depression, GERD (gastroesophageal reflux disease), HYPERCHOLESTERAEMIA, Hypertension, Prinzmetal angina ([address]), Spinal stenoses, and TIA (transient ischaemic attack). Past Surgical History:  has a past surgical history that includes joint replacement; Colonoscopy; back surgery; Splenectomy; Cholecystectomy; Appendectomy; Upper gastrointestinal endoscopy (5-14-10); Upper gastrointestinal endoscopy (12); Clavicle surgery; bronchoscopy (N/A, 2019); bronchoscopy (2019); Colonoscopy (N/A, 2020); and Colonoscopy (2020). Assessment   Body Structures, Functions, Activity Limitations Requiring Skilled Therapeutic Intervention: Decreased functional mobility ; Decreased endurance;Decreased balance;Decreased safe awareness  Assessment: Pt is a 70 y.o. M. admitted  s/p overdose, treated for ELIZABET, dehydration. Presents pleasant and agreeable to evaluation, demonstrating functional LE strength/ROM, but c/o significant fatigue with brief standing activity. He did require intermittent Min A to ambulate without device, and would benefit from continued therapy to maximize his balance, endurance, and safety awarenss. Anticipate return home with assist from wife, initial use of RW. He declined home PT.     Antonio Gannon scored a 19/24 on the AM-PAC

## 2023-11-10 NOTE — PROGRESS NOTES
Comprehensive Nutrition Assessment    Type and Reason for Visit:  Initial, Positive Nutrition Screen    Nutrition Recommendations/Plan:   Continue regular diet  Continue Ensure bid     Malnutrition Assessment:  Malnutrition Status: At risk for malnutrition (Comment) (11/10/23 1415)    Context:  Acute Illness     Findings of the 6 clinical characteristics of malnutrition:  Energy Intake:  Mild decrease in energy intake (Comment)  Weight Loss:  No significant weight loss     Body Fat Loss:  No significant body fat loss     Muscle Mass Loss:  No significant muscle mass loss    Fluid Accumulation:  No significant fluid accumulation     Strength:  Not Performed    Nutrition Assessment:    Pt triggered an assessment r/t reports of wt loss. PMH includes; medical marijuana, COPD, Barretts Esophagus, HLD, HTN, nausea. Pt adm r/t smoking marijuana and becoming minimally responsive. Pt also noted with ELIZABET, Hyponatremia and deydration. Pt now alert. Diet adv to regular with Ensure bid. Pt did report that po intake is slowly improving, including acceptance of the Ensure offered bid. When asked about wt pt reported a UBW of 150 lbs. Current wt down slightly at 149 lbs. Will monitor trend. Nutrition Related Findings:    Labs reviewed. Noted BM 11/7. Noted no edema. Wound Type: Skin Tears (LUE)       Current Nutrition Intake & Therapies:    Average Meal Intake: 26-50%, % (improving)  Average Supplements Intake: %  ADULT DIET; Regular  ADULT ORAL NUTRITION SUPPLEMENT; Lunch, Dinner; Standard High Calorie/High Protein Oral Supplement    Anthropometric Measures:  Height: 172.7 cm (5' 8\")  Ideal Body Weight (IBW): 154 lbs (70 kg)    Admission Body Weight: 68 kg (150 lb)  Current Body Weight: 67.6 kg (149 lb),   IBW.  Weight Source: Bed Scale  Current BMI (kg/m2): 22.7                          BMI Categories: Normal Weight (BMI 22.0 to 24.9) age over 72    Estimated Daily Nutrient Needs:        Energy (kcal/day): 2210-4386 (25-30 x ABW 68 kg)     Protein (g/day): 82-95 (1.2-1.4 x ABW 68 kg)  Method Used for Fluid Requirements: 1 ml/kcal  Fluid (ml/day):      Nutrition Diagnosis:   Inadequate energy intake related to cognitive or neurological impairment as evidenced by poor intake prior to admission    Nutrition Interventions:   Food and/or Nutrient Delivery: Continue Current Diet, Continue Oral Nutrition Supplement  Nutrition Education/Counseling: Education not indicated  Coordination of Nutrition Care: Continue to monitor while inpatient       Goals:     Goals: PO intake 50% or greater       Nutrition Monitoring and Evaluation:   Behavioral-Environmental Outcomes: None Identified  Food/Nutrient Intake Outcomes: Food and Nutrient Intake, Supplement Intake  Physical Signs/Symptoms Outcomes: Biochemical Data, Constipation, Diarrhea, Skin, Weight, Fluid Status or Edema    Discharge Planning:     Too soon to determine     Catherine Hairston [address] [facility]  Contact: [phone]

## 2023-11-10 NOTE — PROGRESS NOTES
VASCULAR    Seen for AAA. No new issues or complaints. VSS Afeb  Abd soft with nontender pulsatile mass    Labs pending    A/P: Asymptomatic AAA (5.5 cm)   Will require elective repair - EVAR vs open   Can F/U as outpt after DC for planning. Will follow while hospitalized.      Anabella Marshall

## 2023-11-10 NOTE — CARE COORDINATION
Provided to patient . .. by Electronically signed by EAN Pinedo on 11/10/2023 at 2:25 PM. Education provided to patient, patient reported no questions and verbalized understanding. Patient aware of 4 hours allotted time to determine if they choose to pursue Medicare appeal process.     11/10/23 1425   IMM Letter   IMM Letter given to Patient/Family/Significant other/Guardian/POA/by: To patient by Olya Olivo LMSW   IMM Letter date given: 11/10/23   IMM Letter time given: [address], LMSW, [address] [place] [address] Social Work Case Management   Phone: [phone]  Fax: [phone]

## 2023-11-11 LAB
ANISOCYTOSIS BLD QL SMEAR: ABNORMAL
BASOPHILS # BLD: 0 K/UL (ref 0–0.2)
BASOPHILS NFR BLD: 0 %
DEPRECATED RDW RBC AUTO: 14.2 % (ref 12.4–15.4)
EOSINOPHIL # BLD: 0 K/UL (ref 0–0.6)
EOSINOPHIL NFR BLD: 0 %
HCT VFR BLD AUTO: 37.4 % (ref 40.5–52.5)
HGB BLD-MCNC: 13 G/DL (ref 13.5–17.5)
LYMPHOCYTES # BLD: 2.4 K/UL (ref 1–5.1)
LYMPHOCYTES NFR BLD: 23 %
MCH RBC QN AUTO: 31.8 PG (ref 26–34)
MCHC RBC AUTO-ENTMCNC: 34.9 G/DL (ref 31–36)
MCV RBC AUTO: 91.1 FL (ref 80–100)
MONOCYTES # BLD: 2.2 K/UL (ref 0–1.3)
MONOCYTES NFR BLD: 22 %
NEUTROPHILS # BLD: 5.3 K/UL (ref 1.7–7.7)
NEUTROPHILS NFR BLD: 50 %
NEUTS BAND NFR BLD MANUAL: 4 % (ref 0–7)
OVALOCYTES BLD QL SMEAR: ABNORMAL
PATH INTERP BLD-IMP: NO
PLATELET # BLD AUTO: 381 K/UL (ref 135–450)
PLATELET BLD QL SMEAR: ADEQUATE
PMV BLD AUTO: 8.1 FL (ref 5–10.5)
POIKILOCYTOSIS BLD QL SMEAR: ABNORMAL
RBC # BLD AUTO: 4.11 M/UL (ref 4.2–5.9)
SLIDE REVIEW: ABNORMAL
VARIANT LYMPHS NFR BLD MANUAL: 1 % (ref 0–6)
WBC # BLD AUTO: 9.9 K/UL (ref 4–11)

## 2023-11-12 NOTE — DISCHARGE SUMMARY
Hospital Medicine Discharge Summary      Patient ID: Srinath Gerardo , [id number]     Patient's PCP: TORIE Mock    Admit Date: 11/8/2023     Discharge Date: 11/10/2023      Admitting Physician: Constance Padilla MD    Discharge Physician: Constance Padilla MD     Discharge Diagnoses: Active Hospital Problems    Diagnosis Date Noted    Hx of drug overdose [Z91.89] 11/10/2023    Accidental drug overdose [T50.901A] 11/09/2023    Bladder diverticulum [N32.3] 11/09/2023    Infrarenal abdominal aortic aneurysm (AAA) without rupture ([address]) [I71.43] 11/09/2023    History of nephrolithiasis [Z87.442] 11/09/2023    History of rectal bleeding [Z87.19] 11/09/2023    Electrolyte abnormality [E87.8] 11/09/2023    OD (overdose of drug), accidental or unintentional, initial encounter Lucian Chavarria 11/09/2023    ELIZABET (acute kidney injury) ([address]) [N17.9] 09/21/2020    Lactic acidosis [E87.20] 09/21/2020    Smoker [F17.200]     Status post cholecystectomy [Z90.49]     History of splenectomy [Z90.81]     Chronic obstructive pulmonary disease ([address]) [J44.9]     Sepsis ([address]) [A41.9] 09/21/2015         The patient was seen and examined on the day of discharge and this discharge summary is in conjunction with any daily progress note from day of discharge.     HOSPITAL COURSE    Patient demographics:  The patient  Srinath Gerardo is a 70 y.o. male      Significant past medical history:       Patient Active Problem List   Diagnosis    Respiratory distress    Opiate overdose ([address])    Sepsis ([address])    Status post bronchoscopy    Mediastinal lymphadenopathy    Hilar lymphadenopathy    Status post cholecystectomy    History of splenectomy    Chronic obstructive pulmonary disease (HCC)    Smoker    Lactic acidosis    ELIZABET (acute kidney injury) ([address])    Severe malnutrition (HCC)    Accidental drug overdose    Bladder diverticulum    Infrarenal abdominal aortic aneurysm (AAA) without rupture ([address])    History of nephrolithiasis

## 2023-11-21 ENCOUNTER — OFFICE VISIT (OUTPATIENT)
Dept: VASCULAR SURGERY | Age: 68
End: 2023-11-21
Payer: MEDICARE

## 2023-11-21 VITALS — HEIGHT: 68 IN | BODY MASS INDEX: 22.58 KG/M2 | HEART RATE: 70 BPM | WEIGHT: 149 LBS

## 2023-11-21 DIAGNOSIS — I71.43 INFRARENAL ABDOMINAL AORTIC ANEURYSM (AAA) WITHOUT RUPTURE (HCC): Primary | ICD-10-CM

## 2023-11-21 DIAGNOSIS — I97.611 POSTPROCEDURAL HEMORRHAGE OF A CIRCULATORY SYSTEM ORGAN OR STRUCTURE FOLLOWING CARDIAC BYPASS: ICD-10-CM

## 2023-11-21 DIAGNOSIS — R06.02 SHORTNESS OF BREATH: ICD-10-CM

## 2023-11-21 DIAGNOSIS — I72.4 POPLITEAL ARTERY ANEURYSM, BILATERAL (HCC): ICD-10-CM

## 2023-11-21 PROCEDURE — 1111F DSCHRG MED/CURRENT MED MERGE: CPT | Performed by: SURGERY

## 2023-11-21 PROCEDURE — 4004F PT TOBACCO SCREEN RCVD TLK: CPT | Performed by: SURGERY

## 2023-11-21 PROCEDURE — 1123F ACP DISCUSS/DSCN MKR DOCD: CPT | Performed by: SURGERY

## 2023-11-21 PROCEDURE — G8427 DOCREV CUR MEDS BY ELIG CLIN: HCPCS | Performed by: SURGERY

## 2023-11-21 PROCEDURE — G8484 FLU IMMUNIZE NO ADMIN: HCPCS | Performed by: SURGERY

## 2023-11-21 PROCEDURE — 99213 OFFICE O/P EST LOW 20 MIN: CPT | Performed by: SURGERY

## 2023-11-21 PROCEDURE — 3017F COLORECTAL CA SCREEN DOC REV: CPT | Performed by: SURGERY

## 2023-11-21 PROCEDURE — G8420 CALC BMI NORM PARAMETERS: HCPCS | Performed by: SURGERY

## 2023-11-21 NOTE — PROGRESS NOTES
caliber. The descending  thoracic aorta is also normal in caliber. Scattered calcified atheromatous  plaque is present. Calcified coronary atheromatous plaque. No dissection or  evidence for hematoma. The arch vessels are patent. Infrarenal aortic aneurysm measures 5.7 x 3.4 cm. No dissection. The  visceral branches are patent. Distal right common iliac artery aneurysm measures 2.5 cm in the left common  iliac artery measures up to 1.8 cm. Scattered calcified atheromatous plaque  is present. The visualized femoral arterial system is appropriately  opacified. The pulmonary arterial system is not adequately opacified to exclude embolism. CHEST:     No pericardial effusion. No lymphadenopathy. The esophagus is mildly  patulous with nearby streak artifact again noted. Moderately severe emphysema. No acute airspace disease identified. No  pneumothorax or effusion. No acute osseous abnormality identified. Extensive thoracolumbar  stabilization hardware in place. ABDOMEN AND PELVIS:     The liver, pancreas, spleen, adrenals and kidneys reveal no acute findings. Cholecystectomy. There is no bowel dilatation or wall thickening identified. Diverticulosis. No free air. No ascites. No lymphadenopathy. Distended bladder with 3.2 cm left lateral diverticulum. No discrete osseous abnormality identified. Extensive lumbosacral  stabilization hardware in place. IMPRESSION:  1.  5.7 cm infrarenal aortic aneurysm, previously 4.7 cm in 2020. Bilateral  iliac artery aneurysms. No dissection. 2.  Ectasia of the aortic root, unchanged from 2021 imaging. 3.  Moderately severe emphysema. No acute airspace disease identified. 4. No acute abnormality identified in the abdomen or pelvis. 5.  Additional chronic and benign findings, as described.      Assessment:     Asymptomatic aortoiliac aneurysm (AAA 5.6 cm, RCIA 2.7 cm)  Prominent L popliteal

## 2023-11-27 ENCOUNTER — TELEPHONE (OUTPATIENT)
Dept: VASCULAR SURGERY | Age: 68
End: 2023-11-27

## 2023-11-27 NOTE — TELEPHONE ENCOUNTER
Please call patient regarding scheduling of the preop testing Dr. Janiya Patrick wanted prior to scheduling his aneurysm surgery. Please call patient at [phone].

## 2023-11-28 DIAGNOSIS — I97.611 POSTPROCEDURAL HEMORRHAGE OF A CIRCULATORY SYSTEM ORGAN OR STRUCTURE FOLLOWING CARDIAC BYPASS: Primary | ICD-10-CM

## 2023-11-28 NOTE — TELEPHONE ENCOUNTER
Patient is scheduled for stress test on 12/5 and vascular scan on 11/29 at [address] [facility].

## 2023-11-29 ENCOUNTER — HOSPITAL ENCOUNTER (OUTPATIENT)
Dept: VASCULAR LAB | Age: 68
Discharge: HOME | End: 2023-11-29
Attending: SURGERY
Payer: MEDICARE

## 2023-11-29 DIAGNOSIS — I97.611 POSTPROCEDURAL HEMORRHAGE OF A CIRCULATORY SYSTEM ORGAN OR STRUCTURE FOLLOWING CARDIAC BYPASS: ICD-10-CM

## 2023-11-29 PROCEDURE — 93925 LOWER EXTREMITY STUDY: CPT

## 2023-12-03 NOTE — PROGRESS NOTES
placed during the hospital encounter of 12/23/21    CT CHEST PULMONARY EMBOLISM W CONTRAST    Narrative  EXAMINATION:  CTA OF THE CHEST 12/23/2021 5:35 pm    TECHNIQUE:  CTA of the chest was performed after the administration of intravenous  contrast.  Multiplanar reformatted images are provided for review. MIP  images are provided for review. Dose modulation, iterative reconstruction,  and/or weight based adjustment of the mA/kV was utilized to reduce the  radiation dose to as low as reasonably achievable. COMPARISON:  05/14/2020    HISTORY:  ORDERING SYSTEM PROVIDED HISTORY: Evaluate for PE. Rajani Escobar has not  been fully excluded  TECHNOLOGIST PROVIDED HISTORY:  Reason for exam:->Evaluate for PE. Low O2.  COVID has not been fully excluded  Decision Support Exception - unselect if not a suspected or confirmed  emergency medical condition->Emergency Medical Condition (MA)  Reason for Exam: low O2-possible covid, cough and SOB x 6 days    FINDINGS:  Pulmonary Arteries: Pulmonary arteries are adequately opacified for  evaluation. No evidence of intraluminal filling defect to suggest pulmonary  embolism. Main pulmonary artery is normal in caliber. Mediastinum: There are small lymph nodes along the pretracheal region and AP  window measuring up to 1 cm which are unchanged. The heart and pericardium  demonstrate no acute abnormality. There is mild calcified plaque throughout  the aorta which is mildly dilated throughout with no aneurysm or dissection  and is unchanged. Lungs/pleura: The lungs are hyperinflated and emphysematous with mild  biapical pleural thickening and linear scarring extending into the upper  lobes inferiorly. There are some small irregular pulmonary nodules along the  right upper lobe laterally with the largest measuring 9 mm and are more  prominent. There is mild linear scarring and parenchymal fibrosis along the  lung bases posterolaterally which is prominent.   There is mild

## 2023-12-04 ENCOUNTER — OFFICE VISIT (OUTPATIENT)
Dept: PULMONOLOGY | Age: 68
End: 2023-12-04

## 2023-12-04 VITALS
RESPIRATION RATE: 18 BRPM | TEMPERATURE: 97.4 F | HEART RATE: 70 BPM | DIASTOLIC BLOOD PRESSURE: 70 MMHG | OXYGEN SATURATION: 98 % | BODY MASS INDEX: 23.31 KG/M2 | WEIGHT: 153.8 LBS | HEIGHT: 68 IN | SYSTOLIC BLOOD PRESSURE: 120 MMHG

## 2023-12-04 DIAGNOSIS — I71.43 INFRARENAL ABDOMINAL AORTIC ANEURYSM (AAA) WITHOUT RUPTURE (HCC): ICD-10-CM

## 2023-12-04 DIAGNOSIS — J43.2 CENTRILOBULAR EMPHYSEMA (HCC): ICD-10-CM

## 2023-12-04 DIAGNOSIS — F17.200 SMOKER: ICD-10-CM

## 2023-12-04 DIAGNOSIS — R91.1 PULMONARY NODULE SEEN ON IMAGING STUDY: ICD-10-CM

## 2023-12-04 DIAGNOSIS — J43.2 CENTRILOBULAR EMPHYSEMA (HCC): Primary | ICD-10-CM

## 2023-12-04 PROCEDURE — 1123F ACP DISCUSS/DSCN MKR DOCD: CPT | Performed by: INTERNAL MEDICINE

## 2023-12-04 PROCEDURE — 99204 OFFICE O/P NEW MOD 45 MIN: CPT | Performed by: INTERNAL MEDICINE

## 2023-12-04 RX ORDER — FLUTICASONE FUROATE, UMECLIDINIUM BROMIDE AND VILANTEROL TRIFENATATE 100; 62.5; 25 UG/1; UG/1; UG/1
1 POWDER RESPIRATORY (INHALATION) DAILY
Qty: 1 EACH | Refills: 0 | Status: SHIPPED | COMMUNITY
Start: 2023-12-04

## 2023-12-04 RX ORDER — FLUTICASONE FUROATE, UMECLIDINIUM BROMIDE AND VILANTEROL TRIFENATATE 200; 62.5; 25 UG/1; UG/1; UG/1
1 POWDER RESPIRATORY (INHALATION) DAILY
Qty: 1 EACH | Refills: 0 | COMMUNITY
Start: 2023-12-04 | End: 2023-12-04

## 2023-12-04 RX ORDER — FLUTICASONE FUROATE, UMECLIDINIUM BROMIDE AND VILANTEROL TRIFENATATE 200; 62.5; 25 UG/1; UG/1; UG/1
1 POWDER RESPIRATORY (INHALATION) DAILY
Qty: 1 EACH | Refills: 11 | Status: SHIPPED | OUTPATIENT
Start: 2023-12-04

## 2023-12-04 NOTE — ASSESSMENT & PLAN NOTE
CT chest reviewed with the patient and his wife. Extensive emphysema. Check alpha-1 antitrypsin. Step up Wixela to Trelegy 200. A prescription sent. Advised stopping marijuana and cigarettes. Patient is planning on having a AAA repair. With stepping up to Trelegy, patient is mostly optimized from a pulmonary standpoint. Pulmonary rehab would be ideal but patient has a 1 week to surgery.     Therefore, from a pulmonary standpoint, the patient is cleared for surgery

## 2023-12-04 NOTE — ASSESSMENT & PLAN NOTE
Pulmonary nodule seen in the left lower lobe. It is see picture above. In the right cervical demonstrates a pulmonary nodule over time with the right most image being 2020, 2021 in the middle and 2023 on the left. As 1 can see, is now enlarged 11 mm in size with a spiculations. Given his family history of lung cancer, smoking and emphysema, this is highly likely to be lung cancer. Given his rate of progression, he is cleared for surgery. A PET scan ordered. It appears that navigational bronchoscopy can be completed on this however high-resolution CT chest would be ideal to specifically clarify this. Patient will be difficult surgical candidate given its position. Interventional radiology with needle biopsy would be very high risk of pneumothorax given its position to emphysema.

## 2023-12-07 ENCOUNTER — HOSPITAL ENCOUNTER (OUTPATIENT)
Dept: PULMONOLOGY | Age: 68
Discharge: HOME | End: 2023-12-07
Attending: INTERNAL MEDICINE
Payer: MEDICARE

## 2023-12-07 DIAGNOSIS — J43.2 CENTRILOBULAR EMPHYSEMA (HCC): ICD-10-CM

## 2023-12-07 LAB
DLCO %PRED: NORMAL
DLCO PRED: NORMAL
DLCO/VA %PRED: NORMAL
DLCO/VA PRED: NORMAL
DLCO/VA: NORMAL
DLCO: 49 ML/MIN/MMHG
EXPIRATORY TIME-POST: NORMAL
EXPIRATORY TIME: NORMAL
FEF 25-75% %CHNG: NORMAL
FEF 25-75% %PRED-POST: NORMAL
FEF 25-75% %PRED-PRE: NORMAL
FEF 25-75% PRED: NORMAL
FEF 25-75%-POST: NORMAL
FEF 25-75%-PRE: NORMAL
FEV1 %PRED-POST: 62 %
FEV1 %PRED-PRE: 57 %
FEV1 PRED: NORMAL
FEV1-POST: NORMAL
FEV1-PRE: NORMAL
FEV1/FVC %PRED-POST: NORMAL
FEV1/FVC %PRED-PRE: NORMAL
FEV1/FVC PRED: NORMAL
FEV1/FVC-POST: NORMAL
FEV1/FVC-PRE: NORMAL
FVC %PRED-POST: 116 L
FVC %PRED-PRE: 120 %
FVC PRED: NORMAL
FVC-POST: NORMAL
FVC-PRE: NORMAL
GAW %PRED: NORMAL
GAW PRED: NORMAL
GAW: NORMAL
IC %PRED: NORMAL
IC PRED: NORMAL
IC: NORMAL
MEP: NORMAL
MIP: NORMAL
MVV %PRED-PRE: NORMAL
MVV PRED: NORMAL
MVV-PRE: NORMAL
PEF %PRED-POST: NORMAL
PEF %PRED-PRE: NORMAL
PEF PRED: NORMAL
PEF%CHNG: NORMAL
PEF-POST: NORMAL
PEF-PRE: NORMAL
RAW %PRED: NORMAL
RAW PRED: NORMAL
RAW: NORMAL
RV %PRED: NORMAL
RV PRED: NORMAL
RV: NORMAL
SVC %PRED: NORMAL
SVC PRED: NORMAL
SVC: NORMAL
TLC %PRED: NORMAL
TLC PRED: NORMAL
TLC: 109 L
VA %PRED: NORMAL
VA PRED: NORMAL
VA: NORMAL
VTG %PRED: NORMAL
VTG PRED: NORMAL
VTG: NORMAL

## 2023-12-07 PROCEDURE — 94729 DIFFUSING CAPACITY: CPT

## 2023-12-07 PROCEDURE — 94726 PLETHYSMOGRAPHY LUNG VOLUMES: CPT

## 2023-12-07 PROCEDURE — 94640 AIRWAY INHALATION TREATMENT: CPT

## 2023-12-07 PROCEDURE — 94060 EVALUATION OF WHEEZING: CPT

## 2023-12-07 PROCEDURE — 6370000000 HC RX 637 (ALT 250 FOR IP): Performed by: INTERNAL MEDICINE

## 2023-12-07 RX ORDER — ALBUTEROL SULFATE 90 UG/1
2 AEROSOL, METERED RESPIRATORY (INHALATION) ONCE
Status: COMPLETED | OUTPATIENT
Start: 2023-12-07 | End: 2023-12-07

## 2023-12-07 RX ADMIN — ALBUTEROL SULFATE 4 PUFF: 90 AEROSOL, METERED RESPIRATORY (INHALATION) at 11:32

## 2023-12-07 ASSESSMENT — PULMONARY FUNCTION TESTS
FVC_PERCENT_PREDICTED_POST: 116
FVC_PERCENT_PREDICTED_PRE: 120
FEV1_PERCENT_PREDICTED_PRE: 57
FEV1_PERCENT_PREDICTED_POST: 62

## 2023-12-08 LAB
A1AT PHENOTYP SERPL-IMP: NORMAL
A1AT SERPL-MCNC: 164 MG/DL (ref 90–200)

## 2023-12-12 ENCOUNTER — HOSPITAL ENCOUNTER (OUTPATIENT)
Dept: PET IMAGING | Age: 68
Discharge: HOME | End: 2023-12-12
Attending: INTERNAL MEDICINE
Payer: MEDICARE

## 2023-12-12 DIAGNOSIS — R91.1 PULMONARY NODULE SEEN ON IMAGING STUDY: ICD-10-CM

## 2023-12-12 PROCEDURE — 78815 PET IMAGE W/CT SKULL-THIGH: CPT

## 2023-12-12 PROCEDURE — 3430000000 HC RX DIAGNOSTIC RADIOPHARMACEUTICAL: Performed by: INTERNAL MEDICINE

## 2023-12-12 PROCEDURE — A9552 F18 FDG: HCPCS | Performed by: INTERNAL MEDICINE

## 2023-12-12 RX ORDER — FLUDEOXYGLUCOSE F 18 200 MCI/ML
14 INJECTION, SOLUTION INTRAVENOUS
Status: COMPLETED | OUTPATIENT
Start: 2023-12-12 | End: 2023-12-12

## 2023-12-12 RX ADMIN — FLUDEOXYGLUCOSE F 18 14 MILLICURIE: 200 INJECTION, SOLUTION INTRAVENOUS at 10:35

## 2023-12-13 ENCOUNTER — TELEPHONE (OUTPATIENT)
Dept: VASCULAR SURGERY | Age: 68
End: 2023-12-13

## 2023-12-13 NOTE — TELEPHONE ENCOUNTER
Patient's wife called regarding the scheduling of his stress test.  It is not until 12/20/23. He had vascular test done. They were hoping to have the surgery before [place]. Please call wife at [phone].

## 2023-12-15 ENCOUNTER — TELEPHONE (OUTPATIENT)
Dept: PULMONOLOGY | Age: 68
End: 2023-12-15

## 2023-12-15 ENCOUNTER — TELEPHONE (OUTPATIENT)
Dept: CARDIOTHORACIC SURGERY | Age: 68
End: 2023-12-15

## 2023-12-15 DIAGNOSIS — J43.2 CENTRILOBULAR EMPHYSEMA (HCC): Primary | ICD-10-CM

## 2023-12-15 DIAGNOSIS — R91.1 PULMONARY NODULE SEEN ON IMAGING STUDY: ICD-10-CM

## 2023-12-15 RX ORDER — PREDNISONE 10 MG/1
TABLET ORAL
Qty: 30 TABLET | Refills: 0 | Status: SHIPPED | OUTPATIENT
Start: 2023-12-15 | End: 2023-12-25

## 2023-12-15 NOTE — TELEPHONE ENCOUNTER
Left message for patient. Surgery will not be before the holidays. We are looking at 1/2 at [facility] at 8:00am. I will get in touch with patient mid week next week. He is to have some testing  (popliteal scans to R/O popliteal aneurysms) prior to surgery.

## 2023-12-15 NOTE — RESULT ENCOUNTER NOTE
Appears to be stage 1 based on PET. However, DLCO 49%. IR bx difficult. Kofi bronch difficult. I am having him complete Navigational CT chest ot see fi possible. I am not clear it is as of now.

## 2023-12-15 NOTE — TELEPHONE ENCOUNTER
PT notified an said he's waiting for central scheduling to call him back to schedule his CT of the chest.

## 2023-12-15 NOTE — TELEPHONE ENCOUNTER
Complains of cough and SOB  Duration couple months  Cough with sputum production? Every once in a while  Color  black stuff  Fever? no  Any other Symptoms? no  Any current treatment tried? no  Using inhalers? yes do they help? yes  Pharmacy? [facility] in [facility] thinks he needs some prednisone. Also he said he was given a number by Dr Huan Corona to schedule a PET at [facility] and the number is not a good number. Advised him that usually the MA schedules that and I would send a message to see if we are working on that. He said he's available any time we'd like to schedule it, just let him know.

## 2023-12-20 ENCOUNTER — TELEPHONE (OUTPATIENT)
Dept: CARDIOLOGY CLINIC | Age: 68
End: 2023-12-20

## 2023-12-20 NOTE — TELEPHONE ENCOUNTER
Called patient and relayed information of stress test results to patient. Dr Stanford will address next plan of action  verbalized understanding

## 2023-12-22 ENCOUNTER — TELEPHONE (OUTPATIENT)
Dept: PULMONOLOGY | Age: 68
End: 2023-12-22

## 2023-12-22 NOTE — TELEPHONE ENCOUNTER
Pt called stated he is experiencing pain on left lung side last a few hours then goes away but happening very often wants to know if anything can be done to comfort him.

## 2023-12-26 ENCOUNTER — TELEPHONE (OUTPATIENT)
Dept: VASCULAR SURGERY | Age: 68
End: 2023-12-26

## 2023-12-26 ENCOUNTER — PATIENT MESSAGE (OUTPATIENT)
Dept: PULMONOLOGY | Age: 68
End: 2023-12-26

## 2023-12-26 NOTE — TELEPHONE ENCOUNTER
Pt's wife called to speak with Markie Marin. They are inquiring about information for the pt's surgery coming up 1/2/24 with Dr. Benita Lagos. Please call pt's wife back at your earliest convenience. Best callback number is [phone]. Please advise.

## 2023-12-27 ENCOUNTER — PREP FOR PROCEDURE (OUTPATIENT)
Dept: VASCULAR SURGERY | Age: 68
End: 2023-12-27

## 2023-12-27 DIAGNOSIS — Z01.818 PREOP TESTING: Primary | ICD-10-CM

## 2023-12-27 DIAGNOSIS — I71.43 INFRARENAL ABDOMINAL AORTIC ANEURYSM (AAA) WITHOUT RUPTURE (HCC): ICD-10-CM

## 2023-12-27 NOTE — TELEPHONE ENCOUNTER
Spoke with patients spouse. Patient is cleared for surgery from the pulmonologist, has had scans and stress test. He is scheduled for another CT lillie. Surgery is scheduled for 1/2/24 with admission for hydration on 1/1/24 at 5:00pm.     Patient is aware of this and will call back with any other questions.

## 2023-12-27 NOTE — TELEPHONE ENCOUNTER
Pt called regarding information about his surgery. Pt has requested a callback to his number: [phone].

## 2023-12-28 ENCOUNTER — HOSPITAL ENCOUNTER (OUTPATIENT)
Dept: CT IMAGING | Age: 68
Discharge: HOME | End: 2023-12-28
Attending: INTERNAL MEDICINE
Payer: MEDICARE

## 2023-12-28 DIAGNOSIS — R91.1 PULMONARY NODULE SEEN ON IMAGING STUDY: ICD-10-CM

## 2023-12-28 PROCEDURE — 71250 CT THORAX DX C-: CPT

## 2023-12-29 ENCOUNTER — ANESTHESIA EVENT (OUTPATIENT)
Dept: OPERATING ROOM | Age: 68
End: 2023-12-29
Payer: OTHER GOVERNMENT

## 2023-12-31 RX ORDER — SODIUM CHLORIDE 0.9 % (FLUSH) 0.9 %
5-40 SYRINGE (ML) INJECTION EVERY 12 HOURS SCHEDULED
Status: CANCELLED | OUTPATIENT
Start: 2023-12-31

## 2023-12-31 RX ORDER — SODIUM CHLORIDE 9 MG/ML
INJECTION, SOLUTION INTRAVENOUS PRN
Status: CANCELLED | OUTPATIENT
Start: 2023-12-31

## 2023-12-31 RX ORDER — SODIUM CHLORIDE 0.9 % (FLUSH) 0.9 %
5-40 SYRINGE (ML) INJECTION PRN
Status: CANCELLED | OUTPATIENT
Start: 2023-12-31

## 2024-01-01 ENCOUNTER — HOSPITAL ENCOUNTER (INPATIENT)
Age: 69
LOS: 7 days | Discharge: HOME | DRG: 268 | End: 2024-01-08
Attending: SURGERY | Admitting: SURGERY
Payer: MEDICARE

## 2024-01-01 ENCOUNTER — APPOINTMENT (OUTPATIENT)
Dept: GENERAL RADIOLOGY | Age: 69
DRG: 268 | End: 2024-01-01
Attending: SURGERY
Payer: MEDICARE

## 2024-01-01 DIAGNOSIS — Z98.890 S/P AAA REPAIR: Primary | ICD-10-CM

## 2024-01-01 DIAGNOSIS — R91.1 PULMONARY NODULE: ICD-10-CM

## 2024-01-01 DIAGNOSIS — Z86.79 S/P AAA REPAIR: Primary | ICD-10-CM

## 2024-01-01 PROBLEM — I71.43 INFRARENAL ABDOMINAL AORTIC ANEURYSM, WITHOUT RUPTURE (HCC): Status: ACTIVE | Noted: 2024-01-01

## 2024-01-01 PROBLEM — I71.40 AAA (ABDOMINAL AORTIC ANEURYSM) WITHOUT RUPTURE (HCC): Status: ACTIVE | Noted: 2024-01-01

## 2024-01-01 LAB
ABO + RH BLD: NORMAL
ANION GAP SERPL CALCULATED.3IONS-SCNC: 11 MMOL/L (ref 3–16)
APTT BLD: 30.3 SEC (ref 22.7–35.9)
BACTERIA URNS QL MICRO: ABNORMAL /HPF
BASOPHILS # BLD: 0 K/UL (ref 0–0.2)
BASOPHILS NFR BLD: 0.1 %
BILIRUB UR QL STRIP.AUTO: NEGATIVE
BLD GP AB SCN SERPL QL: NORMAL
BUN SERPL-MCNC: 13 MG/DL (ref 7–20)
CALCIUM SERPL-MCNC: 8.8 MG/DL (ref 8.3–10.6)
CHLORIDE SERPL-SCNC: 102 MMOL/L (ref 99–110)
CLARITY UR: CLEAR
CO2 SERPL-SCNC: 26 MMOL/L (ref 21–32)
COLOR UR: YELLOW
CREAT SERPL-MCNC: 0.6 MG/DL (ref 0.8–1.3)
DEPRECATED RDW RBC AUTO: 14 % (ref 12.4–15.4)
EOSINOPHIL # BLD: 0 K/UL (ref 0–0.6)
EOSINOPHIL NFR BLD: 0 %
EPI CELLS #/AREA URNS AUTO: 0 /HPF (ref 0–5)
GFR SERPLBLD CREATININE-BSD FMLA CKD-EPI: >60 ML/MIN/{1.73_M2}
GLUCOSE SERPL-MCNC: 180 MG/DL (ref 70–99)
GLUCOSE UR STRIP.AUTO-MCNC: NEGATIVE MG/DL
HCT VFR BLD AUTO: 40.3 % (ref 40.5–52.5)
HGB BLD-MCNC: 13.9 G/DL (ref 13.5–17.5)
HGB UR QL STRIP.AUTO: NEGATIVE
HYALINE CASTS #/AREA URNS AUTO: 0 /LPF (ref 0–8)
INR PPP: 1.03 (ref 0.84–1.16)
KETONES UR STRIP.AUTO-MCNC: NEGATIVE MG/DL
LEUKOCYTE ESTERASE UR QL STRIP.AUTO: ABNORMAL
LYMPHOCYTES # BLD: 0.7 K/UL (ref 1–5.1)
LYMPHOCYTES NFR BLD: 3.1 %
MCH RBC QN AUTO: 31.7 PG (ref 26–34)
MCHC RBC AUTO-ENTMCNC: 34.6 G/DL (ref 31–36)
MCV RBC AUTO: 91.5 FL (ref 80–100)
MONOCYTES # BLD: 0.4 K/UL (ref 0–1.3)
MONOCYTES NFR BLD: 1.9 %
NEUTROPHILS # BLD: 20.7 K/UL (ref 1.7–7.7)
NEUTROPHILS NFR BLD: 94.9 %
NITRITE UR QL STRIP.AUTO: NEGATIVE
PH UR STRIP.AUTO: 8 [PH] (ref 5–8)
PLATELET # BLD AUTO: 448 K/UL (ref 135–450)
PMV BLD AUTO: 7 FL (ref 5–10.5)
POTASSIUM SERPL-SCNC: 4.4 MMOL/L (ref 3.5–5.1)
PROT UR STRIP.AUTO-MCNC: NEGATIVE MG/DL
PROTHROMBIN TIME: 13.5 SEC (ref 11.5–14.8)
RBC # BLD AUTO: 4.4 M/UL (ref 4.2–5.9)
RBC CLUMPS #/AREA URNS AUTO: 0 /HPF (ref 0–4)
SODIUM SERPL-SCNC: 139 MMOL/L (ref 136–145)
SP GR UR STRIP.AUTO: 1.01 (ref 1–1.03)
UA DIPSTICK W REFLEX MICRO PNL UR: YES
URN SPEC COLLECT METH UR: ABNORMAL
UROBILINOGEN UR STRIP-ACNC: 1 E.U./DL
WBC # BLD AUTO: 21.8 K/UL (ref 4–11)
WBC #/AREA URNS AUTO: 68 /HPF (ref 0–5)

## 2024-01-01 PROCEDURE — 86923 COMPATIBILITY TEST ELECTRIC: CPT

## 2024-01-01 PROCEDURE — 2580000003 HC RX 258: Performed by: SURGERY

## 2024-01-01 PROCEDURE — 81001 URINALYSIS AUTO W/SCOPE: CPT

## 2024-01-01 PROCEDURE — 85025 COMPLETE CBC W/AUTO DIFF WBC: CPT

## 2024-01-01 PROCEDURE — 86901 BLOOD TYPING SEROLOGIC RH(D): CPT

## 2024-01-01 PROCEDURE — 93005 ELECTROCARDIOGRAM TRACING: CPT | Performed by: SURGERY

## 2024-01-01 PROCEDURE — 85730 THROMBOPLASTIN TIME PARTIAL: CPT

## 2024-01-01 PROCEDURE — 86900 BLOOD TYPING SEROLOGIC ABO: CPT

## 2024-01-01 PROCEDURE — 71046 X-RAY EXAM CHEST 2 VIEWS: CPT

## 2024-01-01 PROCEDURE — 2100000000 HC CCU R&B

## 2024-01-01 PROCEDURE — 86850 RBC ANTIBODY SCREEN: CPT

## 2024-01-01 PROCEDURE — 80048 BASIC METABOLIC PNL TOTAL CA: CPT

## 2024-01-01 PROCEDURE — 36415 COLL VENOUS BLD VENIPUNCTURE: CPT

## 2024-01-01 PROCEDURE — 85610 PROTHROMBIN TIME: CPT

## 2024-01-01 RX ORDER — POTASSIUM CHLORIDE 20 MEQ/1
40 TABLET, EXTENDED RELEASE ORAL PRN
Status: DISCONTINUED | OUTPATIENT
Start: 2024-01-01 | End: 2024-01-02

## 2024-01-01 RX ORDER — MAGNESIUM SULFATE IN WATER 40 MG/ML
2000 INJECTION, SOLUTION INTRAVENOUS PRN
Status: DISCONTINUED | OUTPATIENT
Start: 2024-01-01 | End: 2024-01-08 | Stop reason: HOSPADM

## 2024-01-01 RX ORDER — SODIUM CHLORIDE 9 MG/ML
INJECTION, SOLUTION INTRAVENOUS PRN
Status: DISCONTINUED | OUTPATIENT
Start: 2024-01-01 | End: 2024-01-02 | Stop reason: SDUPTHER

## 2024-01-01 RX ORDER — 0.9 % SODIUM CHLORIDE 0.9 %
1000 INTRAVENOUS SOLUTION INTRAVENOUS ONCE
Status: COMPLETED | OUTPATIENT
Start: 2024-01-02 | End: 2024-01-02

## 2024-01-01 RX ORDER — POTASSIUM CHLORIDE 7.45 MG/ML
10 INJECTION INTRAVENOUS PRN
Status: DISCONTINUED | OUTPATIENT
Start: 2024-01-01 | End: 2024-01-02

## 2024-01-01 RX ORDER — SODIUM CHLORIDE 0.9 % (FLUSH) 0.9 %
5-40 SYRINGE (ML) INJECTION EVERY 12 HOURS SCHEDULED
Status: DISCONTINUED | OUTPATIENT
Start: 2024-01-01 | End: 2024-01-02 | Stop reason: SDUPTHER

## 2024-01-01 RX ORDER — SODIUM CHLORIDE 9 MG/ML
INJECTION, SOLUTION INTRAVENOUS PRN
Status: DISCONTINUED | OUTPATIENT
Start: 2024-01-01 | End: 2024-01-08 | Stop reason: HOSPADM

## 2024-01-01 RX ORDER — SODIUM CHLORIDE 9 MG/ML
INJECTION, SOLUTION INTRAVENOUS CONTINUOUS
Status: DISCONTINUED | OUTPATIENT
Start: 2024-01-01 | End: 2024-01-02 | Stop reason: SDUPTHER

## 2024-01-01 RX ORDER — 0.9 % SODIUM CHLORIDE 0.9 %
500 INTRAVENOUS SOLUTION INTRAVENOUS ONCE
Status: COMPLETED | OUTPATIENT
Start: 2024-01-02 | End: 2024-01-02

## 2024-01-01 RX ORDER — SODIUM CHLORIDE 0.9 % (FLUSH) 0.9 %
5-40 SYRINGE (ML) INJECTION PRN
Status: DISCONTINUED | OUTPATIENT
Start: 2024-01-01 | End: 2024-01-08 | Stop reason: HOSPADM

## 2024-01-01 RX ADMIN — SODIUM CHLORIDE 1000 ML: 9 INJECTION, SOLUTION INTRAVENOUS at 23:44

## 2024-01-01 RX ADMIN — SODIUM CHLORIDE: 9 INJECTION, SOLUTION INTRAVENOUS at 17:51

## 2024-01-01 NOTE — PROGRESS NOTES
Pt arrived to CVU room 1312 @ 1520 for direction admission PCU status. Wife bedside.  Pt oriented to room and CVU monitoring. Pt changed into hospital gown and connected to CVU monitoring and VS obtained. Pre-op admission sign and held orders released  12 lead EKG completed and placed in chart.  PIV placed left AC and IVF started.  Pt transported to radiology via stretcher for 2view CXR, then returned to CVU room 1312.  Urinal within reach, instructed re: need for urine sample.  Pt eating dinner at this time.

## 2024-01-02 ENCOUNTER — APPOINTMENT (OUTPATIENT)
Dept: GENERAL RADIOLOGY | Age: 69
DRG: 268 | End: 2024-01-02
Attending: SURGERY
Payer: MEDICARE

## 2024-01-02 ENCOUNTER — ANESTHESIA (OUTPATIENT)
Dept: OPERATING ROOM | Age: 69
End: 2024-01-02
Payer: OTHER GOVERNMENT

## 2024-01-02 LAB
ANION GAP SERPL CALCULATED.3IONS-SCNC: 7 MMOL/L (ref 3–16)
BASE EXCESS BLDA CALC-SCNC: -1.1 MMOL/L (ref -3–3)
BASE EXCESS BLDA CALC-SCNC: -1.8 MMOL/L (ref -3–3)
BASE EXCESS BLDA CALC-SCNC: -3 MMOL/L (ref -3–3)
BASE EXCESS BLDA CALC-SCNC: -4 MMOL/L (ref -3–3)
BASE EXCESS BLDA CALC-SCNC: -4 MMOL/L (ref -3–3)
BASE EXCESS BLDA CALC-SCNC: 0 MMOL/L (ref -3–3)
BUN SERPL-MCNC: 13 MG/DL (ref 7–20)
CA-I BLD-SCNC: 1.16 MMOL/L (ref 1.12–1.32)
CA-I BLD-SCNC: 1.18 MMOL/L (ref 1.12–1.32)
CA-I BLD-SCNC: 1.34 MMOL/L (ref 1.12–1.32)
CA-I BLD-SCNC: 1.4 MMOL/L (ref 1.12–1.32)
CALCIUM SERPL-MCNC: 7.9 MG/DL (ref 8.3–10.6)
CHLORIDE SERPL-SCNC: 114 MMOL/L (ref 99–110)
CO2 BLDA-SCNC: 25 MMOL/L
CO2 BLDA-SCNC: 25 MMOL/L
CO2 BLDA-SCNC: 26 MMOL/L
CO2 BLDA-SCNC: 26.4 MMOL/L
CO2 BLDA-SCNC: 27 MMOL/L
CO2 BLDA-SCNC: 27.4 MMOL/L
CO2 SERPL-SCNC: 27 MMOL/L (ref 21–32)
COHGB MFR BLDA: 0.6 % (ref 0–1.5)
COHGB MFR BLDA: 1.2 % (ref 0–1.5)
CREAT SERPL-MCNC: 0.6 MG/DL (ref 0.8–1.3)
DEPRECATED RDW RBC AUTO: 14.1 % (ref 12.4–15.4)
EKG ATRIAL RATE: 76 BPM
EKG DIAGNOSIS: NORMAL
EKG P AXIS: 43 DEGREES
EKG P-R INTERVAL: 168 MS
EKG Q-T INTERVAL: 420 MS
EKG QRS DURATION: 100 MS
EKG QTC CALCULATION (BAZETT): 473 MS
EKG R AXIS: 35 DEGREES
EKG T AXIS: 80 DEGREES
EKG VENTRICULAR RATE: 76 BPM
GFR SERPLBLD CREATININE-BSD FMLA CKD-EPI: >60 ML/MIN/{1.73_M2}
GLUCOSE BLD-MCNC: 115 MG/DL (ref 70–99)
GLUCOSE BLD-MCNC: 172 MG/DL (ref 70–99)
GLUCOSE BLD-MCNC: 183 MG/DL (ref 70–99)
GLUCOSE BLD-MCNC: 200 MG/DL (ref 70–99)
GLUCOSE SERPL-MCNC: 190 MG/DL (ref 70–99)
HCO3 BLDA-SCNC: 23.1 MMOL/L (ref 21–29)
HCO3 BLDA-SCNC: 23.3 MMOL/L (ref 21–29)
HCO3 BLDA-SCNC: 24.4 MMOL/L (ref 21–29)
HCO3 BLDA-SCNC: 25 MMOL/L (ref 21–29)
HCO3 BLDA-SCNC: 25.7 MMOL/L (ref 21–29)
HCO3 BLDA-SCNC: 25.7 MMOL/L (ref 21–29)
HCT VFR BLD AUTO: 33 % (ref 40.5–52.5)
HCT VFR BLD AUTO: 34 % (ref 40.5–52.5)
HCT VFR BLD AUTO: 36 % (ref 40.5–52.5)
HCT VFR BLD AUTO: 38 % (ref 40.5–52.5)
HCT VFR BLD AUTO: 40 % (ref 40.5–52.5)
HGB BLD CALC-MCNC: 11.3 GM/DL (ref 13.5–17.5)
HGB BLD CALC-MCNC: 11.4 GM/DL (ref 13.5–17.5)
HGB BLD CALC-MCNC: 12.3 GM/DL (ref 13.5–17.5)
HGB BLD CALC-MCNC: 12.8 GM/DL (ref 13.5–17.5)
HGB BLD-MCNC: 13.4 G/DL (ref 13.5–17.5)
HGB BLDA-MCNC: 14 G/DL (ref 13.5–17.5)
HGB BLDA-MCNC: 14 G/DL (ref 13.5–17.5)
LACTATE BLD-SCNC: 1.7 MMOL/L (ref 0.4–2)
LACTATE BLD-SCNC: 2.24 MMOL/L (ref 0.4–2)
LACTATE BLD-SCNC: <0.3 MMOL/L (ref 0.4–2)
LACTATE BLD-SCNC: <0.3 MMOL/L (ref 0.4–2)
MCH RBC QN AUTO: 31.2 PG (ref 26–34)
MCHC RBC AUTO-ENTMCNC: 33.6 G/DL (ref 31–36)
MCV RBC AUTO: 93 FL (ref 80–100)
METHGB MFR BLDA: 0.4 %
METHGB MFR BLDA: 0.6 %
O2 THERAPY: ABNORMAL
O2 THERAPY: ABNORMAL
PCO2 BLDA: 45.8 MMHG (ref 35–45)
PCO2 BLDA: 47.8 MM HG (ref 35–45)
PCO2 BLDA: 49.5 MM HG (ref 35–45)
PCO2 BLDA: 52.7 MM HG (ref 35–45)
PCO2 BLDA: 54.2 MMHG (ref 35–45)
PCO2 BLDA: 62.8 MM HG (ref 35–45)
PERFORMED ON: ABNORMAL
PH BLDA: 7.2 [PH] (ref 7.35–7.45)
PH BLDA: 7.25 [PH] (ref 7.35–7.45)
PH BLDA: 7.29 [PH] (ref 7.35–7.45)
PH BLDA: 7.29 [PH] (ref 7.35–7.45)
PH BLDA: 7.32 [PH] (ref 7.35–7.45)
PH BLDA: 7.34 [PH] (ref 7.35–7.45)
PLATELET # BLD AUTO: 263 K/UL (ref 135–450)
PMV BLD AUTO: 7.2 FL (ref 5–10.5)
PO2 BLDA: 161.1 MM HG (ref 75–108)
PO2 BLDA: 224 MMHG (ref 75–108)
PO2 BLDA: 375 MM HG (ref 75–108)
PO2 BLDA: 395.5 MM HG (ref 75–108)
PO2 BLDA: 449.3 MM HG (ref 75–108)
PO2 BLDA: 99.4 MMHG (ref 75–108)
POC SAMPLE TYPE: ABNORMAL
POTASSIUM BLD-SCNC: 3.4 MMOL/L (ref 3.5–5.1)
POTASSIUM BLD-SCNC: 3.4 MMOL/L (ref 3.5–5.1)
POTASSIUM BLD-SCNC: 3.7 MMOL/L (ref 3.5–5.1)
POTASSIUM BLD-SCNC: 3.8 MMOL/L (ref 3.5–5.1)
POTASSIUM SERPL-SCNC: 4.2 MMOL/L (ref 3.5–5.1)
RBC # BLD AUTO: 4.3 M/UL (ref 4.2–5.9)
SAO2 % BLDA: 100 % (ref 93–100)
SAO2 % BLDA: 96.5 %
SAO2 % BLDA: 99 % (ref 93–100)
SAO2 % BLDA: 99.5 %
SODIUM BLD-SCNC: 146 MMOL/L (ref 136–145)
SODIUM BLD-SCNC: 147 MMOL/L (ref 136–145)
SODIUM BLD-SCNC: 148 MMOL/L (ref 136–145)
SODIUM BLD-SCNC: 149 MMOL/L (ref 136–145)
SODIUM SERPL-SCNC: 148 MMOL/L (ref 136–145)
WBC # BLD AUTO: 36 K/UL (ref 4–11)

## 2024-01-02 PROCEDURE — 85014 HEMATOCRIT: CPT

## 2024-01-02 PROCEDURE — 6360000002 HC RX W HCPCS

## 2024-01-02 PROCEDURE — 83605 ASSAY OF LACTIC ACID: CPT

## 2024-01-02 PROCEDURE — P9045 ALBUMIN (HUMAN), 5%, 250 ML: HCPCS

## 2024-01-02 PROCEDURE — 35103 REPAIR ARTERY RUPTURE AORTA: CPT | Performed by: SURGERY

## 2024-01-02 PROCEDURE — 04SB0ZZ REPOSITION INFERIOR MESENTERIC ARTERY, OPEN APPROACH: ICD-10-PCS | Performed by: FAMILY MEDICINE

## 2024-01-02 PROCEDURE — 82947 ASSAY GLUCOSE BLOOD QUANT: CPT

## 2024-01-02 PROCEDURE — 2700000000 HC OXYGEN THERAPY PER DAY

## 2024-01-02 PROCEDURE — 2580000003 HC RX 258: Performed by: SURGERY

## 2024-01-02 PROCEDURE — 04R00JZ REPLACEMENT OF ABDOMINAL AORTA WITH SYNTHETIC SUBSTITUTE, OPEN APPROACH: ICD-10-PCS | Performed by: SURGERY

## 2024-01-02 PROCEDURE — 94640 AIRWAY INHALATION TREATMENT: CPT

## 2024-01-02 PROCEDURE — 3700000001 HC ADD 15 MINUTES (ANESTHESIA): Performed by: SURGERY

## 2024-01-02 PROCEDURE — 2100000000 HC CCU R&B

## 2024-01-02 PROCEDURE — 93010 ELECTROCARDIOGRAM REPORT: CPT | Performed by: INTERNAL MEDICINE

## 2024-01-02 PROCEDURE — 3600000016 HC SURGERY LEVEL 6 ADDTL 15MIN: Performed by: SURGERY

## 2024-01-02 PROCEDURE — 3600000006 HC SURGERY LEVEL 6 BASE: Performed by: SURGERY

## 2024-01-02 PROCEDURE — 2580000003 HC RX 258

## 2024-01-02 PROCEDURE — 80048 BASIC METABOLIC PNL TOTAL CA: CPT

## 2024-01-02 PROCEDURE — 04V00DZ RESTRICTION OF ABDOMINAL AORTA WITH INTRALUMINAL DEVICE, OPEN APPROACH: ICD-10-PCS | Performed by: FAMILY MEDICINE

## 2024-01-02 PROCEDURE — 2500000003 HC RX 250 WO HCPCS: Performed by: SURGERY

## 2024-01-02 PROCEDURE — 04RD0JZ REPLACEMENT OF LEFT COMMON ILIAC ARTERY WITH SYNTHETIC SUBSTITUTE, OPEN APPROACH: ICD-10-PCS | Performed by: SURGERY

## 2024-01-02 PROCEDURE — 85027 COMPLETE CBC AUTOMATED: CPT

## 2024-01-02 PROCEDURE — 04SB0ZZ REPOSITION INFERIOR MESENTERIC ARTERY, OPEN APPROACH: ICD-10-PCS | Performed by: SURGERY

## 2024-01-02 PROCEDURE — 2500000003 HC RX 250 WO HCPCS

## 2024-01-02 PROCEDURE — 6360000002 HC RX W HCPCS: Performed by: SURGERY

## 2024-01-02 PROCEDURE — 3700000000 HC ANESTHESIA ATTENDED CARE: Performed by: SURGERY

## 2024-01-02 PROCEDURE — 94002 VENT MGMT INPAT INIT DAY: CPT

## 2024-01-02 PROCEDURE — B24BZZ4 ULTRASONOGRAPHY OF HEART WITH AORTA, TRANSESOPHAGEAL: ICD-10-PCS | Performed by: ANESTHESIOLOGY

## 2024-01-02 PROCEDURE — 82330 ASSAY OF CALCIUM: CPT

## 2024-01-02 PROCEDURE — 0DNU0ZZ RELEASE OMENTUM, OPEN APPROACH: ICD-10-PCS | Performed by: SURGERY

## 2024-01-02 PROCEDURE — L8670 VASCULAR GRAFT, SYNTHETIC: HCPCS | Performed by: SURGERY

## 2024-01-02 PROCEDURE — 04RC0JZ REPLACEMENT OF RIGHT COMMON ILIAC ARTERY WITH SYNTHETIC SUBSTITUTE, OPEN APPROACH: ICD-10-PCS | Performed by: FAMILY MEDICINE

## 2024-01-02 PROCEDURE — 84295 ASSAY OF SERUM SODIUM: CPT

## 2024-01-02 PROCEDURE — 04RC0JZ REPLACEMENT OF RIGHT COMMON ILIAC ARTERY WITH SYNTHETIC SUBSTITUTE, OPEN APPROACH: ICD-10-PCS | Performed by: SURGERY

## 2024-01-02 PROCEDURE — 6370000000 HC RX 637 (ALT 250 FOR IP): Performed by: SURGERY

## 2024-01-02 PROCEDURE — 35697 REIMPLANT ARTERY EACH: CPT | Performed by: SURGERY

## 2024-01-02 PROCEDURE — A4217 STERILE WATER/SALINE, 500 ML: HCPCS | Performed by: SURGERY

## 2024-01-02 PROCEDURE — 84132 ASSAY OF SERUM POTASSIUM: CPT

## 2024-01-02 PROCEDURE — 82803 BLOOD GASES ANY COMBINATION: CPT

## 2024-01-02 PROCEDURE — 71045 X-RAY EXAM CHEST 1 VIEW: CPT

## 2024-01-02 PROCEDURE — 2709999900 HC NON-CHARGEABLE SUPPLY: Performed by: SURGERY

## 2024-01-02 PROCEDURE — 94761 N-INVAS EAR/PLS OXIMETRY MLT: CPT

## 2024-01-02 DEVICE — ALBOGRAFT POLYESTER VASCULAR GRAFT 16CMX8MM
Type: IMPLANTABLE DEVICE | Site: AORTA | Status: FUNCTIONAL
Brand: ALBOGRAFT POLYESTER VASCULAR GRAFT

## 2024-01-02 RX ORDER — SODIUM CHLORIDE 0.9 % (FLUSH) 0.9 %
5-40 SYRINGE (ML) INJECTION EVERY 12 HOURS SCHEDULED
Status: DISCONTINUED | OUTPATIENT
Start: 2024-01-02 | End: 2024-01-02

## 2024-01-02 RX ORDER — VERAPAMIL HYDROCHLORIDE 80 MG/1
80 TABLET ORAL 4 TIMES DAILY
Status: DISCONTINUED | OUTPATIENT
Start: 2024-01-02 | End: 2024-01-06

## 2024-01-02 RX ORDER — DULOXETIN HYDROCHLORIDE 60 MG/1
60 CAPSULE, DELAYED RELEASE ORAL DAILY
Status: DISCONTINUED | OUTPATIENT
Start: 2024-01-03 | End: 2024-01-06

## 2024-01-02 RX ORDER — METHOCARBAMOL 100 MG/ML
INJECTION, SOLUTION INTRAMUSCULAR; INTRAVENOUS PRN
Status: DISCONTINUED | OUTPATIENT
Start: 2024-01-02 | End: 2024-01-02 | Stop reason: SDUPTHER

## 2024-01-02 RX ORDER — SODIUM CHLORIDE 0.9 % (FLUSH) 0.9 %
5-40 SYRINGE (ML) INJECTION EVERY 12 HOURS SCHEDULED
Status: DISCONTINUED | OUTPATIENT
Start: 2024-01-02 | End: 2024-01-02 | Stop reason: HOSPADM

## 2024-01-02 RX ORDER — ONDANSETRON 2 MG/ML
4 INJECTION INTRAMUSCULAR; INTRAVENOUS
Status: DISCONTINUED | OUTPATIENT
Start: 2024-01-02 | End: 2024-01-02 | Stop reason: HOSPADM

## 2024-01-02 RX ORDER — SODIUM CHLORIDE 9 MG/ML
INJECTION, SOLUTION INTRAVENOUS PRN
Status: DISCONTINUED | OUTPATIENT
Start: 2024-01-02 | End: 2024-01-02

## 2024-01-02 RX ORDER — DEXAMETHASONE SODIUM PHOSPHATE 4 MG/ML
INJECTION, SOLUTION INTRA-ARTICULAR; INTRALESIONAL; INTRAMUSCULAR; INTRAVENOUS; SOFT TISSUE PRN
Status: DISCONTINUED | OUTPATIENT
Start: 2024-01-02 | End: 2024-01-02 | Stop reason: SDUPTHER

## 2024-01-02 RX ORDER — ALBUTEROL SULFATE 90 UG/1
2 AEROSOL, METERED RESPIRATORY (INHALATION)
Status: DISCONTINUED | OUTPATIENT
Start: 2024-01-02 | End: 2024-01-08 | Stop reason: HOSPADM

## 2024-01-02 RX ORDER — FAMOTIDINE 20 MG/1
20 TABLET, FILM COATED ORAL 2 TIMES DAILY
Status: DISCONTINUED | OUTPATIENT
Start: 2024-01-02 | End: 2024-01-08 | Stop reason: HOSPADM

## 2024-01-02 RX ORDER — SODIUM CHLORIDE 0.9 % (FLUSH) 0.9 %
5-40 SYRINGE (ML) INJECTION PRN
Status: DISCONTINUED | OUTPATIENT
Start: 2024-01-02 | End: 2024-01-02 | Stop reason: HOSPADM

## 2024-01-02 RX ORDER — SODIUM CHLORIDE 9 MG/ML
INJECTION, SOLUTION INTRAVENOUS PRN
Status: DISCONTINUED | OUTPATIENT
Start: 2024-01-02 | End: 2024-01-08 | Stop reason: HOSPADM

## 2024-01-02 RX ORDER — ROCURONIUM BROMIDE 10 MG/ML
INJECTION, SOLUTION INTRAVENOUS PRN
Status: DISCONTINUED | OUTPATIENT
Start: 2024-01-02 | End: 2024-01-02 | Stop reason: SDUPTHER

## 2024-01-02 RX ORDER — PROTAMINE SULFATE 10 MG/ML
INJECTION, SOLUTION INTRAVENOUS PRN
Status: DISCONTINUED | OUTPATIENT
Start: 2024-01-02 | End: 2024-01-02 | Stop reason: SDUPTHER

## 2024-01-02 RX ORDER — LIDOCAINE HYDROCHLORIDE 20 MG/ML
INJECTION, SOLUTION EPIDURAL; INFILTRATION; INTRACAUDAL; PERINEURAL PRN
Status: DISCONTINUED | OUTPATIENT
Start: 2024-01-02 | End: 2024-01-02 | Stop reason: SDUPTHER

## 2024-01-02 RX ORDER — MEPERIDINE HYDROCHLORIDE 25 MG/ML
12.5 INJECTION INTRAMUSCULAR; INTRAVENOUS; SUBCUTANEOUS EVERY 5 MIN PRN
Status: DISCONTINUED | OUTPATIENT
Start: 2024-01-02 | End: 2024-01-02 | Stop reason: HOSPADM

## 2024-01-02 RX ORDER — MIDAZOLAM HYDROCHLORIDE 1 MG/ML
INJECTION INTRAMUSCULAR; INTRAVENOUS PRN
Status: DISCONTINUED | OUTPATIENT
Start: 2024-01-02 | End: 2024-01-02 | Stop reason: SDUPTHER

## 2024-01-02 RX ORDER — PROPOFOL 10 MG/ML
5-50 INJECTION, EMULSION INTRAVENOUS CONTINUOUS
Status: DISCONTINUED | OUTPATIENT
Start: 2024-01-02 | End: 2024-01-03

## 2024-01-02 RX ORDER — HEPARIN SODIUM 10000 [USP'U]/ML
INJECTION, SOLUTION INTRAVENOUS; SUBCUTANEOUS PRN
Status: DISCONTINUED | OUTPATIENT
Start: 2024-01-02 | End: 2024-01-02 | Stop reason: SDUPTHER

## 2024-01-02 RX ORDER — ALBUMIN, HUMAN INJ 5% 5 %
SOLUTION INTRAVENOUS PRN
Status: DISCONTINUED | OUTPATIENT
Start: 2024-01-02 | End: 2024-01-02 | Stop reason: SDUPTHER

## 2024-01-02 RX ORDER — PHENYLEPHRINE HCL IN 0.9% NACL 1 MG/10 ML
SYRINGE (ML) INTRAVENOUS PRN
Status: DISCONTINUED | OUTPATIENT
Start: 2024-01-02 | End: 2024-01-02 | Stop reason: SDUPTHER

## 2024-01-02 RX ORDER — MAGNESIUM HYDROXIDE 1200 MG/15ML
LIQUID ORAL CONTINUOUS PRN
Status: COMPLETED | OUTPATIENT
Start: 2024-01-02 | End: 2024-01-02

## 2024-01-02 RX ORDER — SODIUM CHLORIDE 0.9 % (FLUSH) 0.9 %
5-40 SYRINGE (ML) INJECTION PRN
Status: DISCONTINUED | OUTPATIENT
Start: 2024-01-02 | End: 2024-01-02

## 2024-01-02 RX ORDER — FENTANYL CITRATE 0.05 MG/ML
25 INJECTION, SOLUTION INTRAMUSCULAR; INTRAVENOUS EVERY 5 MIN PRN
Status: DISCONTINUED | OUTPATIENT
Start: 2024-01-02 | End: 2024-01-02 | Stop reason: HOSPADM

## 2024-01-02 RX ORDER — REGADENOSON 0.08 MG/ML
0.4 INJECTION, SOLUTION INTRAVENOUS
Status: DISCONTINUED | OUTPATIENT
Start: 2024-01-02 | End: 2024-01-04

## 2024-01-02 RX ORDER — SODIUM CHLORIDE 9 MG/ML
INJECTION, SOLUTION INTRAVENOUS PRN
Status: DISCONTINUED | OUTPATIENT
Start: 2024-01-02 | End: 2024-01-02 | Stop reason: HOSPADM

## 2024-01-02 RX ORDER — CALCIUM CHLORIDE 100 MG/ML
INJECTION INTRAVENOUS; INTRAVENTRICULAR PRN
Status: DISCONTINUED | OUTPATIENT
Start: 2024-01-02 | End: 2024-01-02 | Stop reason: SDUPTHER

## 2024-01-02 RX ORDER — SODIUM CHLORIDE 9 MG/ML
INJECTION, SOLUTION INTRAVENOUS CONTINUOUS
Status: DISCONTINUED | OUTPATIENT
Start: 2024-01-02 | End: 2024-01-03

## 2024-01-02 RX ORDER — SUCCINYLCHOLINE/SOD CL,ISO/PF 200MG/10ML
SYRINGE (ML) INTRAVENOUS PRN
Status: DISCONTINUED | OUTPATIENT
Start: 2024-01-02 | End: 2024-01-02 | Stop reason: SDUPTHER

## 2024-01-02 RX ORDER — PROPOFOL 10 MG/ML
INJECTION, EMULSION INTRAVENOUS CONTINUOUS PRN
Status: DISCONTINUED | OUTPATIENT
Start: 2024-01-02 | End: 2024-01-02 | Stop reason: SDUPTHER

## 2024-01-02 RX ORDER — CHLORHEXIDINE GLUCONATE ORAL RINSE 1.2 MG/ML
15 SOLUTION DENTAL 2 TIMES DAILY
Status: DISCONTINUED | OUTPATIENT
Start: 2024-01-02 | End: 2024-01-03

## 2024-01-02 RX ORDER — LISINOPRIL 10 MG/1
40 TABLET ORAL DAILY
Status: DISCONTINUED | OUTPATIENT
Start: 2024-01-02 | End: 2024-01-06

## 2024-01-02 RX ORDER — KETAMINE HCL IN NACL, ISO-OSM 100MG/10ML
SYRINGE (ML) INJECTION PRN
Status: DISCONTINUED | OUTPATIENT
Start: 2024-01-02 | End: 2024-01-02 | Stop reason: SDUPTHER

## 2024-01-02 RX ORDER — ONDANSETRON 4 MG/1
4 TABLET, ORALLY DISINTEGRATING ORAL EVERY 8 HOURS PRN
Status: DISCONTINUED | OUTPATIENT
Start: 2024-01-02 | End: 2024-01-08 | Stop reason: HOSPADM

## 2024-01-02 RX ORDER — FENTANYL CITRATE 50 UG/ML
INJECTION, SOLUTION INTRAMUSCULAR; INTRAVENOUS PRN
Status: DISCONTINUED | OUTPATIENT
Start: 2024-01-02 | End: 2024-01-02 | Stop reason: SDUPTHER

## 2024-01-02 RX ORDER — SODIUM CHLORIDE 9 MG/ML
INJECTION, SOLUTION INTRAVENOUS PRN
Status: DISCONTINUED | OUTPATIENT
Start: 2024-01-02 | End: 2024-01-02 | Stop reason: SDUPTHER

## 2024-01-02 RX ORDER — SODIUM CHLORIDE 0.9 % (FLUSH) 0.9 %
5-40 SYRINGE (ML) INJECTION EVERY 12 HOURS SCHEDULED
Status: DISCONTINUED | OUTPATIENT
Start: 2024-01-02 | End: 2024-01-08 | Stop reason: HOSPADM

## 2024-01-02 RX ORDER — SODIUM CHLORIDE 0.9 % (FLUSH) 0.9 %
5-40 SYRINGE (ML) INJECTION PRN
Status: DISCONTINUED | OUTPATIENT
Start: 2024-01-02 | End: 2024-01-08 | Stop reason: HOSPADM

## 2024-01-02 RX ORDER — ONDANSETRON 2 MG/ML
INJECTION INTRAMUSCULAR; INTRAVENOUS PRN
Status: DISCONTINUED | OUTPATIENT
Start: 2024-01-02 | End: 2024-01-02 | Stop reason: SDUPTHER

## 2024-01-02 RX ORDER — NITROGLYCERIN 20 MG/100ML
INJECTION INTRAVENOUS CONTINUOUS PRN
Status: DISCONTINUED | OUTPATIENT
Start: 2024-01-02 | End: 2024-01-02 | Stop reason: SDUPTHER

## 2024-01-02 RX ORDER — FENTANYL CITRATE 0.05 MG/ML
50 INJECTION, SOLUTION INTRAMUSCULAR; INTRAVENOUS EVERY 5 MIN PRN
Status: DISCONTINUED | OUTPATIENT
Start: 2024-01-02 | End: 2024-01-02 | Stop reason: HOSPADM

## 2024-01-02 RX ORDER — ONDANSETRON 2 MG/ML
4 INJECTION INTRAMUSCULAR; INTRAVENOUS EVERY 6 HOURS PRN
Status: DISCONTINUED | OUTPATIENT
Start: 2024-01-02 | End: 2024-01-08 | Stop reason: HOSPADM

## 2024-01-02 RX ADMIN — HEPARIN SODIUM 1000 UNITS: 10000 INJECTION INTRAVENOUS; SUBCUTANEOUS at 12:43

## 2024-01-02 RX ADMIN — SODIUM BICARBONATE 25 MEQ: 84 INJECTION INTRAVENOUS at 14:08

## 2024-01-02 RX ADMIN — Medication 100 MCG: at 08:50

## 2024-01-02 RX ADMIN — SODIUM CHLORIDE, PRESERVATIVE FREE 10 ML: 5 INJECTION INTRAVENOUS at 20:42

## 2024-01-02 RX ADMIN — Medication 150 MCG: at 10:32

## 2024-01-02 RX ADMIN — PROPOFOL 35 MCG/KG/MIN: 10 INJECTION, EMULSION INTRAVENOUS at 20:49

## 2024-01-02 RX ADMIN — Medication 10 MG: at 13:30

## 2024-01-02 RX ADMIN — ROCURONIUM BROMIDE 40 MG: 10 INJECTION, SOLUTION INTRAVENOUS at 08:15

## 2024-01-02 RX ADMIN — SODIUM CHLORIDE 2000 MG: 900 INJECTION INTRAVENOUS at 20:08

## 2024-01-02 RX ADMIN — FENTANYL CITRATE 50 MCG: 50 INJECTION INTRAMUSCULAR; INTRAVENOUS at 07:30

## 2024-01-02 RX ADMIN — Medication 10 MG: at 08:40

## 2024-01-02 RX ADMIN — CALCIUM CHLORIDE INJECTION 0.2 G: 100 INJECTION, SOLUTION INTRAVENOUS at 08:55

## 2024-01-02 RX ADMIN — SODIUM CHLORIDE: 9 INJECTION, SOLUTION INTRAVENOUS at 02:36

## 2024-01-02 RX ADMIN — ROCURONIUM BROMIDE 10 MG: 10 INJECTION, SOLUTION INTRAVENOUS at 08:05

## 2024-01-02 RX ADMIN — SODIUM CHLORIDE 2000 MG: 900 INJECTION INTRAVENOUS at 12:02

## 2024-01-02 RX ADMIN — PHENYLEPHRINE HYDROCHLORIDE 50 MCG/MIN: 10 INJECTION INTRAVENOUS at 10:46

## 2024-01-02 RX ADMIN — FENTANYL CITRATE 25 MCG: 50 INJECTION INTRAMUSCULAR; INTRAVENOUS at 14:02

## 2024-01-02 RX ADMIN — HEPARIN SODIUM 4000 UNITS: 10000 INJECTION INTRAVENOUS; SUBCUTANEOUS at 10:20

## 2024-01-02 RX ADMIN — SODIUM CHLORIDE: 9 INJECTION, SOLUTION INTRAVENOUS at 16:05

## 2024-01-02 RX ADMIN — ROCURONIUM BROMIDE 50 MG: 10 INJECTION, SOLUTION INTRAVENOUS at 10:25

## 2024-01-02 RX ADMIN — SODIUM BICARBONATE 25 MEQ: 84 INJECTION INTRAVENOUS at 11:09

## 2024-01-02 RX ADMIN — SODIUM BICARBONATE 50 MEQ: 84 INJECTION INTRAVENOUS at 12:41

## 2024-01-02 RX ADMIN — Medication 200 MCG: at 12:37

## 2024-01-02 RX ADMIN — LIDOCAINE HYDROCHLORIDE 100 MG: 20 INJECTION, SOLUTION EPIDURAL; INFILTRATION; INTRACAUDAL; PERINEURAL at 08:05

## 2024-01-02 RX ADMIN — CHLORHEXIDINE GLUCONATE 15 ML: 1.2 RINSE ORAL at 20:14

## 2024-01-02 RX ADMIN — PROPOFOL 15 MCG/KG/MIN: 10 INJECTION, EMULSION INTRAVENOUS at 08:05

## 2024-01-02 RX ADMIN — Medication 200 MCG: at 12:33

## 2024-01-02 RX ADMIN — Medication 100 MCG: at 08:53

## 2024-01-02 RX ADMIN — PROTAMINE SULFATE 20 MG: 10 INJECTION, SOLUTION INTRAVENOUS at 13:21

## 2024-01-02 RX ADMIN — SODIUM CHLORIDE 2000 MG: 900 INJECTION INTRAVENOUS at 08:13

## 2024-01-02 RX ADMIN — Medication 100 MCG: at 08:54

## 2024-01-02 RX ADMIN — CALCIUM CHLORIDE INJECTION 0.2 G: 100 INJECTION, SOLUTION INTRAVENOUS at 08:58

## 2024-01-02 RX ADMIN — CALCIUM CHLORIDE INJECTION 0.2 G: 100 INJECTION, SOLUTION INTRAVENOUS at 12:31

## 2024-01-02 RX ADMIN — ROCURONIUM BROMIDE 50 MG: 10 INJECTION, SOLUTION INTRAVENOUS at 11:45

## 2024-01-02 RX ADMIN — NITROGLYCERIN 100 MCG: 20 INJECTION INTRAVENOUS at 08:43

## 2024-01-02 RX ADMIN — CALCIUM CHLORIDE INJECTION 0.1 G: 100 INJECTION, SOLUTION INTRAVENOUS at 10:43

## 2024-01-02 RX ADMIN — NITROGLYCERIN 40 MCG: 20 INJECTION INTRAVENOUS at 09:59

## 2024-01-02 RX ADMIN — HEPARIN SODIUM 1000 UNITS: 10000 INJECTION INTRAVENOUS; SUBCUTANEOUS at 11:20

## 2024-01-02 RX ADMIN — SODIUM BICARBONATE 25 MEQ: 84 INJECTION INTRAVENOUS at 11:54

## 2024-01-02 RX ADMIN — PROTAMINE SULFATE 5 MG: 10 INJECTION, SOLUTION INTRAVENOUS at 13:19

## 2024-01-02 RX ADMIN — FENTANYL CITRATE 50 MCG: 50 INJECTION INTRAMUSCULAR; INTRAVENOUS at 08:04

## 2024-01-02 RX ADMIN — SODIUM CHLORIDE: 9 INJECTION, SOLUTION INTRAVENOUS at 07:58

## 2024-01-02 RX ADMIN — SODIUM CHLORIDE: 9 INJECTION, SOLUTION INTRAVENOUS at 15:31

## 2024-01-02 RX ADMIN — HYDROMORPHONE HYDROCHLORIDE 0.5 MG: 1 INJECTION, SOLUTION INTRAMUSCULAR; INTRAVENOUS; SUBCUTANEOUS at 08:40

## 2024-01-02 RX ADMIN — SODIUM NITROPRUSSIDE 0.25 MCG/KG/MIN: 50 INJECTION, SOLUTION INTRAVENOUS at 20:26

## 2024-01-02 RX ADMIN — PROPOFOL 50 MCG/KG/MIN: 10 INJECTION, EMULSION INTRAVENOUS at 08:36

## 2024-01-02 RX ADMIN — Medication 300 MCG: at 12:38

## 2024-01-02 RX ADMIN — PROPOFOL 50 MCG/KG/MIN: 10 INJECTION, EMULSION INTRAVENOUS at 15:28

## 2024-01-02 RX ADMIN — SODIUM CHLORIDE 500 ML: 9 INJECTION, SOLUTION INTRAVENOUS at 03:48

## 2024-01-02 RX ADMIN — Medication 500 MCG: at 11:54

## 2024-01-02 RX ADMIN — ROCURONIUM BROMIDE 100 MG: 10 INJECTION, SOLUTION INTRAVENOUS at 09:00

## 2024-01-02 RX ADMIN — SODIUM BICARBONATE 25 MEQ: 84 INJECTION INTRAVENOUS at 09:12

## 2024-01-02 RX ADMIN — HYDROMORPHONE HYDROCHLORIDE 0.5 MG: 1 INJECTION, SOLUTION INTRAMUSCULAR; INTRAVENOUS; SUBCUTANEOUS at 08:11

## 2024-01-02 RX ADMIN — Medication 100 MCG: at 10:43

## 2024-01-02 RX ADMIN — DEXAMETHASONE SODIUM PHOSPHATE 4 MG: 4 INJECTION, SOLUTION INTRAMUSCULAR; INTRAVENOUS at 08:11

## 2024-01-02 RX ADMIN — ONDANSETRON 4 MG: 2 INJECTION INTRAMUSCULAR; INTRAVENOUS at 13:00

## 2024-01-02 RX ADMIN — MOMETASONE FUROATE AND FORMOTEROL FUMARATE DIHYDRATE 2 PUFF: 200; 5 AEROSOL RESPIRATORY (INHALATION) at 20:29

## 2024-01-02 RX ADMIN — Medication 160 MG: at 08:06

## 2024-01-02 RX ADMIN — VERAPAMIL HYDROCHLORIDE 80 MG: 80 TABLET ORAL at 20:31

## 2024-01-02 RX ADMIN — MIDAZOLAM 2 MG: 1 INJECTION INTRAMUSCULAR; INTRAVENOUS at 07:30

## 2024-01-02 RX ADMIN — SODIUM BICARBONATE 25 MEQ: 84 INJECTION INTRAVENOUS at 09:16

## 2024-01-02 RX ADMIN — NITROGLYCERIN 10 MCG/MIN: 20 INJECTION INTRAVENOUS at 09:34

## 2024-01-02 RX ADMIN — SODIUM CHLORIDE: 9 INJECTION, SOLUTION INTRAVENOUS at 11:17

## 2024-01-02 RX ADMIN — METHOCARBAMOL 250 MG: 100 INJECTION, SOLUTION INTRAMUSCULAR; INTRAVENOUS at 13:50

## 2024-01-02 RX ADMIN — Medication 200 MCG: at 12:35

## 2024-01-02 RX ADMIN — ALBUMIN (HUMAN) 250 ML: 12.5 INJECTION, SOLUTION INTRAVENOUS at 09:00

## 2024-01-02 RX ADMIN — Medication 10 MG: at 09:24

## 2024-01-02 RX ADMIN — Medication 20 MG: at 08:05

## 2024-01-02 RX ADMIN — Medication 100 MCG: at 11:05

## 2024-01-02 RX ADMIN — SODIUM CHLORIDE: 9 INJECTION, SOLUTION INTRAVENOUS at 21:22

## 2024-01-02 RX ADMIN — SODIUM CHLORIDE: 9 INJECTION, SOLUTION INTRAVENOUS at 13:45

## 2024-01-02 RX ADMIN — FENTANYL CITRATE 50 MCG: 50 INJECTION INTRAMUSCULAR; INTRAVENOUS at 14:05

## 2024-01-02 RX ADMIN — SODIUM BICARBONATE 50 MEQ: 84 INJECTION INTRAVENOUS at 10:27

## 2024-01-02 RX ADMIN — FENTANYL CITRATE 25 MCG: 50 INJECTION INTRAMUSCULAR; INTRAVENOUS at 13:46

## 2024-01-02 RX ADMIN — SODIUM CHLORIDE, PRESERVATIVE FREE 20 MG: 5 INJECTION INTRAVENOUS at 20:15

## 2024-01-02 RX ADMIN — CALCIUM CHLORIDE INJECTION 0.2 G: 100 INJECTION, SOLUTION INTRAVENOUS at 12:38

## 2024-01-02 RX ADMIN — NICARDIPINE HYDROCHLORIDE 3 MG/HR: 0.1 INJECTION, SOLUTION INTRAVENTRICULAR at 08:48

## 2024-01-02 RX ADMIN — SODIUM BICARBONATE 25 MEQ: 84 INJECTION INTRAVENOUS at 13:40

## 2024-01-02 RX ADMIN — CALCIUM CHLORIDE INJECTION 0.1 G: 100 INJECTION, SOLUTION INTRAVENOUS at 10:30

## 2024-01-02 RX ADMIN — Medication 100 MCG: at 10:29

## 2024-01-02 RX ADMIN — Medication 200 MCG: at 10:57

## 2024-01-02 RX ADMIN — NITROGLYCERIN 50 MCG: 20 INJECTION INTRAVENOUS at 09:53

## 2024-01-02 ASSESSMENT — PULMONARY FUNCTION TESTS
PIF_VALUE: 16
PIF_VALUE: 18
PIF_VALUE: 17
PIF_VALUE: 15
PIF_VALUE: 18
PIF_VALUE: 11
PIF_VALUE: 18
PIF_VALUE: 18
PIF_VALUE: 19
PIF_VALUE: 18
PIF_VALUE: 15
PIF_VALUE: 11
PIF_VALUE: 19
PIF_VALUE: 16
PIF_VALUE: 17
PIF_VALUE: 18
PIF_VALUE: 16
PIF_VALUE: 18
PIF_VALUE: 18
PIF_VALUE: 17
PIF_VALUE: 12
PIF_VALUE: 17
PIF_VALUE: 18
PIF_VALUE: 15
PIF_VALUE: 16
PIF_VALUE: 16
PIF_VALUE: 11
PIF_VALUE: 18
PIF_VALUE: 18

## 2024-01-02 ASSESSMENT — PAIN - FUNCTIONAL ASSESSMENT: PAIN_FUNCTIONAL_ASSESSMENT: NONE - DENIES PAIN

## 2024-01-02 ASSESSMENT — ENCOUNTER SYMPTOMS: SHORTNESS OF BREATH: 0

## 2024-01-02 ASSESSMENT — LIFESTYLE VARIABLES: SMOKING_STATUS: 0

## 2024-01-02 NOTE — CARE COORDINATION
01/02/24 1526   Service Assessment   Patient Orientation Unresponsive   Cognition Other (see comment)  (being worked on by three staff members)   History Provided By Spouse   Primary Caregiver Self   Accompanied By/Relationship spouse   Support Systems Spouse/Significant Other   Patient's Healthcare Decision Maker is: Legal Next of Kin   PCP Verified by CM Yes  (W[facility] does not know who it [facility])   Last Visit to PCP Within last 3 months   Prior Functional Level Assistance with the following:;Housework;Cooking;Shopping   Current Functional Level Assistance with the following:;Housework;Shopping;Cooking   Can patient return to prior living arrangement Yes   Ability to make needs known: Other (see comment)  (not right now)   Family able to assist with home care needs: Yes  (wfe)   Would you like for me to discuss the discharge plan with any other family members/significant others, and if so, who? Yes   Financial Resources Medicare;[place] ([facility])   Community Resources None   Social/Functional History   Lives With Spouse   Type of Home House   Discharge Planning   Type of Residence House   Living Arrangements Spouse/Significant Other   Current Services Prior To Admission Durable Medical Equipment   Current DME Prior to Arrival Shower Chair;Walker  (shower chair, 4 wh walker, wheelchair)   Potential Assistance Needed Home Care   DME Ordered? No   Potential Assistance Purchasing Medications No   Type of Home Care Services PT;OT;Nursing Services   Patient expects to be discharged to: House   One/Two Story Residence Split level   # of Interior Steps 7   Interior Rails Right   Lift Chair Available No   History of falls? 0   Services At/After Discharge   Transition of Care Consult (CM Consult) Home Health   Internal Home Health Yes   Services At/After Discharge Home Health   [place] Resource Information Provided? No   Mode of Transport at Discharge Other (see comment)  (wife)

## 2024-01-02 NOTE — ANESTHESIA PROCEDURE NOTES
Arterial Line:    An arterial line was placed using surface landmarks, in the holding area for the following indication(s): continuous blood pressure monitoring and blood sampling needed.    A 20 gauge (size), 1 and 3/8 inch (length), Angiocath (type) catheter was placed, Seldinger technique not used, into the right radial artery, secured by tape and Tegaderm.  Anesthesia type: Local  Local infiltration: Injection    Events:  patient tolerated procedure well with no complications and EBL < 5mL.1/2/2024 7:31 AM1/2/2024 7:32 AM  Anesthesiologist: Jose Masters MD  Performed: Anesthesiologist   Preanesthetic Checklist  Completed: patient identified, IV checked, site marked, risks and benefits discussed, surgical/procedural consents, equipment checked, pre-op evaluation, timeout performed, anesthesia consent given, oxygen available and monitors applied/VS acknowledged

## 2024-01-02 NOTE — ANESTHESIA PROCEDURE NOTES
Central Venous Line:    A central venous line was placed using surface landmarks, in the holding area for the following indication(s): central venous access and CVP monitoring.1/2/2024 7:36 AM1/2/2024 7:48 AM    Sterility preparation included the following: hand hygiene performed prior to procedure, maximum sterile barriers used and sterile technique used to drape from head to toe.    The patient was placed in Trendelenburg position.The right internal jugular vein was prepped.    The site was prepped with Chloraprep.  A 7 Fr (size), 16 (length), triple lumen was placed.    During the procedure, the following specific steps were taken: target vein identified, needle advanced into vein and blood aspirated and guidewire advanced into vein.    During the procedure the patient experienced: patient tolerated procedure well with no complications and EBL < 5mL.      Outcomes: uncomplicated and patient tolerated procedure wellno  Anesthesia type: general and local..No  Staffing  Performed: Anesthesiologist   Anesthesiologist: Jose Masters MD  Performed by: Jose Masters MD  Authorized by: Jose Masters MD    Preanesthetic Checklist  Completed: patient identified, timeout performed and monitors applied/VS acknowledged

## 2024-01-02 NOTE — PLAN OF CARE
Problem: Safety - Adult  Goal: Free from fall injury  Outcome: Progressing  Flowsheets (Taken 1/2/2024 0008)  Free From Fall Injury: Instruct family/caregiver on patient safety     Problem: ABCDS Injury Assessment  Goal: Absence of physical injury  Outcome: Progressing  Flowsheets (Taken 1/2/2024 0008)  Absence of Physical Injury: Implement safety measures based on patient assessment

## 2024-01-02 NOTE — H&P
Subjective:       Manfred Washington is a 68 y.o. male with known aortoiliac aneurysms without symptoms (AAA 5.7 cm with R LINNEA 2.7 cm).    Past Medical History:   Diagnosis Date    Arthritis     Gong's esophagus     Chronic back pain     COPD (chronic obstructive pulmonary disease) (HCC)     Depression     GERD (gastroesophageal reflux disease)     HYPERCHOLESTERAEMIA     Hypertension     Prinzmetal angina (HCC)     Spinal stenoses     TIA (transient ischaemic attack)      Past Surgical History:   Procedure Laterality Date    APPENDECTOMY      BACK SURGERY      BRONCHOSCOPY N/A 12/30/2019    BRONCHOSCOPY ALVEOLAR LAVAGE performed by Christopher Peña MD at [facility] ENDOSCOPY    BRONCHOSCOPY  12/30/2019    BRONCHOSCOPY BRUSHINGS performed by Christopher Peña MD at [facility] ENDOSCOPY    CHOLECYSTECTOMY      CLAVICLE SURGERY      COLONOSCOPY      WITH BIOPSY    COLONOSCOPY N/A 7/24/2020    COLONOSCOPY POLYPECTOMY SNARE/COLD BIOPSY performed by Car Marquez MD at [facility] ENDOSCOPY    COLONOSCOPY  7/24/2020    COLONOSCOPY WITH BIOPSY performed by Car Marquez MD at [facility] ENDOSCOPY    JOINT REPLACEMENT      RIGHT KNEE    SPLENECTOMY      UPPER GASTROINTESTINAL ENDOSCOPY  5-14-10    egd with biopsy    UPPER GASTROINTESTINAL ENDOSCOPY  11-27-12    Esophagogastroduodenoscopy with biopsy, Romero esophageal dilation, and Botulinum injection of the pylorus     Family History   Problem Relation Age of Onset    Heart Disease Mother     Lung Cancer Father     Heart Attack Brother 41    Lung Cancer Brother     Substance Abuse Brother     Heart Disease Brother      Social History     Socioeconomic History    Marital status:    Tobacco Use    Smoking status: Every Day     Current packs/day: 1.00     Average packs/day: 1 pack/day for 42.0 years (42.0 ttl pk-yrs)     Types: Cigarettes    Smokeless tobacco: Never   Vaping Use    Vaping Use: Never used   Substance and Sexual Activity    Alcohol use: No    Drug

## 2024-01-02 NOTE — BRIEF OP NOTE
Brief Postoperative Note      Patient: Manfred Washington  YOB: 1955  MRN: [id number]    Date of Procedure: 1/2/2024    Pre-Op Diagnosis Codes:     * Infrarenal abdominal aortic aneurysm (AAA) without rupture (HCC) [I71.43]    Post-Op Diagnosis: Same       Procedure(s):  OPEN ABDOMINAL ANEURYSM REPAIR REIMPLANTATION OF INFERIOR MESENTERIC ARTERY    Surgeon(s):  Live Stanford MD    Assistant:  Surgical Assistant: Ayesha Wood; Iva Bran    Anesthesia: General    Estimated Blood Loss (mL): 2000    Complications: None    Specimens:   * No specimens in log *    Implants:  Implant Name Type Inv. Item Serial No.  Lot No. LRB No. Used Action   GRAFT VASC ALBOGRFT POLY 16 MMX8 MM [id number] CM L KNIT BIFUR - [id number] Vascular grafts GRAFT VASC ALBOGRFT POLY 16 MMX8 MM DIAX50 CM L KNIT BIFUR [id number] [facility] VASCULAR [facility]- [id number] N/A 1 Implanted         Drains:   NG/OG/NJ/NE Tube Nasogastric 18 fr Right nostril (Active)       Urinary Catheter 01/02/24 2 Way (Active)       Findings: as above      Electronically signed by Live Stanford MD on 1/2/2024 at 2:15 PM

## 2024-01-02 NOTE — PROGRESS NOTES
Consent signed with patient and wife at bedside. All belongings except dentures, cell phone, , and walker sent home with wife. CHG bath completed and linens changed.

## 2024-01-02 NOTE — CARE COORDINATION
Met with wife at bedside for Assessment and to present initial IMM.    Case Management Assessment  Initial Evaluation    Date/Time of Evaluation: 1/2/2024 3:30 PM  Assessment Completed by: DALLIN Hopper    If patient is discharged prior to next notation, then this note serves as note for discharge by case management.    Patient Name: Manfred Washington                   YOB: 1955  Diagnosis: Infrarenal abdominal aortic aneurysm, without rupture (HCC) [I71.43]  AAA (abdominal aortic aneurysm) without rupture (HCC) [I71.40]                   Date / Time: 1/1/2024  5:29 PM    Patient Admission Status: Inpatient   Readmission Risk (Low < 19, Mod (19-27), High > 27): Readmission Risk Score: 12.5    Current PCP: Suzette Oquendo APRN  PCP verified by CM? Yes (Wife [facility]; does not [facility] who it [facility])    Chart Reviewed: Yes      History Provided by: Spouse  Patient Orientation: Unresponsive    Patient Cognition: Other (see comment) (being worked on by three staff members)    Hospitalization in the last 30 days (Readmission):  No    If yes, Readmission Assessment in CM Navigator will be completed.    Advance Directives:      Code Status: Full Code   Patient's Primary Decision Maker is: Legal Next of Kin    Primary Decision Maker: [place] Washington - Spouse - [phone]    Discharge Planning:    Patient lives with: Spouse/Significant Other Type of Home: House  Primary Care Giver: Self  Patient Support Systems include: Spouse/Significant Other   Current Financial resources: Medicare, [place] ([facility])  Current community resources: None  Current services prior to admission: Durable Medical Equipment            Current DME: Shower Chair, Walker (shower chair, 4 wh walker, wheelchair)            Type of Home Care services:  PT, OT, Nursing Services    ADLS  Prior functional level: Assistance with the following:, Housework, Cooking, Shopping  Current functional level: Assistance with the

## 2024-01-02 NOTE — ANESTHESIA PROCEDURE NOTES
Procedure Performed: MAMADOU       Start Time:  1/2/2024 8:20 AM       End Time:   1/2/2024 8:20 AM    Preanesthesia Checklist:  Patient identified, IV assessed, risks and benefits discussed, monitors and equipment assessed, procedure being performed at surgeon's request and anesthesia consent obtained.    General Procedure Information  Diagnostic Indications for Echo:  assessment of valve function  Physician Requesting Echo: Live Stanford MD  Location performed:  OR  Intubated  Bite block placed  Heart visualized  Probe Insertion:  Easy  Probe Type:  3D  Modalities:  2D, color flow mapping and M-mode    Echocardiographic and Doppler Measurements    Ventricles    Left Ventricle:  Ejection Fraction 50%.          Valves    Aortic Valve:  Regurgitation moderate.              Atria    Right Atrium:  Size normal.  Spontaneous echo contrast not present.  Thrombus not present.  Tumor not present.    Left Atrium:  Size normal.  Spontaneous echo contrast not present.  Thrombus not present.  Tumor not present.                  Anesthesia Information  Performed Personally  Anesthesiologist:  Jose Masters MD      Echocardiogram Comments:       MAMADOU used for monitoring of Aortic valve during Open AAA case

## 2024-01-03 ENCOUNTER — APPOINTMENT (OUTPATIENT)
Dept: GENERAL RADIOLOGY | Age: 69
DRG: 268 | End: 2024-01-03
Attending: SURGERY
Payer: MEDICARE

## 2024-01-03 PROBLEM — R91.1 PULMONARY NODULE: Status: ACTIVE | Noted: 2024-01-01

## 2024-01-03 LAB
ANION GAP SERPL CALCULATED.3IONS-SCNC: 8 MMOL/L (ref 3–16)
BASE EXCESS BLDA CALC-SCNC: -1 MMOL/L (ref -3–3)
BASE EXCESS BLDA CALC-SCNC: -2 MMOL/L (ref -3–3)
BUN SERPL-MCNC: 20 MG/DL (ref 7–20)
CALCIUM SERPL-MCNC: 7.3 MG/DL (ref 8.3–10.6)
CHLORIDE SERPL-SCNC: 118 MMOL/L (ref 99–110)
CO2 BLDA-SCNC: 25 MMOL/L
CO2 BLDA-SCNC: 25.5 MMOL/L
CO2 SERPL-SCNC: 25 MMOL/L (ref 21–32)
COHGB MFR BLDA: 1.2 % (ref 0–1.5)
CREAT SERPL-MCNC: 1 MG/DL (ref 0.8–1.3)
DEPRECATED RDW RBC AUTO: 14 % (ref 12.4–15.4)
GFR SERPLBLD CREATININE-BSD FMLA CKD-EPI: >60 ML/MIN/{1.73_M2}
GLUCOSE SERPL-MCNC: 116 MG/DL (ref 70–99)
HCO3 BLDA-SCNC: 23.3 MMOL/L (ref 21–29)
HCO3 BLDA-SCNC: 24.3 MMOL/L (ref 21–29)
HCT VFR BLD AUTO: 34.4 % (ref 40.5–52.5)
HGB BLD-MCNC: 11.6 G/DL (ref 13.5–17.5)
HGB BLDA-MCNC: 11.7 G/DL (ref 13.5–17.5)
LIPASE SERPL-CCNC: 49 U/L (ref 13–60)
MAGNESIUM SERPL-MCNC: 1.9 MG/DL (ref 1.8–2.4)
MCH RBC QN AUTO: 31.2 PG (ref 26–34)
MCHC RBC AUTO-ENTMCNC: 33.8 G/DL (ref 31–36)
MCV RBC AUTO: 92.3 FL (ref 80–100)
METHGB MFR BLDA: 0.6 %
O2 THERAPY: ABNORMAL
PCO2 BLDA: 41.9 MMHG (ref 35–45)
PCO2 BLDA: 42.7 MM HG (ref 35–45)
PERFORMED ON: ABNORMAL
PH BLDA: 7.34 [PH] (ref 7.35–7.45)
PH BLDA: 7.37 [PH] (ref 7.35–7.45)
PHOSPHATE SERPL-MCNC: 4.8 MG/DL (ref 2.5–4.9)
PLATELET # BLD AUTO: 281 K/UL (ref 135–450)
PMV BLD AUTO: 7.7 FL (ref 5–10.5)
PO2 BLDA: 126 MM HG (ref 75–108)
PO2 BLDA: 81.6 MMHG (ref 75–108)
POC SAMPLE TYPE: ABNORMAL
POTASSIUM SERPL-SCNC: 4.3 MMOL/L (ref 3.5–5.1)
RBC # BLD AUTO: 3.73 M/UL (ref 4.2–5.9)
SAO2 % BLDA: 96.4 %
SAO2 % BLDA: 99 % (ref 93–100)
SODIUM SERPL-SCNC: 151 MMOL/L (ref 136–145)
WBC # BLD AUTO: 23.7 K/UL (ref 4–11)

## 2024-01-03 PROCEDURE — 6360000002 HC RX W HCPCS: Performed by: SURGERY

## 2024-01-03 PROCEDURE — 2500000003 HC RX 250 WO HCPCS: Performed by: NURSE PRACTITIONER

## 2024-01-03 PROCEDURE — 6360000002 HC RX W HCPCS: Performed by: NURSE PRACTITIONER

## 2024-01-03 PROCEDURE — 94640 AIRWAY INHALATION TREATMENT: CPT

## 2024-01-03 PROCEDURE — 80048 BASIC METABOLIC PNL TOTAL CA: CPT

## 2024-01-03 PROCEDURE — 99291 CRITICAL CARE FIRST HOUR: CPT | Performed by: INTERNAL MEDICINE

## 2024-01-03 PROCEDURE — 2700000000 HC OXYGEN THERAPY PER DAY

## 2024-01-03 PROCEDURE — 6370000000 HC RX 637 (ALT 250 FOR IP): Performed by: SURGERY

## 2024-01-03 PROCEDURE — 83735 ASSAY OF MAGNESIUM: CPT

## 2024-01-03 PROCEDURE — 6360000002 HC RX W HCPCS: Performed by: NURSE ANESTHETIST, CERTIFIED REGISTERED

## 2024-01-03 PROCEDURE — 94761 N-INVAS EAR/PLS OXIMETRY MLT: CPT

## 2024-01-03 PROCEDURE — 83690 ASSAY OF LIPASE: CPT

## 2024-01-03 PROCEDURE — 2500000003 HC RX 250 WO HCPCS: Performed by: SURGERY

## 2024-01-03 PROCEDURE — 94003 VENT MGMT INPAT SUBQ DAY: CPT

## 2024-01-03 PROCEDURE — 99024 POSTOP FOLLOW-UP VISIT: CPT | Performed by: SURGERY

## 2024-01-03 PROCEDURE — 85027 COMPLETE CBC AUTOMATED: CPT

## 2024-01-03 PROCEDURE — APPNB15 APP NON BILLABLE TIME 0-15 MINS: Performed by: NURSE PRACTITIONER

## 2024-01-03 PROCEDURE — 84100 ASSAY OF PHOSPHORUS: CPT

## 2024-01-03 PROCEDURE — 2580000003 HC RX 258: Performed by: SURGERY

## 2024-01-03 PROCEDURE — 82803 BLOOD GASES ANY COMBINATION: CPT

## 2024-01-03 PROCEDURE — 71045 X-RAY EXAM CHEST 1 VIEW: CPT

## 2024-01-03 PROCEDURE — 2100000000 HC CCU R&B

## 2024-01-03 RX ORDER — MORPHINE SULFATE 4 MG/ML
4 INJECTION, SOLUTION INTRAMUSCULAR; INTRAVENOUS
Status: DISCONTINUED | OUTPATIENT
Start: 2024-01-03 | End: 2024-01-08 | Stop reason: HOSPADM

## 2024-01-03 RX ORDER — FENTANYL CITRATE-0.9 % NACL/PF 10 MCG/ML
25-200 PLASTIC BAG, INJECTION (ML) INTRAVENOUS CONTINUOUS
Status: DISCONTINUED | OUTPATIENT
Start: 2024-01-03 | End: 2024-01-03

## 2024-01-03 RX ORDER — MORPHINE SULFATE 2 MG/ML
2 INJECTION, SOLUTION INTRAMUSCULAR; INTRAVENOUS
Status: DISCONTINUED | OUTPATIENT
Start: 2024-01-03 | End: 2024-01-08 | Stop reason: HOSPADM

## 2024-01-03 RX ORDER — DEXTROSE MONOHYDRATE, SODIUM CHLORIDE, AND POTASSIUM CHLORIDE 50; 1.49; 4.5 G/1000ML; G/1000ML; G/1000ML
INJECTION, SOLUTION INTRAVENOUS CONTINUOUS
Status: DISCONTINUED | OUTPATIENT
Start: 2024-01-03 | End: 2024-01-04

## 2024-01-03 RX ORDER — CALCIUM GLUCONATE 20 MG/ML
2000 INJECTION, SOLUTION INTRAVENOUS ONCE
Status: COMPLETED | OUTPATIENT
Start: 2024-01-03 | End: 2024-01-03

## 2024-01-03 RX ORDER — DEXMEDETOMIDINE HYDROCHLORIDE 4 UG/ML
.1-1.5 INJECTION, SOLUTION INTRAVENOUS CONTINUOUS
Status: DISCONTINUED | OUTPATIENT
Start: 2024-01-04 | End: 2024-01-05

## 2024-01-03 RX ORDER — MAGNESIUM SULFATE IN WATER 40 MG/ML
2000 INJECTION, SOLUTION INTRAVENOUS ONCE
Status: COMPLETED | OUTPATIENT
Start: 2024-01-03 | End: 2024-01-03

## 2024-01-03 RX ORDER — MIDAZOLAM HYDROCHLORIDE 1 MG/ML
1 INJECTION INTRAMUSCULAR; INTRAVENOUS ONCE
Status: COMPLETED | OUTPATIENT
Start: 2024-01-03 | End: 2024-01-03

## 2024-01-03 RX ORDER — METOPROLOL TARTRATE 1 MG/ML
5 INJECTION, SOLUTION INTRAVENOUS EVERY 6 HOURS
Status: DISCONTINUED | OUTPATIENT
Start: 2024-01-03 | End: 2024-01-08 | Stop reason: HOSPADM

## 2024-01-03 RX ADMIN — SODIUM CHLORIDE 2000 MG: 900 INJECTION INTRAVENOUS at 11:54

## 2024-01-03 RX ADMIN — SODIUM CHLORIDE, PRESERVATIVE FREE 10 ML: 5 INJECTION INTRAVENOUS at 08:14

## 2024-01-03 RX ADMIN — VERAPAMIL HYDROCHLORIDE 80 MG: 80 TABLET ORAL at 08:04

## 2024-01-03 RX ADMIN — SODIUM NITROPRUSSIDE 0.25 MCG/KG/MIN: 50 INJECTION, SOLUTION INTRAVENOUS at 21:55

## 2024-01-03 RX ADMIN — MORPHINE SULFATE 2 MG: 2 INJECTION, SOLUTION INTRAMUSCULAR; INTRAVENOUS at 21:23

## 2024-01-03 RX ADMIN — METOPROLOL TARTRATE 5 MG: 5 INJECTION, SOLUTION INTRAVENOUS at 20:38

## 2024-01-03 RX ADMIN — SODIUM CHLORIDE: 9 INJECTION, SOLUTION INTRAVENOUS at 03:08

## 2024-01-03 RX ADMIN — MIDAZOLAM 1 MG: 1 INJECTION INTRAMUSCULAR; INTRAVENOUS at 03:10

## 2024-01-03 RX ADMIN — METOPROLOL TARTRATE 5 MG: 5 INJECTION, SOLUTION INTRAVENOUS at 13:10

## 2024-01-03 RX ADMIN — POTASSIUM CHLORIDE, DEXTROSE MONOHYDRATE AND SODIUM CHLORIDE: 150; 5; 450 INJECTION, SOLUTION INTRAVENOUS at 07:58

## 2024-01-03 RX ADMIN — MORPHINE SULFATE 4 MG: 4 INJECTION, SOLUTION INTRAMUSCULAR; INTRAVENOUS at 18:56

## 2024-01-03 RX ADMIN — Medication 50 MCG/HR: at 03:23

## 2024-01-03 RX ADMIN — DEXMEDETOMIDINE HYDROCHLORIDE 0.2 MCG/KG/HR: 400 INJECTION INTRAVENOUS at 23:53

## 2024-01-03 RX ADMIN — MORPHINE SULFATE 4 MG: 4 INJECTION, SOLUTION INTRAMUSCULAR; INTRAVENOUS at 16:27

## 2024-01-03 RX ADMIN — METOPROLOL TARTRATE 5 MG: 5 INJECTION, SOLUTION INTRAVENOUS at 08:05

## 2024-01-03 RX ADMIN — MOMETASONE FUROATE AND FORMOTEROL FUMARATE DIHYDRATE 2 PUFF: 200; 5 AEROSOL RESPIRATORY (INHALATION) at 08:36

## 2024-01-03 RX ADMIN — MORPHINE SULFATE 2 MG: 2 INJECTION, SOLUTION INTRAMUSCULAR; INTRAVENOUS at 10:46

## 2024-01-03 RX ADMIN — PROPOFOL 40 MCG/KG/MIN: 10 INJECTION, EMULSION INTRAVENOUS at 07:19

## 2024-01-03 RX ADMIN — SODIUM CHLORIDE 2000 MG: 900 INJECTION INTRAVENOUS at 04:04

## 2024-01-03 RX ADMIN — VERAPAMIL HYDROCHLORIDE 80 MG: 80 TABLET ORAL at 20:36

## 2024-01-03 RX ADMIN — MAGNESIUM SULFATE HEPTAHYDRATE 2000 MG: 40 INJECTION, SOLUTION INTRAVENOUS at 09:34

## 2024-01-03 RX ADMIN — SODIUM CHLORIDE, PRESERVATIVE FREE 20 MG: 5 INJECTION INTRAVENOUS at 08:04

## 2024-01-03 RX ADMIN — VERAPAMIL HYDROCHLORIDE 80 MG: 80 TABLET ORAL at 13:10

## 2024-01-03 RX ADMIN — POTASSIUM CHLORIDE, DEXTROSE MONOHYDRATE AND SODIUM CHLORIDE: 150; 5; 450 INJECTION, SOLUTION INTRAVENOUS at 18:57

## 2024-01-03 RX ADMIN — PROPOFOL 50 MCG/KG/MIN: 10 INJECTION, EMULSION INTRAVENOUS at 03:07

## 2024-01-03 RX ADMIN — MOMETASONE FUROATE AND FORMOTEROL FUMARATE DIHYDRATE 2 PUFF: 200; 5 AEROSOL RESPIRATORY (INHALATION) at 20:16

## 2024-01-03 RX ADMIN — LISINOPRIL 40 MG: 10 TABLET ORAL at 08:03

## 2024-01-03 RX ADMIN — FAMOTIDINE 20 MG: 20 TABLET, FILM COATED ORAL at 20:36

## 2024-01-03 RX ADMIN — VERAPAMIL HYDROCHLORIDE 80 MG: 80 TABLET ORAL at 16:36

## 2024-01-03 RX ADMIN — DULOXETINE HYDROCHLORIDE 60 MG: 60 CAPSULE, DELAYED RELEASE ORAL at 08:58

## 2024-01-03 RX ADMIN — MORPHINE SULFATE 2 MG: 2 INJECTION, SOLUTION INTRAMUSCULAR; INTRAVENOUS at 14:07

## 2024-01-03 RX ADMIN — CALCIUM GLUCONATE 2000 MG: 20 INJECTION, SOLUTION INTRAVENOUS at 08:54

## 2024-01-03 RX ADMIN — CHLORHEXIDINE GLUCONATE 15 ML: 1.2 RINSE ORAL at 08:05

## 2024-01-03 ASSESSMENT — PULMONARY FUNCTION TESTS
PIF_VALUE: 12
PIF_VALUE: 17
PIF_VALUE: 12
PIF_VALUE: 12
PIF_VALUE: 18
PIF_VALUE: 23
PIF_VALUE: 18
PIF_VALUE: 17
PIF_VALUE: 18
PIF_VALUE: 18
PIF_VALUE: 21
PIF_VALUE: 17
PIF_VALUE: 14
PIF_VALUE: 19
PIF_VALUE: 16
PIF_VALUE: 18
PIF_VALUE: 15
PIF_VALUE: 18
PIF_VALUE: 19
PIF_VALUE: 16
PIF_VALUE: 22
PIF_VALUE: 12
PIF_VALUE: 10
PIF_VALUE: 12
PIF_VALUE: 11
PIF_VALUE: 11
PIF_VALUE: 18
PIF_VALUE: 19
PIF_VALUE: 11
PIF_VALUE: 22
PIF_VALUE: 19
PIF_VALUE: 16
PIF_VALUE: 19
PIF_VALUE: 12

## 2024-01-03 ASSESSMENT — PAIN SCALES - GENERAL
PAINLEVEL_OUTOF10: 6
PAINLEVEL_OUTOF10: 2
PAINLEVEL_OUTOF10: 0
PAINLEVEL_OUTOF10: 5
PAINLEVEL_OUTOF10: 8
PAINLEVEL_OUTOF10: 9

## 2024-01-03 ASSESSMENT — PAIN DESCRIPTION - DESCRIPTORS
DESCRIPTORS: DISCOMFORT;STABBING
DESCRIPTORS: ACHING

## 2024-01-03 ASSESSMENT — PAIN DESCRIPTION - ORIENTATION: ORIENTATION: MID

## 2024-01-03 ASSESSMENT — PAIN DESCRIPTION - LOCATION
LOCATION: ABDOMEN

## 2024-01-03 NOTE — CONSULTS
REASON FOR CONSULTATION/CC: copd      Consult at request of Live Stanford MD for copd    PCP: Suzette Oquendo APRN  Established Pulmonologist:  None    HISTORY OF PRESENT ILLNESS: Manfred Washington is a 68 y.o. year old male with a history of  who presents with     Patient was intubated    This note may have been  transcribed using Dragon Dictation software. Please disregard any translational errors.      Assessment:     Pulmonary nodule  Centrilobular emphysema  Tobacco abuse, marijuana use  AAA    Plan:      Hospital Day 2     Pulmonary nodule left lower lobe  Left lower lobe pulmonary nodule completed high-resolution CT December 28.  Based on the best data available, it does appear that this is biopsy able via navigational bronchoscopy.  This is a difficult biopsy for CT surgery and for interventional radiology.  Case request will be submitted today for [facility] for navigational bronchoscopy    Mechanical ventilation  Remain intubated after AAA.  If this was planned after the surgery  Start SAT SBT today  Patient was on SIMV with predominant spontaneous respirations.  Changed to spontaneous 5/5 at 745.  Asked nurse to start SAT.  Likely extubate in 30 minutes.  Patient moving around spontaneously currently.        AAA status post repair  Open AAA with graft  Pod day 1  Per vascular surgery     COPD  Dulera   Spiriva     Nutrition  - Diet NPO Exceptions are: Sips of Water with Meds    Mobility       Access  Arterial   Arterial Line 01/02/24 Radial (Active)   Site Assessment Clean, dry & intact 01/03/24 0600   Line Status Arterial fluids per protocol;Intact and in place;Blood return noted 01/03/24 0600   Art Line Interventions Zeroed and calibrated;Leveled;Connections checked and tightened;Flushed per protocol 01/03/24 0600   Color/Movement/Sensation Capillary refill less than 3 sec 01/03/24 0600   Dressing Type Transparent 01/03/24 0600   Dressing Status Clean, dry & intact 01/03/24 0600   Dressing  Labs     01/03/24  0524   LIPASE 49.0     PT/INR:   Recent Labs     01/01/24 1911   PROTIME 13.5   INR 1.03     APTT:   Recent Labs     01/01/24 1911   APTT 30.3     UA:  Recent Labs     01/01/24  1855   COLORU Yellow   PHUR 8.0   WBCUA 68*   RBCUA 0   BACTERIA None Seen   CLARITYU Clear   SPECGRAV 1.014   LEUKOCYTESUR MODERATE*   UROBILINOGEN 1.0   BILIRUBINUR Negative   BLOODU Negative   GLUCOSEU Negative     Recent Labs     01/02/24  1800 01/03/24  0524   PHART 7.345* 7.371   POL6EYT 45.8* 41.9   PO2ART 99.4 81.6       Vent Information  Ventilator ID: [id number]  Ventilator Initiate: Yes  Vent Mode: SIMV/VC    Radiology Review:  Pertinent images / reports were reviewed as a part of this visit.    CT Chest w/ contrast: No results found for this or any previous visit.      CT Chest w/o contrast: Results for orders placed during the hospital encounter of 12/28/23    CT CHEST WO CONTRAST    Narrative  EXAMINATION:  CT OF THE CHEST WITHOUT CONTRAST 12/28/2023 1:35 pm    TECHNIQUE:  CT of the chest was performed without the administration of intravenous  contrast. Multiplanar reformatted images are provided for review. Automated  exposure control, iterative reconstruction, and/or weight based adjustment of  the mA/kV was utilized to reduce the radiation dose to as low as reasonably  achievable.    COMPARISON:  PET-CT 12/12/2023 and CTs 11/09/2023 and 12/23/2021.    HISTORY:  ORDERING SYSTEM PROVIDED HISTORY: Pulmonary nodule seen on imaging study  TECHNOLOGIST PROVIDED HISTORY:  Reason for exam:->pulmonary nodule with monarch protocol  Reason for exam:->Please ensure monarch protocol  Reason for Exam: pt is here to follow up on previously seen Lung Nodule. pt  states he is having surgery and  wanted this CT  Relevant Medical/Surgical History: COPD, Splenectomy, Cholecystectomy, Back  surgery,    FINDINGS:  Mediastinum: A lymph node in the aortopulmonary window measures 1 cm in  shortest dimension and is larger than  and/or atelectasis  posteriorly.  Suggest pulmonology correlation and follow-up to assure  resolution    Small pulmonary nodules right upper lobe measuring up to 9 mm which are more  prominent.  Recommend follow-up    Small lymph nodes in the mediastinum which are not pathologic by size criteria    COPD with fibrosis and scarring along the upper lobes and both lung bases  which is unchanged.    Extensive postop changes in the spine and lower mediastinum which is  unchanged.    Fleischner Society guidelines for follow-up and management of incidentally  detected pulmonary nodules    Multiple Solid Nodules:    Nodule size less than 6 mm  In a low-risk patient, no routine follow-up.  In a high-risk patient, optional CT at 12 months.    Nodule size equals 6-8 mm  In a low-risk patient, CT at 3-6 months, then consider CT at 18-24 months.  In a high-risk patient, CT at 3-6 months, then CT at 18-24 months.    Nodule size greater than 8 mm  In a low-risk patient, CT at 3-6 months, then consider CT at 18-24 months.  In a high-risk patient, CT at 3-6 months, then CT at 18-24 months.    - Low risk patients include individuals with minimal or absent history of  smoking and other known risk factors.    - High risk patients include individuals with a history or smoking or known  risk factors.    Radiology 2017 http://pubs.rsna.org/doi/full/10.1148/radiol.1417026297      CXR PA/LAT: Results for orders placed during the hospital encounter of 01/01/24    XR CHEST (2 VW)    Narrative  EXAMINATION:  TWO XRAY VIEWS OF THE CHEST    1/1/2024 6:00 pm    COMPARISON:  08/28/2023    HISTORY:  ORDERING SYSTEM PROVIDED HISTORY: COPD - preop  TECHNOLOGIST PROVIDED HISTORY:  Reason for exam:->COPD - preop    FINDINGS:  The lungs are hyperexpanded without acute focal process.  There is no  effusion or pneumothorax. The cardiomediastinal silhouette is stable. The  osseous structures are stable with thoracolumbar and right clavicular  fixation

## 2024-01-03 NOTE — OP NOTE
[facility]           [address] [place] [address]                                OPERATIVE REPORT    PATIENT NAME: CODY TSANG                         :        1955  MED REC NO:   [id number]                          ROOM:       1312  ACCOUNT NO:   [id number]                           ADMIT DATE: 2024  PROVIDER:     Live Stanford MD    DATE OF PROCEDURE:  2024    PREOPERATIVE DIAGNOSIS:  Infrarenal abdominal aortic aneurysm with right  common iliac artery aneurysm (5.7 cm, 2.8 cm respectively).    OPERATIVE DIAGNOSIS:  Infrarenal abdominal aortic aneurysm with right  common iliac artery aneurysm (5.7 cm, 2.8 cm respectively).    OPERATION PERFORMED:  1.  Open repair of abdominal aortoiliac aneurysm with aortobiiliac graft  (16 x 8 mm Dacron).  2.  Extensive adhesiolysis for exposure.  3.  Reimplantation of inferior mesenteric artery.    SURGEON:  Live Stanford MD    ANESTHESIA:  General endotracheal.    ESTIMATED BLOOD LOSS:  2000 mL.    HISTORY:  The patient is a 68-year-old gentleman who was initially met  in November for evaluation of an incidentally noted aortoiliac aneurysm  seen on CT scan done for workup of general weight loss and malaise.  It  was recommended he have this aneurysm which measured 5.7 cm repaired,  but he was not a candidate for endovascular repair.  He agreed to  proceed understanding risks, benefits, and other options.    OPERATIVE PROCEDURE:  The patient was brought to the operating room and  placed on the operating room table in supine position.  After induction  of anesthesia and placing all lines, the abdomen and groins were prepped  and draped in a sterile fashion.  A previous midline incision was  reopened sharply from just below the xiphoid to just above the symphysis  pubis.  Electrocautery was used to divide soft tissue and fascia.  The  previous laparotomy was done for splenectomy and care was  to proceed with reimplantation of the ARPIT into left limb of  an aortobiiliac graft.  The patient was given additional 1000 units of  heparin.  The origin of the inferior mesenteric artery was taken off the  aortic wall with a Carrel patch sharply.  The cuff was excised from the  left limb of the graft after clamping and an end-to-side anastomosis was  created using a 4-0 running Prolene and to sew the Carrel patch to the  left iliac limb.  After completion of this, there was noted to be a  strong pulse in the ARPIT with a good Doppler signal & the colon  Immediately became peristaltic and pink.  The retroperitoneum was  examined and found to be hemostatic.  It was then closed over the graft  using two separate running 2-0 Vicryl tied in the center.  The NG tube  was confirmed in the stomach and the fascia was closed using two  separate running #1 PDS looped sutures tied in the center.  Skin was  closed using a subcuticular stitch of 4-0 Monocryl and Dermabond.   Operation was very complicated because of the take down of adhesions,  reimplantation of the ARPIT, and extensive pelvic dissection and difficult  distal anastomosis resulting in unusual amount of time for this type of  operation lasting five and a half hours.        GIANA PANDA MD    D: 01/02/2024 14:57:40       T: 01/02/2024 15:04:41     [email or web address]  Job#: [id number]     Doc#: [id number]    CC:

## 2024-01-03 NOTE — PROGRESS NOTES
VASCULAR  POD#1    Intubated and sedated. No adverse events overnight. No BMs    110/64 (Nipride 0,25 mcg/kg/min)   Afeb  CVP pending  I/O +3900 (, UO 1555)  Lungs clear  Abd soft without distention; silent  Palpable B DP & PT pulses  Incision intact without erythema or hematoma    Labs reviewed  Na 151  Cl 118  Creat 1.0 Ca 7.3  HgB 11.6    ABG SIMV 14   +5 peep 7.37/42/82    CXR clear    A/P: S/P AAA repair (ABIG + ARPIT reimplantation)   Hemodynamically stable.   Will adjust IVF composition and rate (decrease).   Continue NPO and NGT.   Decrease vent rate and check ABG. Dr. Donahue to see pt for management and extubation .   Wean sedation etc.   Continue CVU.     Josef Stanford

## 2024-01-03 NOTE — PROGRESS NOTES
Pt max out on propofol, and becoming increasingly agitated ( RASS +2), trying to pull lines and tubes. On call Vascular surgery MD notified, orders for 1mg Versed one time. Critical care notified, orders for fentanyl gtt placed per ERIKA Quinones.

## 2024-01-03 NOTE — PROGRESS NOTES
20:26 Pt hypertensive beginning of shift; /91 (118), HR 89. Per orders Nipride started at 0.25 mg/kg/min.

## 2024-01-04 ENCOUNTER — APPOINTMENT (OUTPATIENT)
Dept: GENERAL RADIOLOGY | Age: 69
DRG: 268 | End: 2024-01-04
Attending: SURGERY
Payer: MEDICARE

## 2024-01-04 PROBLEM — F39 MOOD DISORDER (HCC): Status: ACTIVE | Noted: 2024-01-04

## 2024-01-04 PROBLEM — R91.1 PULMONARY NODULE: Status: RESOLVED | Noted: 2024-01-01 | Resolved: 2024-01-04

## 2024-01-04 PROBLEM — R45.1 AGITATION: Status: ACTIVE | Noted: 2024-01-04

## 2024-01-04 PROBLEM — K59.00 CONSTIPATION: Status: ACTIVE | Noted: 2024-01-04

## 2024-01-04 PROBLEM — N17.9 AKI (ACUTE KIDNEY INJURY) (HCC): Status: RESOLVED | Noted: 2020-09-21 | Resolved: 2024-01-04

## 2024-01-04 PROBLEM — E87.20 LACTIC ACIDOSIS: Status: RESOLVED | Noted: 2020-09-21 | Resolved: 2024-01-04

## 2024-01-04 PROBLEM — E87.0 HYPERNATREMIA: Status: ACTIVE | Noted: 2024-01-04

## 2024-01-04 PROBLEM — T50.901A ACCIDENTAL DRUG OVERDOSE: Status: RESOLVED | Noted: 2023-11-09 | Resolved: 2024-01-04

## 2024-01-04 PROBLEM — T50.901A OD (OVERDOSE OF DRUG), ACCIDENTAL OR UNINTENTIONAL, INITIAL ENCOUNTER: Status: RESOLVED | Noted: 2023-11-09 | Resolved: 2024-01-04

## 2024-01-04 PROBLEM — D64.9 ANEMIA: Status: ACTIVE | Noted: 2024-01-04

## 2024-01-04 PROBLEM — I71.43 INFRARENAL ABDOMINAL AORTIC ANEURYSM (AAA) WITHOUT RUPTURE (HCC): Status: RESOLVED | Noted: 2023-11-09 | Resolved: 2024-01-04

## 2024-01-04 LAB
AMORPH SED URNS QL MICRO: NORMAL /HPF
ANION GAP SERPL CALCULATED.3IONS-SCNC: 4 MMOL/L (ref 3–16)
BILIRUB UR QL STRIP.AUTO: NEGATIVE
BLOOD BANK DISPENSE STATUS: NORMAL
BLOOD BANK PRODUCT CODE: NORMAL
BPU ID: NORMAL
BUN SERPL-MCNC: 18 MG/DL (ref 7–20)
CALCIUM SERPL-MCNC: 7.9 MG/DL (ref 8.3–10.6)
CHARACTER UR: NORMAL
CHLORIDE SERPL-SCNC: 114 MMOL/L (ref 99–110)
CLARITY UR: CLEAR
CO2 SERPL-SCNC: 25 MMOL/L (ref 21–32)
COLOR UR: YELLOW
CREAT SERPL-MCNC: 0.8 MG/DL (ref 0.8–1.3)
DEPRECATED RDW RBC AUTO: 14.1 % (ref 12.4–15.4)
DESCRIPTION BLOOD BANK: NORMAL
GFR SERPLBLD CREATININE-BSD FMLA CKD-EPI: >60 ML/MIN/{1.73_M2}
GLUCOSE BLD-MCNC: 122 MG/DL (ref 70–99)
GLUCOSE BLD-MCNC: 125 MG/DL (ref 70–99)
GLUCOSE BLD-MCNC: 99 MG/DL (ref 70–99)
GLUCOSE SERPL-MCNC: 122 MG/DL (ref 70–99)
GLUCOSE UR STRIP.AUTO-MCNC: NEGATIVE MG/DL
HCT VFR BLD AUTO: 27 % (ref 40.5–52.5)
HGB BLD-MCNC: 9 G/DL (ref 13.5–17.5)
HGB UR QL STRIP.AUTO: ABNORMAL
KETONES UR STRIP.AUTO-MCNC: NEGATIVE MG/DL
LEUKOCYTE ESTERASE UR QL STRIP.AUTO: NEGATIVE
MAGNESIUM SERPL-MCNC: 2.4 MG/DL (ref 1.8–2.4)
MCH RBC QN AUTO: 30.6 PG (ref 26–34)
MCHC RBC AUTO-ENTMCNC: 33.3 G/DL (ref 31–36)
MCV RBC AUTO: 92 FL (ref 80–100)
NITRITE UR QL STRIP.AUTO: NEGATIVE
PERFORMED ON: ABNORMAL
PERFORMED ON: ABNORMAL
PERFORMED ON: NORMAL
PH UR STRIP.AUTO: 6 [PH] (ref 5–8)
PHOSPHATE SERPL-MCNC: 3.1 MG/DL (ref 2.5–4.9)
PLATELET # BLD AUTO: 262 K/UL (ref 135–450)
PMV BLD AUTO: 7.6 FL (ref 5–10.5)
POTASSIUM SERPL-SCNC: 4.2 MMOL/L (ref 3.5–5.1)
PROT UR STRIP.AUTO-MCNC: 30 MG/DL
RBC # BLD AUTO: 2.93 M/UL (ref 4.2–5.9)
RBC #/AREA URNS HPF: NORMAL /HPF (ref 0–4)
SODIUM SERPL-SCNC: 143 MMOL/L (ref 136–145)
SODIUM UR-SCNC: 64 MMOL/L
SP GR UR STRIP.AUTO: 1.01 (ref 1–1.03)
UA COMPLETE W REFLEX CULTURE PNL UR: ABNORMAL
UA DIPSTICK W REFLEX MICRO PNL UR: YES
URN SPEC COLLECT METH UR: ABNORMAL
UROBILINOGEN UR STRIP-ACNC: 0.2 E.U./DL
WBC # BLD AUTO: 27.4 K/UL (ref 4–11)
WBC #/AREA URNS HPF: NORMAL /HPF (ref 0–5)

## 2024-01-04 PROCEDURE — 2580000003 HC RX 258: Performed by: SURGERY

## 2024-01-04 PROCEDURE — 2500000003 HC RX 250 WO HCPCS: Performed by: NURSE PRACTITIONER

## 2024-01-04 PROCEDURE — 84100 ASSAY OF PHOSPHORUS: CPT

## 2024-01-04 PROCEDURE — 81001 URINALYSIS AUTO W/SCOPE: CPT

## 2024-01-04 PROCEDURE — 94761 N-INVAS EAR/PLS OXIMETRY MLT: CPT

## 2024-01-04 PROCEDURE — 6360000002 HC RX W HCPCS: Performed by: STUDENT IN AN ORGANIZED HEALTH CARE EDUCATION/TRAINING PROGRAM

## 2024-01-04 PROCEDURE — 2580000003 HC RX 258: Performed by: STUDENT IN AN ORGANIZED HEALTH CARE EDUCATION/TRAINING PROGRAM

## 2024-01-04 PROCEDURE — 2500000003 HC RX 250 WO HCPCS: Performed by: SURGERY

## 2024-01-04 PROCEDURE — 97166 OT EVAL MOD COMPLEX 45 MIN: CPT

## 2024-01-04 PROCEDURE — 99233 SBSQ HOSP IP/OBS HIGH 50: CPT | Performed by: INTERNAL MEDICINE

## 2024-01-04 PROCEDURE — APPNB15 APP NON BILLABLE TIME 0-15 MINS: Performed by: NURSE PRACTITIONER

## 2024-01-04 PROCEDURE — 2700000000 HC OXYGEN THERAPY PER DAY

## 2024-01-04 PROCEDURE — 84300 ASSAY OF URINE SODIUM: CPT

## 2024-01-04 PROCEDURE — 97530 THERAPEUTIC ACTIVITIES: CPT

## 2024-01-04 PROCEDURE — 94640 AIRWAY INHALATION TREATMENT: CPT

## 2024-01-04 PROCEDURE — 97162 PT EVAL MOD COMPLEX 30 MIN: CPT

## 2024-01-04 PROCEDURE — 83735 ASSAY OF MAGNESIUM: CPT

## 2024-01-04 PROCEDURE — 6360000002 HC RX W HCPCS: Performed by: NURSE PRACTITIONER

## 2024-01-04 PROCEDURE — 71045 X-RAY EXAM CHEST 1 VIEW: CPT

## 2024-01-04 PROCEDURE — 6370000000 HC RX 637 (ALT 250 FOR IP): Performed by: SURGERY

## 2024-01-04 PROCEDURE — 2100000000 HC CCU R&B

## 2024-01-04 PROCEDURE — 85027 COMPLETE CBC AUTOMATED: CPT

## 2024-01-04 PROCEDURE — 6360000002 HC RX W HCPCS: Performed by: SURGERY

## 2024-01-04 PROCEDURE — 80048 BASIC METABOLIC PNL TOTAL CA: CPT

## 2024-01-04 RX ORDER — DEXTROSE MONOHYDRATE, SODIUM CHLORIDE, AND POTASSIUM CHLORIDE 50; 1.49; 4.5 G/1000ML; G/1000ML; G/1000ML
INJECTION, SOLUTION INTRAVENOUS CONTINUOUS
Status: DISCONTINUED | OUTPATIENT
Start: 2024-01-04 | End: 2024-01-06

## 2024-01-04 RX ORDER — LORAZEPAM 2 MG/ML
3 INJECTION INTRAMUSCULAR
Status: DISCONTINUED | OUTPATIENT
Start: 2024-01-04 | End: 2024-01-04

## 2024-01-04 RX ORDER — LORAZEPAM 2 MG/ML
4 INJECTION INTRAMUSCULAR
Status: DISCONTINUED | OUTPATIENT
Start: 2024-01-04 | End: 2024-01-04

## 2024-01-04 RX ORDER — LORAZEPAM 1 MG/1
3 TABLET ORAL
Status: DISCONTINUED | OUTPATIENT
Start: 2024-01-04 | End: 2024-01-04

## 2024-01-04 RX ORDER — LORAZEPAM 1 MG/1
2 TABLET ORAL
Status: DISCONTINUED | OUTPATIENT
Start: 2024-01-04 | End: 2024-01-04

## 2024-01-04 RX ORDER — LORAZEPAM 2 MG/ML
1 INJECTION INTRAMUSCULAR
Status: DISCONTINUED | OUTPATIENT
Start: 2024-01-04 | End: 2024-01-04

## 2024-01-04 RX ORDER — SODIUM CHLORIDE 9 MG/ML
INJECTION, SOLUTION INTRAVENOUS PRN
Status: DISCONTINUED | OUTPATIENT
Start: 2024-01-04 | End: 2024-01-08 | Stop reason: HOSPADM

## 2024-01-04 RX ORDER — SODIUM CHLORIDE 0.9 % (FLUSH) 0.9 %
5-40 SYRINGE (ML) INJECTION EVERY 12 HOURS SCHEDULED
Status: DISCONTINUED | OUTPATIENT
Start: 2024-01-04 | End: 2024-01-08 | Stop reason: HOSPADM

## 2024-01-04 RX ORDER — SODIUM CHLORIDE 0.9 % (FLUSH) 0.9 %
5-40 SYRINGE (ML) INJECTION PRN
Status: DISCONTINUED | OUTPATIENT
Start: 2024-01-04 | End: 2024-01-08 | Stop reason: HOSPADM

## 2024-01-04 RX ORDER — TRAZODONE HYDROCHLORIDE 50 MG/1
50 TABLET ORAL NIGHTLY PRN
Status: DISCONTINUED | OUTPATIENT
Start: 2024-01-04 | End: 2024-01-04

## 2024-01-04 RX ORDER — LORAZEPAM 2 MG/ML
2 INJECTION INTRAMUSCULAR
Status: DISCONTINUED | OUTPATIENT
Start: 2024-01-04 | End: 2024-01-04

## 2024-01-04 RX ORDER — GABAPENTIN 600 MG/1
600 TABLET ORAL 3 TIMES DAILY
Status: DISCONTINUED | OUTPATIENT
Start: 2024-01-04 | End: 2024-01-04

## 2024-01-04 RX ORDER — DEXTROSE MONOHYDRATE 50 MG/ML
INJECTION, SOLUTION INTRAVENOUS CONTINUOUS
Status: DISCONTINUED | OUTPATIENT
Start: 2024-01-04 | End: 2024-01-04

## 2024-01-04 RX ORDER — FUROSEMIDE 10 MG/ML
20 INJECTION INTRAMUSCULAR; INTRAVENOUS ONCE
Status: COMPLETED | OUTPATIENT
Start: 2024-01-04 | End: 2024-01-04

## 2024-01-04 RX ORDER — LORAZEPAM 1 MG/1
1 TABLET ORAL
Status: DISCONTINUED | OUTPATIENT
Start: 2024-01-04 | End: 2024-01-04

## 2024-01-04 RX ORDER — MULTIVITAMIN WITH IRON
1 TABLET ORAL DAILY
Status: DISCONTINUED | OUTPATIENT
Start: 2024-01-04 | End: 2024-01-04

## 2024-01-04 RX ORDER — GAUZE BANDAGE 2" X 2"
100 BANDAGE TOPICAL DAILY
Status: DISCONTINUED | OUTPATIENT
Start: 2024-01-11 | End: 2024-01-05

## 2024-01-04 RX ORDER — TIZANIDINE 4 MG/1
4 TABLET ORAL EVERY 8 HOURS PRN
Status: DISCONTINUED | OUTPATIENT
Start: 2024-01-04 | End: 2024-01-04

## 2024-01-04 RX ORDER — KETOROLAC TROMETHAMINE 15 MG/ML
15 INJECTION, SOLUTION INTRAMUSCULAR; INTRAVENOUS EVERY 6 HOURS
Status: COMPLETED | OUTPATIENT
Start: 2024-01-04 | End: 2024-01-07

## 2024-01-04 RX ORDER — MORPHINE SULFATE 2 MG/ML
2 INJECTION, SOLUTION INTRAMUSCULAR; INTRAVENOUS EVERY 4 HOURS PRN
Status: ACTIVE | OUTPATIENT
Start: 2024-01-04 | End: 2024-01-06

## 2024-01-04 RX ORDER — LORAZEPAM 1 MG/1
4 TABLET ORAL
Status: DISCONTINUED | OUTPATIENT
Start: 2024-01-04 | End: 2024-01-04

## 2024-01-04 RX ORDER — LACTULOSE 10 G/15ML
20 SOLUTION ORAL 2 TIMES DAILY
Status: DISCONTINUED | OUTPATIENT
Start: 2024-01-04 | End: 2024-01-04

## 2024-01-04 RX ADMIN — SODIUM CHLORIDE, PRESERVATIVE FREE 10 ML: 5 INJECTION INTRAVENOUS at 20:15

## 2024-01-04 RX ADMIN — MORPHINE SULFATE 2 MG: 2 INJECTION, SOLUTION INTRAMUSCULAR; INTRAVENOUS at 00:04

## 2024-01-04 RX ADMIN — VERAPAMIL HYDROCHLORIDE 80 MG: 80 TABLET ORAL at 08:04

## 2024-01-04 RX ADMIN — METOPROLOL TARTRATE 5 MG: 5 INJECTION, SOLUTION INTRAVENOUS at 20:15

## 2024-01-04 RX ADMIN — THIAMINE HYDROCHLORIDE 500 MG: 100 INJECTION, SOLUTION INTRAMUSCULAR; INTRAVENOUS at 01:30

## 2024-01-04 RX ADMIN — KETOROLAC TROMETHAMINE 15 MG: 15 INJECTION, SOLUTION INTRAMUSCULAR; INTRAVENOUS at 08:05

## 2024-01-04 RX ADMIN — SODIUM CHLORIDE, PRESERVATIVE FREE 20 MG: 5 INJECTION INTRAVENOUS at 20:14

## 2024-01-04 RX ADMIN — SODIUM CHLORIDE, PRESERVATIVE FREE 20 MG: 5 INJECTION INTRAVENOUS at 08:04

## 2024-01-04 RX ADMIN — SODIUM CHLORIDE, PRESERVATIVE FREE 10 ML: 5 INJECTION INTRAVENOUS at 07:53

## 2024-01-04 RX ADMIN — LORAZEPAM 1 MG: 2 INJECTION INTRAMUSCULAR; INTRAVENOUS at 00:47

## 2024-01-04 RX ADMIN — DEXMEDETOMIDINE HYDROCHLORIDE 0.5 MCG/KG/HR: 400 INJECTION INTRAVENOUS at 09:59

## 2024-01-04 RX ADMIN — SODIUM CHLORIDE, PRESERVATIVE FREE 10 ML: 5 INJECTION INTRAVENOUS at 08:07

## 2024-01-04 RX ADMIN — FUROSEMIDE 20 MG: 10 INJECTION, SOLUTION INTRAMUSCULAR; INTRAVENOUS at 15:15

## 2024-01-04 RX ADMIN — VERAPAMIL HYDROCHLORIDE 80 MG: 80 TABLET ORAL at 20:16

## 2024-01-04 RX ADMIN — POTASSIUM CHLORIDE, DEXTROSE MONOHYDRATE AND SODIUM CHLORIDE: 150; 5; 450 INJECTION, SOLUTION INTRAVENOUS at 06:51

## 2024-01-04 RX ADMIN — DULOXETINE HYDROCHLORIDE 60 MG: 60 CAPSULE, DELAYED RELEASE ORAL at 08:30

## 2024-01-04 RX ADMIN — THIAMINE HYDROCHLORIDE 500 MG: 100 INJECTION, SOLUTION INTRAMUSCULAR; INTRAVENOUS at 16:32

## 2024-01-04 RX ADMIN — DEXMEDETOMIDINE HYDROCHLORIDE 0.4 MCG/KG/HR: 400 INJECTION INTRAVENOUS at 23:41

## 2024-01-04 RX ADMIN — POTASSIUM CHLORIDE, DEXTROSE MONOHYDRATE AND SODIUM CHLORIDE: 150; 5; 450 INJECTION, SOLUTION INTRAVENOUS at 23:40

## 2024-01-04 RX ADMIN — MORPHINE SULFATE 2 MG: 2 INJECTION, SOLUTION INTRAMUSCULAR; INTRAVENOUS at 05:19

## 2024-01-04 RX ADMIN — THIAMINE HYDROCHLORIDE 500 MG: 100 INJECTION, SOLUTION INTRAMUSCULAR; INTRAVENOUS at 10:21

## 2024-01-04 RX ADMIN — DEXTROSE MONOHYDRATE: 50 INJECTION, SOLUTION INTRAVENOUS at 01:27

## 2024-01-04 RX ADMIN — METOPROLOL TARTRATE 5 MG: 5 INJECTION, SOLUTION INTRAVENOUS at 06:40

## 2024-01-04 RX ADMIN — MORPHINE SULFATE 2 MG: 2 INJECTION, SOLUTION INTRAMUSCULAR; INTRAVENOUS at 17:27

## 2024-01-04 RX ADMIN — MOMETASONE FUROATE AND FORMOTEROL FUMARATE DIHYDRATE 2 PUFF: 200; 5 AEROSOL RESPIRATORY (INHALATION) at 19:47

## 2024-01-04 RX ADMIN — VERAPAMIL HYDROCHLORIDE 80 MG: 80 TABLET ORAL at 16:41

## 2024-01-04 RX ADMIN — TIOTROPIUM BROMIDE INHALATION SPRAY 2 PUFF: 3.12 SPRAY, METERED RESPIRATORY (INHALATION) at 07:54

## 2024-01-04 RX ADMIN — METOPROLOL TARTRATE 5 MG: 5 INJECTION, SOLUTION INTRAVENOUS at 14:14

## 2024-01-04 RX ADMIN — MOMETASONE FUROATE AND FORMOTEROL FUMARATE DIHYDRATE 2 PUFF: 200; 5 AEROSOL RESPIRATORY (INHALATION) at 07:54

## 2024-01-04 RX ADMIN — KETOROLAC TROMETHAMINE 15 MG: 15 INJECTION, SOLUTION INTRAMUSCULAR; INTRAVENOUS at 20:14

## 2024-01-04 RX ADMIN — LISINOPRIL 40 MG: 10 TABLET ORAL at 08:14

## 2024-01-04 RX ADMIN — KETOROLAC TROMETHAMINE 15 MG: 15 INJECTION, SOLUTION INTRAMUSCULAR; INTRAVENOUS at 14:14

## 2024-01-04 ASSESSMENT — PAIN DESCRIPTION - DESCRIPTORS
DESCRIPTORS: DISCOMFORT
DESCRIPTORS: ACHING
DESCRIPTORS: ACHING
DESCRIPTORS: DISCOMFORT
DESCRIPTORS: DULL
DESCRIPTORS: DISCOMFORT

## 2024-01-04 ASSESSMENT — PAIN DESCRIPTION - LOCATION
LOCATION: ABDOMEN;INCISION;BACK
LOCATION: ABDOMEN
LOCATION: ABDOMEN;INCISION
LOCATION: ABDOMEN

## 2024-01-04 ASSESSMENT — PAIN SCALES - GENERAL
PAINLEVEL_OUTOF10: 6
PAINLEVEL_OUTOF10: 2
PAINLEVEL_OUTOF10: 6
PAINLEVEL_OUTOF10: 3
PAINLEVEL_OUTOF10: 0
PAINLEVEL_OUTOF10: 8
PAINLEVEL_OUTOF10: 6
PAINLEVEL_OUTOF10: 2

## 2024-01-04 ASSESSMENT — PAIN DESCRIPTION - ORIENTATION
ORIENTATION: MID
ORIENTATION: MID
ORIENTATION: MID;ANTERIOR
ORIENTATION: MID

## 2024-01-04 NOTE — PROGRESS NOTES
Per patient's wife patient does not drink alcohol however he does take a lot of pain medications at home.    Gisela Mae RN

## 2024-01-04 NOTE — PLAN OF CARE
Problem: Safety - Adult  Goal: Free from fall injury  Outcome: Progressing     Problem: ABCDS Injury Assessment  Goal: Absence of physical injury  Outcome: Progressing     Problem: Discharge Planning  Goal: Discharge to home or other facility with appropriate resources  Outcome: Progressing     Problem: Pain  Goal: Verbalizes/displays adequate comfort level or baseline comfort level  Outcome: Progressing     Problem: Safety - Medical Restraint  Goal: Remains free of injury from restraints (Restraint for Interference with Medical Device)  Description: INTERVENTIONS:  1. Determine that other, less restrictive measures have been tried or would not be effective before applying the restraint  2. Evaluate the patient's condition at the time of restraint application  3. Inform patient/family regarding the reason for restraint  4. Q2H: Monitor safety, psychosocial status, comfort, nutrition and hydration  Outcome: Progressing     Problem: Skin/Tissue Integrity  Goal: Absence of new skin breakdown  Description: 1.  Monitor for areas of redness and/or skin breakdown  2.  Assess vascular access sites hourly  3.  Every 4-6 hours minimum:  Change oxygen saturation probe site  4.  Every 4-6 hours:  If on nasal continuous positive airway pressure, respiratory therapy assess nares and determine need for appliance change or resting period.  Outcome: Progressing

## 2024-01-04 NOTE — CONSULTS
Hospitalist Consult Note      PCP: Suzette Oquendo APRN    Date of Admission: 1/1/2024  Current Hospital Day: 4    Chief Admission Complaint: AAA    Subjective:   Agitation      Presenting Admission History:      Patient is a 68-year-old male who was admitted by vascular surgery on 1/2/2024 for elective open AAA repair of 5.7 cm with right LINNEA 2.7 cm, asymptomatic related to this.  Has history of CAD, COPD.  History splenectomy.    Had open abdominal aneurysm repair and reimplantation of inferior mesenteric artery on the same date 1/2 by Dr. Stanford.  No complications intra procedurally and minimal EBL per operative report.  Operation noted to be very complicated related to takedown of adhesions, reimplantation of ARPIT, extensive pelvic dissection and difficult distal anastomosis.  Operation lasted 5-1/2 hours.  Vascular has recommended NG tube to remain in place pending bowel movements. Patient was extubated 1/3.    Consulted overnight 1/3 - 1/4 for sudden onset agitation.  Patient is alert and oriented, denies use of alcohol.  We are attempting to obtain information from wife regarding drinking history.  Requiring sedation with Precedex and also probably Ativan PRNs.  Discussed with lab, apparently cannot add on alcohol to admission labs.  Patient endorses 1 PPD smoking history, declines nicotine patch.  Says that he had a BM yesterday?  Still feeling constipated and requests further bowel regimen.  Complains of mid abdominal and RLQ pain.  Continues to request water in excess.  Hospitalist team will follow.    Assessment/Plan:      Current Principal Problem:     Infrarenal abdominal aortic aneurysm, without rupture (HCC)    AAA repair, 1/2/2024 Dr. Stanford.  Procedure was complicated by adhesiolysis, reimplantation of ARPIT, extensive pelvic dissection and difficult distal anastomosis.  Operation lasted 5 and half hours.  Had NGT in place.  Constipation.  Further bowel regimen.  Care with opiate pain

## 2024-01-04 NOTE — PROGRESS NOTES
Pulmonary Progress Note    Date of Admission: 1/1/2024   LOS: 3 days       CC:  No chief complaint on file.   copd    Subjective:  Extubated in chair.       ROS:   No nausea  No Vomiting  No chest pain       Assessment:          Plan:     This note may have been transcribed using Dragon Dictation software. Please disregard any translational errors.       Hospital Day: 3     Pulmonary nodule left lower lobe  Case being scheduled  Future Appointments   Date Time Provider Department Center   3/4/2024 11:40 AM Fox Donahue MD  PULM [facility]              AAA status post repair  Open AAA with graft  Pod day 1  Per vascular surgery      COPD  Dulera   Spiriva        agitation   On precedex.    Weaning precedex.             Data:        PHYSICAL EXAM:   Blood pressure 111/62, pulse 76, temperature 98.2 °F (36.8 °C), temperature source Oral, resp. rate 12, height 1.727 m (5' 8\"), weight 73.1 kg (161 lb 2.5 oz), SpO2 93 %.'  Body mass index is 24.5 kg/m².   Gen: No distress.    ENT:   Resp: No accessory muscle use. No crackles. No wheezes. No rhonchi.    CV: Regular rate. Regular rhythm. No murmur or rub. No edema.   Skin: Warm, dry, normal texture and turgor. No nodule on exposed extremities.   M/S: No cyanosis. No clubbing. No joint deformity.  Psych: awake.       Medications:    Scheduled Meds:   sodium chloride flush  5-40 mL IntraVENous 2 times per day    thiamine (B-1) 500 mg in sodium chloride 0.9 % 100 mL IVPB  500 mg IntraVENous Q8H    Followed by    [START ON 1/6/2024] thiamine (B-1) 250 mg in sodium chloride 0.9 % 100 mL IVPB  250 mg IntraVENous Q24H    Followed by    [START ON 1/11/2024] thiamine  100 mg Oral Daily    ketorolac  15 mg IntraVENous Q6H    metoprolol  5 mg IntraVENous Q6H    sodium chloride flush  5-40 mL IntraVENous 2 times per day    famotidine  20 mg Oral BID    Or    famotidine (PEPCID) injection  20 mg IntraVENous BID    DULoxetine  60 mg Per G Tube Daily    lisinopril  40 mg Per NG tube

## 2024-01-04 NOTE — PROGRESS NOTES
Eating Heart-Healthy Food: Using the DASH Plan    Eating for your heart doesnt have to be hard or boring. You just need to know how to make healthier choices. The DASH eating plan has been developed to help you do just that. DASH stands for Dietary Approaches to Stop Hypertension. It is a plan that has been proven to be healthier for your heart and to lower your risk for high blood pressure. It can also help lower your risk for cancer, heart disease, osteoporosis, and diabetes.  Choosing from each food group  Choose foods from each of the food groups below each day. Try to get the recommended number of servings for each food group. The serving numbers are based on a diet of 2,000 calories a day. Talk to your doctor if youre unsure about your calorie needs. Along with getting the correct servings, the DASH plan also recommends a sodium intake less than 2,300 mg per day.        Grains  Servings: 6 to 8 a day  A serving is:  · 1 slice bread  · 1 ounce dry cereal  · Half a cup cooked rice, pasta or cereal  Best choices: Whole grains and any grains high in fiber. Vegetables  Servings: 4 to 5 a day  A serving is:  · 1 cup raw leafy vegetable  · Half a cup cut-up raw or cooked vegetable  · Half a cup vegetable juice  Best choices: Fresh or frozen vegetables prepared without added salt or fat.   Fruits  Servings: 4 to 5 a day  A serving is:  · 1 medium fruit  · One-quarter cup dried fruit  · Half a cup fresh, frozen, or canned fruit  · Half a cup of 100% fruit juices  Best choices: A variety of fresh fruits of different colors. Whole fruits are a better choice than fruit juices. Low-fat or fat-free dairy  Servings: 2 to 3 a day  A serving is:  · 1 cup milk  · 1 cup yogurt  · One and a half ounces cheese  Best choices: Skim or 1% milk, low-fat or fat-free yogurt or buttermilk, and low-fat cheeses.         Lean meats, poultry, fish  Servings: 6 or fewer a day  A serving is:  · 1 ounce cooked meats, poultry, or fish  · 1  VASCULAR                 POD#2     Extubated yesterday without incident. Feels bloated. No BMs  Agitated last PM but is now more appropriate and cooperative on low dose Precedex.     140/70  (off Nipride)      750-110             Afeb                 CVP 3  I/O +1283 (UO 1555,  - dark green)  Lungs clear  Abd soft, distended; silent  Palpable B DP & PT pulses  Incision intact without erythema or hematoma     Labs reviewed             Na 143       K 4.2      Cl 114              Creat 0.8        Ca 7.9              HgB 9.0 WBC 27.4                                      CXR pending     A/P:     S/P AAA repair (ABIG + ARPIT reimplantation)              Hemodynamically stable.              Will continue D51/2NS at 75 cc/hr - lasix later today.              Continue NPO and NGT. Expected postop ileus due to adhesiolysis and protracted time with open abdomen as well as ARPIT replant.              No signs of renal insufficiency. Abnormal Na due to fluid resuscitation and correcting with change in IVF composition.   Will hold off on nephrology consultation.              Agitation - not unexpected in this older pt with prolonged anesthesia and sedation. Will observe. Add toradol to avoid opiods.   Continue low dose precedex for now. Observe for any signs of withdrawal.   Anemia - no active bleeding. Drop in hemaglobin due to intra-op loss and resuscitation.   Up in chair. PT/OT.              Continue CVU.                 Josef tSanford   [id number] [employer] [address] [place] [address], [place] [address]. All rights reserved. This information is not intended as a substitute for professional medical care. Always follow your healthcare professional's instructions.

## 2024-01-04 NOTE — PLAN OF CARE
VSS. NSR on monitor. Patient is alert and oriented at times but very forgetful and agitated at times. Remains on precedex, did not tolerate lowering well. Patient became increasingly agitated and yelling at RN. Patient was pleasant this AM. Complains of abdominal incision pain and pain to back, medication given with some relief. Patient up to chair today with miladys steady. Patient NPO except for ice chips. Assisted with turns for pressure ulcer prevention. SCD's placed for DVT prophylaxis. Free from any injury. Patient and family updated on plan of care. Will continue plan of care.      Problem: Safety - Adult  Goal: Free from fall injury  1/4/2024 1502 by Gisela Mae RN  Outcome: Progressing  1/4/2024 0413 by Jeaneth Jones RN  Outcome: Progressing     Problem: ABCDS Injury Assessment  Goal: Absence of physical injury  1/4/2024 1502 by Gisela Mae RN  Outcome: Progressing  1/4/2024 0413 by Jeaneth Jones RN  Outcome: Progressing     Problem: Discharge Planning  Goal: Discharge to home or other facility with appropriate resources  1/4/2024 1502 by Gisela Mae RN  Outcome: Progressing  Flowsheets (Taken 1/4/2024 0789)  Discharge to home or other facility with appropriate resources:   Identify barriers to discharge with patient and caregiver   Arrange for needed discharge resources and transportation as appropriate   Identify discharge learning needs (meds, wound care, etc)  1/4/2024 0413 by Jeaneth Jones RN  Outcome: Progressing     Problem: Pain  Goal: Verbalizes/displays adequate comfort level or baseline comfort level  1/4/2024 1502 by Gisela Mae RN  Outcome: Progressing  1/4/2024 0413 by Jeaneth Jones RN  Outcome: Progressing     Problem: Skin/Tissue Integrity  Goal: Absence of new skin breakdown  Description: 1.  Monitor for areas of redness and/or skin breakdown  2.  Assess vascular access sites hourly  3.  Every 4-6 hours minimum:  Change oxygen saturation probe site  4.

## 2024-01-04 NOTE — PROGRESS NOTES
Patient was calm and cooperative, pleasant to talk with. Titrated precedex down at 1100. 1120 Patient became verbal and agitated. Patient demanding water and agitated that his wife is not here. RN educated that he can only have ice chips at this time and that he cannot eat an entire cup continuously, patient will verbalize understanding to eat ice in moderation however will forget conversation quickly after. RN and patient left multiple voicemail's for patient's wife. Patient reeducated that we have called her together 3 times and that we are awaiting her phone call. She eventually did call back and states that she will be by later today, spoke directly to patient as well. Patient immediatly asks patient's wife to get him something to drink over the telephone. Patient reeducated he can only have ice at this time. Will continue plan of care.     Gisela Mae, RN

## 2024-01-04 NOTE — PROGRESS NOTES
Physical Therapy  Facility/Department: [address] CVICU  Physical Therapy Initial Assessment    Name: Manfred Washington  : 1955  MRN: [id number]  Date of Service: 2024    Discharge Recommendations:  Continue to assess pending progress (Monitor closely for d/c home.)   PT Equipment Recommendations  Other: None anticipated at this time.    Current Am-Pac 10/24; anticipate cont progress with functional activities.       Assessment   Body Structures, Functions, Activity Limitations Requiring Skilled Therapeutic Intervention: Decreased functional mobility ;Decreased strength;Decreased safe awareness;Decreased cognition;Decreased endurance;Decreased balance;Increased pain  Assessment: 67 y/o male admit 2023 with Infrarenal AAA with R Common Iliac Artery Aneurysm. 2023 S/P Open Repair of AAA with Aorto Bi Iliac Graft, Extensive MIKAELA, Reimplant of Inferior Mesenteric Artery. PMH as noted including CAD, COPD,  Splenectomy, TIA, Spinal Stenosis, Back Surg, R TKR.  PTA pt living with wife in multi level home with few steps to enter; reports independent daily care and functional mobility (Rollator, Cane).  Currently pt requiring Mod assist x 2 to eob and Min assist for Transfer oob via Stedy.   Pt able to maintain brief stand, attempts to lift each foot during use of Stedy in prep amb activities.  At this time, will monitor closely for d/c home.  Therapy Prognosis: Good  Decision Making: Medium Complexity  History: 67 y/o male admit 2023 with Infrarenal AAA with R Common Iliac Artery Aneurysm. 2023 S/P Open Repair of AAA with Aorto Bi Iliac Graft, Extensive MIKAELA, Reimplant of Inferior Mesenteric Artery. PMH as noted including CAD, COPD,  Splenectomy, TIA, Spinal Stenosis, Back Surg, R TKR.  Exam: See above.  Clinical Presentation: See above.  Barriers to Learning: False Pass.  Requires PT Follow-Up: Yes  Activity Tolerance  Activity Tolerance: Patient tolerated treatment well;Patient limited by endurance     Plan

## 2024-01-04 NOTE — PROGRESS NOTES
Patient becoming verbally and physically aggressive with staff. Patient pulling at NG tube and IV lines. Patient attempting to pull IV pole over and shouting at staff. Hospitalist consulted. New orders received. Patient asked if he consumes alcohol and he denied. No history noted in chart. Attempted to call wife with no answer.

## 2024-01-04 NOTE — PROGRESS NOTES
Asked Kip JAMES about monitoring patients CVP because it was reading in the negatives. Ordered to continue to monitor but vascular aware of readings.

## 2024-01-04 NOTE — PROGRESS NOTES
Right radial arterial line removed per order. Manual pressure held x 10 min. No bleeding, oozing, or hematoma noted. Transparent, occlusive dressing applied.

## 2024-01-04 NOTE — PROGRESS NOTES
Patient becoming agitated and yelling out. Patient requesting water. Patient diet currently ice chips and sips with meds. Patient had 150mL with medication earlier and has had multiple cups of ice chips in the last hour. Increased NG output noted. Patient educated on diet order.

## 2024-01-04 NOTE — PROGRESS NOTES
Occupational Therapy  Facility/Department: [address] [facility]  Occupational Therapy Initial Assessment    Name: Manfred Washington  : 1955  MRN: [id number]  Date of Service: 2024    Discharge Recommendations:  Continue to assess pending progress, Patient would benefit from continued therapy after discharge      Manfred Washington scored a 14/24 on the AM-PAC ADL Inpatient form. Current research shows that an AM-PAC score of 17 or less is typically not associated with a discharge to the patient's home setting.     If patient discharges prior to next session this note will serve as a discharge summary.  Please see below for the latest assessment towards goals.      Patient Diagnosis(es): The encounter diagnosis was Pulmonary nodule.  Past Medical History:  has a past medical history of Arthritis, Gong's esophagus, Chronic back pain, COPD (chronic obstructive pulmonary disease) (HCC), Depression, GERD (gastroesophageal reflux disease), HYPERCHOLESTERAEMIA, Hypertension, Prinzmetal angina (HCC), Spinal stenoses, and TIA (transient ischaemic attack).  Past Surgical History:  has a past surgical history that includes joint replacement; Colonoscopy; back surgery; Splenectomy; Cholecystectomy; Appendectomy; Upper gastrointestinal endoscopy (5-14-10); Upper gastrointestinal endoscopy (12); Clavicle surgery; bronchoscopy (N/A, 2019); bronchoscopy (2019); Colonoscopy (N/A, 2020); Colonoscopy (2020); and Abdominal aortic aneurysm repair (N/A, 2024).    Treatment Diagnosis: decreased ADL status/cognition/fxl mobility      Assessment   Performance deficits / Impairments: Decreased functional mobility ;Decreased ADL status;Decreased strength;Decreased cognition;Decreased safe awareness;Decreased endurance;Decreased balance  Assessment: Pt is a 68 y.o. male s/p open abdominal aneurysm repair and reimplantation of inferior mesenteric artery on  by Dr. Stanford. At baseline, pt lives with wife and  participate in therapy. Pt c/o abd pain, did not rate. Pt confused, needs increased time to answer questions.     Social/Functional History  Social/Functional History  Lives With: Spouse  Type of Home: House  Home Layout: Multi-level, Bed/Bath upstairs (2 story house with basement.  Laundry on main level.)  Home Access: Stairs to enter without rails  Entrance Stairs - Number of Steps: 2 KATIE without HR; stair lift to additional levels  Bathroom Shower/Tub: Walk-in shower, Shower chair without back  Bathroom Toilet: Standard  Bathroom Equipment: Shower chair, Built-in shower seat, Grab bars in shower, Hand-held shower  Bathroom Accessibility: Accessible, Walker accessible  Home Equipment: Walker, rolling, Cane, Wheelchair-electric, Wheelchair-manual, Hospital bed, Walker, 4 wheeled  ADL Assistance: Independent  Ambulation Assistance: Independent (With Rollator; cane prn.)  Transfer Assistance: Independent  Active : Yes  Occupation: Retired  Additional Comments: Information obtained from prior PT encounter 11/2023; pt confirms.       Objective       Observation/Palpation  Observation: Abd incision/staples intact.    Safety Devices  Type of Devices: Call light within reach;Chair alarm in place;Gait belt;Left in chair;Nurse notified    AROM: Generally decreased, functional (B UE's)  Strength: Generally decreased, functional (B UE's)    ADL  Feeding: Stand by assistance  Feeding Skilled Clinical Factors: to eat icechips  Toileting: Dependent/Total  Toileting Skilled Clinical Factors: sosa catheter  Functional Mobility: Dependent/Total  Functional Mobility Skilled Clinical Factors: total A with miladys sims for bed>chair transfer  Additional Comments: Anticipate pt is set-up/SBA seated grooming, mod/max A bathing and dressing based on ROM/strength, balance, endurance, cognition.    Activity Tolerance  Activity Tolerance: Patient tolerated treatment well;Patient limited by endurance    Bed mobility  Supine to Sit:  body clothing?: A Little  How much help is needed for taking care of personal grooming?: A Little  How much help for eating meals?: None  AM-PAC Inpatient Daily Activity Raw Score: 14  AM-PAC Inpatient ADL T-Scale Score : 33.39  ADL Inpatient CMS 0-100% Score: 59.67  ADL Inpatient CMS G-Code Modifier : CK           Goals  Short Term Goals  Time Frame for Short Term Goals: Prior to d/c:  Short Term Goal 1: Pt will bathe with min A.  Short Term Goal 2: Pt will dress with min A.  Short Term Goal 3: Pt will toilet with SBA.  Short Term Goal 4: Pt will complete fxl mobility and fxl transfers to/from ADL surfaces with SBA using AD prn.  Short Term Goal 5: Pt will tolerate standing >5 minutes for functional task with SBA to improve standing tolerance for ADL routine.  Long Term Goals  Time Frame for Long Term Goals : STGs=LTGs  Patient Goals   Patient goals : pt unable to verbalize personal therapy goal d/t decreased cognition.       Therapy Time   Individual Concurrent Group Co-treatment   Time In 0830         Time Out 0910         Minutes 40         Timed Code Treatment Minutes: 25 Minutes       Rylie Junior, OTR/L [id number]

## 2024-01-04 NOTE — PROGRESS NOTES
Patient continues to yell out and is becoming increasingly more agitated. Patient pulling at NG tube. Critical care notified. New order received for precedex gtt

## 2024-01-05 LAB
ANION GAP SERPL CALCULATED.3IONS-SCNC: 5 MMOL/L (ref 3–16)
BUN SERPL-MCNC: 21 MG/DL (ref 7–20)
CALCIUM SERPL-MCNC: 7.7 MG/DL (ref 8.3–10.6)
CHLORIDE SERPL-SCNC: 109 MMOL/L (ref 99–110)
CO2 SERPL-SCNC: 26 MMOL/L (ref 21–32)
CREAT SERPL-MCNC: 0.8 MG/DL (ref 0.8–1.3)
DEPRECATED RDW RBC AUTO: 13.9 % (ref 12.4–15.4)
GFR SERPLBLD CREATININE-BSD FMLA CKD-EPI: >60 ML/MIN/{1.73_M2}
GLUCOSE BLD-MCNC: 83 MG/DL (ref 70–99)
GLUCOSE SERPL-MCNC: 86 MG/DL (ref 70–99)
HCT VFR BLD AUTO: 24.4 % (ref 40.5–52.5)
HGB BLD-MCNC: 8.4 G/DL (ref 13.5–17.5)
MCH RBC QN AUTO: 31.9 PG (ref 26–34)
MCHC RBC AUTO-ENTMCNC: 34.5 G/DL (ref 31–36)
MCV RBC AUTO: 92.2 FL (ref 80–100)
PERFORMED ON: NORMAL
PLATELET # BLD AUTO: 290 K/UL (ref 135–450)
PMV BLD AUTO: 8 FL (ref 5–10.5)
POTASSIUM SERPL-SCNC: 3.7 MMOL/L (ref 3.5–5.1)
RBC # BLD AUTO: 2.65 M/UL (ref 4.2–5.9)
SODIUM SERPL-SCNC: 140 MMOL/L (ref 136–145)
WBC # BLD AUTO: 23.5 K/UL (ref 4–11)

## 2024-01-05 PROCEDURE — 6360000002 HC RX W HCPCS: Performed by: STUDENT IN AN ORGANIZED HEALTH CARE EDUCATION/TRAINING PROGRAM

## 2024-01-05 PROCEDURE — 2700000000 HC OXYGEN THERAPY PER DAY

## 2024-01-05 PROCEDURE — 85027 COMPLETE CBC AUTOMATED: CPT

## 2024-01-05 PROCEDURE — 97530 THERAPEUTIC ACTIVITIES: CPT

## 2024-01-05 PROCEDURE — 97116 GAIT TRAINING THERAPY: CPT

## 2024-01-05 PROCEDURE — 2580000003 HC RX 258: Performed by: SURGERY

## 2024-01-05 PROCEDURE — 6370000000 HC RX 637 (ALT 250 FOR IP): Performed by: SURGERY

## 2024-01-05 PROCEDURE — 2500000003 HC RX 250 WO HCPCS: Performed by: NURSE PRACTITIONER

## 2024-01-05 PROCEDURE — 80048 BASIC METABOLIC PNL TOTAL CA: CPT

## 2024-01-05 PROCEDURE — 2500000003 HC RX 250 WO HCPCS: Performed by: SURGERY

## 2024-01-05 PROCEDURE — 6360000002 HC RX W HCPCS: Performed by: SURGERY

## 2024-01-05 PROCEDURE — 2100000000 HC CCU R&B

## 2024-01-05 PROCEDURE — 2580000003 HC RX 258: Performed by: STUDENT IN AN ORGANIZED HEALTH CARE EDUCATION/TRAINING PROGRAM

## 2024-01-05 PROCEDURE — 6360000002 HC RX W HCPCS: Performed by: NURSE PRACTITIONER

## 2024-01-05 PROCEDURE — 94640 AIRWAY INHALATION TREATMENT: CPT

## 2024-01-05 PROCEDURE — 99233 SBSQ HOSP IP/OBS HIGH 50: CPT | Performed by: INTERNAL MEDICINE

## 2024-01-05 PROCEDURE — 94761 N-INVAS EAR/PLS OXIMETRY MLT: CPT

## 2024-01-05 RX ORDER — OXYCODONE HYDROCHLORIDE 10 MG/1
10 TABLET ORAL EVERY 4 HOURS PRN
Status: DISCONTINUED | OUTPATIENT
Start: 2024-01-05 | End: 2024-01-08 | Stop reason: HOSPADM

## 2024-01-05 RX ORDER — DOCUSATE SODIUM 100 MG/1
200 CAPSULE, LIQUID FILLED ORAL 2 TIMES DAILY PRN
Status: DISCONTINUED | OUTPATIENT
Start: 2024-01-05 | End: 2024-01-08 | Stop reason: HOSPADM

## 2024-01-05 RX ORDER — OXYCODONE HYDROCHLORIDE 5 MG/1
5 TABLET ORAL EVERY 4 HOURS PRN
Status: DISCONTINUED | OUTPATIENT
Start: 2024-01-05 | End: 2024-01-08 | Stop reason: HOSPADM

## 2024-01-05 RX ORDER — FUROSEMIDE 10 MG/ML
20 INJECTION INTRAMUSCULAR; INTRAVENOUS ONCE
Status: COMPLETED | OUTPATIENT
Start: 2024-01-05 | End: 2024-01-05

## 2024-01-05 RX ADMIN — KETOROLAC TROMETHAMINE 15 MG: 15 INJECTION, SOLUTION INTRAMUSCULAR; INTRAVENOUS at 13:27

## 2024-01-05 RX ADMIN — POTASSIUM CHLORIDE, DEXTROSE MONOHYDRATE AND SODIUM CHLORIDE: 150; 5; 450 INJECTION, SOLUTION INTRAVENOUS at 20:21

## 2024-01-05 RX ADMIN — MORPHINE SULFATE 4 MG: 4 INJECTION, SOLUTION INTRAMUSCULAR; INTRAVENOUS at 09:35

## 2024-01-05 RX ADMIN — KETOROLAC TROMETHAMINE 15 MG: 15 INJECTION, SOLUTION INTRAMUSCULAR; INTRAVENOUS at 01:19

## 2024-01-05 RX ADMIN — MOMETASONE FUROATE AND FORMOTEROL FUMARATE DIHYDRATE 2 PUFF: 200; 5 AEROSOL RESPIRATORY (INHALATION) at 12:13

## 2024-01-05 RX ADMIN — MORPHINE SULFATE 4 MG: 4 INJECTION, SOLUTION INTRAMUSCULAR; INTRAVENOUS at 11:38

## 2024-01-05 RX ADMIN — METOPROLOL TARTRATE 5 MG: 5 INJECTION, SOLUTION INTRAVENOUS at 01:19

## 2024-01-05 RX ADMIN — KETOROLAC TROMETHAMINE 15 MG: 15 INJECTION, SOLUTION INTRAMUSCULAR; INTRAVENOUS at 20:14

## 2024-01-05 RX ADMIN — MORPHINE SULFATE 4 MG: 4 INJECTION, SOLUTION INTRAMUSCULAR; INTRAVENOUS at 17:19

## 2024-01-05 RX ADMIN — VERAPAMIL HYDROCHLORIDE 80 MG: 80 TABLET ORAL at 08:25

## 2024-01-05 RX ADMIN — THIAMINE HYDROCHLORIDE 500 MG: 100 INJECTION, SOLUTION INTRAMUSCULAR; INTRAVENOUS at 08:35

## 2024-01-05 RX ADMIN — DULOXETINE HYDROCHLORIDE 60 MG: 60 CAPSULE, DELAYED RELEASE ORAL at 08:25

## 2024-01-05 RX ADMIN — VERAPAMIL HYDROCHLORIDE 80 MG: 80 TABLET ORAL at 21:11

## 2024-01-05 RX ADMIN — THIAMINE HYDROCHLORIDE 500 MG: 100 INJECTION, SOLUTION INTRAMUSCULAR; INTRAVENOUS at 01:33

## 2024-01-05 RX ADMIN — METOPROLOL TARTRATE 5 MG: 5 INJECTION, SOLUTION INTRAVENOUS at 13:27

## 2024-01-05 RX ADMIN — OXYCODONE HYDROCHLORIDE 10 MG: 10 TABLET ORAL at 21:11

## 2024-01-05 RX ADMIN — SODIUM CHLORIDE, PRESERVATIVE FREE 10 ML: 5 INJECTION INTRAVENOUS at 08:31

## 2024-01-05 RX ADMIN — LISINOPRIL 40 MG: 10 TABLET ORAL at 08:25

## 2024-01-05 RX ADMIN — SODIUM CHLORIDE, PRESERVATIVE FREE 10 ML: 5 INJECTION INTRAVENOUS at 20:14

## 2024-01-05 RX ADMIN — VERAPAMIL HYDROCHLORIDE 80 MG: 80 TABLET ORAL at 17:15

## 2024-01-05 RX ADMIN — METOPROLOL TARTRATE 5 MG: 5 INJECTION, SOLUTION INTRAVENOUS at 07:39

## 2024-01-05 RX ADMIN — MORPHINE SULFATE 4 MG: 4 INJECTION, SOLUTION INTRAMUSCULAR; INTRAVENOUS at 01:43

## 2024-01-05 RX ADMIN — MOMETASONE FUROATE AND FORMOTEROL FUMARATE DIHYDRATE 2 PUFF: 200; 5 AEROSOL RESPIRATORY (INHALATION) at 19:21

## 2024-01-05 RX ADMIN — MORPHINE SULFATE 4 MG: 4 INJECTION, SOLUTION INTRAMUSCULAR; INTRAVENOUS at 22:25

## 2024-01-05 RX ADMIN — VERAPAMIL HYDROCHLORIDE 80 MG: 80 TABLET ORAL at 13:26

## 2024-01-05 RX ADMIN — Medication 10 ML: at 22:25

## 2024-01-05 RX ADMIN — FUROSEMIDE 20 MG: 10 INJECTION, SOLUTION INTRAMUSCULAR; INTRAVENOUS at 06:32

## 2024-01-05 RX ADMIN — METOPROLOL TARTRATE 5 MG: 5 INJECTION, SOLUTION INTRAVENOUS at 19:47

## 2024-01-05 RX ADMIN — SODIUM CHLORIDE, PRESERVATIVE FREE 20 MG: 5 INJECTION INTRAVENOUS at 08:18

## 2024-01-05 RX ADMIN — FAMOTIDINE 20 MG: 20 TABLET, FILM COATED ORAL at 21:11

## 2024-01-05 RX ADMIN — SODIUM CHLORIDE, PRESERVATIVE FREE 10 ML: 5 INJECTION INTRAVENOUS at 19:47

## 2024-01-05 RX ADMIN — MORPHINE SULFATE 4 MG: 4 INJECTION, SOLUTION INTRAMUSCULAR; INTRAVENOUS at 14:18

## 2024-01-05 RX ADMIN — TIOTROPIUM BROMIDE INHALATION SPRAY 2 PUFF: 3.12 SPRAY, METERED RESPIRATORY (INHALATION) at 12:13

## 2024-01-05 RX ADMIN — KETOROLAC TROMETHAMINE 15 MG: 15 INJECTION, SOLUTION INTRAMUSCULAR; INTRAVENOUS at 08:19

## 2024-01-05 RX ADMIN — MORPHINE SULFATE 4 MG: 4 INJECTION, SOLUTION INTRAMUSCULAR; INTRAVENOUS at 04:20

## 2024-01-05 RX ADMIN — OXYCODONE HYDROCHLORIDE 10 MG: 10 TABLET ORAL at 15:54

## 2024-01-05 ASSESSMENT — PAIN DESCRIPTION - LOCATION
LOCATION: BACK
LOCATION: ABDOMEN;BACK
LOCATION: ABDOMEN;BACK
LOCATION: BACK
LOCATION: BACK
LOCATION: ABDOMEN
LOCATION: BACK

## 2024-01-05 ASSESSMENT — PAIN SCALES - GENERAL
PAINLEVEL_OUTOF10: 8
PAINLEVEL_OUTOF10: 9
PAINLEVEL_OUTOF10: 9
PAINLEVEL_OUTOF10: 0
PAINLEVEL_OUTOF10: 9
PAINLEVEL_OUTOF10: 7
PAINLEVEL_OUTOF10: 9
PAINLEVEL_OUTOF10: 9
PAINLEVEL_OUTOF10: 7
PAINLEVEL_OUTOF10: 7
PAINLEVEL_OUTOF10: 9
PAINLEVEL_OUTOF10: 8
PAINLEVEL_OUTOF10: 5
PAINLEVEL_OUTOF10: 6
PAINLEVEL_OUTOF10: 9
PAINLEVEL_OUTOF10: 9
PAINLEVEL_OUTOF10: 8

## 2024-01-05 ASSESSMENT — PAIN - FUNCTIONAL ASSESSMENT
PAIN_FUNCTIONAL_ASSESSMENT: PREVENTS OR INTERFERES SOME ACTIVE ACTIVITIES AND ADLS

## 2024-01-05 ASSESSMENT — PAIN DESCRIPTION - DESCRIPTORS
DESCRIPTORS: SHARP;DULL;ACHING
DESCRIPTORS: SHARP
DESCRIPTORS: SHARP
DESCRIPTORS: ACHING;DISCOMFORT
DESCRIPTORS: SHARP
DESCRIPTORS: SHARP;DULL
DESCRIPTORS: SHARP

## 2024-01-05 ASSESSMENT — PAIN DESCRIPTION - ORIENTATION
ORIENTATION: LOWER;MID;UPPER
ORIENTATION: UPPER;MID;LOWER
ORIENTATION: MID;UPPER;LOWER
ORIENTATION: MID;ANTERIOR
ORIENTATION: MID;LOWER;UPPER

## 2024-01-05 ASSESSMENT — PAIN DESCRIPTION - PAIN TYPE
TYPE: CHRONIC PAIN

## 2024-01-05 NOTE — PROGRESS NOTES
Pulmonary Progress Note    Date of Admission: 1/1/2024   LOS: 4 days       CC:  No chief complaint on file.   copd    Subjective:  Patient states he takes Valium 10 mg twice a day for muscle spasms.  However, he is still slightly confused with Precedex being turned off currently.  He then stated Cymbalta.    Valium is not on his med list.    ROS:   No nausea  No Vomiting  No chest pain       Assessment:          Plan:     This note may have been transcribed using Dragon Dictation software. Please disregard any translational errors.       Hospital Day: 4     Pulmonary nodule left lower lobe  Case being scheduled  Future Appointments   Date Time Provider Department Center   3/4/2024 11:40 AM Fox Donahue MD  PULM [facility]              AAA status post repair  Open AAA with graft  Pod day 1  Per vascular surgery      COPD  Dulera   Spiriva        agitation  Stopping Precedex today.  No agitation.         Data:        PHYSICAL EXAM:   Blood pressure 129/86, pulse 64, temperature 97.8 °F (36.6 °C), temperature source Oral, resp. rate 20, height 1.727 m (5' 8\"), weight 73.1 kg (161 lb 2.5 oz), SpO2 98 %.'  Body mass index is 24.5 kg/m².   Gen: No distress.    ENT:   Resp: No accessory muscle use. No crackles. No wheezes. No rhonchi.    CV: Regular rate. Regular rhythm. No murmur or rub. No edema.   Skin: Warm, dry, normal texture and turgor. No nodule on exposed extremities.   M/S: No cyanosis. No clubbing. No joint deformity.  Psych: awake.       Medications:    Scheduled Meds:   sodium chloride flush  5-40 mL IntraVENous 2 times per day    thiamine (B-1) 500 mg in sodium chloride 0.9 % 100 mL IVPB  500 mg IntraVENous Q8H    Followed by    [START ON 1/6/2024] thiamine (B-1) 250 mg in sodium chloride 0.9 % 100 mL IVPB  250 mg IntraVENous Q24H    Followed by    [START ON 1/11/2024] thiamine  100 mg Oral Daily    ketorolac  15 mg IntraVENous Q6H    metoprolol  5 mg IntraVENous Q6H    sodium chloride flush  5-40 mL  IntraVENous 2 times per day    famotidine  20 mg Oral BID    Or    famotidine (PEPCID) injection  20 mg IntraVENous BID    DULoxetine  60 mg Per G Tube Daily    lisinopril  40 mg Per NG tube Daily    verapamil  80 mg Per NG tube 4x Daily    mometasone-formoterol  2 puff Inhalation BID RT    And    tiotropium  2 puff Inhalation Daily RT       Continuous Infusions:   sodium chloride      dextrose 5% and 0.45% NaCl with KCl 20 mEq 50 mL/hr at 01/05/24 0551    sodium chloride Stopped (01/03/24 0443)    sodium chloride         PRN Meds:  sodium chloride flush, sodium chloride, morphine, morphine **OR** morphine, sodium chloride flush, sodium chloride, ondansetron **OR** ondansetron, albuterol sulfate HFA, sodium chloride flush, magnesium sulfate, sodium chloride    Labs reviewed:  CBC:   Recent Labs     01/03/24 0524 01/04/24  0330 01/05/24  0545   WBC 23.7* 27.4* 23.5*   HGB 11.6* 9.0* 8.4*   HCT 34.4* 27.0* 24.4*   MCV 92.3 92.0 92.2    262 290       BMP:   Recent Labs     01/03/24 0524 01/04/24  0330 01/05/24  0545   * 143 140   K 4.3 4.2 3.7   * 114* 109   CO2 25 25 26   PHOS 4.8 3.1  --    BUN 20 18 21*   CREATININE 1.0 0.8 0.8       LIVER PROFILE:   Recent Labs     01/03/24 0524   LIPASE 49.0       PT/INR:   No results for input(s): \"PROTIME\", \"INR\" in the last 72 hours.    APTT:   No results for input(s): \"APTT\" in the last 72 hours.    UA:  Recent Labs     01/04/24  1010   COLORU Yellow   PHUR 6.0   WBCUA 0-2   RBCUA 3-4   CLARITYU Clear   SPECGRAV 1.015   LEUKOCYTESUR Negative   UROBILINOGEN 0.2   BILIRUBINUR Negative   BLOODU LARGE*   GLUCOSEU Negative   AMORPHOUS 1+       No results for input(s): \"PH\", \"PCO2\", \"PO2\" in the last 72 hours.    Cx:      Films:             This note was transcribed using Dragon Dictation software. Please disregard any translational errors.      AI WILEY   [facility]  [phone]

## 2024-01-05 NOTE — PROGRESS NOTES
Physician Progress Note      PATIENT:               CODY TSANG  CSN #:                  [id number]  :                       1955  ADMIT DATE:       2024 5:29 PM  DISCH DATE:  RESPONDING  PROVIDER #:        Christiano Oliveira MD        QUERY TEXT:    Type of Anemia: Please provide further specificity, if known.    Clinical indicators include: hematoma, hgb, anemia, bleeding  Options provided:  -- Anemia due to acute blood loss  -- Anemia due to chronic blood loss  -- Anemia due to iron deficiency  -- Anemia due to postoperative blood loss  -- Anemia due to chronic disease  -- Other - I will add my own diagnosis  -- Disagree - Not applicable / Not valid  -- Disagree - Clinically Unable to determine / Unknown        PROVIDER RESPONSE TEXT:    Provider was unable to determine a response for this query.  please refer to primary team ( vascular surgery )      Electronically signed by:  Christiano Oliveira MD 2024 4:27 PM

## 2024-01-05 NOTE — PROGRESS NOTES
Physical Therapy  Facility/Department: [address] [facility]  Physical Therapy Treatment Note    Name: Manfred Washington  : 1955  MRN: [id number]  Date of Service: 2024    Discharge Recommendations:  Continue to assess pending progress (Anticipate adequate progress for d/c home.)          Current Am-Pac .    Assessment   Body Structures, Functions, Activity Limitations Requiring Skilled Therapeutic Intervention: Decreased functional mobility ;Decreased strength;Decreased safe awareness;Decreased cognition;Decreased endurance;Decreased balance;Increased pain  Assessment: 69 y/o male admit 2023 with Infrarenal AAA with R Common Iliac Artery Aneurysm. 2023 S/P Open Repair of AAA with Aorto Bi Iliac Graft, Extensive MIKAELA, Reimplant of Inferior Mesenteric Artery. PMH as noted including CAD, COPD, GÉNESIS, Splenectomy, TIA, Spinal Stenosis, Back Surg, R TKR.  PTA pt living with wife in multi level home with few steps to enter; reports independent daily care and functional mobility (Rollator, Cane). Currently,  Pt mesfin oob, transfer/amb only short distance with Walker CCGA. Reports mild dizziness with oob activities; bp approp. Needs cues for safety awareness; limited carryover at times. Upon arrival, Room air with sats 80%. O2 2L applied with sats at least 90%. Nursing informed. Pt reports \"heavy\" smoker.    At this time, cont to anticipate adequate progress for d/c home.  Therapy Prognosis: Good  History: 69 y/o male admit 2023 with Infrarenal AAA with R Common Iliac Artery Aneurysm. 2023 S/P Open Repair of AAA with Aorto Bi Iliac Graft, Extensive MIKAELA, Reimplant of Inferior Mesenteric Artery. PMH as noted including CAD, COPD, GÉNESIS, Splenectomy, TIA, Spinal Stenosis, Back Surg, R TKR.  Exam: See above.  Clinical Presentation: See above.  Barriers to Learning: Koi.  Requires PT Follow-Up: Yes  Activity Tolerance  Activity Tolerance: Patient tolerated treatment well  Activity Tolerance Comments: Pt mesfin oob,  level.)  Home Access: Stairs to enter without rails  Entrance Stairs - Number of Steps: 2 KATIE without HR; stair lift to additional levels  Bathroom Shower/Tub: Walk-in shower, Shower chair without back  Bathroom Toilet: Standard  Bathroom Equipment: Shower chair, Built-in shower seat, Grab bars in shower, Hand-held shower  Bathroom Accessibility: Accessible, Walker accessible  Home Equipment: Walker, rolling, Cane, Wheelchair-electric, Wheelchair-manual, Hospital bed, Walker, 4 wheeled  ADL Assistance: Independent  Ambulation Assistance: Independent (With Rollator; cane prn.)  Transfer Assistance: Independent  Active : Yes  Occupation: Retired  Additional Comments: Information obtained from prior PT encounter 11/2023; pt confirms.  Vision/Hearing  Vision  Vision: Within Functional Limits  Hearing  Hearing: Exceptions to WFL  Hearing Exceptions: Hard of hearing/hearing concerns    Cognition   Orientation  Orientation Level: Oriented to person;Oriented to place;Oriented to time  Cognition  Overall Cognitive Status: Exceptions  Following Commands: Follows one step commands with increased time  Attention Span: Attends with cues to redirect  Memory: Decreased recall of recent events;Decreased short term memory  Safety Judgement: Decreased awareness of need for safety  Problem Solving: Decreased awareness of errors;Assistance required to generate solutions  Insights: Decreased awareness of deficits  Initiation: Requires cues for some  Sequencing: Requires cues for some     Objective        Observation/Palpation  Observation: Abd incision/staples intact.  Edema: Distal LE edema (mild R>L).                       Bed mobility  Supine to Sit: Minimal assistance (HOB slightly elevated.  Via Sidelying.)  Transfers  Sit to Stand: Contact guard assistance (With Walker.)  Stand to Sit: Contact guard assistance (With Walker.)  Comment: Transfers from eob, recliner.  Needs cues for safe hand placement although diminished

## 2024-01-05 NOTE — PROGRESS NOTES
Dr. Live Stanford notified of pt complaining of pain after receiving PRN Morphine. Dr. Stanford to place new orders.

## 2024-01-05 NOTE — CARE COORDINATION
Chart reviewed.  Spoke with bedside RN, Bailee.  Started therapy today; no recommendation as yet.  Continues on clear diet.  Following for DC needs.  DALLIN Gallegos     Case Management   [phone]    1/5/2024  2:13 PM

## 2024-01-05 NOTE — PROGRESS NOTES
Wife approached nurses station concerned about new order today for oxycodone. Wife states pt \"got hooked\" on oxy before and doesn't want him to be discharged on oxycodone. Explained that nursing staff will administer medication within the ordered parameters set by the ordering physician and we can discuss plan of care for pain management with attending during rounds

## 2024-01-05 NOTE — PROGRESS NOTES
Occupational Therapy  Facility/Department: [facility]  Occupational Therapy Daily Treatment Note    Name: Manfred Washington  : 1955  MRN: [id number]  Date of Service: 2024    Discharge Recommendations:  Continue to assess pending progress, 24 hour supervision or assist, Home with Home health OT  OT Equipment Recommendations  Other: TBD    Manfred Washington scored a 15/24 on the AM-PAC ADL Inpatient form. Current research shows that an AM-PAC score of 18 or greater is typically associated with a discharge to the patient's home setting. Based on the patient's AM-PAC score, and their current ADL deficits, it is recommended that the patient have 2-3 sessions per week of Occupational Therapy at d/c to increase the patient's independence.  At this time, this patient demonstrates the endurance and safety to discharge home with 24 hour assist and a home OT follow up treatment frequency of 2-3x/wk.   Please see assessment section for further patient specific details.    If patient discharges prior to next session this note will serve as a discharge summary.  Please see below for the latest assessment towards goals.         Patient Diagnosis(es): The encounter diagnosis was Pulmonary nodule.  Past Medical History:  has a past medical history of Arthritis, Gong's esophagus, Chronic back pain, COPD (chronic obstructive pulmonary disease) (HCC), Depression, GERD (gastroesophageal reflux disease), HYPERCHOLESTERAEMIA, Hypertension, Prinzmetal angina (HCC), Spinal stenoses, and TIA (transient ischaemic attack).  Past Surgical History:  has a past surgical history that includes joint replacement; Colonoscopy; back surgery; Splenectomy; Cholecystectomy; Appendectomy; Upper gastrointestinal endoscopy (5-14-10); Upper gastrointestinal endoscopy (12); Clavicle surgery; bronchoscopy (N/A, 2019); bronchoscopy (2019); Colonoscopy (N/A, 2020); Colonoscopy (2020); and Abdominal aortic aneurysm repair (N/A,  notified    ADL  Toileting: Dependent/Total  Functional Mobility: Minimal assistance  Functional Mobility Skilled Clinical Factors: ~3-4 steps bed>chair, ~12 ft with 4WW and Min A.      Bed mobility  Rolling to Right: Contact guard assistance  Supine to Sit: Minimal assistance  Scooting: Stand by assistance    Transfers  Sit to stand: Contact guard assistance  Stand to sit: Contact guard assistance  Transfer Comments: to/from 4WW, VC's for hand placement    Vision  Vision: Within Functional Limits  Hearing  Hearing: Exceptions to WFL  Hearing Exceptions: Hard of hearing/hearing concerns    Cognition  Overall Cognitive Status: Exceptions  Following Commands: Follows one step commands with increased time  Attention Span: Attends with cues to redirect  Memory: Decreased recall of recent events;Decreased short term memory  Safety Judgement: Decreased awareness of need for safety  Problem Solving: Decreased awareness of errors;Assistance required to generate solutions  Insights: Decreased awareness of deficits  Initiation: Requires cues for some  Sequencing: Requires cues for some  Orientation  Orientation Level: Oriented to person;Oriented to place;Oriented to time      Static Sitting Balance : seated EOB SBA  Static Standing Balance : CGA at 4WW  Dynamic Standing Balance : CGA/min A at 4WW during fxl mobility    Education Given To: Patient  Education Provided: Role of Therapy;Plan of Care;Transfer Training;Orientation  Education Method: Demonstration;Verbal  Barriers to Learning: Cognition;Hearing  Education Outcome: Continued education needed                                                AM-PAC - ADL  AM-PAC Daily Activity - Inpatient   How much help is needed for putting on and taking off regular lower body clothing?: A Lot  How much help is needed for bathing (which includes washing, rinsing, drying)?: A Lot  How much help is needed for toileting (which includes using toilet, bedpan, or urinal)?: Total  How much

## 2024-01-05 NOTE — PROGRESS NOTES
V2.0    [facility] [place] Progress Note ( follow up on consult )      Name:  Manfred Washington /Age/Sex: 1955  (68 y.o. male)   MRN & CSN:  [id number] & [id number] Encounter Date/Time: 2024 8:57 AM EDT   Location:  [id number]/1312-01 PCP: Suzette Oquendo APRN     Attending:Live Stanford MD       Hospital Day: 5      HPI :   Chief Complaint: admitted electively for AAA repair, hopitalist consulted .     Manfred Washington is a 68 y.o. male who  was admitted by vascular surgery on 2024 for elective open AAA repair of 5.7 cm with right LINNEA 2.7 cm, asymptomatic related to this.  Has history of CAD, COPD.  History splenectomy.     Had open abdominal aneurysm repair and reimplantation of inferior mesenteric artery on the same date  by Dr. Stanford.  No complications intra procedurally and minimal EBL per operative report.  Operation noted to be very complicated related to takedown of adhesions, reimplantation of ARPIT, extensive pelvic dissection and difficult distal anastomosis.  Operation lasted 5-1/2 hours.  Vascular has recommended NG tube to remain in place pending bowel movements. Patient was extubated 1/3.     Consulted overnight 1/3 -  for sudden onset agitation.  Patient is alert and oriented, denies use of alcohol.  We are attempting to obtain information from wife regarding drinking history.  Requiring sedation with Precedex and also probably Ativan PRNs      Subjective:   Alert and oriented this morning, denies any complaints.   Patient does recall agitation last night, he attributes it to missed trazodone dose.   Was on precedex that was weaned off   Review of Systems:      Pertinent positives and negatives discussed in HPI    Objective:     Intake/Output Summary (Last 24 hours) at 2024 0733  Last data filed at 2024 0551  Gross per 24 hour   Intake 2826 ml   Output 2775 ml   Net 51 ml      Vitals:   Vitals:    24 0420 24 0500 24 0600 24 0632   BP:  (!) 142/81 130/83 130/83  disorder.  CIWA protocol in place in case of alcohol history.  Ativan PRNs, requiring Precedex.  Continue patient's home Cymbalta, trazodone. Significantly improved today, weaned off precedex.   Smoker-1 PPD.  Nicotine replacement therapy offered, patient declines at this time.          Personally reviewed Lab Studies and Imaging        Medical Decision Making:  The following items were considered in medical decision making:  Discussion of patient care with other providers  Reviewed clinical lab tests  Reviewed radiology tests  Reviewed other diagnostic tests/interventions  Independent review of radiologic images  Microbiology cultures and other micro tests reviewed        Electronically signed by Christiano Oliveira MD on 1/5/2024 at 7:33 AM  Comment: Please note this report has been produced using speech recognition software and may contain errors related to that system including errors in grammar, punctuation, and spelling, as well as words and phrases that may be inappropriate. If there are any questions or concerns, please feel free to contact the dictating provider for clarification.

## 2024-01-05 NOTE — PROGRESS NOTES
Per Dr Abdoulaye MD. NG is to be removed and pt is allowed to have a total of one cup of ice chips every 4 hours.      NG was removed by this RN.     Electronically signed by Kajal Brunson RN on 1/5/2024 at 6:29 AM

## 2024-01-05 NOTE — ANESTHESIA POSTPROCEDURE EVALUATION
Department of Anesthesiology  Postprocedure Note    Patient: Manfred Washington  MRN: [id number]  YOB: 1955  Date of evaluation: 1/5/2024    Procedure Summary       Date: 01/02/24 Room / Location: [address]    Anesthesia Start: 0758 Anesthesia Stop: 1444    Procedure: OPEN ABDOMINAL ANEURYSM REPAIR REIMPLANTATION OF INFERIOR MESENTERIC ARTERY (Abdomen) Diagnosis:       Infrarenal abdominal aortic aneurysm (AAA) without rupture (HCC)      (Infrarenal abdominal aortic aneurysm (AAA) without rupture (HCC) [I71.43])    Surgeons: Live Stanford MD Responsible Provider: Jose Masters MD    Anesthesia Type: general ASA Status: 3            Anesthesia Type: No value filed.    Durga Phase I: Durga Score: 10    Durga Phase II:      Anesthesia Post Evaluation    Patient location during evaluation: ICU  Patient participation: complete - patient cannot participate  Level of consciousness: sedated and ventilated  Pain score: 0  Airway patency: patent  Nausea & Vomiting: no nausea and no vomiting  Cardiovascular status: hemodynamically stable  Respiratory status: ventilator  Hydration status: euvolemic  Comments: Saw patient in ICU Tuesday afternoon the day of surgery. He was intubated and sedated  Pain management: adequate    No notable events documented.

## 2024-01-05 NOTE — PLAN OF CARE
Problem: Safety - Adult  Goal: Free from fall injury  Outcome: Progressing     Problem: ABCDS Injury Assessment  Goal: Absence of physical injury  Outcome: Progressing  Flowsheets (Taken 1/5/2024 1824)  Absence of Physical Injury: Implement safety measures based on patient assessment     Problem: Discharge Planning  Goal: Discharge to home or other facility with appropriate resources  Outcome: Progressing  Flowsheets (Taken 1/5/2024 0745)  Discharge to home or other facility with appropriate resources:   Identify barriers to discharge with patient and caregiver   Arrange for needed discharge resources and transportation as appropriate     Problem: Pain  Goal: Verbalizes/displays adequate comfort level or baseline comfort level  Outcome: Progressing     Problem: Skin/Tissue Integrity  Goal: Absence of new skin breakdown  Description: 1.  Monitor for areas of redness and/or skin breakdown  2.  Assess vascular access sites hourly  3.  Every 4-6 hours minimum:  Change oxygen saturation probe site  4.  Every 4-6 hours:  If on nasal continuous positive airway pressure, respiratory therapy assess nares and determine need for appliance change or resting period.  Outcome: Progressing     Problem: Gastrointestinal - Adult  Goal: Maintains or returns to baseline bowel function  Outcome: Progressing  Flowsheets (Taken 1/5/2024 0745)  Maintains or returns to baseline bowel function:   Assess bowel function   Encourage oral fluids to ensure adequate hydration   Administer IV fluids as ordered to ensure adequate hydration   Administer ordered medications as needed   Encourage mobilization and activity     Problem: Genitourinary - Adult  Goal: Urinary catheter remains patent  Outcome: Progressing  Flowsheets (Taken 1/5/2024 0745)  Urinary catheter remains patent:   Assess patency of urinary catheter   Irrigate catheter per Licensed Independent Practitioner order if indicated and notify Licensed Independent Practitioner if unable

## 2024-01-05 NOTE — PROGRESS NOTES
VASCULAR                 POD#3     No SOB. Some pain but controlled.   Attempt at weaning of Precedex resulted in in creased agitation.  Reports + flatus & feels less bloated.     130/83       75            Afeb                 O2 sat 89-92 on RA  I/O +51 (UO 2375, )  Lungs clear but decreased BS throughout  Abd softer, less distended, + BS  Palpable B DP & PT pulses  Incision intact without erythema or hematoma     Labs pending     A/P:     S/P AAA repair (ABIG + ARPIT reimplantation)              Hemodynamically stable.              Will continue D51/2NS at 75 cc/hr - additional lasix today.              Continue NPO. DC NGT. Limited ice chips.              No signs of renal insufficiency. Abnormal Na due to fluid resuscitation and correcting with change in IVF composition.              Agitation - not unexpected in this older pt with prolonged anesthesia and sedation. Will observe. Continue low dose Precedex.              Anemia - no active bleeding. Drop in hemaglobin due to intra-op loss and resuscitation.              Up in chair. PT/OT. Ambulate.   Yasir sosa later today after lasix diuresis.              Continue CVU.                 Josef Stanford

## 2024-01-06 ENCOUNTER — APPOINTMENT (OUTPATIENT)
Dept: GENERAL RADIOLOGY | Age: 69
DRG: 268 | End: 2024-01-06
Attending: SURGERY
Payer: MEDICARE

## 2024-01-06 LAB
ANION GAP SERPL CALCULATED.3IONS-SCNC: 8 MMOL/L (ref 3–16)
BUN SERPL-MCNC: 20 MG/DL (ref 7–20)
CALCIUM SERPL-MCNC: 8.3 MG/DL (ref 8.3–10.6)
CHLORIDE SERPL-SCNC: 106 MMOL/L (ref 99–110)
CO2 SERPL-SCNC: 27 MMOL/L (ref 21–32)
CREAT SERPL-MCNC: 0.8 MG/DL (ref 0.8–1.3)
DEPRECATED RDW RBC AUTO: 13.7 % (ref 12.4–15.4)
GFR SERPLBLD CREATININE-BSD FMLA CKD-EPI: >60 ML/MIN/{1.73_M2}
GLUCOSE SERPL-MCNC: 85 MG/DL (ref 70–99)
HCT VFR BLD AUTO: 27.2 % (ref 40.5–52.5)
HGB BLD-MCNC: 9.2 G/DL (ref 13.5–17.5)
MCH RBC QN AUTO: 31.1 PG (ref 26–34)
MCHC RBC AUTO-ENTMCNC: 33.7 G/DL (ref 31–36)
MCV RBC AUTO: 92.2 FL (ref 80–100)
PLATELET # BLD AUTO: 350 K/UL (ref 135–450)
PMV BLD AUTO: 7.7 FL (ref 5–10.5)
POTASSIUM SERPL-SCNC: 3.7 MMOL/L (ref 3.5–5.1)
PROCALCITONIN SERPL IA-MCNC: 0.15 NG/ML (ref 0–0.15)
RBC # BLD AUTO: 2.95 M/UL (ref 4.2–5.9)
SODIUM SERPL-SCNC: 141 MMOL/L (ref 136–145)
WBC # BLD AUTO: 23 K/UL (ref 4–11)

## 2024-01-06 PROCEDURE — 85027 COMPLETE CBC AUTOMATED: CPT

## 2024-01-06 PROCEDURE — 6370000000 HC RX 637 (ALT 250 FOR IP): Performed by: SURGERY

## 2024-01-06 PROCEDURE — 94761 N-INVAS EAR/PLS OXIMETRY MLT: CPT

## 2024-01-06 PROCEDURE — 84145 PROCALCITONIN (PCT): CPT

## 2024-01-06 PROCEDURE — 2700000000 HC OXYGEN THERAPY PER DAY

## 2024-01-06 PROCEDURE — 2500000003 HC RX 250 WO HCPCS: Performed by: SURGERY

## 2024-01-06 PROCEDURE — 6360000002 HC RX W HCPCS: Performed by: NURSE PRACTITIONER

## 2024-01-06 PROCEDURE — 36592 COLLECT BLOOD FROM PICC: CPT

## 2024-01-06 PROCEDURE — 6360000002 HC RX W HCPCS: Performed by: SURGERY

## 2024-01-06 PROCEDURE — 6360000002 HC RX W HCPCS: Performed by: INTERNAL MEDICINE

## 2024-01-06 PROCEDURE — 2580000003 HC RX 258: Performed by: STUDENT IN AN ORGANIZED HEALTH CARE EDUCATION/TRAINING PROGRAM

## 2024-01-06 PROCEDURE — 2100000000 HC CCU R&B

## 2024-01-06 PROCEDURE — 6370000000 HC RX 637 (ALT 250 FOR IP): Performed by: STUDENT IN AN ORGANIZED HEALTH CARE EDUCATION/TRAINING PROGRAM

## 2024-01-06 PROCEDURE — 2580000003 HC RX 258: Performed by: SURGERY

## 2024-01-06 PROCEDURE — 71045 X-RAY EXAM CHEST 1 VIEW: CPT

## 2024-01-06 PROCEDURE — 80048 BASIC METABOLIC PNL TOTAL CA: CPT

## 2024-01-06 PROCEDURE — 94640 AIRWAY INHALATION TREATMENT: CPT

## 2024-01-06 PROCEDURE — 99233 SBSQ HOSP IP/OBS HIGH 50: CPT | Performed by: INTERNAL MEDICINE

## 2024-01-06 PROCEDURE — 6370000000 HC RX 637 (ALT 250 FOR IP): Performed by: INTERNAL MEDICINE

## 2024-01-06 RX ORDER — LISINOPRIL 10 MG/1
40 TABLET ORAL DAILY
Status: DISCONTINUED | OUTPATIENT
Start: 2024-01-07 | End: 2024-01-08 | Stop reason: HOSPADM

## 2024-01-06 RX ORDER — DULOXETIN HYDROCHLORIDE 60 MG/1
60 CAPSULE, DELAYED RELEASE ORAL DAILY
Status: DISCONTINUED | OUTPATIENT
Start: 2024-01-07 | End: 2024-01-08 | Stop reason: HOSPADM

## 2024-01-06 RX ORDER — DOXYCYCLINE HYCLATE 100 MG
100 TABLET ORAL EVERY 12 HOURS SCHEDULED
Status: DISCONTINUED | OUTPATIENT
Start: 2024-01-06 | End: 2024-01-08 | Stop reason: HOSPADM

## 2024-01-06 RX ORDER — FUROSEMIDE 10 MG/ML
20 INJECTION INTRAMUSCULAR; INTRAVENOUS ONCE
Status: COMPLETED | OUTPATIENT
Start: 2024-01-06 | End: 2024-01-06

## 2024-01-06 RX ORDER — HYDRALAZINE HYDROCHLORIDE 20 MG/ML
10 INJECTION INTRAMUSCULAR; INTRAVENOUS EVERY 6 HOURS PRN
Status: DISCONTINUED | OUTPATIENT
Start: 2024-01-06 | End: 2024-01-08 | Stop reason: HOSPADM

## 2024-01-06 RX ORDER — FUROSEMIDE 10 MG/ML
20 INJECTION INTRAMUSCULAR; INTRAVENOUS 2 TIMES DAILY
Status: DISCONTINUED | OUTPATIENT
Start: 2024-01-06 | End: 2024-01-08 | Stop reason: HOSPADM

## 2024-01-06 RX ORDER — VERAPAMIL HYDROCHLORIDE 80 MG/1
80 TABLET ORAL 4 TIMES DAILY
Status: DISCONTINUED | OUTPATIENT
Start: 2024-01-06 | End: 2024-01-08 | Stop reason: HOSPADM

## 2024-01-06 RX ORDER — LABETALOL HYDROCHLORIDE 5 MG/ML
10 INJECTION, SOLUTION INTRAVENOUS EVERY 6 HOURS PRN
Status: DISCONTINUED | OUTPATIENT
Start: 2024-01-06 | End: 2024-01-08 | Stop reason: HOSPADM

## 2024-01-06 RX ORDER — TRAZODONE HYDROCHLORIDE 50 MG/1
50 TABLET ORAL NIGHTLY PRN
Status: DISCONTINUED | OUTPATIENT
Start: 2024-01-06 | End: 2024-01-08 | Stop reason: HOSPADM

## 2024-01-06 RX ADMIN — MORPHINE SULFATE 4 MG: 4 INJECTION, SOLUTION INTRAMUSCULAR; INTRAVENOUS at 02:56

## 2024-01-06 RX ADMIN — Medication 10 ML: at 01:34

## 2024-01-06 RX ADMIN — DOCUSATE SODIUM 200 MG: 100 CAPSULE, LIQUID FILLED ORAL at 16:26

## 2024-01-06 RX ADMIN — METOPROLOL TARTRATE 5 MG: 5 INJECTION, SOLUTION INTRAVENOUS at 01:34

## 2024-01-06 RX ADMIN — METOPROLOL TARTRATE 5 MG: 5 INJECTION, SOLUTION INTRAVENOUS at 14:01

## 2024-01-06 RX ADMIN — OXYCODONE HYDROCHLORIDE 10 MG: 10 TABLET ORAL at 01:31

## 2024-01-06 RX ADMIN — HYDRALAZINE HYDROCHLORIDE 10 MG: 20 INJECTION, SOLUTION INTRAMUSCULAR; INTRAVENOUS at 10:36

## 2024-01-06 RX ADMIN — Medication 10 ML: at 05:18

## 2024-01-06 RX ADMIN — DOXYCYCLINE HYCLATE 100 MG: 100 TABLET, FILM COATED ORAL at 20:54

## 2024-01-06 RX ADMIN — VERAPAMIL HYDROCHLORIDE 80 MG: 80 TABLET ORAL at 14:01

## 2024-01-06 RX ADMIN — Medication 10 ML: at 04:55

## 2024-01-06 RX ADMIN — TIOTROPIUM BROMIDE INHALATION SPRAY 2 PUFF: 3.12 SPRAY, METERED RESPIRATORY (INHALATION) at 07:35

## 2024-01-06 RX ADMIN — OXYCODONE HYDROCHLORIDE 10 MG: 10 TABLET ORAL at 06:47

## 2024-01-06 RX ADMIN — MORPHINE SULFATE 2 MG: 2 INJECTION, SOLUTION INTRAMUSCULAR; INTRAVENOUS at 17:47

## 2024-01-06 RX ADMIN — VERAPAMIL HYDROCHLORIDE 80 MG: 80 TABLET ORAL at 17:47

## 2024-01-06 RX ADMIN — LISINOPRIL 40 MG: 10 TABLET ORAL at 08:15

## 2024-01-06 RX ADMIN — SODIUM CHLORIDE, PRESERVATIVE FREE 10 ML: 5 INJECTION INTRAVENOUS at 20:55

## 2024-01-06 RX ADMIN — FUROSEMIDE 20 MG: 10 INJECTION, SOLUTION INTRAMUSCULAR; INTRAVENOUS at 16:26

## 2024-01-06 RX ADMIN — KETOROLAC TROMETHAMINE 15 MG: 15 INJECTION, SOLUTION INTRAMUSCULAR; INTRAVENOUS at 08:10

## 2024-01-06 RX ADMIN — TRAZODONE HYDROCHLORIDE 50 MG: 50 TABLET ORAL at 20:54

## 2024-01-06 RX ADMIN — DOXYCYCLINE HYCLATE 100 MG: 100 TABLET, FILM COATED ORAL at 08:15

## 2024-01-06 RX ADMIN — VERAPAMIL HYDROCHLORIDE 80 MG: 80 TABLET ORAL at 08:21

## 2024-01-06 RX ADMIN — DULOXETINE HYDROCHLORIDE 60 MG: 60 CAPSULE, DELAYED RELEASE ORAL at 08:15

## 2024-01-06 RX ADMIN — FUROSEMIDE 20 MG: 10 INJECTION, SOLUTION INTRAMUSCULAR; INTRAVENOUS at 08:10

## 2024-01-06 RX ADMIN — SODIUM CHLORIDE, PRESERVATIVE FREE 10 ML: 5 INJECTION INTRAVENOUS at 08:16

## 2024-01-06 RX ADMIN — Medication 10 ML: at 02:58

## 2024-01-06 RX ADMIN — FUROSEMIDE 20 MG: 10 INJECTION, SOLUTION INTRAMUSCULAR; INTRAVENOUS at 02:58

## 2024-01-06 RX ADMIN — METOPROLOL TARTRATE 5 MG: 5 INJECTION, SOLUTION INTRAVENOUS at 20:54

## 2024-01-06 RX ADMIN — OXYCODONE HYDROCHLORIDE 5 MG: 5 TABLET ORAL at 16:25

## 2024-01-06 RX ADMIN — VERAPAMIL HYDROCHLORIDE 80 MG: 80 TABLET ORAL at 20:54

## 2024-01-06 RX ADMIN — FAMOTIDINE 20 MG: 20 TABLET, FILM COATED ORAL at 20:54

## 2024-01-06 RX ADMIN — KETOROLAC TROMETHAMINE 15 MG: 15 INJECTION, SOLUTION INTRAMUSCULAR; INTRAVENOUS at 01:34

## 2024-01-06 RX ADMIN — FAMOTIDINE 20 MG: 20 TABLET, FILM COATED ORAL at 08:15

## 2024-01-06 RX ADMIN — METOPROLOL TARTRATE 5 MG: 5 INJECTION, SOLUTION INTRAVENOUS at 08:11

## 2024-01-06 RX ADMIN — MOMETASONE FUROATE AND FORMOTEROL FUMARATE DIHYDRATE 2 PUFF: 200; 5 AEROSOL RESPIRATORY (INHALATION) at 07:35

## 2024-01-06 RX ADMIN — KETOROLAC TROMETHAMINE 15 MG: 15 INJECTION, SOLUTION INTRAMUSCULAR; INTRAVENOUS at 20:54

## 2024-01-06 RX ADMIN — KETOROLAC TROMETHAMINE 15 MG: 15 INJECTION, SOLUTION INTRAMUSCULAR; INTRAVENOUS at 14:01

## 2024-01-06 RX ADMIN — MORPHINE SULFATE 4 MG: 4 INJECTION, SOLUTION INTRAMUSCULAR; INTRAVENOUS at 05:18

## 2024-01-06 RX ADMIN — MOMETASONE FUROATE AND FORMOTEROL FUMARATE DIHYDRATE 2 PUFF: 200; 5 AEROSOL RESPIRATORY (INHALATION) at 19:25

## 2024-01-06 RX ADMIN — OXYCODONE HYDROCHLORIDE 10 MG: 10 TABLET ORAL at 20:54

## 2024-01-06 ASSESSMENT — PAIN DESCRIPTION - DESCRIPTORS
DESCRIPTORS: STABBING
DESCRIPTORS: ACHING;DISCOMFORT
DESCRIPTORS: STABBING
DESCRIPTORS: STABBING;SPASM
DESCRIPTORS: STABBING
DESCRIPTORS: STABBING

## 2024-01-06 ASSESSMENT — PAIN SCALES - GENERAL
PAINLEVEL_OUTOF10: 6
PAINLEVEL_OUTOF10: 0
PAINLEVEL_OUTOF10: 7
PAINLEVEL_OUTOF10: 6
PAINLEVEL_OUTOF10: 0
PAINLEVEL_OUTOF10: 8
PAINLEVEL_OUTOF10: 9
PAINLEVEL_OUTOF10: 6
PAINLEVEL_OUTOF10: 9
PAINLEVEL_OUTOF10: 7
PAINLEVEL_OUTOF10: 5
PAINLEVEL_OUTOF10: 6
PAINLEVEL_OUTOF10: 7
PAINLEVEL_OUTOF10: 6
PAINLEVEL_OUTOF10: 4
PAINLEVEL_OUTOF10: 9
PAINLEVEL_OUTOF10: 7

## 2024-01-06 ASSESSMENT — PAIN - FUNCTIONAL ASSESSMENT

## 2024-01-06 ASSESSMENT — PAIN DESCRIPTION - LOCATION
LOCATION: BACK

## 2024-01-06 ASSESSMENT — PAIN DESCRIPTION - ORIENTATION
ORIENTATION: LEFT;MID;UPPER
ORIENTATION: MID;LOWER;UPPER
ORIENTATION: UPPER;MID;LOWER
ORIENTATION: UPPER
ORIENTATION: UPPER;MID;LOWER

## 2024-01-06 ASSESSMENT — PAIN DESCRIPTION - PAIN TYPE
TYPE: CHRONIC PAIN

## 2024-01-06 NOTE — PROGRESS NOTES
@ 2959 - This RN called Vascular surgeon on-call to inform regarding the patient's increased Oxygen demands. Dr. Nishant Travis will be calling back.    @ [phone] - Dr. Nishant Travis called this RN back. This RN informed Dr. Travis that pt. is post-op day 4 open AAA repair, that reason for the call is the pt.'s increasing O2 demands from 2 L/min to now high-flow nasal cannula @ 7 L/min, and that one of pt.'s history is COPD and he's a 1 PPD smoker. This RN told Dr. Travis that about an hour ago he suddenly woke-up and said that he's having a hard time breathing & at that time he had his O2 cannula off his nose. Once the O2 cannula was back, his O2 saturation didn't recover, although he denies having a hard time breathing now, and that his O2 saturation now on 7 L/min is between 88-91%. Dr. Travis ordered a Stat CXR and one-time Lasix 20 mg IV dose (see order).    Electronically signed by Jasmine Zhong RN on 1/6/2024 at 2:49 AM

## 2024-01-06 NOTE — PROGRESS NOTES
This RN told him that this RN will be giving him a stool softener since he hasn't had a bowel movement since January 1st. He refused and said that he is feeling that he is going to have a BM in the next few hours. This RN will let the next shift RN aware that there's an available PRN Colace that can be given to the patient if he will not have a BM in the next few hours.    Electronically signed by Jasmine Zhong RN on 1/6/2024 at 6:59 AM

## 2024-01-06 NOTE — PROGRESS NOTES
This RN called X-Ray alfonso Ayers if when will Radiologist read the stat portable CXR that was done to the pt. about an hour ago. X-Ray alfonso Ayers will send Radiologist a message again regarding this.    Electronically signed by Jasmine Zhong RN on 1/6/2024 at 4:03 AM

## 2024-01-06 NOTE — PROGRESS NOTES
V2.0    [facility] [place] Progress Note ( follow up on consult )      Name:  Manfred Washington /Age/Sex: 1955  (68 y.o. male)   MRN & CSN:  [id number] & [id number] Encounter Date/Time: 2024 8:57 AM EDT   Location:  [id number]/1312-01 PCP: Suzette Oquendo APRN     Attending:Live Stanford MD       Hospital Day: 6      HPI :   Chief Complaint: admitted electively for AAA repair, hopitalist consulted .     Manfred Washington is a 68 y.o. male who  was admitted by vascular surgery on 2024 for elective open AAA repair of 5.7 cm with right LINNEA 2.7 cm, asymptomatic related to this.  Has history of CAD, COPD.  History splenectomy.     Had open abdominal aneurysm repair and reimplantation of inferior mesenteric artery on the same date  by Dr. Stanford.  No complications intra procedurally and minimal EBL per operative report.  Operation noted to be very complicated related to takedown of adhesions, reimplantation of ARPIT, extensive pelvic dissection and difficult distal anastomosis.  Operation lasted 5-1/2 hours.  Vascular has recommended NG tube to remain in place pending bowel movements. Patient was extubated 1/3.     Consulted overnight 1/3 -  for sudden onset agitation.  Patient is alert and oriented, denies use of alcohol.  We are attempting to obtain information from wife regarding drinking history.  Requiring sedation with Precedex and also probably Ativan PRNs      Subjective:   Worsening hypoxia, now on 10 liters of oxygen   Patient denies any complaints.   Encouraged to use IS  Review of Systems:      Pertinent positives and negatives discussed in HPI    Objective:     Intake/Output Summary (Last 24 hours) at 2024 0802  Last data filed at 2024 0737  Gross per 24 hour   Intake 2552.73 ml   Output 4160 ml   Net -1607.27 ml        Vitals:   Vitals:    24 0700 24 0730 24 0740 24 0742   BP: (!) 140/80      Pulse: 63 89 71 74   Resp: 11 22 13 20   Temp:       TempSrc:       SpO2: 92%   monitor.  Agitation, mood disorder.  CIWA protocol in place in case of alcohol history.  Ativan PRNs, requiring Precedex.  Continue patient's home Cymbalta, trazodone. Significantly improved today, weaned off precedex.   Smoker-1 PPD.  Nicotine replacement therapy offered, patient declines at this time.          Personally reviewed Lab Studies and Imaging        Medical Decision Making:  The following items were considered in medical decision making:  Discussion of patient care with other providers  Reviewed clinical lab tests  Reviewed radiology tests  Reviewed other diagnostic tests/interventions  Independent review of radiologic images  Microbiology cultures and other micro tests reviewed        Electronically signed by Christiano Oliveira MD on 1/6/2024 at 8:02 AM  Comment: Please note this report has been produced using speech recognition software and may contain errors related to that system including errors in grammar, punctuation, and spelling, as well as words and phrases that may be inappropriate. If there are any questions or concerns, please feel free to contact the dictating provider for clarification.

## 2024-01-06 NOTE — PROGRESS NOTES
Late entry due to patient care.    @ 1945 to 2035 - Shift assessment completed. He's alert, calm, cooperative, oriented (x4), but forgetful w/ some information at times. He's SR w/ occasional PVCs on the monitor, on O2 @ 2L/min per nasal cannula, not in any distress, denies any nausea, has soft abdomen, w/ active bowel sounds, has been passing flatus, and has palpable bilateral dorsalis pedis and posterior tibial pulses. His abdominal surgical incision is intact, approximated, and without any erythema nor any hematoma. He was re-educated on his limit of 1 cup of ice chips every 4 hours and sips of water w/ oral medicines only. He agreed. This RN re-educated him on using the IS 10 times every hour, when he's awake. He verbalized understanding and was able to do an average of 1,500 cc (x 10 times) on the IS. Plan of care for this shift was also explained to him. Call light, urinal, and bedside table are within his reach.    Electronically signed by Jasmine Zhong RN on 1/6/2024 at 12:33 AM

## 2024-01-06 NOTE — PROGRESS NOTES
Nursing Shift Summary 7A-7P    Shift Events  Oxygen needs peaked at early this morning.  Was able to wean to 4L by end of shift.    Good response to IV lasix  IV fluids stopped  Diet upgraded to clear liquids    Vitals  BP: PRN ordered to keep SBP >160.  Hydralazine given X1   HR: NSR/ PVSs   Resp/SpO2: Peaked at 15L.  Now at 4L  Temp: Afebrile    Pain: Moderate/ Severe.  Increased after getting OOB to chair.  Oxycodone given X1.  Morphine given X1      Infusions  NONE    Neuro  OX: 4, intermittant confusion.    RASS: 0    I/O & Drains  I/O this shift:  In: 1447.1 [P.O.:1380; I.V.:67.1]  Out: 2625 [Urine:2625]           Bedside Shift Handoff given to: Kacy @ 7:12 PM  Chiquita Luis RN

## 2024-01-06 NOTE — PROGRESS NOTES
Late entry due to patient care.    @ 0119 - Called-out because he's having \"a hard time breathing\". He was found to be on room air w/ O2 sat of 75%. This RN placed him back on previous O2 support of 3 L/min and had him do some slow deep breaths. Upon lung auscultation, his breath sounds were unchanged from previous assessments- clear but decreased.    @ 0156 - O2 sat still not staying above 90%, so this RN had been titrating his O2 support up (see flowsheets). He had done his IS (10 times) a few minutes ago and has achieved an average of 1,250 cc.    @ 0211 - O2 sat = 89% on O2 @ 6 L/min per nasal cannula. This RN placed him on a high-flow nasal cannula, & will titrate O2 to keep O2 sat > 90%.    Electronically signed by Jasmine Zhong RN on 1/6/2024 at 3:17 AM

## 2024-01-06 NOTE — PROGRESS NOTES
Pulmonary Progress Note    Date of Admission: 1/1/2024   LOS: 5 days       CC:  No chief complaint on file.   copd    Subjective:  Patient states he takes Valium 10 mg twice a day for muscle spasms.  However, he is still slightly confused with Precedex being turned off currently.  He then stated Cymbalta.    Valium is not on his med list.    ROS:   No nausea  No Vomiting  No chest pain       Assessment:          Plan:     This note may have been transcribed using Dragon Dictation software. Please disregard any translational errors.       Hospital Day: 5     Pulmonary nodule left lower lobe  Case being scheduled  Future Appointments   Date Time Provider Department Center   3/4/2024 11:40 AM Fox Donahue MD  PULM [facility]              AAA status post repair  Open AAA with graft  Pod day 1  Per vascular surgery      COPD  Dulera   Spiriva        agitation  Stopping Precedex today.  No agitation.    Acute hypoxemia  Likely secondary to volume overload  Currently 6 L positive  Stop IVF.   Continue lasix.   Continue IS   Smal bilaerla effusions noted on chest X-ray   Ground-glass opacities, possible pneumonia.  Positive predictive value limited after surgery but will order today and daily to follow trend.       Sleep  Requesting trazodone.  Patient states he takes 1 pill at night.  Advises this is a 80 mg.  Since the smallest 1 tablet is 50, will give him 1, 50 mg tablet    Data:        PHYSICAL EXAM:   Blood pressure (!) 140/80, pulse 63, temperature 98 °F (36.7 °C), temperature source Oral, resp. rate 11, height 1.727 m (5' 8\"), weight 72.6 kg (160 lb 0.9 oz), SpO2 92 %.'  Body mass index is 24.34 kg/m².   Gen: No distress.    ENT:   Resp: No accessory muscle use. No crackles. No wheezes. No rhonchi.    CV: Regular rate. Regular rhythm. No murmur or rub. No edema.   Skin: Warm, dry, normal texture and turgor. No nodule on exposed extremities.   M/S: No cyanosis. No clubbing. No joint deformity.  Psych: awake.        Medications:    Scheduled Meds:   sodium chloride flush  5-40 mL IntraVENous 2 times per day    ketorolac  15 mg IntraVENous Q6H    metoprolol  5 mg IntraVENous Q6H    sodium chloride flush  5-40 mL IntraVENous 2 times per day    famotidine  20 mg Oral BID    Or    famotidine (PEPCID) injection  20 mg IntraVENous BID    DULoxetine  60 mg Per G Tube Daily    lisinopril  40 mg Per NG tube Daily    verapamil  80 mg Per NG tube 4x Daily    mometasone-formoterol  2 puff Inhalation BID RT    And    tiotropium  2 puff Inhalation Daily RT       Continuous Infusions:   sodium chloride      sodium chloride Stopped (01/03/24 0443)    sodium chloride         PRN Meds:  oxyCODONE **OR** oxyCODONE, docusate sodium, sodium chloride flush, sodium chloride, morphine, morphine **OR** morphine, sodium chloride flush, sodium chloride, ondansetron **OR** ondansetron, albuterol sulfate HFA, sodium chloride flush, magnesium sulfate, sodium chloride    Labs reviewed:  CBC:   Recent Labs     01/04/24  0330 01/05/24  0545 01/06/24  0451   WBC 27.4* 23.5* 23.0*   HGB 9.0* 8.4* 9.2*   HCT 27.0* 24.4* 27.2*   MCV 92.0 92.2 92.2    290 350       BMP:   Recent Labs     01/04/24  0330 01/05/24  0545 01/06/24  0451    140 141   K 4.2 3.7 3.7   * 109 106   CO2 25 26 27   PHOS 3.1  --   --    BUN 18 21* 20   CREATININE 0.8 0.8 0.8       LIVER PROFILE:   No results for input(s): \"AST\", \"ALT\", \"LIPASE\", \"AMYLASE\", \"ALB\", \"BILIDIR\", \"BILITOT\", \"ALKPHOS\" in the last 72 hours.    PT/INR:   No results for input(s): \"PROTIME\", \"INR\" in the last 72 hours.    APTT:   No results for input(s): \"APTT\" in the last 72 hours.    UA:  Recent Labs     01/04/24  1010   COLORU Yellow   PHUR 6.0   WBCUA 0-2   RBCUA 3-4   CLARITYU Clear   SPECGRAV 1.015   LEUKOCYTESUR Negative   UROBILINOGEN 0.2   BILIRUBINUR Negative   BLOODU LARGE*   GLUCOSEU Negative   AMORPHOUS 1+       No results for input(s): \"PH\", \"PCO2\", \"PO2\" in the last 72

## 2024-01-06 NOTE — PROGRESS NOTES
Vascular    POD #4   Increased O2 requirement overnight.  Responded to 20mg Lasix with another 20 this am  Pt denies sob  No n/v    AFVSS    Intake/Output Summary (Last 24 hours) at 1/6/2024 1153  Last data filed at 1/6/2024 1000  Gross per 24 hour   Intake 2522.73 ml   Output 3800 ml   Net -1277.27 ml     Abd soft, NT  Ext warm with palpable distal pulses    Lab Results   Component Value Date    WBC 23.0 (H) 01/06/2024    HGB 9.2 (L) 01/06/2024    HCT 27.2 (L) 01/06/2024    MCV 92.2 01/06/2024     01/06/2024     Lab Results   Component Value Date    CREATININE 0.8 01/06/2024    BUN 20 01/06/2024     01/06/2024    K 3.7 01/06/2024     01/06/2024    CO2 27 01/06/2024     I/P:  POD #4  Improved diuresis with Lasix 40  Continue to wean O2 as tolerated  Advance to clear liquids today  Transition to meds to po from IV  Encourage use of IS      Nishant Travis M.D., FACS.  1/6/2024  11:54 AM

## 2024-01-06 NOTE — PROGRESS NOTES
@ [phone] - This RN called Vascular surgeon on-call to inform regarding the patient's CXR result. Dr. Nishant Travis called this RN back. This RN read the CXR result \"1. Ill-defined ground-glass opacity in the right lung upper lobe suggesting pneumonia. 2. Small bilateral pleural effusions more prominent on the left.\" to Dr. Travis. Dr. Travis was also made aware that pt. is still on O2 @ 8 L/min per high-flow nasal cannula, and urinated about 1,000 ml post-Lasix IV administration. Dr. Travis acknowledged and no new orders orders were made.    Electronically signed by Jasmine Zhong RN on 1/6/2024 at 5:10 AM

## 2024-01-06 NOTE — PROGRESS NOTES
Bedside shift hand-off done w/ oncoming VINNY MAYNARD, who will assume pt. care. All questions were answered. This RN handed-off the pt. in a stable condition.    Electronically signed by Jasmine Zhong RN on 1/6/2024 at 7:50 AM

## 2024-01-06 NOTE — FLOWSHEET NOTE
Perfect Serve to Nishant Travis II, MD  at 10:16 AM    Situation:   -Hypertension despite morning medications  - Update: patient now on 15L HFNC.  Received 20 mg IV lasix this morning.  IV fluids are off     01/06/24 1000   Vitals   Pulse 80   Respirations 14   BP (!) 174/98   MAP (Calculated) 123   MAP (mmHg) 119   Oxygen Therapy   SpO2 95 %   Pulse via Oximetry 77 beats per minute         Response   Call back at 10:22 AM    Action  PRN Medications for HTN placed  Acknowledgement of oxygen demands.  Continue Pulmonology orders

## 2024-01-06 NOTE — PLAN OF CARE
Problem: Gastrointestinal - Adult  Goal: Maintains or returns to baseline bowel function  Outcome: Not Progressing  Flowsheets (Taken 1/6/2024 0800)  Maintains or returns to baseline bowel function:   Assess bowel function   Encourage oral fluids to ensure adequate hydration    No BM since surgery.  Declining stool softeners.       Problem: Respiratory - Adult  Goal: Achieves optimal ventilation and oxygenation  Outcome: Not Progressing  Flowsheets (Taken 1/6/2024 0800)  Achieves optimal ventilation and oxygenation:   Assess for changes in respiratory status   Assess for changes in mentation and behavior   Position to facilitate oxygenation and minimize respiratory effort   Oxygen supplementation based on oxygen saturation or arterial blood gases   Initiate smoking cessation protocol as indicated   Encourage broncho-pulmonary hygiene including cough, deep breathe, incentive spirometry     Able to wean oxygen as allowed.  Keeping SPO2 >90%.  Currently on 10L HFNC    Problem: Safety - Adult  Goal: Free from fall injury  Outcome: Progressing     Problem: Discharge Planning  Goal: Discharge to home or other facility with appropriate resources  Outcome: Progressing  Flowsheets (Taken 1/6/2024 0800)  Discharge to home or other facility with appropriate resources:   Identify barriers to discharge with patient and caregiver   Identify discharge learning needs (meds, wound care, etc)

## 2024-01-07 LAB
ANION GAP SERPL CALCULATED.3IONS-SCNC: 6 MMOL/L (ref 3–16)
BUN SERPL-MCNC: 17 MG/DL (ref 7–20)
CALCIUM SERPL-MCNC: 7.6 MG/DL (ref 8.3–10.6)
CHLORIDE SERPL-SCNC: 100 MMOL/L (ref 99–110)
CO2 SERPL-SCNC: 30 MMOL/L (ref 21–32)
CREAT SERPL-MCNC: 0.7 MG/DL (ref 0.8–1.3)
DEPRECATED RDW RBC AUTO: 13.6 % (ref 12.4–15.4)
GFR SERPLBLD CREATININE-BSD FMLA CKD-EPI: >60 ML/MIN/{1.73_M2}
GLUCOSE SERPL-MCNC: 97 MG/DL (ref 70–99)
HCT VFR BLD AUTO: 27.5 % (ref 40.5–52.5)
HGB BLD-MCNC: 9.6 G/DL (ref 13.5–17.5)
MAGNESIUM SERPL-MCNC: 2 MG/DL (ref 1.8–2.4)
MCH RBC QN AUTO: 31.3 PG (ref 26–34)
MCHC RBC AUTO-ENTMCNC: 34.9 G/DL (ref 31–36)
MCV RBC AUTO: 89.7 FL (ref 80–100)
PLATELET # BLD AUTO: 428 K/UL (ref 135–450)
PMV BLD AUTO: 8.4 FL (ref 5–10.5)
POTASSIUM SERPL-SCNC: 3 MMOL/L (ref 3.5–5.1)
POTASSIUM SERPL-SCNC: 3.3 MMOL/L (ref 3.5–5.1)
PROCALCITONIN SERPL IA-MCNC: 0.18 NG/ML (ref 0–0.15)
RBC # BLD AUTO: 3.06 M/UL (ref 4.2–5.9)
SODIUM SERPL-SCNC: 136 MMOL/L (ref 136–145)
WBC # BLD AUTO: 15.2 K/UL (ref 4–11)

## 2024-01-07 PROCEDURE — 94640 AIRWAY INHALATION TREATMENT: CPT

## 2024-01-07 PROCEDURE — 99233 SBSQ HOSP IP/OBS HIGH 50: CPT | Performed by: INTERNAL MEDICINE

## 2024-01-07 PROCEDURE — 85027 COMPLETE CBC AUTOMATED: CPT

## 2024-01-07 PROCEDURE — 6370000000 HC RX 637 (ALT 250 FOR IP): Performed by: SURGERY

## 2024-01-07 PROCEDURE — 36592 COLLECT BLOOD FROM PICC: CPT

## 2024-01-07 PROCEDURE — 2100000000 HC CCU R&B

## 2024-01-07 PROCEDURE — 2700000000 HC OXYGEN THERAPY PER DAY

## 2024-01-07 PROCEDURE — 83735 ASSAY OF MAGNESIUM: CPT

## 2024-01-07 PROCEDURE — 6370000000 HC RX 637 (ALT 250 FOR IP): Performed by: INTERNAL MEDICINE

## 2024-01-07 PROCEDURE — 6360000002 HC RX W HCPCS: Performed by: INTERNAL MEDICINE

## 2024-01-07 PROCEDURE — 80048 BASIC METABOLIC PNL TOTAL CA: CPT

## 2024-01-07 PROCEDURE — 2580000003 HC RX 258: Performed by: STUDENT IN AN ORGANIZED HEALTH CARE EDUCATION/TRAINING PROGRAM

## 2024-01-07 PROCEDURE — 94760 N-INVAS EAR/PLS OXIMETRY 1: CPT

## 2024-01-07 PROCEDURE — 2580000003 HC RX 258: Performed by: SURGERY

## 2024-01-07 PROCEDURE — 6360000002 HC RX W HCPCS: Performed by: SURGERY

## 2024-01-07 PROCEDURE — 2500000003 HC RX 250 WO HCPCS: Performed by: SURGERY

## 2024-01-07 PROCEDURE — 84145 PROCALCITONIN (PCT): CPT

## 2024-01-07 PROCEDURE — 84132 ASSAY OF SERUM POTASSIUM: CPT

## 2024-01-07 RX ORDER — POTASSIUM CHLORIDE 7.45 MG/ML
10 INJECTION INTRAVENOUS PRN
Status: DISCONTINUED | OUTPATIENT
Start: 2024-01-07 | End: 2024-01-08

## 2024-01-07 RX ORDER — MAGNESIUM SULFATE IN WATER 40 MG/ML
2000 INJECTION, SOLUTION INTRAVENOUS PRN
Status: DISCONTINUED | OUTPATIENT
Start: 2024-01-07 | End: 2024-01-07 | Stop reason: SDUPTHER

## 2024-01-07 RX ORDER — POTASSIUM CHLORIDE 29.8 MG/ML
20 INJECTION INTRAVENOUS PRN
Status: DISCONTINUED | OUTPATIENT
Start: 2024-01-07 | End: 2024-01-08

## 2024-01-07 RX ADMIN — VERAPAMIL HYDROCHLORIDE 80 MG: 80 TABLET ORAL at 17:47

## 2024-01-07 RX ADMIN — METOPROLOL TARTRATE 5 MG: 5 INJECTION, SOLUTION INTRAVENOUS at 15:27

## 2024-01-07 RX ADMIN — FAMOTIDINE 20 MG: 20 TABLET, FILM COATED ORAL at 08:00

## 2024-01-07 RX ADMIN — SODIUM CHLORIDE, PRESERVATIVE FREE 10 ML: 5 INJECTION INTRAVENOUS at 21:58

## 2024-01-07 RX ADMIN — FUROSEMIDE 20 MG: 10 INJECTION, SOLUTION INTRAMUSCULAR; INTRAVENOUS at 17:47

## 2024-01-07 RX ADMIN — POTASSIUM CHLORIDE 10 MEQ: 7.46 INJECTION, SOLUTION INTRAVENOUS at 17:54

## 2024-01-07 RX ADMIN — POTASSIUM CHLORIDE 10 MEQ: 7.46 INJECTION, SOLUTION INTRAVENOUS at 21:33

## 2024-01-07 RX ADMIN — POTASSIUM CHLORIDE 20 MEQ: 29.8 INJECTION, SOLUTION INTRAVENOUS at 07:59

## 2024-01-07 RX ADMIN — VERAPAMIL HYDROCHLORIDE 80 MG: 80 TABLET ORAL at 13:26

## 2024-01-07 RX ADMIN — SODIUM CHLORIDE: 9 INJECTION, SOLUTION INTRAVENOUS at 07:52

## 2024-01-07 RX ADMIN — METOPROLOL TARTRATE 5 MG: 5 INJECTION, SOLUTION INTRAVENOUS at 07:52

## 2024-01-07 RX ADMIN — FUROSEMIDE 20 MG: 10 INJECTION, SOLUTION INTRAMUSCULAR; INTRAVENOUS at 07:55

## 2024-01-07 RX ADMIN — SODIUM CHLORIDE, PRESERVATIVE FREE 10 ML: 5 INJECTION INTRAVENOUS at 08:01

## 2024-01-07 RX ADMIN — MOMETASONE FUROATE AND FORMOTEROL FUMARATE DIHYDRATE 2 PUFF: 200; 5 AEROSOL RESPIRATORY (INHALATION) at 11:32

## 2024-01-07 RX ADMIN — OXYCODONE HYDROCHLORIDE 10 MG: 10 TABLET ORAL at 13:08

## 2024-01-07 RX ADMIN — POTASSIUM CHLORIDE 20 MEQ: 29.8 INJECTION, SOLUTION INTRAVENOUS at 10:23

## 2024-01-07 RX ADMIN — POTASSIUM CHLORIDE 20 MEQ: 29.8 INJECTION, SOLUTION INTRAVENOUS at 08:59

## 2024-01-07 RX ADMIN — TIOTROPIUM BROMIDE INHALATION SPRAY 2 PUFF: 3.12 SPRAY, METERED RESPIRATORY (INHALATION) at 11:31

## 2024-01-07 RX ADMIN — Medication 10 ML: at 05:47

## 2024-01-07 RX ADMIN — METOPROLOL TARTRATE 5 MG: 5 INJECTION, SOLUTION INTRAVENOUS at 20:01

## 2024-01-07 RX ADMIN — KETOROLAC TROMETHAMINE 15 MG: 15 INJECTION, SOLUTION INTRAMUSCULAR; INTRAVENOUS at 03:02

## 2024-01-07 RX ADMIN — DULOXETINE HYDROCHLORIDE 60 MG: 60 CAPSULE, DELAYED RELEASE ORAL at 08:00

## 2024-01-07 RX ADMIN — Medication 10 ML: at 03:02

## 2024-01-07 RX ADMIN — DOXYCYCLINE HYCLATE 100 MG: 100 TABLET, FILM COATED ORAL at 08:00

## 2024-01-07 RX ADMIN — DOXYCYCLINE HYCLATE 100 MG: 100 TABLET, FILM COATED ORAL at 19:58

## 2024-01-07 RX ADMIN — LISINOPRIL 40 MG: 10 TABLET ORAL at 08:00

## 2024-01-07 RX ADMIN — OXYCODONE HYDROCHLORIDE 10 MG: 10 TABLET ORAL at 22:44

## 2024-01-07 RX ADMIN — POTASSIUM CHLORIDE 10 MEQ: 7.46 INJECTION, SOLUTION INTRAVENOUS at 20:02

## 2024-01-07 RX ADMIN — TRAZODONE HYDROCHLORIDE 50 MG: 50 TABLET ORAL at 19:59

## 2024-01-07 RX ADMIN — SODIUM CHLORIDE, PRESERVATIVE FREE 10 ML: 5 INJECTION INTRAVENOUS at 08:00

## 2024-01-07 RX ADMIN — FAMOTIDINE 20 MG: 20 TABLET, FILM COATED ORAL at 19:59

## 2024-01-07 RX ADMIN — VERAPAMIL HYDROCHLORIDE 80 MG: 80 TABLET ORAL at 08:05

## 2024-01-07 RX ADMIN — POTASSIUM CHLORIDE 10 MEQ: 7.46 INJECTION, SOLUTION INTRAVENOUS at 22:44

## 2024-01-07 RX ADMIN — VERAPAMIL HYDROCHLORIDE 80 MG: 80 TABLET ORAL at 19:59

## 2024-01-07 RX ADMIN — OXYCODONE HYDROCHLORIDE 10 MG: 10 TABLET ORAL at 17:47

## 2024-01-07 RX ADMIN — OXYCODONE HYDROCHLORIDE 10 MG: 10 TABLET ORAL at 08:04

## 2024-01-07 RX ADMIN — METOPROLOL TARTRATE 5 MG: 5 INJECTION, SOLUTION INTRAVENOUS at 03:02

## 2024-01-07 RX ADMIN — MOMETASONE FUROATE AND FORMOTEROL FUMARATE DIHYDRATE 2 PUFF: 200; 5 AEROSOL RESPIRATORY (INHALATION) at 20:30

## 2024-01-07 ASSESSMENT — PAIN DESCRIPTION - LOCATION
LOCATION: BACK;ABDOMEN
LOCATION: ABDOMEN
LOCATION: ABDOMEN
LOCATION: BACK;ABDOMEN
LOCATION: ABDOMEN
LOCATION: ABDOMEN
LOCATION: BACK
LOCATION: ABDOMEN;BACK

## 2024-01-07 ASSESSMENT — PAIN DESCRIPTION - ORIENTATION
ORIENTATION: MID;LOWER;UPPER
ORIENTATION: MID;LOWER
ORIENTATION: UPPER;MID;LOWER
ORIENTATION: MID;LOWER;UPPER
ORIENTATION: MID
ORIENTATION: MID
ORIENTATION: MID;LOWER;UPPER
ORIENTATION: MID

## 2024-01-07 ASSESSMENT — PAIN SCALES - GENERAL
PAINLEVEL_OUTOF10: 7
PAINLEVEL_OUTOF10: 8
PAINLEVEL_OUTOF10: 8
PAINLEVEL_OUTOF10: 7
PAINLEVEL_OUTOF10: 0
PAINLEVEL_OUTOF10: 4
PAINLEVEL_OUTOF10: 6
PAINLEVEL_OUTOF10: 4
PAINLEVEL_OUTOF10: 7
PAINLEVEL_OUTOF10: 8
PAINLEVEL_OUTOF10: 6
PAINLEVEL_OUTOF10: 6
PAINLEVEL_OUTOF10: 4
PAINLEVEL_OUTOF10: 6

## 2024-01-07 ASSESSMENT — PAIN DESCRIPTION - DESCRIPTORS
DESCRIPTORS: ACHING
DESCRIPTORS: STABBING
DESCRIPTORS: ACHING;BURNING
DESCRIPTORS: ACHING;DISCOMFORT
DESCRIPTORS: ACHING

## 2024-01-07 ASSESSMENT — PAIN DESCRIPTION - ONSET: ONSET: ON-GOING

## 2024-01-07 ASSESSMENT — PAIN DESCRIPTION - PAIN TYPE
TYPE: SURGICAL PAIN;CHRONIC PAIN
TYPE: SURGICAL PAIN
TYPE: SURGICAL PAIN
TYPE: CHRONIC PAIN;SURGICAL PAIN
TYPE: CHRONIC PAIN
TYPE: SURGICAL PAIN
TYPE: CHRONIC PAIN;SURGICAL PAIN

## 2024-01-07 ASSESSMENT — PAIN - FUNCTIONAL ASSESSMENT
PAIN_FUNCTIONAL_ASSESSMENT: ACTIVITIES ARE NOT PREVENTED

## 2024-01-07 ASSESSMENT — PAIN DESCRIPTION - FREQUENCY: FREQUENCY: CONTINUOUS

## 2024-01-07 NOTE — PROGRESS NOTES
Vascular    POD #5  Tolerating clears.  +flatus  No other complaints    AFVSS    Intake/Output Summary (Last 24 hours) at 1/7/2024 1005  Last data filed at 1/7/2024 0820  Gross per 24 hour   Intake 2190 ml   Output 3300 ml   Net -1110 ml     Lab Results   Component Value Date    WBC 15.2 (H) 01/07/2024    HGB 9.6 (L) 01/07/2024    HCT 27.5 (L) 01/07/2024    MCV 89.7 01/07/2024     01/07/2024     Lab Results   Component Value Date    CREATININE 0.7 (L) 01/07/2024    BUN 17 01/07/2024     01/07/2024    K 3.0 (L) 01/07/2024     01/07/2024    CO2 30 01/07/2024     I/P:  POD #5  Advance to regular diet  Continue pulm toilet.  Wean O2 as tolerated  Activity as tolerated      Nishant Travis M.D., FACS.  1/7/2024  10:07 AM

## 2024-01-07 NOTE — PROGRESS NOTES
Late entry due to patient care.    @ 2040 to 2135 - Shift assessment completed. He's alert, calm, cooperative, oriented (x4), but forgetful w/ some information at times. He's SR w/ occasional PVCs on the monitor, on O2 @ 4L/min per high-flow nasal cannula, not in any distress, denies any nausea, has soft abdomen, w/ active bowel sounds, has been passing flatus, and has palpable bilateral dorsalis pedis and posterior tibial pulses. His abdominal surgical incision is intact, approximated, and without any erythema nor any hematoma. Plan of care for this shift was also explained to him. This RN re-educated him on using the IS 10 times every hour, when he's awake. He verbalized understanding and was able to do an average of 1,250 cc (x 10 times) on the IS. He took all of his scheduled PO medications & PRN Oxycodone & Trazodone (see MAR) without any problems. He walked to the bathroom using a four-wheeled walker, w/ this RN as standby-assist only. He had a moderate amount of formed and brown bowel movement. He went back to bed and tolerated the ambulation well. Call light, urinals, and bedside table were placed within his reach. Bed alarm was activated.    Electronically signed by Jasmine Zhong RN on 1/7/2024 at 12:59 AM

## 2024-01-07 NOTE — PROGRESS NOTES
Pulmonary Progress Note    Date of Admission: 1/1/2024   LOS: 6 days       CC:  No chief complaint on file.   copd    Subjective:  Sleeping     ROS:        Assessment:          Plan:     This note may have been transcribed using Dragon Dictation software. Please disregard any translational errors.       Hospital Day: 6     Pulmonary nodule left lower lobe  Case being scheduled  Future Appointments   Date Time Provider Department Center   3/4/2024 11:40 AM Fox Donahue MD [facility]              AAA status post repair  Open AAA with graft     Per vascular surgery      COPD  Dulera   Spiriva        Acute hypoxemia  Likely secondary to volume overload  Currently 3 L positive  Continue lasix.   Continue IS   Smal bilaerla effusions noted on chest X-ray   Ground-glass opacities, possible pneumonia.  WBC improving  Procalcitonin   low.      Sleep    trazodone.       Data:        PHYSICAL EXAM:   Blood pressure (!) 145/76, pulse 60, temperature 98.4 °F (36.9 °C), temperature source Oral, resp. rate 16, height 1.727 m (5' 8\"), weight 69.9 kg (154 lb 1.6 oz), SpO2 94 %.'  Body mass index is 23.43 kg/m².   Gen: No distress.    ENT:   Resp: No accessory muscle use. No crackles. No wheezes. No rhonchi.    CV: Regular rate. Regular rhythm. No murmur or rub. No edema.   Skin: Warm, dry, normal texture and turgor. No nodule on exposed extremities.   M/S: No cyanosis. No clubbing. No joint deformity.  Psych: sleeping .       Medications:    Scheduled Meds:   furosemide  20 mg IntraVENous BID    doxycycline hyclate  100 mg Oral 2 times per day    DULoxetine  60 mg Oral Daily    lisinopril  40 mg Oral Daily    verapamil  80 mg Oral 4x Daily    sodium chloride flush  5-40 mL IntraVENous 2 times per day    metoprolol  5 mg IntraVENous Q6H    sodium chloride flush  5-40 mL IntraVENous 2 times per day    famotidine  20 mg Oral BID    Or    famotidine (PEPCID) injection  20 mg IntraVENous BID    mometasone-formoterol  2 puff  Inhalation BID RT    And    tiotropium  2 puff Inhalation Daily RT       Continuous Infusions:   sodium chloride      sodium chloride Stopped (01/03/24 0443)    sodium chloride         PRN Meds:  potassium chloride, traZODone, hydrALAZINE, labetalol, oxyCODONE **OR** oxyCODONE, docusate sodium, sodium chloride flush, sodium chloride, morphine **OR** morphine, sodium chloride flush, sodium chloride, ondansetron **OR** ondansetron, albuterol sulfate HFA, sodium chloride flush, magnesium sulfate, sodium chloride    Labs reviewed:  CBC:   Recent Labs     01/05/24  0545 01/06/24  0451 01/07/24  0547   WBC 23.5* 23.0* 15.2*   HGB 8.4* 9.2* 9.6*   HCT 24.4* 27.2* 27.5*   MCV 92.2 92.2 89.7    350 428       BMP:   Recent Labs     01/05/24 0545 01/06/24 0451 01/07/24  0547    141 136   K 3.7 3.7 3.0*    106 100   CO2 26 27 30   BUN 21* 20 17   CREATININE 0.8 0.8 0.7*       LIVER PROFILE:   No results for input(s): \"AST\", \"ALT\", \"LIPASE\", \"AMYLASE\", \"ALB\", \"BILIDIR\", \"BILITOT\", \"ALKPHOS\" in the last 72 hours.    PT/INR:   No results for input(s): \"PROTIME\", \"INR\" in the last 72 hours.    APTT:   No results for input(s): \"APTT\" in the last 72 hours.    UA:  Recent Labs     01/04/24  1010   COLORU Yellow   PHUR 6.0   WBCUA 0-2   RBCUA 3-4   CLARITYU Clear   SPECGRAV 1.015   LEUKOCYTESUR Negative   UROBILINOGEN 0.2   BILIRUBINUR Negative   BLOODU LARGE*   GLUCOSEU Negative   AMORPHOUS 1+       No results for input(s): \"PH\", \"PCO2\", \"PO2\" in the last 72 hours.    Cx:      Films:             This note was transcribed using Dragon Dictation software. Please disregard any translational errors.      AI Bold[facility]  [phone]

## 2024-01-07 NOTE — PROGRESS NOTES
V2.0    [facility] Progress Note ( follow up on consult )      Name:  Manfred Washington /Age/Sex: 1955  (68 y.o. male)   MRN & CSN:  [id number] & [id number] Encounter Date/Time: 2024 8:57 AM EDT   Location:  [id number]/1312-01 PCP: Suzette Oquendo APRN     Attending:Live Stanford MD       Hospital Day: 7      HPI :   Chief Complaint: admitted electively for AAA repair, hopitalist consulted .     Manfred Washington is a 68 y.o. male who  was admitted by vascular surgery on 2024 for elective open AAA repair of 5.7 cm with right LINNEA 2.7 cm, asymptomatic related to this.  Has history of CAD, COPD.  History splenectomy.     Had open abdominal aneurysm repair and reimplantation of inferior mesenteric artery on the same date  by Dr. Stanford.  No complications intra procedurally and minimal EBL per operative report.  Operation noted to be very complicated related to takedown of adhesions, reimplantation of ARPIT, extensive pelvic dissection and difficult distal anastomosis.  Operation lasted 5-1/2 hours.  Vascular has recommended NG tube to remain in place pending bowel movements. Patient was extubated 1/3.     Consulted overnight 1/3 -  for sudden onset agitation.  Patient is alert and oriented, denies use of alcohol.  We are attempting to obtain information from wife regarding drinking history.  Requiring sedation with Precedex and also probably Ativan PRNs      Subjective:   Oxygen requirements improving.  Down to 2 liters ( from 10 liters yesteday ).   Denies any complaints this morning.   Eager for DC   Review of Systems:      Pertinent positives and negatives discussed in HPI    Objective:     Intake/Output Summary (Last 24 hours) at 2024 1505  Last data filed at 2024 1000  Gross per 24 hour   Intake 1910 ml   Output 3100 ml   Net -1190 ml        Vitals:   Vitals:    24 1135 24 1200 24 1300 24 1308   BP:  (!) 140/90     Pulse: 74 77     Resp:  16  16   Temp:  98.3 °F (36.8  10:10 AM    COLORU Yellow 01/04/2024 10:10 AM    PHUR 6.0 01/04/2024 10:10 AM    LABCAST 10-20 Hyaline 09/20/2015 09:22 PM    WBCUA 0-2 01/04/2024 10:10 AM    RBCUA 3-4 01/04/2024 10:10 AM    BACTERIA None Seen 01/01/2024 06:55 PM    CLARITYU Clear 01/04/2024 10:10 AM    SPECGRAV 1.015 01/04/2024 10:10 AM    LEUKOCYTESUR Negative 01/04/2024 10:10 AM    UROBILINOGEN 0.2 01/04/2024 10:10 AM    BILIRUBINUR Negative 01/04/2024 10:10 AM    BLOODU LARGE 01/04/2024 10:10 AM    GLUCOSEU Negative 01/04/2024 10:10 AM    KETUA Negative 01/04/2024 10:10 AM    AMORPHOUS 1+ 01/04/2024 10:10 AM     Urine Cultures: No results found for: \"LABURIN\"  Blood Cultures:   Lab Results   Component Value Date/Time    BC No Growth after 4 days of incubation. 12/23/2021 07:00 PM     Lab Results   Component Value Date/Time    BLOODCULT2 No Growth after 4 days of incubation. 12/23/2021 07:15 PM     Organism:   Lab Results   Component Value Date/Time    ORG Candida albicans 12/30/2019 12:48 PM         Assessment and Recommendations   Manfred Washington is a 68 y.o. male who presents with agitation             Acute hypoxic respiratory failure.   Oxygen requirement worsening, up to 10 L of oxygen 1/6, improved to 2 liters 1/7  Likely fluid overload, x-ray with groundglass opacities.  Started by pulmonology on doxycycline 1/6.  Continue Lasix 20 mg IV BID  ( output 5.2 liters in the last 24 hours )   Agree with stopping  IV fluids   Continue intensive spirometry    AAA repair, 1/2/2024 Dr. Stanford.  Procedure was complicated by adhesiolysis, reimplantation of ARPIT, extensive pelvic dissection and difficult distal anastomosis.  Operation lasted 5 and half hours.  Had NGT in place ( removed).   Constipation.  Further bowel regimen.  Care with opiate pain control as this may exacerbate constipation.  Hypernatremia ( resolved )   Leukocytosis.  improving, likely acute phase reactant   Electrolyte abnormality.  Calcium 7.3-replete  COPD.  Continue maintenance MDI,

## 2024-01-07 NOTE — PROGRESS NOTES
He was lying on his R side and his O2 sat was staying on the low 80s. This RN requested him if he could flip to his L side instead. He opted to lie on his back at this time. His O2 sat immediately improved and increased to 91-93% on O2 @ 6 L/min per high-flow nasal cannula.    Electronically signed by Jasmine Zhong RN on 1/7/2024 at 1:56 AM

## 2024-01-07 NOTE — PROGRESS NOTES
Nursing Shift Summary 7A-7P    Shift Events  Potassium replacement 60 mEq  Diet upgraded to regular  Central line removal    Vitals  BP: SBP within goal   HR: NSR   Resp/SpO2: Weaned to 2L.  IS encouraged.  OOB to chair for 6 hours total  Temp: Afebrile   Pain: Moderate/ Severe abd/ chronic back pain.  Oxycodone given X3      Infusions  Potassium     Neuro  OX: 4   RASS: 0    I/O & Drains  I/O this shift:  In: 1625.6 [P.O.:1380; I.V.:38.2; IV Piggyback:207.4]  Out: 1200 ml urine           Bedside Shift Handoff given to: Giancarlo @ 7:25 PM  All continuous medications reviewed at bedside  Chiquita Luis RN

## 2024-01-07 NOTE — PROGRESS NOTES
Bedside shift hand-off done w/ oncoming VINNY MAYNARD, who will assume pt. care. All questions were answered. This RN handed-off the pt. in a stable condition.    Electronically signed by Jasmine Zhong RN on 1/7/2024 at 7:24 AM   Pt is now requesting for the dulcolax supp when pt started to move bowels , large watery stool,pads changed,matias care done,mariama care done,call button within reach,will continue to monitor.

## 2024-01-07 NOTE — PLAN OF CARE
Problem: Cardiovascular - Adult  Goal: Maintains optimal cardiac output and hemodynamic stability  Outcome: Progressing  Maintains optimal cardiac output and hemodynamic stability:   Monitor blood pressure and heart rate   Monitor urine output and notify Licensed Independent Practitioner for values outside of normal range   Assess for signs of decreased cardiac output     Problem: Respiratory - Adult  Goal: Achieves optimal ventilation and oxygenation  Outcome: Progressing  Achieves optimal ventilation and oxygenation:   Assess for changes in respiratory status   Assess for changes in mentation and behavior   Position to facilitate oxygenation and minimize respiratory effort   Oxygen supplementation based on oxygen saturation or arterial blood gases   Initiate smoking cessation protocol as indicated   Encourage broncho-pulmonary hygiene including cough, deep breathe, incentive spirometry   Assess and instruct to report shortness of breath or any respiratory difficulty   Respiratory therapy support as indicated     Problem: Neurosensory - Adult  Goal: Achieves stable or improved neurological status  Outcome: Progressing  Achieves stable or improved neurological status: Assess for and report changes in neurological status     Problem: Gastrointestinal - Adult  Goal: Maintains or returns to baseline bowel function  Outcome: Progressing  Maintains or returns to baseline bowel function:   Assess bowel function   Administer ordered medications as needed   Encourage mobilization and activity     Problem: Gastrointestinal - Adult  Goal: Minimal or absence of nausea and vomiting  Outcome: Progressing  Minimal or absence of nausea and vomiting:   Administer ordered antiemetic medications as needed   Provide nonpharmacologic comfort measures as appropriate   Advance diet as tolerated, if ordered     Problem: Hematologic - Adult  Goal: Maintains hematologic stability  Outcome: Progressing  Maintains hematologic stability:    Assess for signs and symptoms of bleeding or hemorrhage   Monitor labs for bleeding or clotting disorders     Problem: Skin/Tissue Integrity - Adult  Goal: Incisions, wounds, or drain sites healing without S/S of infection  Outcome: Progressing  Incisions, Wounds, or Drain Sites Healing Without Sign and Symptoms of Infection:   TWICE DAILY: Assess and document skin integrity   TWICE DAILY: Assess and document dressing/incision, wound bed, drain sites and surrounding tissue     Problem: Gastrointestinal - Adult  Goal: Maintains or returns to baseline bowel function  Outcome: Progressing  Maintains or returns to baseline bowel function:   Assess bowel function   Administer ordered medications as needed   Encourage mobilization and activity    Problem: Pain  Goal: Verbalizes/displays adequate comfort level or baseline comfort level  Outcome: Progressing  Verbalizes/displays adequate comfort level or baseline comfort level:   Encourage patient to monitor pain and request assistance   Assess pain using appropriate pain scale   Administer analgesics based on type and severity of pain and evaluate response   Implement non-pharmacological measures as appropriate and evaluate response   Notify Licensed Independent Practitioner if interventions unsuccessful or patient reports new pain     Problem: ABCDS Injury Assessment  Goal: Absence of physical injury  Outcome: Progressing  Absence of Physical Injury: Implement safety measures based on patient assessment

## 2024-01-07 NOTE — PLAN OF CARE
Problem: Safety - Adult  Goal: Free from fall injury  Outcome: Progressing  Flowsheets (Taken 1/2/2024 0008 by Haley Carr, RN)  Free From Fall Injury: Instruct family/caregiver on patient safety     Problem: Discharge Planning  Goal: Discharge to home or other facility with appropriate resources  Outcome: Progressing  Flowsheets (Taken 1/7/2024 0800)  Discharge to home or other facility with appropriate resources:   Identify barriers to discharge with patient and caregiver   Arrange for needed discharge resources and transportation as appropriate   Identify discharge learning needs (meds, wound care, etc)     Problem: Pain  Goal: Verbalizes/displays adequate comfort level or baseline comfort level  Problem: Gastrointestinal - Adult  Goal: Maintains or returns to baseline bowel function  1/7/2024 1045 by Chiquita Luis RN  Outcome: Progressing  Flowsheets (Taken 1/7/2024 0800)  Maintains or returns to baseline bowel function: Assess bowel function  Note: BM today.  Diet upgraded  1/7/2024 0318 by Jasmine Zhong RN  Outcome: Progressing  Flowsheets (Taken 1/7/2024 0128)  Maintains or returns to baseline bowel function:   Assess bowel function   Administer ordered medications as needed   Encourage mobilization and activity     Problem: Respiratory - Adult  Goal: Achieves optimal ventilation and oxygenation  1/7/2024 1045 by Chiquita Luis RN  Outcome: Progressing  Flowsheets (Taken 1/7/2024 0128 by Jasmine Zhong RN)  Achieves optimal ventilation and oxygenation:   Assess for changes in respiratory status   Assess for changes in mentation and behavior   Position to facilitate oxygenation and minimize respiratory effort   Oxygen supplementation based on oxygen saturation or arterial blood gases   Initiate smoking cessation protocol as indicated   Encourage broncho-pulmonary hygiene including cough, deep breathe, incentive spirometry   Assess and instruct to report shortness of breath or any

## 2024-01-08 VITALS
HEIGHT: 68 IN | BODY MASS INDEX: 22.82 KG/M2 | WEIGHT: 150.57 LBS | DIASTOLIC BLOOD PRESSURE: 81 MMHG | RESPIRATION RATE: 20 BRPM | HEART RATE: 77 BPM | OXYGEN SATURATION: 93 % | SYSTOLIC BLOOD PRESSURE: 132 MMHG | TEMPERATURE: 97.7 F

## 2024-01-08 LAB
ANION GAP SERPL CALCULATED.3IONS-SCNC: 8 MMOL/L (ref 3–16)
BUN SERPL-MCNC: 17 MG/DL (ref 7–20)
CALCIUM SERPL-MCNC: 8.3 MG/DL (ref 8.3–10.6)
CHLORIDE SERPL-SCNC: 101 MMOL/L (ref 99–110)
CO2 SERPL-SCNC: 29 MMOL/L (ref 21–32)
CREAT SERPL-MCNC: 0.7 MG/DL (ref 0.8–1.3)
DEPRECATED RDW RBC AUTO: 13.7 % (ref 12.4–15.4)
GFR SERPLBLD CREATININE-BSD FMLA CKD-EPI: >60 ML/MIN/{1.73_M2}
GLUCOSE SERPL-MCNC: 100 MG/DL (ref 70–99)
HCT VFR BLD AUTO: 28.6 % (ref 40.5–52.5)
HGB BLD-MCNC: 10 G/DL (ref 13.5–17.5)
MCH RBC QN AUTO: 31.6 PG (ref 26–34)
MCHC RBC AUTO-ENTMCNC: 35 G/DL (ref 31–36)
MCV RBC AUTO: 90.3 FL (ref 80–100)
PLATELET # BLD AUTO: 502 K/UL (ref 135–450)
PMV BLD AUTO: 7.9 FL (ref 5–10.5)
POTASSIUM SERPL-SCNC: 3.5 MMOL/L (ref 3.5–5.1)
POTASSIUM SERPL-SCNC: 3.8 MMOL/L (ref 3.5–5.1)
PROCALCITONIN SERPL IA-MCNC: 0.16 NG/ML (ref 0–0.15)
RBC # BLD AUTO: 3.16 M/UL (ref 4.2–5.9)
SODIUM SERPL-SCNC: 138 MMOL/L (ref 136–145)
WBC # BLD AUTO: 13.7 K/UL (ref 4–11)

## 2024-01-08 PROCEDURE — 6370000000 HC RX 637 (ALT 250 FOR IP): Performed by: INTERNAL MEDICINE

## 2024-01-08 PROCEDURE — 94760 N-INVAS EAR/PLS OXIMETRY 1: CPT

## 2024-01-08 PROCEDURE — 6370000000 HC RX 637 (ALT 250 FOR IP): Performed by: SURGERY

## 2024-01-08 PROCEDURE — 84145 PROCALCITONIN (PCT): CPT

## 2024-01-08 PROCEDURE — 2580000003 HC RX 258: Performed by: STUDENT IN AN ORGANIZED HEALTH CARE EDUCATION/TRAINING PROGRAM

## 2024-01-08 PROCEDURE — 2500000003 HC RX 250 WO HCPCS: Performed by: SURGERY

## 2024-01-08 PROCEDURE — 85027 COMPLETE CBC AUTOMATED: CPT

## 2024-01-08 PROCEDURE — 84132 ASSAY OF SERUM POTASSIUM: CPT

## 2024-01-08 PROCEDURE — 6360000002 HC RX W HCPCS: Performed by: INTERNAL MEDICINE

## 2024-01-08 PROCEDURE — 97116 GAIT TRAINING THERAPY: CPT

## 2024-01-08 PROCEDURE — 36415 COLL VENOUS BLD VENIPUNCTURE: CPT

## 2024-01-08 PROCEDURE — 97530 THERAPEUTIC ACTIVITIES: CPT

## 2024-01-08 PROCEDURE — 80048 BASIC METABOLIC PNL TOTAL CA: CPT

## 2024-01-08 PROCEDURE — 94640 AIRWAY INHALATION TREATMENT: CPT

## 2024-01-08 PROCEDURE — 97535 SELF CARE MNGMENT TRAINING: CPT

## 2024-01-08 PROCEDURE — 6360000002 HC RX W HCPCS: Performed by: SURGERY

## 2024-01-08 RX ORDER — POTASSIUM CHLORIDE 20 MEQ/1
20 TABLET, EXTENDED RELEASE ORAL 2 TIMES DAILY
Status: DISCONTINUED | OUTPATIENT
Start: 2024-01-08 | End: 2024-01-08 | Stop reason: HOSPADM

## 2024-01-08 RX ORDER — DOXYCYCLINE HYCLATE 100 MG
100 TABLET ORAL EVERY 12 HOURS SCHEDULED
Qty: 9 TABLET | Refills: 0 | Status: SHIPPED | OUTPATIENT
Start: 2024-01-08 | End: 2024-01-13

## 2024-01-08 RX ORDER — FUROSEMIDE 10 MG/ML
20 INJECTION INTRAMUSCULAR; INTRAVENOUS ONCE
Status: COMPLETED | OUTPATIENT
Start: 2024-01-08 | End: 2024-01-08

## 2024-01-08 RX ORDER — OXYCODONE HYDROCHLORIDE 5 MG/1
5 TABLET ORAL EVERY 6 HOURS PRN
Qty: 12 TABLET | Refills: 0 | Status: SHIPPED | OUTPATIENT
Start: 2024-01-08 | End: 2024-01-08

## 2024-01-08 RX ORDER — DOXYCYCLINE HYCLATE 100 MG
100 TABLET ORAL EVERY 12 HOURS SCHEDULED
Qty: 9 TABLET | Refills: 0 | Status: SHIPPED | OUTPATIENT
Start: 2024-01-08 | End: 2024-01-08

## 2024-01-08 RX ORDER — OXYCODONE HYDROCHLORIDE 5 MG/1
5 TABLET ORAL EVERY 6 HOURS PRN
Qty: 12 TABLET | Refills: 0 | Status: SHIPPED | OUTPATIENT
Start: 2024-01-08 | End: 2024-01-11

## 2024-01-08 RX ADMIN — METOPROLOL TARTRATE 5 MG: 5 INJECTION, SOLUTION INTRAVENOUS at 08:02

## 2024-01-08 RX ADMIN — POTASSIUM CHLORIDE 10 MEQ: 7.46 INJECTION, SOLUTION INTRAVENOUS at 06:20

## 2024-01-08 RX ADMIN — LISINOPRIL 40 MG: 10 TABLET ORAL at 08:02

## 2024-01-08 RX ADMIN — POTASSIUM CHLORIDE 20 MEQ: 1500 TABLET, EXTENDED RELEASE ORAL at 08:02

## 2024-01-08 RX ADMIN — OXYCODONE HYDROCHLORIDE 10 MG: 10 TABLET ORAL at 12:54

## 2024-01-08 RX ADMIN — DOXYCYCLINE HYCLATE 100 MG: 100 TABLET, FILM COATED ORAL at 08:02

## 2024-01-08 RX ADMIN — VERAPAMIL HYDROCHLORIDE 80 MG: 80 TABLET ORAL at 12:52

## 2024-01-08 RX ADMIN — MOMETASONE FUROATE AND FORMOTEROL FUMARATE DIHYDRATE 2 PUFF: 200; 5 AEROSOL RESPIRATORY (INHALATION) at 08:42

## 2024-01-08 RX ADMIN — FUROSEMIDE 20 MG: 10 INJECTION, SOLUTION INTRAMUSCULAR; INTRAVENOUS at 08:01

## 2024-01-08 RX ADMIN — OXYCODONE HYDROCHLORIDE 5 MG: 5 TABLET ORAL at 08:02

## 2024-01-08 RX ADMIN — TIOTROPIUM BROMIDE INHALATION SPRAY 2 PUFF: 3.12 SPRAY, METERED RESPIRATORY (INHALATION) at 08:41

## 2024-01-08 RX ADMIN — VERAPAMIL HYDROCHLORIDE 80 MG: 80 TABLET ORAL at 08:04

## 2024-01-08 RX ADMIN — METOPROLOL TARTRATE 5 MG: 5 INJECTION, SOLUTION INTRAVENOUS at 03:03

## 2024-01-08 RX ADMIN — FAMOTIDINE 20 MG: 20 TABLET, FILM COATED ORAL at 08:02

## 2024-01-08 RX ADMIN — DULOXETINE HYDROCHLORIDE 60 MG: 60 CAPSULE, DELAYED RELEASE ORAL at 08:02

## 2024-01-08 RX ADMIN — SODIUM CHLORIDE, PRESERVATIVE FREE 10 ML: 5 INJECTION INTRAVENOUS at 08:02

## 2024-01-08 ASSESSMENT — PAIN DESCRIPTION - LOCATION
LOCATION: ABDOMEN

## 2024-01-08 ASSESSMENT — PAIN DESCRIPTION - DESCRIPTORS
DESCRIPTORS: ACHING
DESCRIPTORS: ACHING;DISCOMFORT
DESCRIPTORS: ACHING

## 2024-01-08 ASSESSMENT — PAIN DESCRIPTION - ORIENTATION
ORIENTATION: MID

## 2024-01-08 ASSESSMENT — PAIN - FUNCTIONAL ASSESSMENT
PAIN_FUNCTIONAL_ASSESSMENT: ACTIVITIES ARE NOT PREVENTED

## 2024-01-08 ASSESSMENT — PAIN SCALES - GENERAL
PAINLEVEL_OUTOF10: 9
PAINLEVEL_OUTOF10: 5
PAINLEVEL_OUTOF10: 4

## 2024-01-08 ASSESSMENT — PAIN DESCRIPTION - FREQUENCY
FREQUENCY: CONTINUOUS
FREQUENCY: CONTINUOUS

## 2024-01-08 ASSESSMENT — PAIN DESCRIPTION - PAIN TYPE
TYPE: SURGICAL PAIN
TYPE: SURGICAL PAIN

## 2024-01-08 ASSESSMENT — PAIN DESCRIPTION - ONSET
ONSET: ON-GOING
ONSET: ON-GOING

## 2024-01-08 NOTE — PLAN OF CARE
Problem: Safety - Adult  Goal: Free from fall injury  1/8/2024 0929 by Chiuqita Luis RN  Outcome: Progressing     Problem: Discharge Planning  Goal: Discharge to home or other facility with appropriate resources  1/8/2024 0929 by Chiquita Luis RN  Outcome: Progressing  Flowsheets (Taken 1/8/2024 0800)  Discharge to home or other facility with appropriate resources:   Identify barriers to discharge with patient and caregiver   Arrange for needed discharge resources and transportation as appropriate   Identify discharge learning needs (meds, wound care, etc)     Problem: Gastrointestinal - Adult  Goal: Maintains or returns to baseline bowel function  1/8/2024 0929 by Chiquita Luis RN  Outcome: Progressing  Flowsheets (Taken 1/7/2024 2000 by Giancarlo Crow RN)  Maintains or returns to baseline bowel function:   Assess bowel function   Encourage oral fluids to ensure adequate hydration   Administer ordered medications as needed     Problem: Musculoskeletal - Adult  Goal: Return mobility to safest level of function  1/8/2024 0929 by Chiquita Luis RN  Outcome: Progressing  Flowsheets (Taken 1/6/2024 0800)  Return Mobility to Safest Level of Function:   Assess patient stability and activity tolerance for standing, transferring and ambulating with or without assistive devices   Assist with transfers and ambulation using safe patient handling equipment as needed     Problem: Cardiovascular - Adult  Goal: Maintains optimal cardiac output and hemodynamic stability  1/8/2024 0929 by Chiquita Luis RN  Outcome: Progressing  Flowsheets (Taken 1/7/2024 0128 by Jasmine Zhong RN)  Maintains optimal cardiac output and hemodynamic stability:   Monitor blood pressure and heart rate   Monitor urine output and notify Licensed Independent Practitioner for values outside of normal range   Assess for signs of decreased cardiac output

## 2024-01-08 NOTE — PROGRESS NOTES
Patient declined to ambulate this morning.    Pain medication given     Will continue to encourage

## 2024-01-08 NOTE — PROGRESS NOTES
V2.0    [facility] [place] Progress Note ( follow up on consult )      Name:  Manfred Washington /Age/Sex: 1955  (68 y.o. male)   MRN & CSN:  [id number] & [id number] Encounter Date/Time: 2024 8:57 AM EDT   Location:  [id number]/1312-01 PCP: Suzette Oquendo APRN     Attending:Live Stanford MD       Hospital Day: 8      HPI :   Chief Complaint: admitted electively for AAA repair, hopitalist consulted .     Manfred Washington is a 68 y.o. male who  was admitted by vascular surgery on 2024 for elective open AAA repair of 5.7 cm with right LINNEA 2.7 cm, asymptomatic related to this.  Has history of CAD, COPD.  History splenectomy.     Had open abdominal aneurysm repair and reimplantation of inferior mesenteric artery on the same date  by Dr. Stanford.  No complications intra procedurally and minimal EBL per operative report.  Operation noted to be very complicated related to takedown of adhesions, reimplantation of ARPIT, extensive pelvic dissection and difficult distal anastomosis.  Operation lasted 5-1/2 hours.  Vascular has recommended NG tube to remain in place pending bowel movements. Patient was extubated 1/3.     Consulted overnight 1/3 -  for sudden onset agitation.  Patient is alert and oriented, denies use of alcohol.  We are attempting to obtain information from wife regarding drinking history.  Requiring sedation with Precedex and also probably Ativan PRNs      Subjective:   Weaned off oxygen.   Denies any complaints.   Review of Systems:      Pertinent positives and negatives discussed in HPI    Objective:     Intake/Output Summary (Last 24 hours) at 2024 1128  Last data filed at 2024 1000  Gross per 24 hour   Intake 2318.17 ml   Output 2300 ml   Net 18.17 ml        Vitals:   Vitals:    24 0800 24 0832 24 0844 24 0845   BP: 121/77      Pulse: 73  78    Resp: 16 16     Temp: 97.7 °F (36.5 °C)      TempSrc: Oral      SpO2: 94%  93% 90%   Weight:       Height:      Q4H PRN   Or  oxyCODONE, 10 mg, Q4H PRN  docusate sodium, 200 mg, BID PRN  sodium chloride flush, 5-40 mL, PRN  sodium chloride, , PRN  morphine, 2 mg, Q2H PRN   Or  morphine, 4 mg, Q2H PRN  sodium chloride flush, 5-40 mL, PRN  sodium chloride, , PRN  ondansetron, 4 mg, Q8H PRN   Or  ondansetron, 4 mg, Q6H PRN  albuterol sulfate HFA, 2 puff, As Directed RT PRN  sodium chloride flush, 5-40 mL, PRN  magnesium sulfate, 2,000 mg, PRN  sodium chloride, , PRN          Labs and Imaging   CT ABDOMEN PELVIS W IV CONTRAST Additional Contrast? None    Result Date: 6/8/2023  EXAMINATION: CT OF THE ABDOMEN AND PELVIS WITH CONTRAST 6/8/2023 7:52 pm TECHNIQUE: CT of the abdomen and pelvis was performed with the administration of intravenous contrast. Multiplanar reformatted images are provided for review. Automated exposure control, iterative reconstruction, and/or weight based adjustment of the mA/kV was utilized to reduce the radiation dose to as low as reasonably achievable. COMPARISON: 09/11/2022 HISTORY: ORDERING SYSTEM PROVIDED HISTORY: abd pain, diarrhea TECHNOLOGIST PROVIDED HISTORY: Reason for exam:->abd pain, diarrhea Additional Contrast?->None Decision Support Exception - unselect if not a suspected or confirmed emergency medical condition->Emergency Medical Condition (MA) Reason for Exam: abd pain, diarrhea FINDINGS: Lower Chest: Coarse interstitial findings in the lung bases are again demonstrated compatible with fibrotic lung process.  Overall this appears similar compared to the prior exam. Organs: The liver, pancreas, spleen, kidneys, and adrenals reveal no acute findings. No inflammatory change identified in the gallbladder fossa. GI/Bowel: Liquid stool is present throughout the colon.  The sigmoid colon takes an unusual course towards the right abdomen and there is mild swirling of the mesentery noted without twisting to suggest volvulus.  Loops of colon are mildly distended with gas.  No significant small bowel

## 2024-01-08 NOTE — PROGRESS NOTES
Patient was verbally aggressive towards staff and wife in room.  Threw lunch across the floor.     This RN reminded the patient that verbal aggression, yelling, and throwing items will not be tolerated.

## 2024-01-08 NOTE — DISCHARGE INSTRUCTIONS
-Call to schedule a follow up with Dr. Stanford in 1-2 weeks.  -Please call Dr. Stanford's office at [phone] to make an appointment or to make any adjustments to your appointment time..    -Avoid any heavy lifting more than 5 lbs or strenuous exercise until seen by Dr. Stanford for follow up and given permission to do so.  -You may walk in moderation.  -Keep incision(s) clean and dry.  -Do not take a bath, swim or submerge your incision(s) in water until given the okay to do so by Dr. Stanford.  -If you begin to notice any infectious appearing drainage, excessive swelling near your incision(s), fever, chills, nausea or vomiting, please call Dr. Stanford's office immediately.  -You have been provided a prescription for pain medication which you can take according to the instructions - try Tylenol or other pain control measures such as rest or ice and use the prescription pain medication only for significant pain which can not be relieved by other pain control measures first.   -For severe pain or pain which is not relieved by pain medication, please call Dr. Stanford's office for further instruction.   -If you have any questions or concerns, please do not hesitate to call Dr. Stanford's office.           Abdominal Aortic Aneurysm Repair Surgery: What to Expect at Home  Your Recovery  Aortic aneurysm repair is surgery to fix a weak and bulging section of the aorta. The aorta is the large blood vessel (artery) that carries blood from the heart through the belly to the rest of the body. The doctor used a man-made tube, called a graft, to replace the weak section of your aorta in your belly.  You can expect the cut (incision) to be sore for a few weeks. The doctor will take the stitches out of your incision about 5 to 10 days after surgery.  You will feel more tired than usual for several weeks after surgery. You may be able to do many of your usual activities after 4 to 6 weeks. But you will probably need 2 to 3 months to fully  harmful.     If you think your pain medicine is making you sick to your stomach:  Take your medicine after meals (unless your doctor has told you not to).  Ask your doctor for a different pain medicine.     If your doctor prescribed antibiotics, take them as directed. Do not stop taking them just because you feel better. You need to take the full course of antibiotics.   Incision care    If you have strips of tape on the incision, leave the tape on for a week or until it falls off.     Wash the area daily with warm, soapy water, and pat it dry. Other cleaning products, such as hydrogen peroxide, can make the wound heal more slowly. You may cover the area with a gauze bandage if it weeps or rubs against clothing. Change the bandage every day.     Keep the area clean and dry.   Follow-up care is a key part of your treatment and safety. Be sure to make and go to all appointments, and call your doctor if you are having problems. It's also a good idea to know your test results and keep a list of the medicines you take.  When should you call for help?   Call 911 anytime you think you may need emergency care. For example, call if:    You passed out (lost consciousness).     You have severe trouble breathing.     You have sudden chest pain and shortness of breath, or you cough up blood or foamy, pink mucus.     You have severe pain in your belly.     You have chest pain or pressure. This may occur with:  Sweating.  Shortness of breath.  Nausea or vomiting.  Pain that spreads from the chest to the neck, jaw, or one or both shoulders or arms.  Dizziness or lightheadedness.  A fast or uneven pulse.  After calling 911, chew 1 adult-strength aspirin. Wait for an ambulance. Do not try to drive yourself.   Call your doctor now or seek immediate medical care if:    You have new or increased shortness of breath.     You are dizzy or lightheaded, or you feel like you may faint.     You are sick to your stomach or cannot keep fluids

## 2024-01-08 NOTE — CARE COORDINATION
A quick chart review reveals that this patient may discharge.     We are waiting on updated therapy notes, their initial notes indicate that he may be able to discharge to home safely with supports.     [facility] needs if there are any.    He is on our list to see today.     Respectfully submitted,    Rosalinda MCKOY, YAHIR  [facility]   [phone]    Electronically signed by EAN Nicolas, DALLIN on 1/8/2024 at 9:01 AM

## 2024-01-08 NOTE — FLOWSHEET NOTE
01/08/24 1409   AVS Reviewed   AVS & discharge instructions reviewed with patient and/or representative? Yes   Reviewed instructions with Patient   Level of Understanding Questions answered;Teach back completed        01/08/24 1407   Discharge Event   Discharged with Documented Belongings Yes   Departure Mode With spouse   Mobility at Departure Wheelchair   Discharged to Private Residence   Time of Discharge 1408        01/08/24 1407   Belongings   Dental Appliances Uppers;Lowers   Vision - Corrective Lenses None   Hearing Aid None   Clothing Footwear;Pants;Shirt;Jacket/Coat   Jewelry None   Body Piercings Removed No   Electronic Devices Cell Phone;   Weapons (Notify Protective Services/Security) None       - Patient opted to have prescriptions sent over to home pharmacy.  Tricia Kramer.   Outpatient pharmacy stated they will handle scripts transfer.

## 2024-01-08 NOTE — PROGRESS NOTES
VASCULAR                 POD#6     No SOB & off O2 today. Pain well controlled.   Eating well.     VSS         Afeb                 O2 sat > 90% on RA          I/O -84 (good UO)  Lungs clear  Abd softer, less distended, + BS  Palpable B DP & PT pulses  Incision intact without erythema or hematoma     Labs noted     A/P:     S/P AAA repair (ABIG + ARPIT reimplantation)              Hemodynamically stable.              Hypoxia resolved - continue Doxycycline po.              Additional dose of Lasix today with KCl replacement.              Ambulate estrada. Possible DC later today.                 Josef Stanford

## 2024-01-08 NOTE — PROGRESS NOTES
Occupational Therapy  Facility/Department: [address] [facility]  Occupational Therapy Daily Treatment Note    Name: Manfred Washington  : 1955  MRN: [id number]  Date of Service: 2024    Discharge Recommendations:  24 hour supervision or assist (pt declining home OT)      Manfred Washington scored a 18/24 on the AM-PAC ADL Inpatient form. Current research shows that an AM-PAC score of 18 or greater is typically associated with a discharge to the patient's home setting.     If patient discharges prior to next session this note will serve as a discharge summary.  Please see below for the latest assessment towards goals.      Patient Diagnosis(es): The encounter diagnosis was Pulmonary nodule.  Past Medical History:  has a past medical history of Arthritis, Gong's esophagus, Chronic back pain, COPD (chronic obstructive pulmonary disease) (HCC), Depression, GERD (gastroesophageal reflux disease), HYPERCHOLESTERAEMIA, Hypertension, Prinzmetal angina (HCC), Spinal stenoses, and TIA (transient ischaemic attack).  Past Surgical History:  has a past surgical history that includes joint replacement; Colonoscopy; back surgery; Splenectomy; Cholecystectomy; Appendectomy; Upper gastrointestinal endoscopy (5-14-10); Upper gastrointestinal endoscopy (12); Clavicle surgery; bronchoscopy (N/A, 2019); bronchoscopy (2019); Colonoscopy (N/A, 2020); Colonoscopy (2020); and Abdominal aortic aneurysm repair (N/A, 2024).    Treatment Diagnosis: decreased ADL status/cognition/fxl mobility      Assessment   Performance deficits / Impairments: Decreased functional mobility ;Decreased ADL status;Decreased strength;Decreased cognition;Decreased safe awareness;Decreased endurance;Decreased balance  Assessment: Pt is a 68 y.o. male s/p open abdominal aneurysm repair and reimplantation of inferior mesenteric artery on  by Dr. Stanford. At baseline, pt lives with wife and independent ADLs and fxl mobility. Pt currently  decreased cognition.       Therapy Time   Individual Concurrent Group Co-treatment   Time In 0840         Time Out 0910         Minutes 30                 Rylie Junior, OTR/L [id number]

## 2024-01-08 NOTE — PROGRESS NOTES
Physical Therapy  Facility/Department: [address] [facility]  Physical Therapy Treatment Note    Name: Manfred Washington  : 1955  MRN: [id number]  Date of Service: 2024    Discharge Recommendations:  Home with assist PRN   PT Equipment Recommendations  Equipment Needed: No      Manfred Washington scored a 19/24 on the AM-PAC short mobility form.  At this time, no further PT is recommended upon discharge.  Assessment   Assessment: 69 y/o male admit 2023 with Infrarenal AAA with R Common Iliac Artery Aneurysm. 2023 S/P Open Repair of AAA with Aorto Bi Iliac Graft, Extensive MIKAELA, Reimplant of Inferior Mesenteric Artery. PMH as noted including CAD, COPD, GÉNESIS, Splenectomy, TIA, Spinal Stenosis, Back Surg, R TKR.  PTA pt living with wife in multi level home with few steps to enter; reports independent daily care and functional mobility (Rollator, Cane). Currently,  Pt mesfin transfers/amb functional distances with Walker SBA/CGA.  Cues for safe transitional mvts although no specific LOB.  Pt reports adequate assist/support for d/c home.  Pt denies need for cont PT upon d/c.  Therapy Prognosis: Good  History: 69 y/o male admit 2023 with Infrarenal AAA with R Common Iliac Artery Aneurysm. 2023 S/P Open Repair of AAA with Aorto Bi Iliac Graft, Extensive MIKAELA, Reimplant of Inferior Mesenteric Artery. PMH as noted including CAD, COPD, GÉNESIS, Splenectomy, TIA, Spinal Stenosis, Back Surg, R TKR.  Exam: See above.  Clinical Presentation: See above.  Barriers to Learning: [place].  Requires PT Follow-Up: Yes  Activity Tolerance  Activity Tolerance: Patient tolerated treatment well  Activity Tolerance Comments: Pt mesfin transfers/amb functional distances with Walker SBA/CGA.  Cues for safe transitional mvts although no specific LOB.     Plan   Physical Therapy Plan  General Plan: 3-5 times per week  Current Treatment Recommendations: Strengthening, Therapeutic activities, Functional mobility training, Transfer training, Gait training,

## 2024-01-08 NOTE — PLAN OF CARE
Problem: Safety - Adult  Goal: Free from fall injury  1/7/2024 2247 by Giancarlo Crow, RN  Outcome: Progressing  1/7/2024 1045 by Chiquita Luis RN  Outcome: Progressing  Flowsheets (Taken 1/2/2024 0008 by Haley Carr, RN)  Free From Fall Injury: Instruct family/caregiver on patient safety     Problem: ABCDS Injury Assessment  Goal: Absence of physical injury  Outcome: Progressing     Problem: Discharge Planning  Goal: Discharge to home or other facility with appropriate resources  1/7/2024 2247 by Giancarlo Crow, RN  Outcome: Progressing  Flowsheets (Taken 1/7/2024 2000)  Discharge to home or other facility with appropriate resources: Identify barriers to discharge with patient and caregiver  1/7/2024 1045 by Chiquita Luis RN  Outcome: Progressing  Flowsheets (Taken 1/7/2024 0800)  Discharge to home or other facility with appropriate resources:   Identify barriers to discharge with patient and caregiver   Arrange for needed discharge resources and transportation as appropriate   Identify discharge learning needs (meds, wound care, etc)     Problem: Pain  Goal: Verbalizes/displays adequate comfort level or baseline comfort level  Outcome: Progressing     Problem: Skin/Tissue Integrity  Goal: Absence of new skin breakdown  Description: 1.  Monitor for areas of redness and/or skin breakdown  2.  Assess vascular access sites hourly  3.  Every 4-6 hours minimum:  Change oxygen saturation probe site  4.  Every 4-6 hours:  If on nasal continuous positive airway pressure, respiratory therapy assess nares and determine need for appliance change or resting period.  Outcome: Progressing     Problem: Gastrointestinal - Adult  Goal: Maintains or returns to baseline bowel function  1/7/2024 2247 by Giancarlo Crow, RN  Outcome: Progressing  Flowsheets (Taken 1/7/2024 2000)  Maintains or returns to baseline bowel function:   Assess bowel function   Encourage oral fluids to ensure adequate hydration    Administer ordered medications as needed  1/7/2024 1045 by Chiquita Luis, RN  Outcome: Progressing  Flowsheets (Taken 1/7/2024 0800)  Maintains or returns to baseline bowel function: Assess bowel function  Note: BM today.  Diet upgraded  Goal: Minimal or absence of nausea and vomiting  Outcome: Progressing  Flowsheets (Taken 1/7/2024 2000)  Minimal or absence of nausea and vomiting:   Administer ordered antiemetic medications as needed   Provide nonpharmacologic comfort measures as appropriate     Problem: Genitourinary - Adult  Goal: Urinary catheter remains patent  Outcome: Progressing  Goal: Absence of urinary retention  Outcome: Progressing  Flowsheets (Taken 1/7/2024 2000)  Absence of urinary retention:   Assess patient’s ability to void and empty bladder   Monitor intake/output and perform bladder scan as needed     Problem: Musculoskeletal - Adult  Goal: Return mobility to safest level of function  Outcome: Progressing     Problem: Neurosensory - Adult  Goal: Achieves stable or improved neurological status  Outcome: Progressing  Flowsheets (Taken 1/7/2024 2000)  Achieves stable or improved neurological status: Assess for and report changes in neurological status     Problem: Respiratory - Adult  Goal: Achieves optimal ventilation and oxygenation  1/7/2024 2247 by Giancarlo Crow, RN  Outcome: Progressing  1/7/2024 1045 by Chiquita Luis, RN  Outcome: Progressing  Flowsheets (Taken 1/7/2024 0128 by Jasmine Zhong, RN)  Achieves optimal ventilation and oxygenation:   Assess for changes in respiratory status   Assess for changes in mentation and behavior   Position to facilitate oxygenation and minimize respiratory effort   Oxygen supplementation based on oxygen saturation or arterial blood gases   Initiate smoking cessation protocol as indicated   Encourage broncho-pulmonary hygiene including cough, deep breathe, incentive spirometry   Assess and instruct to report shortness of breath or any

## 2024-01-09 NOTE — TELEPHONE ENCOUNTER
From: Dr. Fox Donahue  To: [facility]  Sent: 12/26/2023 2:41 PM EST  Subject: chest pain          Sorry for the delay in response.       \"Pt called stated he is experiencing pain on left lung side last a few hours then goes away but happening very often wants to know if anything can be done to comfort him. \"      This is not from the nodule. Unclear cause.     Looks like your CT is schedule for the 28th.     Message me after it is completed.

## 2024-01-09 NOTE — TELEPHONE ENCOUNTER
Called pt to get scheduled for Bronch pt stated he wants to wait on procedure he just got out of the hospital an not feeling good. Stated he will call the office when he's ready.

## 2024-01-09 NOTE — TELEPHONE ENCOUNTER
Please call the patient and find out       1. What the last message means, is referring to?      2. Opening for navigational bronchoscopy opened for 1/18 @ 1 pm.  What does he think about this timing?      Also, look at my response to him on mychart.

## 2024-01-09 NOTE — DISCHARGE SUMMARY
DISCHARGE SUMMARY      Patient ID:  Manfred Washington  [id number] 68 y.o. 1955    Admission Date: 1/1/2024  5:29 PM  Discharge Date: 1/8/2024    Principle Diagnosis: Infrarenal abdominal aortic aneurysm, without rupture (HCC)  Secondary Diagnosis: Principal Problem:    Infrarenal abdominal aortic aneurysm, without rupture (HCC)  Active Problems:    Status post bronchoscopy    Mediastinal lymphadenopathy    Hilar lymphadenopathy    Status post cholecystectomy    History of splenectomy    Chronic obstructive pulmonary disease (HCC)    Smoker    Leukocytosis    History of nephrolithiasis    History of rectal bleeding    Electrolyte abnormality    Hx of drug overdose    Pulmonary nodule seen on imaging study    AAA (abdominal aortic aneurysm) without rupture (HCC)    Pulmonary nodule    Agitation    Constipation    Hypernatremia    Anemia    Mood disorder (HCC)  Resolved Problems:    * No resolved hospital problems. *    Patient Active Problem List   Diagnosis    Status post bronchoscopy    Mediastinal lymphadenopathy    Hilar lymphadenopathy    Status post cholecystectomy    History of splenectomy    Chronic obstructive pulmonary disease (HCC)    Smoker    Leukocytosis    Severe malnutrition (HCC)    Bladder diverticulum    History of nephrolithiasis    History of rectal bleeding    Electrolyte abnormality    Hx of drug overdose    Pulmonary nodule seen on imaging study    Infrarenal abdominal aortic aneurysm, without rupture (HCC)    AAA (abdominal aortic aneurysm) without rupture (HCC)    Pulmonary nodule    Agitation    Constipation    Hypernatremia    Anemia    Mood disorder (HCC)        Consults: IP CONSULT TO PULMONOLOGY  IP CONSULT TO HOSPITALIST    Procedure: Procedure(s):  OPEN ABDOMINAL ANEURYSM REPAIR REIMPLANTATION OF INFERIOR MESENTERIC ARTERY with Dr. Live Stanford on 1/2/2024    History: The patient is a 68 y.o. male with past medical and surgical history of:  Past Medical History:   Diagnosis Date     Arthritis     Gong's esophagus     Chronic back pain     COPD (chronic obstructive pulmonary disease) (HCC)     Depression     GERD (gastroesophageal reflux disease)     HYPERCHOLESTERAEMIA     Hypertension     Prinzmetal angina (HCC)     Spinal stenoses     TIA (transient ischaemic attack)       and   Past Surgical History:   Procedure Laterality Date    ABDOMINAL AORTIC ANEURYSM REPAIR N/A 1/2/2024    OPEN ABDOMINAL ANEURYSM REPAIR REIMPLANTATION OF INFERIOR MESENTERIC ARTERY performed by Live Stanford MD at [facility] [place] OR    APPENDECTOMY      BACK SURGERY      BRONCHOSCOPY N/A 12/30/2019    BRONCHOSCOPY ALVEOLAR LAVAGE performed by Christopher Peña MD at [facility] ENDOSCOPY    BRONCHOSCOPY  12/30/2019    BRONCHOSCOPY BRUSHINGS performed by Christopher Peña MD at [facility] ENDOSCOPY    CHOLECYSTECTOMY      CLAVICLE SURGERY      COLONOSCOPY      WITH BIOPSY    COLONOSCOPY N/A 7/24/2020    COLONOSCOPY POLYPECTOMY SNARE/COLD BIOPSY performed by Car Marquez MD at [facility] [place] ENDOSCOPY    COLONOSCOPY  7/24/2020    COLONOSCOPY WITH BIOPSY performed by Car Marquez MD at [facility] [place] ENDOSCOPY    JOINT REPLACEMENT      RIGHT KNEE    SPLENECTOMY      UPPER GASTROINTESTINAL ENDOSCOPY  5-14-10    egd with biopsy    UPPER GASTROINTESTINAL ENDOSCOPY  11-27-12    Esophagogastroduodenoscopy with biopsy, Romero esophageal dilation, and Botulinum injection of the pylorus       Hospital Course: The patient underwent an Open AAA Repair with Reimplantation of the Inferior Mesenteric Artery with Dr. Live Stanford on 1/2/2024. The patient was admitted directly pre-operatively for for hydration to the CVU. His post-operative course progressed as expected without any immediate or obvious post-operative complications. He was extubated on POD #1 by Pulmonology who has seen the patient previously. The patient did experience some confusion/agitation post-operatively which was not necessarily unexpected in an older patient  drowsiness, lightheadedness, unsteady balance     Trelegy Ellipta 200-62.5-25 MCG/ACT Aepb inhaler  Generic drug: fluticasone-umeclidin-vilant  Inhale 1 puff into the lungs daily     verapamil 80 MG tablet  Commonly known as: NISHANT  Notes to patient: Use:Calcium channel blocker and antihypertensive drug, it can treat high blood pressure, severe angina, and arrhythmia.  Side Effects:dizziness, slow heartbeat, constipation, nausea, headache, tiredness, skin rash or itching               Where to Get Your Medications        These medications were sent to [id number] - [place] - [address] [place] [address] - P [phone] - F [phone] [address] [place] [address]      Phone: [phone]   doxycycline hyclate 100 MG tablet  oxyCODONE 5 MG immediate release tablet         Activity: No lifting, driving, or strenuous exercise until released by Dr. Live Stanford to do so.  Diet: Resume diet prior to admission  Wound Care: Keep wound clean and dry. Use soap and water to clean around your wound, keep open to air.     Follow up with Dr. Live Stanford in 1-2 weeks. Please call Dr. Stanford's office at [phone] to make an appointment or to make any adjustments to your appointment time.      Signed:  Electronically signed by TORIE Lopez CNP on 1/9/2024 at 1:06 PM

## 2024-01-12 ENCOUNTER — TELEPHONE (OUTPATIENT)
Dept: VASCULAR SURGERY | Age: 69
End: 2024-01-12

## 2024-01-17 ENCOUNTER — TELEPHONE (OUTPATIENT)
Dept: PULMONOLOGY | Age: 69
End: 2024-01-17

## 2024-01-24 ENCOUNTER — OFFICE VISIT (OUTPATIENT)
Dept: VASCULAR SURGERY | Age: 69
End: 2024-01-24

## 2024-01-24 VITALS — HEIGHT: 68 IN | HEART RATE: 82 BPM | BODY MASS INDEX: 22.73 KG/M2 | WEIGHT: 150 LBS

## 2024-01-24 DIAGNOSIS — I71.43 INFRARENAL ABDOMINAL AORTIC ANEURYSM (AAA) WITHOUT RUPTURE (HCC): ICD-10-CM

## 2024-01-24 DIAGNOSIS — R10.9 POSTOPERATIVE ABDOMINAL PAIN: Primary | ICD-10-CM

## 2024-01-24 DIAGNOSIS — G89.18 POSTOPERATIVE ABDOMINAL PAIN: Primary | ICD-10-CM

## 2024-01-24 PROCEDURE — 99024 POSTOP FOLLOW-UP VISIT: CPT | Performed by: SURGERY

## 2024-01-24 RX ORDER — HYDROCODONE BITARTRATE AND ACETAMINOPHEN 5; 325 MG/1; MG/1
1 TABLET ORAL EVERY 6 HOURS PRN
Qty: 20 TABLET | Refills: 0 | Status: SHIPPED | OUTPATIENT
Start: 2024-01-24 | End: 2024-02-07

## 2024-01-24 NOTE — PROGRESS NOTES
First OV S/P open AAA repair with ABIG 1/2/2024.  Overall has low energy level but he is eating well and he has no other major complaints except pain.  Breathing with exertion is poor but he believes it is at baseline.    EXAM:  Abd soft with nl BS    Incision intact    Palpable pulses    A/P: S/P AAA repair with ABIG. Patent.   Doing well at 3 weeks post op.   Increase activities as tolerated.   F/U with Dr. Donahue re: lung disease and suspected Ca that needs a bx.   RTO in 6 weeks.

## 2024-01-25 ENCOUNTER — TELEPHONE (OUTPATIENT)
Dept: PULMONOLOGY | Age: 69
End: 2024-01-25

## 2024-01-25 DIAGNOSIS — R91.1 PULMONARY NODULE SEEN ON IMAGING STUDY: Primary | ICD-10-CM

## 2024-01-25 NOTE — TELEPHONE ENCOUNTER
Called and talked with the patient.  After discussing options, he would like to discuss empiric radiation therapy instead of a biopsy to prove likely lung cancer.  He was referred to radiation oncology.  At this point, he will let me know if we are to pursue with a biopsy.    CC the patient on MyChart

## 2024-01-25 NOTE — TELEPHONE ENCOUNTER
Pt called back  States he is feeling better  But he is having 2 nd thoghts about this  Please call to discuss

## 2024-01-25 NOTE — TELEPHONE ENCOUNTER
Please call Mr Washington and see if he is feeling well enough to schedule the biopsy.    Pt l/m  wcb

## 2024-02-08 ENCOUNTER — TELEPHONE (OUTPATIENT)
Dept: VASCULAR SURGERY | Age: 69
End: 2024-02-08

## 2024-02-08 NOTE — TELEPHONE ENCOUNTER
Left [facility] for [facility] to call back. [phone]  I am trying to reschedule patient an appointment.

## 2024-02-13 ENCOUNTER — TELEPHONE (OUTPATIENT)
Dept: PULMONOLOGY | Age: 69
End: 2024-02-13

## 2024-02-13 NOTE — TELEPHONE ENCOUNTER
Haley with [facility] called and said that the [facility] has denied the request to have the patient seen at [facility]. They stated the [facility] can get the [facility] the [facility].   If Dr. Donahue wants to follow up with [facility] regarding this their # is   [phone]

## 2024-02-22 NOTE — PROGRESS NOTES
Physician Progress Note      PATIENT:               CODY TSANG  CSN #:                  [id number]  :                       1955  ADMIT DATE:       2024 5:29 PM  DISCH DATE:        2024 2:30 PM  RESPONDING  PROVIDER #:        Live Stanford MD          QUERY TEXT:    Pt. admitted for repair of   Infrarenal abdominal aortic aneurysm with right   common iliac artery aneurysm. Per Op note dated 24 documentation of open   abdominal aneurysm repair using Dacron graft, Open repair of abdominal   aortoiliac aneurysm with aortobiiliac graft. To accurately reflect procedure   performed please specify :    The medical record reflects the following:  Risk Factors: Infrarenal abdominal aortic aneurysm with right common iliac   artery aneurysm  Clinical Indicators:  Per Op note dated 24 documentation of open abdominal   aneurysm repair using Dacron graft, Open repair of abdominal aortoiliac   aneurysm with aortobiiliac graft.  Treatment:   Open repair of abdominal aortoiliac aneurysm with aortobiiliac   graft (16 x 8 mm Dacron).  2.  Extensive adhesiolysis for exposure. 3.  Reimplantation of inferior   mesenteric artery.  Options provided:  -- Excision of the aneurysm and replaced with graft  -- Graft was Sewn over the existing aneurysm (outside the lumen)  -- Graft was placed within the aneurysmal sac(Inside the lumen)  -- Other - I will add my own diagnosis  -- Disagree - Not applicable / Not valid  -- Disagree - Clinically unable to determine / Unknown  -- Refer to Clinical Documentation Reviewer    PROVIDER RESPONSE TEXT:    Addendum to 24 procedure note: Graft was placed within the aneurysmal   sac(Inside the lumen)    Query created by: Carlie Carbajal on 2024 4:57 PM      Electronically signed by:  Live Stanford MD 2024 5:22 PM

## 2024-11-18 ENCOUNTER — HOSPITAL ENCOUNTER (EMERGENCY)
Age: 69
Discharge: HOME | End: 2024-11-18
Attending: EMERGENCY MEDICINE
Payer: OTHER GOVERNMENT

## 2024-11-18 ENCOUNTER — APPOINTMENT (OUTPATIENT)
Dept: GENERAL RADIOLOGY | Age: 69
End: 2024-11-18
Payer: OTHER GOVERNMENT

## 2024-11-18 ENCOUNTER — APPOINTMENT (OUTPATIENT)
Dept: CT IMAGING | Age: 69
End: 2024-11-18
Payer: OTHER GOVERNMENT

## 2024-11-18 VITALS
BODY MASS INDEX: 22.73 KG/M2 | TEMPERATURE: 97.6 F | WEIGHT: 153.44 LBS | OXYGEN SATURATION: 94 % | RESPIRATION RATE: 16 BRPM | HEART RATE: 69 BPM | SYSTOLIC BLOOD PRESSURE: 172 MMHG | DIASTOLIC BLOOD PRESSURE: 80 MMHG | HEIGHT: 69 IN

## 2024-11-18 DIAGNOSIS — J44.1 ACUTE EXACERBATION OF CHRONIC OBSTRUCTIVE PULMONARY DISEASE (HCC): Primary | ICD-10-CM

## 2024-11-18 DIAGNOSIS — B96.89 ACUTE BACTERIAL BRONCHITIS: ICD-10-CM

## 2024-11-18 DIAGNOSIS — J20.8 ACUTE BACTERIAL BRONCHITIS: ICD-10-CM

## 2024-11-18 LAB
ALBUMIN SERPL-MCNC: 3.9 G/DL (ref 3.4–5)
ALBUMIN/GLOB SERPL: 1.4 {RATIO} (ref 1.1–2.2)
ALP SERPL-CCNC: 107 U/L (ref 40–129)
ALT SERPL-CCNC: 7 U/L (ref 10–40)
ANION GAP SERPL CALCULATED.3IONS-SCNC: 11 MMOL/L (ref 3–16)
AST SERPL-CCNC: 14 U/L (ref 15–37)
BASOPHILS # BLD: 0.1 K/UL (ref 0–0.2)
BASOPHILS NFR BLD: 0.6 %
BILIRUB SERPL-MCNC: <0.2 MG/DL (ref 0–1)
BUN SERPL-MCNC: 23 MG/DL (ref 7–20)
CALCIUM SERPL-MCNC: 8.8 MG/DL (ref 8.3–10.6)
CHLORIDE SERPL-SCNC: 104 MMOL/L (ref 99–110)
CO2 SERPL-SCNC: 22 MMOL/L (ref 21–32)
CREAT SERPL-MCNC: 0.8 MG/DL (ref 0.8–1.3)
D-DIMER QUANTITATIVE: 2.42 UG/ML FEU (ref 0–0.6)
DEPRECATED RDW RBC AUTO: 14.8 % (ref 12.4–15.4)
EOSINOPHIL # BLD: 0.6 K/UL (ref 0–0.6)
EOSINOPHIL NFR BLD: 6 %
GFR SERPLBLD CREATININE-BSD FMLA CKD-EPI: >90 ML/MIN/{1.73_M2}
GLUCOSE SERPL-MCNC: 93 MG/DL (ref 70–99)
HCT VFR BLD AUTO: 35.9 % (ref 40.5–52.5)
HGB BLD-MCNC: 12.1 G/DL (ref 13.5–17.5)
IMM GRANULOCYTES # BLD: 0 K/UL (ref 0–0.2)
IMM GRANULOCYTES NFR BLD: 0.2 %
LYMPHOCYTES # BLD: 2.5 K/UL (ref 1–5.1)
LYMPHOCYTES NFR BLD: 23.3 %
MCH RBC QN AUTO: 29.2 PG (ref 26–34)
MCHC RBC AUTO-ENTMCNC: 33.7 G/DL (ref 32–36.4)
MCV RBC AUTO: 86.7 FL (ref 80–100)
MONOCYTES # BLD: 1.2 K/UL (ref 0–1.3)
MONOCYTES NFR BLD: 11.4 %
NEUTROPHILS # BLD: 6.3 K/UL (ref 1.7–7.7)
NEUTROPHILS NFR BLD: 58.5 %
NT-PROBNP SERPL-MCNC: 1767 PG/ML (ref 0–124)
PLATELET # BLD AUTO: 452 K/UL (ref 135–450)
PMV BLD AUTO: 9 FL (ref 5–10.5)
POTASSIUM SERPL-SCNC: 4.3 MMOL/L (ref 3.5–5.1)
PROT SERPL-MCNC: 6.6 G/DL (ref 6.4–8.2)
RBC # BLD AUTO: 4.14 M/UL (ref 4.2–5.9)
SODIUM SERPL-SCNC: 137 MMOL/L (ref 136–145)
TROPONIN, HIGH SENSITIVITY: 16 NG/L (ref 0–22)
TROPONIN, HIGH SENSITIVITY: 17 NG/L (ref 0–22)
WBC # BLD AUTO: 10.7 K/UL (ref 4–11)

## 2024-11-18 PROCEDURE — 71260 CT THORAX DX C+: CPT

## 2024-11-18 PROCEDURE — 71045 X-RAY EXAM CHEST 1 VIEW: CPT

## 2024-11-18 PROCEDURE — 80053 COMPREHEN METABOLIC PANEL: CPT

## 2024-11-18 PROCEDURE — 99285 EMERGENCY DEPT VISIT HI MDM: CPT

## 2024-11-18 PROCEDURE — 85025 COMPLETE CBC W/AUTO DIFF WBC: CPT

## 2024-11-18 PROCEDURE — 84484 ASSAY OF TROPONIN QUANT: CPT

## 2024-11-18 PROCEDURE — 6370000000 HC RX 637 (ALT 250 FOR IP): Performed by: EMERGENCY MEDICINE

## 2024-11-18 PROCEDURE — 6360000004 HC RX CONTRAST MEDICATION: Performed by: EMERGENCY MEDICINE

## 2024-11-18 PROCEDURE — 36415 COLL VENOUS BLD VENIPUNCTURE: CPT

## 2024-11-18 PROCEDURE — 93005 ELECTROCARDIOGRAM TRACING: CPT | Performed by: EMERGENCY MEDICINE

## 2024-11-18 PROCEDURE — 85379 FIBRIN DEGRADATION QUANT: CPT

## 2024-11-18 PROCEDURE — 83880 ASSAY OF NATRIURETIC PEPTIDE: CPT

## 2024-11-18 RX ORDER — IOPAMIDOL 755 MG/ML
100 INJECTION, SOLUTION INTRAVASCULAR
Status: COMPLETED | OUTPATIENT
Start: 2024-11-18 | End: 2024-11-18

## 2024-11-18 RX ORDER — PREDNISONE 20 MG/1
40 TABLET ORAL DAILY
Qty: 8 TABLET | Refills: 0 | Status: SHIPPED | OUTPATIENT
Start: 2024-11-18 | End: 2024-11-22

## 2024-11-18 RX ORDER — CEFDINIR 300 MG/1
300 CAPSULE ORAL 2 TIMES DAILY
Qty: 20 CAPSULE | Refills: 0 | Status: SHIPPED | OUTPATIENT
Start: 2024-11-18 | End: 2024-11-28

## 2024-11-18 RX ORDER — PREDNISONE 20 MG/1
60 TABLET ORAL ONCE
Status: COMPLETED | OUTPATIENT
Start: 2024-11-18 | End: 2024-11-18

## 2024-11-18 RX ADMIN — IOPAMIDOL 100 ML: 755 INJECTION, SOLUTION INTRAVENOUS at 17:55

## 2024-11-18 RX ADMIN — PREDNISONE 60 MG: 20 TABLET ORAL at 17:10

## 2024-11-18 ASSESSMENT — PAIN SCALES - GENERAL
PAINLEVEL_OUTOF10: 0
PAINLEVEL_OUTOF10: 0

## 2024-11-18 ASSESSMENT — PAIN - FUNCTIONAL ASSESSMENT
PAIN_FUNCTIONAL_ASSESSMENT: 0-10

## 2024-11-18 NOTE — ED PROVIDER NOTES
predniSONE (DELTASONE) tablet 60 mg (60 mg Oral Given 11/18/24 1710)   iopamidol (ISOVUE-370) 76 % injection 100 mL (100 mLs IntraVENous Given 11/18/24 8245)        Lab results were reviewed.  White blood cell count is 10.7.  Hemoglobin 12.1.  Platelets 452.  Electrolytes normal.  Creatinine 0.8.  Glucose 93.    Troponin is 17 within normal limits.  BNP is 1000 767.    D-dimer is 2.42.    Imaging results were reviewed.  Chest x-ray was reviewed.  No active disease.  No focal infiltrate.  No pneumothorax.    CT chest PE protocol was obtained and reviewed.  No evidence of pulmonary embolus.  Severe emphysema.  Stable nodules as noted in the report.  Trace left pleural effusion.    I suspect the patient likely has acute bacterial bronchitis with an exacerbation of COPD.  He will be treated with prednisone 40 mg daily for 4 days and Omnicef 300 mg twice daily.    Follow-up with your primary care physician at the [facility] in 1 to 2 days for reexamination.    Drink plenty of fluids.    Stop smoking.    Continue nebulizer treatments and inhaler as prescribed.    If condition worsens or new symptoms develop, return immediately to the emergency department.    I am the primary attending of record.    CRITICAL CARE TIME   Total Critical Care time was 0 minutes, excluding separately reportable procedures.  There was a high probability of clinically significant/life threatening deterioration in the patient's condition which required my urgent intervention.      CONSULTS:  None    PROCEDURES:  Unless otherwise noted below, none     Procedures        FINAL IMPRESSION      1. Acute exacerbation of chronic obstructive pulmonary disease (HCC)    2. Acute bacterial bronchitis          DISPOSITION/PLAN   DISPOSITION Decision To Discharge 11/18/2024 06:46:45 PM      PATIENT REFERRED TO:  [facility] Referral  Call [phone] for an appointment  Call today        DISCHARGE MEDICATIONS:  New Prescriptions    CEFDINIR

## 2024-11-18 NOTE — DISCHARGE INSTRUCTIONS
Follow-up with your primary care physician at the [facility] in 1 to 2 days for reexamination.    Drink plenty of fluids.    Stop smoking.    Continue nebulizer treatments and inhaler as prescribed.    If condition worsens or new symptoms develop, return immediately to the emergency department.

## 2024-11-18 NOTE — ED TRIAGE NOTES
Pt. C/o chronic bronchitis and called the [facility] and was told he needed a Zpak and steroids

## 2024-11-19 LAB
EKG ATRIAL RATE: 66 BPM
EKG DIAGNOSIS: NORMAL
EKG P AXIS: 54 DEGREES
EKG P-R INTERVAL: 158 MS
EKG Q-T INTERVAL: 440 MS
EKG QRS DURATION: 116 MS
EKG QTC CALCULATION (BAZETT): 461 MS
EKG R AXIS: 47 DEGREES
EKG T AXIS: 72 DEGREES
EKG VENTRICULAR RATE: 66 BPM

## 2025-01-24 NOTE — ANESTHESIA PRE PROCEDURE
Please send in RX for patient, patient stated prescriptions were not sent over yesterday    [facility] [address] [place] [address] [facility] Phone: [phone]   Fax: [phone]         
01/01/2024 07:11 PM       CMP:   Lab Results   Component Value Date/Time     01/01/2024 07:11 PM    K 4.4 01/01/2024 07:11 PM     01/01/2024 07:11 PM    CO2 26 01/01/2024 07:11 PM    BUN 13 01/01/2024 07:11 PM    CREATININE 0.6 01/01/2024 07:11 PM    GFRAA >60 12/25/2021 08:12 AM    GFRAA >60 08/31/2012 01:02 PM    AGRATIO 1.2 08/28/2023 05:19 AM    LABGLOM >60 01/01/2024 07:11 PM    GLUCOSE 180 01/01/2024 07:11 PM    PROT 5.5 11/09/2023 12:11 AM    PROT 6.9 08/31/2012 01:02 PM    CALCIUM 8.8 01/01/2024 07:11 PM    BILITOT 0.4 11/09/2023 12:11 AM    ALKPHOS 76 11/09/2023 12:11 AM    AST 11 11/09/2023 12:11 AM    ALT 28 11/09/2023 12:11 AM       POC Tests: No results for input(s): \"POCGLU\", \"POCNA\", \"POCK\", \"POCCL\", \"POCBUN\", \"POCHEMO\", \"POCHCT\" in the last 72 hours.    Coags:   Lab Results   Component Value Date/Time    PROTIME 13.5 01/01/2024 07:11 PM    INR 1.03 01/01/2024 07:11 PM    APTT 30.3 01/01/2024 07:11 PM       HCG (If Applicable): No results found for: \"PREGTESTUR\", \"PREGSERUM\", \"HCG\", \"HCGQUANT\"     ABGs:   Lab Results   Component Value Date/Time    PHART 7.383 07/18/2020 08:26 AM    PO2ART 322.0 07/18/2020 08:26 AM    DLC3AAR 38.4 07/18/2020 08:26 AM    CPB8RGO 22.9 07/18/2020 08:26 AM    BEART -2.0 07/18/2020 08:26 AM    R4YJSUQA 99.2 07/18/2020 08:26 AM        Type & Screen (If Applicable):  No results found for: \"LABABO\", \"LABRH\"    Drug/Infectious Status (If Applicable):  No results found for: \"HIV\", \"HEPCAB\"    COVID-19 Screening (If Applicable):   Lab Results   Component Value Date/Time    COVID19 Not Detected 08/28/2023 05:12 AM    COVID19 Not Detected 12/23/2021 04:20 PM    COVID19 Not Detected 07/22/2020 07:10 PM           Anesthesia Evaluation  Patient summary reviewed and Nursing notes reviewed   no history of anesthetic complications:   Airway: Mallampati: II  TM distance: >3 FB   Neck ROM: full  Mouth opening: > = 3 FB   Dental:    (+) edentulous      Pulmonary:normal exam

## 2025-03-21 ENCOUNTER — APPOINTMENT (OUTPATIENT)
Dept: GENERAL RADIOLOGY | Age: 70
DRG: 190 | End: 2025-03-21
Payer: OTHER GOVERNMENT

## 2025-03-21 ENCOUNTER — APPOINTMENT (OUTPATIENT)
Dept: CT IMAGING | Age: 70
DRG: 190 | End: 2025-03-21
Payer: OTHER GOVERNMENT

## 2025-03-21 ENCOUNTER — HOSPITAL ENCOUNTER (INPATIENT)
Age: 70
LOS: 4 days | Discharge: HOME | DRG: 190 | End: 2025-03-25
Attending: INTERNAL MEDICINE | Admitting: INTERNAL MEDICINE
Payer: OTHER GOVERNMENT

## 2025-03-21 DIAGNOSIS — E87.6 HYPOKALEMIA: ICD-10-CM

## 2025-03-21 DIAGNOSIS — R09.02 HYPOXEMIA: ICD-10-CM

## 2025-03-21 DIAGNOSIS — J44.1 ACUTE EXACERBATION OF CHRONIC OBSTRUCTIVE PULMONARY DISEASE (COPD) (HCC): Primary | ICD-10-CM

## 2025-03-21 PROBLEM — R73.9 HYPERGLYCEMIA: Status: ACTIVE | Noted: 2025-03-21

## 2025-03-21 LAB
ACANTHOCYTES BLD QL SMEAR: ABNORMAL
ALBUMIN SERPL-MCNC: 3.9 G/DL (ref 3.4–5)
ALBUMIN/GLOB SERPL: 1.3 {RATIO} (ref 1.1–2.2)
ALP SERPL-CCNC: 97 U/L (ref 40–129)
ALT SERPL-CCNC: 14 U/L (ref 10–40)
ANION GAP SERPL CALCULATED.3IONS-SCNC: 14 MMOL/L (ref 3–16)
ANISOCYTOSIS BLD QL SMEAR: ABNORMAL
AST SERPL-CCNC: 19 U/L (ref 15–37)
BASOPHILS # BLD: 0.2 K/UL (ref 0–0.2)
BASOPHILS NFR BLD: 1 %
BILIRUB SERPL-MCNC: 0.5 MG/DL (ref 0–1)
BUN SERPL-MCNC: 10 MG/DL (ref 7–20)
BURR CELLS BLD QL SMEAR: ABNORMAL
CALCIUM SERPL-MCNC: 9.4 MG/DL (ref 8.3–10.6)
CHLORIDE SERPL-SCNC: 99 MMOL/L (ref 99–110)
CO2 SERPL-SCNC: 26 MMOL/L (ref 21–32)
CREAT SERPL-MCNC: 0.7 MG/DL (ref 0.8–1.3)
DEPRECATED RDW RBC AUTO: 16.9 % (ref 12.4–15.4)
EOSINOPHIL # BLD: 0 K/UL (ref 0–0.6)
EOSINOPHIL NFR BLD: 0 %
GFR SERPLBLD CREATININE-BSD FMLA CKD-EPI: >90 ML/MIN/{1.73_M2}
GLUCOSE SERPL-MCNC: 136 MG/DL (ref 70–99)
HCT VFR BLD AUTO: 37.3 % (ref 40.5–52.5)
HGB BLD-MCNC: 12.6 G/DL (ref 13.5–17.5)
LYMPHOCYTES # BLD: 0.8 K/UL (ref 1–5.1)
LYMPHOCYTES NFR BLD: 4 %
MAGNESIUM SERPL-MCNC: 2.26 MG/DL (ref 1.8–2.4)
MCH RBC QN AUTO: 28.7 PG (ref 26–34)
MCHC RBC AUTO-ENTMCNC: 33.6 G/DL (ref 31–36)
MCV RBC AUTO: 85.3 FL (ref 80–100)
MONOCYTES # BLD: 1.6 K/UL (ref 0–1.3)
MONOCYTES NFR BLD: 8 %
NEUTROPHILS # BLD: 17.3 K/UL (ref 1.7–7.7)
NEUTROPHILS NFR BLD: 87 %
NT-PROBNP SERPL-MCNC: 2347 PG/ML (ref 0–124)
OVALOCYTES BLD QL SMEAR: ABNORMAL
PATH INTERP BLD-IMP: NO
PLATELET # BLD AUTO: 547 K/UL (ref 135–450)
PMV BLD AUTO: 7.6 FL (ref 5–10.5)
POIKILOCYTOSIS BLD QL SMEAR: ABNORMAL
POTASSIUM SERPL-SCNC: 2.8 MMOL/L (ref 3.5–5.1)
PROT SERPL-MCNC: 7 G/DL (ref 6.4–8.2)
RBC # BLD AUTO: 4.38 M/UL (ref 4.2–5.9)
SCHISTOCYTES BLD QL SMEAR: ABNORMAL
SODIUM SERPL-SCNC: 139 MMOL/L (ref 136–145)
TROPONIN, HIGH SENSITIVITY: 30 NG/L (ref 0–22)
WBC # BLD AUTO: 19.9 K/UL (ref 4–11)

## 2025-03-21 PROCEDURE — 6360000004 HC RX CONTRAST MEDICATION: Performed by: PHYSICIAN ASSISTANT

## 2025-03-21 PROCEDURE — 84484 ASSAY OF TROPONIN QUANT: CPT

## 2025-03-21 PROCEDURE — 94640 AIRWAY INHALATION TREATMENT: CPT

## 2025-03-21 PROCEDURE — 93005 ELECTROCARDIOGRAM TRACING: CPT | Performed by: INTERNAL MEDICINE

## 2025-03-21 PROCEDURE — 96374 THER/PROPH/DIAG INJ IV PUSH: CPT

## 2025-03-21 PROCEDURE — 83735 ASSAY OF MAGNESIUM: CPT

## 2025-03-21 PROCEDURE — 2500000003 HC RX 250 WO HCPCS

## 2025-03-21 PROCEDURE — 85025 COMPLETE CBC W/AUTO DIFF WBC: CPT

## 2025-03-21 PROCEDURE — 71045 X-RAY EXAM CHEST 1 VIEW: CPT

## 2025-03-21 PROCEDURE — 83880 ASSAY OF NATRIURETIC PEPTIDE: CPT

## 2025-03-21 PROCEDURE — 1200000000 HC SEMI PRIVATE

## 2025-03-21 PROCEDURE — 6370000000 HC RX 637 (ALT 250 FOR IP): Performed by: PHYSICIAN ASSISTANT

## 2025-03-21 PROCEDURE — 80053 COMPREHEN METABOLIC PANEL: CPT

## 2025-03-21 PROCEDURE — 99285 EMERGENCY DEPT VISIT HI MDM: CPT

## 2025-03-21 PROCEDURE — 71260 CT THORAX DX C+: CPT

## 2025-03-21 PROCEDURE — 6360000002 HC RX W HCPCS: Performed by: PHYSICIAN ASSISTANT

## 2025-03-21 RX ORDER — ENOXAPARIN SODIUM 100 MG/ML
40 INJECTION SUBCUTANEOUS DAILY
Status: DISCONTINUED | OUTPATIENT
Start: 2025-03-22 | End: 2025-03-25 | Stop reason: HOSPADM

## 2025-03-21 RX ORDER — IOPAMIDOL 755 MG/ML
75 INJECTION, SOLUTION INTRAVASCULAR
Status: COMPLETED | OUTPATIENT
Start: 2025-03-21 | End: 2025-03-21

## 2025-03-21 RX ORDER — IPRATROPIUM BROMIDE AND ALBUTEROL SULFATE 2.5; .5 MG/3ML; MG/3ML
1 SOLUTION RESPIRATORY (INHALATION)
Status: DISCONTINUED | OUTPATIENT
Start: 2025-03-22 | End: 2025-03-23

## 2025-03-21 RX ORDER — SODIUM CHLORIDE 0.9 % (FLUSH) 0.9 %
5-40 SYRINGE (ML) INJECTION PRN
Status: DISCONTINUED | OUTPATIENT
Start: 2025-03-21 | End: 2025-03-25 | Stop reason: HOSPADM

## 2025-03-21 RX ORDER — WATER 10 ML/10ML
INJECTION INTRAMUSCULAR; INTRAVENOUS; SUBCUTANEOUS
Status: COMPLETED
Start: 2025-03-21 | End: 2025-03-21

## 2025-03-21 RX ORDER — GUAIFENESIN/DEXTROMETHORPHAN 100-10MG/5
5 SYRUP ORAL EVERY 4 HOURS PRN
Status: DISCONTINUED | OUTPATIENT
Start: 2025-03-21 | End: 2025-03-25 | Stop reason: HOSPADM

## 2025-03-21 RX ORDER — AZITHROMYCIN 500 MG/1
500 TABLET, FILM COATED ORAL NIGHTLY
Status: DISCONTINUED | OUTPATIENT
Start: 2025-03-21 | End: 2025-03-23

## 2025-03-21 RX ORDER — PREDNISONE 20 MG/1
40 TABLET ORAL DAILY
Status: DISCONTINUED | OUTPATIENT
Start: 2025-03-22 | End: 2025-03-21

## 2025-03-21 RX ORDER — ACETAMINOPHEN 650 MG/1
650 SUPPOSITORY RECTAL EVERY 6 HOURS PRN
Status: DISCONTINUED | OUTPATIENT
Start: 2025-03-21 | End: 2025-03-25 | Stop reason: HOSPADM

## 2025-03-21 RX ORDER — VERAPAMIL HYDROCHLORIDE 240 MG/1
120 TABLET, FILM COATED, EXTENDED RELEASE ORAL DAILY
Status: DISCONTINUED | OUTPATIENT
Start: 2025-03-22 | End: 2025-03-25 | Stop reason: HOSPADM

## 2025-03-21 RX ORDER — ONDANSETRON 2 MG/ML
4 INJECTION INTRAMUSCULAR; INTRAVENOUS EVERY 6 HOURS PRN
Status: DISCONTINUED | OUTPATIENT
Start: 2025-03-21 | End: 2025-03-25

## 2025-03-21 RX ORDER — POTASSIUM CHLORIDE 7.45 MG/ML
10 INJECTION INTRAVENOUS
Status: DISPENSED | OUTPATIENT
Start: 2025-03-21 | End: 2025-03-21

## 2025-03-21 RX ORDER — ALBUTEROL SULFATE 0.83 MG/ML
2.5 SOLUTION RESPIRATORY (INHALATION)
Status: DISCONTINUED | OUTPATIENT
Start: 2025-03-21 | End: 2025-03-25 | Stop reason: HOSPADM

## 2025-03-21 RX ORDER — METHYLPREDNISOLONE SODIUM SUCCINATE 125 MG/2ML
60 INJECTION INTRAMUSCULAR; INTRAVENOUS EVERY 8 HOURS
Status: DISCONTINUED | OUTPATIENT
Start: 2025-03-22 | End: 2025-03-22

## 2025-03-21 RX ORDER — POLYETHYLENE GLYCOL 3350 17 G/17G
17 POWDER, FOR SOLUTION ORAL DAILY PRN
Status: DISCONTINUED | OUTPATIENT
Start: 2025-03-21 | End: 2025-03-25 | Stop reason: HOSPADM

## 2025-03-21 RX ORDER — SODIUM CHLORIDE 9 MG/ML
INJECTION, SOLUTION INTRAVENOUS PRN
Status: DISCONTINUED | OUTPATIENT
Start: 2025-03-21 | End: 2025-03-25 | Stop reason: HOSPADM

## 2025-03-21 RX ORDER — ONDANSETRON 4 MG/1
4 TABLET, ORALLY DISINTEGRATING ORAL EVERY 8 HOURS PRN
Status: DISCONTINUED | OUTPATIENT
Start: 2025-03-21 | End: 2025-03-25

## 2025-03-21 RX ORDER — METHYLPREDNISOLONE SODIUM SUCCINATE 125 MG/2ML
60 INJECTION INTRAMUSCULAR; INTRAVENOUS ONCE
Status: COMPLETED | OUTPATIENT
Start: 2025-03-21 | End: 2025-03-21

## 2025-03-21 RX ORDER — TRAZODONE HYDROCHLORIDE 50 MG/1
50 TABLET ORAL NIGHTLY PRN
Status: DISCONTINUED | OUTPATIENT
Start: 2025-03-21 | End: 2025-03-25 | Stop reason: HOSPADM

## 2025-03-21 RX ORDER — ACETAMINOPHEN 325 MG/1
650 TABLET ORAL EVERY 6 HOURS PRN
Status: DISCONTINUED | OUTPATIENT
Start: 2025-03-21 | End: 2025-03-25 | Stop reason: HOSPADM

## 2025-03-21 RX ORDER — VERAPAMIL HYDROCHLORIDE 120 MG/1
120 TABLET, FILM COATED, EXTENDED RELEASE ORAL DAILY
COMMUNITY

## 2025-03-21 RX ORDER — SODIUM CHLORIDE 0.9 % (FLUSH) 0.9 %
5-40 SYRINGE (ML) INJECTION EVERY 12 HOURS SCHEDULED
Status: DISCONTINUED | OUTPATIENT
Start: 2025-03-21 | End: 2025-03-25 | Stop reason: HOSPADM

## 2025-03-21 RX ORDER — IPRATROPIUM BROMIDE AND ALBUTEROL SULFATE 2.5; .5 MG/3ML; MG/3ML
2 SOLUTION RESPIRATORY (INHALATION) ONCE
Status: COMPLETED | OUTPATIENT
Start: 2025-03-21 | End: 2025-03-21

## 2025-03-21 RX ADMIN — WATER 2 ML: 1 INJECTION INTRAMUSCULAR; INTRAVENOUS; SUBCUTANEOUS at 19:30

## 2025-03-21 RX ADMIN — POTASSIUM CHLORIDE 10 MEQ: 7.46 INJECTION, SOLUTION INTRAVENOUS at 21:45

## 2025-03-21 RX ADMIN — IPRATROPIUM BROMIDE AND ALBUTEROL SULFATE 2 DOSE: .5; 2.5 SOLUTION RESPIRATORY (INHALATION) at 19:38

## 2025-03-21 RX ADMIN — METHYLPREDNISOLONE SODIUM SUCCINATE 60 MG: 125 INJECTION INTRAMUSCULAR; INTRAVENOUS at 19:30

## 2025-03-21 RX ADMIN — IOPAMIDOL 75 ML: 755 INJECTION, SOLUTION INTRAVENOUS at 21:37

## 2025-03-21 RX ADMIN — POTASSIUM BICARBONATE 40 MEQ: 391 TABLET, EFFERVESCENT ORAL at 21:43

## 2025-03-21 ASSESSMENT — PAIN - FUNCTIONAL ASSESSMENT: PAIN_FUNCTIONAL_ASSESSMENT: NONE - DENIES PAIN

## 2025-03-21 NOTE — ED PROVIDER NOTES
**ADVANCED PRACTICE PROVIDER, I HAVE EVALUATED THIS PATIENT**        [facility] EMERGENCY DEPARTMENT  EMERGENCY DEPARTMENT ENCOUNTER      Pt Name: Manfred Washington  MRN:[id number]  Birthdate 1955  Date of evaluation: 3/21/2025  Provider: Siddhartha Sebastian PA-C  Note Started: 7:21 PM EDT 3/21/25        Chief Complaint:    Chief Complaint   Patient presents with    Shortness of Breath     Shortness of breath for 7 days hx of COPD, lung cx, 82%on RA, 6L-90's stridor duoneb/albuterol en route. 60 solumedrol          Nursing Notes, Past Medical Hx, Past Surgical Hx, Social Hx, Allergies, and Family Hx were all reviewed and agreed with or any disagreements were addressed in the HPI.    HPI: (Location, Duration, Timing, Severity, Quality, Assoc Sx, Context, Modifying factors)    History From: Patient  Limitations to history : None    Social Determinants Significantly Affecting Health : None    Chief Complaint of shortness of breath and wheezing.  Patient complain of cough with brownish mucus.  Said his symptoms started about 7 days ago.  He does not require oxygen at home.  And he was placed on 6 L of oxygen and his O2 sat is at 94%.  According to EMS his O2 saturation was in the 80s when they arrived.  Patient denies fever, he states he still smokes 1 pack/day.  Denies chest pain.  No abdominal pain.  No nausea vomiting.  He does get shortness of breath walking no more than 10 feet he says and he gets shortness of breath.  History of COPD, asthma.  He was given 60 mg of Solu-Medrol per EMS.  He was also given DuoNebs x 1 and albuterol x 1.  He denies headache, no visual changes.  No other complaints.  His wife called EMS to have him brought here.  [facility]  He did tell me that his pulmonologist did put him on low-dose prednisone that he should take every day.  When [facility] he was taken off of it.  He said when he was on the prednisone every day he felt much

## 2025-03-22 LAB
ANION GAP SERPL CALCULATED.3IONS-SCNC: 12 MMOL/L (ref 3–16)
BASOPHILS # BLD: 0 K/UL (ref 0–0.2)
BASOPHILS NFR BLD: 0 %
BUN SERPL-MCNC: 12 MG/DL (ref 7–20)
CALCIUM SERPL-MCNC: 9.4 MG/DL (ref 8.3–10.6)
CHLORIDE SERPL-SCNC: 102 MMOL/L (ref 99–110)
CO2 SERPL-SCNC: 26 MMOL/L (ref 21–32)
CREAT SERPL-MCNC: 0.7 MG/DL (ref 0.8–1.3)
DEPRECATED RDW RBC AUTO: 16.7 % (ref 12.4–15.4)
EKG ATRIAL RATE: 97 BPM
EKG DIAGNOSIS: NORMAL
EKG P AXIS: 34 DEGREES
EKG P-R INTERVAL: 104 MS
EKG Q-T INTERVAL: 472 MS
EKG QRS DURATION: 112 MS
EKG QTC CALCULATION (BAZETT): 594 MS
EKG R AXIS: 78 DEGREES
EKG T AXIS: 102 DEGREES
EKG VENTRICULAR RATE: 95 BPM
EOSINOPHIL # BLD: 0 K/UL (ref 0–0.6)
EOSINOPHIL NFR BLD: 0 %
EST. AVERAGE GLUCOSE BLD GHB EST-MCNC: 114 MG/DL
GFR SERPLBLD CREATININE-BSD FMLA CKD-EPI: >90 ML/MIN/{1.73_M2}
GLUCOSE SERPL-MCNC: 202 MG/DL (ref 70–99)
HBA1C MFR BLD: 5.6 %
HCT VFR BLD AUTO: 37.9 % (ref 40.5–52.5)
HGB BLD-MCNC: 12.8 G/DL (ref 13.5–17.5)
LACTATE BLDV-SCNC: 1.7 MMOL/L (ref 0.4–2)
LYMPHOCYTES # BLD: 0.6 K/UL (ref 1–5.1)
LYMPHOCYTES NFR BLD: 3.3 %
MAGNESIUM SERPL-MCNC: 2.67 MG/DL (ref 1.8–2.4)
MCH RBC QN AUTO: 28.6 PG (ref 26–34)
MCHC RBC AUTO-ENTMCNC: 33.8 G/DL (ref 31–36)
MCV RBC AUTO: 84.6 FL (ref 80–100)
MONOCYTES # BLD: 0.4 K/UL (ref 0–1.3)
MONOCYTES NFR BLD: 2.2 %
NEUTROPHILS # BLD: 17.8 K/UL (ref 1.7–7.7)
NEUTROPHILS NFR BLD: 94.5 %
PLATELET # BLD AUTO: 548 K/UL (ref 135–450)
PMV BLD AUTO: 7.7 FL (ref 5–10.5)
POTASSIUM SERPL-SCNC: 3.2 MMOL/L (ref 3.5–5.1)
PROCALCITONIN SERPL IA-MCNC: 0.18 NG/ML (ref 0–0.15)
RBC # BLD AUTO: 4.48 M/UL (ref 4.2–5.9)
REPORT: NORMAL
RESP PATH DNA+RNA PNL NPH NAA+NON-PROBE: NORMAL
SODIUM SERPL-SCNC: 140 MMOL/L (ref 136–145)
TSH SERPL DL<=0.005 MIU/L-ACNC: 0.26 UIU/ML (ref 0.27–4.2)
WBC # BLD AUTO: 18.8 K/UL (ref 4–11)

## 2025-03-22 PROCEDURE — 2500000003 HC RX 250 WO HCPCS: Performed by: INTERNAL MEDICINE

## 2025-03-22 PROCEDURE — 83036 HEMOGLOBIN GLYCOSYLATED A1C: CPT

## 2025-03-22 PROCEDURE — 6360000002 HC RX W HCPCS: Performed by: INTERNAL MEDICINE

## 2025-03-22 PROCEDURE — 1200000000 HC SEMI PRIVATE

## 2025-03-22 PROCEDURE — 99223 1ST HOSP IP/OBS HIGH 75: CPT | Performed by: INTERNAL MEDICINE

## 2025-03-22 PROCEDURE — 93010 ELECTROCARDIOGRAM REPORT: CPT | Performed by: INTERNAL MEDICINE

## 2025-03-22 PROCEDURE — 94640 AIRWAY INHALATION TREATMENT: CPT

## 2025-03-22 PROCEDURE — 85025 COMPLETE CBC W/AUTO DIFF WBC: CPT

## 2025-03-22 PROCEDURE — 80048 BASIC METABOLIC PNL TOTAL CA: CPT

## 2025-03-22 PROCEDURE — 94761 N-INVAS EAR/PLS OXIMETRY MLT: CPT

## 2025-03-22 PROCEDURE — 97530 THERAPEUTIC ACTIVITIES: CPT

## 2025-03-22 PROCEDURE — 2700000000 HC OXYGEN THERAPY PER DAY

## 2025-03-22 PROCEDURE — 84443 ASSAY THYROID STIM HORMONE: CPT

## 2025-03-22 PROCEDURE — 83735 ASSAY OF MAGNESIUM: CPT

## 2025-03-22 PROCEDURE — 0202U NFCT DS 22 TRGT SARS-COV-2: CPT

## 2025-03-22 PROCEDURE — 5A0945A ASSISTANCE WITH RESPIRATORY VENTILATION, 24-96 CONSECUTIVE HOURS, HIGH NASAL FLOW/VELOCITY: ICD-10-PCS | Performed by: HOSPITALIST

## 2025-03-22 PROCEDURE — 97166 OT EVAL MOD COMPLEX 45 MIN: CPT

## 2025-03-22 PROCEDURE — 6370000000 HC RX 637 (ALT 250 FOR IP): Performed by: INTERNAL MEDICINE

## 2025-03-22 PROCEDURE — 84145 PROCALCITONIN (PCT): CPT

## 2025-03-22 PROCEDURE — 83605 ASSAY OF LACTIC ACID: CPT

## 2025-03-22 PROCEDURE — 36415 COLL VENOUS BLD VENIPUNCTURE: CPT

## 2025-03-22 PROCEDURE — 6370000000 HC RX 637 (ALT 250 FOR IP): Performed by: HOSPITALIST

## 2025-03-22 PROCEDURE — 97162 PT EVAL MOD COMPLEX 30 MIN: CPT

## 2025-03-22 RX ORDER — GABAPENTIN 600 MG/1
600 TABLET ORAL 4 TIMES DAILY
COMMUNITY

## 2025-03-22 RX ORDER — IBUPROFEN 600 MG/1
600 TABLET, FILM COATED ORAL EVERY 8 HOURS PRN
COMMUNITY

## 2025-03-22 RX ORDER — POTASSIUM CHLORIDE 7.45 MG/ML
10 INJECTION INTRAVENOUS
Status: COMPLETED | OUTPATIENT
Start: 2025-03-22 | End: 2025-03-22

## 2025-03-22 RX ORDER — GABAPENTIN 300 MG/1
600 CAPSULE ORAL 4 TIMES DAILY
Status: DISCONTINUED | OUTPATIENT
Start: 2025-03-22 | End: 2025-03-23

## 2025-03-22 RX ORDER — PANTOPRAZOLE SODIUM 20 MG/1
20 TABLET, DELAYED RELEASE ORAL DAILY
COMMUNITY

## 2025-03-22 RX ORDER — AZELASTINE 1 MG/ML
2 SPRAY, METERED NASAL 2 TIMES DAILY
COMMUNITY

## 2025-03-22 RX ORDER — DULOXETIN HYDROCHLORIDE 60 MG/1
60 CAPSULE, DELAYED RELEASE ORAL 2 TIMES DAILY
Status: ON HOLD | COMMUNITY
End: 2025-03-25 | Stop reason: HOSPADM

## 2025-03-22 RX ORDER — DULOXETIN HYDROCHLORIDE 60 MG/1
60 CAPSULE, DELAYED RELEASE ORAL 2 TIMES DAILY
Status: DISCONTINUED | OUTPATIENT
Start: 2025-03-22 | End: 2025-03-23

## 2025-03-22 RX ORDER — PANTOPRAZOLE SODIUM 20 MG/1
20 TABLET, DELAYED RELEASE ORAL
Status: DISCONTINUED | OUTPATIENT
Start: 2025-03-23 | End: 2025-03-23

## 2025-03-22 RX ORDER — METHYLPREDNISOLONE SODIUM SUCCINATE 40 MG/ML
40 INJECTION INTRAMUSCULAR; INTRAVENOUS EVERY 8 HOURS
Status: DISCONTINUED | OUTPATIENT
Start: 2025-03-22 | End: 2025-03-22

## 2025-03-22 RX ORDER — BUDESONIDE AND FORMOTEROL FUMARATE DIHYDRATE 160; 4.5 UG/1; UG/1
2 AEROSOL RESPIRATORY (INHALATION)
Status: DISCONTINUED | OUTPATIENT
Start: 2025-03-22 | End: 2025-03-25 | Stop reason: HOSPADM

## 2025-03-22 RX ADMIN — IPRATROPIUM BROMIDE AND ALBUTEROL SULFATE 1 DOSE: .5; 3 SOLUTION RESPIRATORY (INHALATION) at 08:21

## 2025-03-22 RX ADMIN — GABAPENTIN 600 MG: 300 CAPSULE ORAL at 18:20

## 2025-03-22 RX ADMIN — DULOXETINE HYDROCHLORIDE 60 MG: 60 CAPSULE, DELAYED RELEASE ORAL at 14:10

## 2025-03-22 RX ADMIN — ALBUTEROL SULFATE 2.5 MG: 0.83 SOLUTION RESPIRATORY (INHALATION) at 14:16

## 2025-03-22 RX ADMIN — WATER 40 MG: 1 INJECTION INTRAMUSCULAR; INTRAVENOUS; SUBCUTANEOUS at 14:09

## 2025-03-22 RX ADMIN — GABAPENTIN 600 MG: 300 CAPSULE ORAL at 14:09

## 2025-03-22 RX ADMIN — GABAPENTIN 600 MG: 300 CAPSULE ORAL at 20:29

## 2025-03-22 RX ADMIN — SODIUM CHLORIDE, PRESERVATIVE FREE 10 ML: 5 INJECTION INTRAVENOUS at 00:08

## 2025-03-22 RX ADMIN — Medication 2 PUFF: at 11:38

## 2025-03-22 RX ADMIN — TRAZODONE HYDROCHLORIDE 50 MG: 50 TABLET ORAL at 20:29

## 2025-03-22 RX ADMIN — VERAPAMIL HYDROCHLORIDE 120 MG: 240 TABLET, FILM COATED, EXTENDED RELEASE ORAL at 08:44

## 2025-03-22 RX ADMIN — WATER 40 MG: 1 INJECTION INTRAMUSCULAR; INTRAVENOUS; SUBCUTANEOUS at 20:29

## 2025-03-22 RX ADMIN — SODIUM CHLORIDE, PRESERVATIVE FREE 10 ML: 5 INJECTION INTRAVENOUS at 03:29

## 2025-03-22 RX ADMIN — SODIUM CHLORIDE, PRESERVATIVE FREE 10 ML: 5 INJECTION INTRAVENOUS at 20:30

## 2025-03-22 RX ADMIN — AZITHROMYCIN 500 MG: 500 TABLET, FILM COATED ORAL at 20:29

## 2025-03-22 RX ADMIN — AZITHROMYCIN 500 MG: 500 TABLET, FILM COATED ORAL at 00:07

## 2025-03-22 RX ADMIN — IPRATROPIUM BROMIDE AND ALBUTEROL SULFATE 1 DOSE: .5; 3 SOLUTION RESPIRATORY (INHALATION) at 19:41

## 2025-03-22 RX ADMIN — Medication 2 PUFF: at 19:41

## 2025-03-22 RX ADMIN — DULOXETINE HYDROCHLORIDE 60 MG: 60 CAPSULE, DELAYED RELEASE ORAL at 20:29

## 2025-03-22 RX ADMIN — METHYLPREDNISOLONE SODIUM SUCCINATE 60 MG: 125 INJECTION INTRAMUSCULAR; INTRAVENOUS at 03:27

## 2025-03-22 RX ADMIN — POTASSIUM CHLORIDE 10 MEQ: 7.46 INJECTION, SOLUTION INTRAVENOUS at 00:03

## 2025-03-22 RX ADMIN — IPRATROPIUM BROMIDE AND ALBUTEROL SULFATE 1 DOSE: .5; 3 SOLUTION RESPIRATORY (INHALATION) at 11:38

## 2025-03-22 RX ADMIN — IPRATROPIUM BROMIDE AND ALBUTEROL SULFATE 1 DOSE: .5; 3 SOLUTION RESPIRATORY (INHALATION) at 15:48

## 2025-03-22 NOTE — ED PROVIDER NOTES
I did not perform an evaluation of [facility] but was asked to review his EKG.     EKG interpreted by myself.   Sinus rhythm with short AK and a rate of 95, normal axis, , QRS  112, QTc 374, no ST elevations, no ST depressions, nonspecific T wave abnormality anterolateral leads, when compared EKG from 11/18/2024 nonspecific intraventricular block no longer present and AK is shortened     Etienne Mcallister MD  03/21/25 1744

## 2025-03-22 NOTE — PROGRESS NOTES
Pt spouse informed RN pt had a bottle of pills in his bag, she will be taking them home with her.    Electronically signed by Lamar Jo RN on 3/22/2025 at 7:15 PM

## 2025-03-22 NOTE — PROGRESS NOTES
Specimen collected through nasal swab for ordered Respi. Tests, labeled, sent to lab.     Electronically signed by Fartun Thayer RN on 3/22/25 at 4:01 AM EDT

## 2025-03-22 NOTE — PROGRESS NOTES
Received from ED, wheeled-in via stretcher c/o ED staff. A&O x4. Pt. Was able to tolerate ambulation without assistive device from stretcher to bed. On O2 inhalation via nasal cannula at 2lpm. VSS.     Ushered to bed, oriented to room. Call light placed within reached and instructed to use when needing assistance.     Routine admission assessment done.    Electronically signed by Fartun Thayer RN on 3/22/25 at 12:30 AM EDT

## 2025-03-22 NOTE — CONSULTS
the pylorus       FAMILY HISTORY:  family history includes Heart Attack (age of onset: 41) in his brother; Heart Disease in his brother and mother; Lung Cancer in his brother and father; Substance Abuse in his brother.    SOCIAL HISTORY:   reports that he has been smoking cigarettes. He has a 42 pack-year smoking history. He has never used smokeless tobacco.    Scheduled Meds:   methylPREDNISolone  40 mg IntraVENous Q8H    budesonide-formoterol  2 puff Inhalation BID RT    sodium chloride flush  5-40 mL IntraVENous 2 times per day    enoxaparin  40 mg SubCUTAneous Daily    azithromycin  500 mg Oral Nightly    ipratropium 0.5 mg-albuterol 2.5 mg  1 Dose Inhalation Q4H WA RT    verapamil  120 mg Oral Daily       Continuous Infusions:   sodium chloride         PRN Meds:  sodium chloride flush, sodium chloride, ondansetron **OR** ondansetron, polyethylene glycol, acetaminophen **OR** acetaminophen, albuterol, guaiFENesin-dextromethorphan, traZODone    ALLERGIES:  Patient is allergic to baclofen, hydromorphone, amitriptyline, apap-mag salicylate-caffeine, choline, codeine, demerol, diltiazem hcl, lansoprazole, tricyclic antidepressants, and trilisate [choline magnesium trisalicylate].    REVIEW OF SYSTEMS:  Constitutional: Negative for fever or chills  HENT: Negative for sore throat  Eyes: Negative for redness   Respiratory: chronically SOB  Cardiovascular: Negative for chest pain  Gastrointestinal: Negative for vomiting, diarrhea   Genitourinary: Negative for hematuria, negative for dysuria  Musculoskeletal: Negative for arthralgias   Skin: Negative for rash  Neurological: Negative for syncope  Hematological: Negative for adenopathy  Extremities:  Negative for swelling    PHYSICAL EXAM:  Blood pressure 108/64, pulse 90, temperature 97.5 °F (36.4 °C), temperature source Axillary, resp. rate 20, height 1.753 m (5' 9\"), weight 71.8 kg (158 lb 4.6 oz), SpO2 96%.'  Gen: Chronically ill appearing   Eyes: PERRL. No sclera

## 2025-03-22 NOTE — PROGRESS NOTES
Occupational Therapy  Facility/Department: [address] MED SURG  Occupational Therapy Initial Assessment    Name: Manfred Washington  : 1955  MRN: [id number]  Date of Service: 3/22/2025    Discharge Recommendations:  Continue to assess pending progress  OT Equipment Recommendations  Other: TBD    Manfred Washington scored a 17/ on the AM-PAC ADL Inpatient form.     If patient discharges prior to next session this note will serve as a discharge summary.  Please see below for the latest assessment towards goals.         Patient Diagnosis(es): The primary encounter diagnosis was Acute exacerbation of chronic obstructive pulmonary disease (COPD) (HCC). Diagnoses of Hypokalemia and Hypoxemia were also pertinent to this visit.  Past Medical History:  has a past medical history of Arthritis, Gong's esophagus, Chronic back pain, COPD (chronic obstructive pulmonary disease) (HCC), Depression, GERD (gastroesophageal reflux disease), HYPERCHOLESTERAEMIA, Hypertension, Prinzmetal angina, Spinal stenoses, and TIA (transient ischaemic attack).  Past Surgical History:  has a past surgical history that includes joint replacement; Colonoscopy; back surgery; Splenectomy; Cholecystectomy; Appendectomy; Upper gastrointestinal endoscopy (5-14-10); Upper gastrointestinal endoscopy (12); Clavicle surgery; bronchoscopy (N/A, 2019); bronchoscopy (2019); Colonoscopy (N/A, 2020); Colonoscopy (2020); and Abdominal aortic aneurysm repair (N/A, 2024).           Assessment  Performance deficits / Impairments: Decreased functional mobility ;Decreased ADL status;Decreased cognition;Decreased safe awareness;Decreased endurance;Decreased high-level IADLs  Assessment: Manfred Washington is a 69 y.o. male smoker with a pmh of COPD, hypertension, hyperlipidemia, TIA, GERD with Gong's esophagus, mood disorder who presents with COPD exacerbation, Acute respiratory failure with hypoxia, Hypokalemia. At baseline, pt lives with wife and

## 2025-03-22 NOTE — ED NOTES
Patient's oxygen saturation dropped to 77%, placed on high flow nasal cannula and sats improved to 94%

## 2025-03-22 NOTE — PROGRESS NOTES
V2.0    [facility] Progress Note      Name:  Manfred Washington /Age/Sex: 1955  (69 y.o. male)   MRN & CSN:  [id number] & [id number] Encounter Date/Time: 3/22/2025 12:54 PM EDT   Location:  [address] PCP: Suzette Oquendo APRN     Attending:Francisco J Erazo MD       Hospital Day: 2    Assessment and Recommendations   Manfred Washington is a 69 y.o. male with pmh of  who presents with Acute exacerbation of chronic obstructive pulmonary disease (COPD) ([facility])          Telemetry monitering  Oxygen  Duonebs   Steroids : IV solumedrol  Antibiotics : cont zithromax  Moniter O2 sat    Pulmonology consulted      DVT ppx  : lovenox    Diet : regular diet    Code status  : full code    Advanced care planning was discussed with patient for a total of 16 minutes.  Full code, DNR CC, DNR CCA, power of  and living will were discussed.  Patient elected to be a full code.       Diet ADULT DIET; Regular; 4 carb choices (60 gm/meal); Low Fat/Low Chol/High Fiber/KUMAR   DVT Prophylaxis [] Lovenox, []  Heparin, [] SCDs, [] Ambulation,  [] Eliquis, [] Xarelto  [] Coumadin   Code Status Full Code   Disposition From:  Expected Disposition:   Estimated Date of Discharge:   Patient requires continued admission due to    Surrogate Decision Maker/ POA         Personally reviewed Lab Studies and Imaging           Telemetry reviewed by myself no ST elevation           Drugs that require monitoring for toxicity include steroids and the method of monitoring was blood sugar     Medical Decision Making:  The following items were considered in medical decision making:  Discussion of patient care with other providers  Reviewed clinical lab tests  Reviewed radiology tests  Reviewed other diagnostic tests/interventions  Independent review of radiologic images  Microbiology cultures and other micro tests reviewed            Subjective:     Chief Complaint:     Manfred Washington is a 69 y.o. male who presents with       SOB improving  AAO x 3  No  acute overnight events  Denies chest pain   Denies fever or chills or vomiting or abdominal pain         Review of Systems:      Pertinent positives and negatives discussed in HPI    Objective:     Intake/Output Summary (Last 24 hours) at 3/22/2025 1254  Last data filed at 3/22/2025 0537  Gross per 24 hour   Intake 449.07 ml   Output 550 ml   Net -100.93 ml      Vitals:   Vitals:    03/22/25 0823 03/22/25 1030 03/22/25 1128 03/22/25 1141   BP:  108/64 (!) 100/59    Pulse: 90  74 78   Resp: 20 19 18   Temp:   97.5 °F (36.4 °C)    TempSrc:       SpO2: 96%  99% 96%   Weight:       Height:             Physical Exam:      General: NAD  Eyes: EOMI  ENT: neck supple  Cardiovascular: Regular rate.  Respiratory:  reduced air entry  Gastrointestinal: Soft, non tender  Genitourinary: no suprapubic tenderness  Musculoskeletal: No edema          Medications:   Medications:    budesonide-formoterol  2 puff Inhalation BID RT    methylPREDNISolone sodium succ (SOLU-MEDROL) 40 mg in sterile water 1 mL injection  40 mg IntraVENous Q8H    DULoxetine  60 mg Oral BID    gabapentin  600 mg Oral 4x daily    [START ON 3/23/2025] pantoprazole  20 mg Oral QAM AC    sodium chloride flush  5-40 mL IntraVENous 2 times per day    enoxaparin  40 mg SubCUTAneous Daily    azithromycin  500 mg Oral Nightly    ipratropium 0.5 mg-albuterol 2.5 mg  1 Dose Inhalation Q4H WA RT    verapamil  120 mg Oral Daily      Infusions:    sodium chloride       PRN Meds: sodium chloride flush, 5-40 mL, PRN  sodium chloride, , PRN  ondansetron, 4 mg, Q8H PRN   Or  ondansetron, 4 mg, Q6H PRN  polyethylene glycol, 17 g, Daily PRN  acetaminophen, 650 mg, Q6H PRN   Or  acetaminophen, 650 mg, Q6H PRN  albuterol, 2.5 mg, Q2H PRN  guaiFENesin-dextromethorphan, 5 mL, Q4H PRN  traZODone, 50 mg, Nightly PRN        Labs and Imaging   CT CHEST PULMONARY EMBOLISM W CONTRAST  Result Date: 3/22/2025  EXAMINATION: CTA OF THE CHEST 3/21/2025 9:27 pm TECHNIQUE: CTA of the chest was

## 2025-03-22 NOTE — ED NOTES
ED TO INPATIENT SBAR HANDOFF    Patient Name: Manfred Washington   Preferred Name: Manfred  : 1955  69 y.o.   Family/Caregiver Present: no   Code Status Order: Full Code  PO Status: NPO:No  Telemetry Order:   C-SSRS: Risk of Suicide: No Risk  Sitter no   Restraints:     Sepsis Risk Score      Situation  Chief Complaint   Patient presents with    Shortness of Breath     Shortness of breath for 7 days hx of COPD, lung cx, 82%on RA, 6L-90's stridor duoneb/albuterol en route. 60 solumedrol      Brief Description of Patient's Condition: stable  Mental Status: oriented, alert, coherent, logical, thought processes intact, and able to concentrate and follow conversation  Arrived from:Home  Imaging:   XR CHEST PORTABLE   Final Result   No acute process.         CT CHEST PULMONARY EMBOLISM W CONTRAST    (Results Pending)     Abnormal labs:   Abnormal Labs Reviewed   CBC WITH AUTO DIFFERENTIAL - Abnormal; Notable for the following components:       Result Value    WBC 19.9 (*)     Hemoglobin 12.6 (*)     Hematocrit 37.3 (*)     RDW 16.9 (*)     Platelets 547 (*)     Neutrophils Absolute 17.3 (*)     Lymphocytes Absolute 0.8 (*)     Monocytes Absolute 1.6 (*)     Anisocytosis 1+ (*)     Poikilocytes 2+ (*)     Schistocytes Occasional (*)     Acanthocytes Occasional (*)     Jon Cells 1+ (*)     Ovalocytes Occasional (*)     All other components within normal limits   COMPREHENSIVE METABOLIC PANEL - Abnormal; Notable for the following components:    Potassium 2.8 (*)     Glucose 136 (*)     Creatinine 0.7 (*)     All other components within normal limits    Narrative:     CALL  [facility] tel. [phone],  Chemistry results called to and read back by VINNY PETERS, 2025 19:58, by  VANESSA   TROPONIN - Abnormal; Notable for the following components:    Troponin, High Sensitivity 30 (*)     All other components within normal limits    Narrative:     CALL  [facility] tel. [phone],  Chemistry results called to and read back by RN

## 2025-03-22 NOTE — H&P
V2.0  History and Physical      Name:  Manfred Washington /Age/Sex: 1955  (69 y.o. male)   MRN & CSN:  [id number] & [id number] Encounter Date/Time: 3/21/2025 9:45 PM EDT   [facility] PCP: Suzette Oquendo APRN       Hospital Day: 1    Assessment and Plan:   Manfred Washington is a 69 y.o. male smoker with a pmh of COPD, hypertension, hyperlipidemia, TIA, GERD with Gong's esophagus, mood disorder who presents with COPD exacerbation (HCC).    Hospital Problems           Last Modified POA    * (Principal) COPD exacerbation (HCC) 3/21/2025 Yes    Acute respiratory failure with hypoxia (HCC) 3/21/2025 Yes    Hypokalemia 3/21/2025 Yes    Hyperglycemia 3/21/2025 Yes       Plan:  Empiric azithromycin, bronchodilators and steroids, check viral panel  Supplemental oxygen to keep sats between 90 and 94%, wean as tolerated  Replace potassium and follow-up  Check glycosylated hemoglobin and TSH  Resume home regimen for chronic stable conditions    Disposition:   Current Living situation: Home  Expected Disposition: Home  Estimated D/C: 2 - 3 days    Diet ADULT DIET; Regular; 4 carb choices (60 gm/meal); Low Fat/Low Chol/High Fiber/KUMAR   DVT Prophylaxis [x] Lovenox, []  Heparin, [] SCDs, [] Ambulation,  [] Eliquis, [] Xarelto, [] Coumadin   Code Status Full Code   Surrogate Decision Maker/ POA Wife     Personally reviewed Lab Studies and Imaging     Discussed management of the case with ED provider who recommended admission.    EKG interpreted personally and results normal sinus rhythm with a ventricular rate of 95, QTc of 374, no acute ischemic changes.    Imaging that was interpreted personally includes chest x-ray and results negative for any acute cardiopulmonary process.    Drugs that require monitoring for toxicity include none and the method of monitoring was n/a.        History from:     patient    History of Present Illness:     Chief Complaint: Shortness of breath and cough  Manfred Washington is a 69 y.o. male  Well-developed/thin, chronically ill-appearing but nontoxic in NAD  Eyes: EOMI, anicteric sclerae, no conjunctival pallor  ENT: Dry oral mucosa, neck supple, no bruit or thyromegaly  Cardiovascular: Regular rate and rhythm with normal S1-S2.  Respiratory: Very diminished breath sounds, rare expiratory wheezes, no rhonchi, tachypneic.    Gastrointestinal: Soft, non tender  Genitourinary: no suprapubic tenderness  Musculoskeletal: No edema  Skin: warm, dry  Neuro: Alert.  Psych: Mood appropriate.       Past Medical History:   PMHx   Past Medical History:   Diagnosis Date   • Arthritis    • Gong's esophagus    • Chronic back pain    • COPD (chronic obstructive pulmonary disease) ([facility])    • Depression    • GERD (gastroesophageal reflux disease)    • HYPERCHOLESTERAEMIA    • Hypertension    • Prinzmetal angina    • Spinal stenoses    • TIA (transient ischaemic attack)      PSHX:  has a past surgical history that includes joint replacement; Colonoscopy; back surgery; Splenectomy; Cholecystectomy; Appendectomy; Upper gastrointestinal endoscopy (5-14-10); Upper gastrointestinal endoscopy (11-27-12); Clavicle surgery; bronchoscopy (N/A, 12/30/2019); bronchoscopy (12/30/2019); Colonoscopy (N/A, 7/24/2020); Colonoscopy (7/24/2020); and Abdominal aortic aneurysm repair (N/A, 1/2/2024).  Allergies:   Allergies   Allergen Reactions   • Baclofen      Other reaction(s): Restlessness   • Hydromorphone      \"I start shaking\"       • Amitriptyline Other (See Comments)     crying   • Apap-Mag Salicylate-Caffeine    • Choline    • Codeine Nausea And Vomiting   • Demerol Nausea And Vomiting   • Diltiazem Hcl Other (See Comments)     crying   • Lansoprazole Other (See Comments)     crying   • Tricyclic Antidepressants Other (See Comments)     crying   • Trilisate [Choline Magnesium Trisalicylate] Other (See Comments)     crying     Fam HX: family history includes Heart Attack (age of onset: 41) in his brother; Heart Disease in his

## 2025-03-22 NOTE — PROGRESS NOTES
Physical Therapy  Facility/Department: [address] MED SURG  Physical Therapy Initial Assessment    Name: Manfred Washington  : 1955  MRN: [id number]  Date of Service: 3/22/2025    Discharge Recommendations:  Patient would benefit from continued therapy after discharge, Therapy recommended at discharge (continue to assess pending progression while in hospital)    Manfred Washington scored a  on the AM-PAC short mobility form. Current research shows that an AM-PAC score of 17 or less is typically not associated with a discharge to the patient's home setting. Based on the patient's AM-PAC score and their current functional mobility deficits, it is recommended that the patient have 3-5 sessions per week of Physical Therapy at d/c to increase the patient's independence.  Please see assessment section for further patient specific details.    If patient discharges prior to next session this note will serve as a discharge summary.  Please see below for the latest assessment towards goals.         Patient Diagnosis(es): The primary encounter diagnosis was Acute exacerbation of chronic obstructive pulmonary disease (COPD) (HCC). Diagnoses of Hypokalemia and Hypoxemia were also pertinent to this visit.  Past Medical History:  has a past medical history of Arthritis, Gong's esophagus, Chronic back pain, COPD (chronic obstructive pulmonary disease) (HCC), Depression, GERD (gastroesophageal reflux disease), HYPERCHOLESTERAEMIA, Hypertension, Prinzmetal angina, Spinal stenoses, and TIA (transient ischaemic attack).  Past Surgical History:  has a past surgical history that includes joint replacement; Colonoscopy; back surgery; Splenectomy; Cholecystectomy; Appendectomy; Upper gastrointestinal endoscopy (5-14-10); Upper gastrointestinal endoscopy (12); Clavicle surgery; bronchoscopy (N/A, 2019); bronchoscopy (2019); Colonoscopy (N/A, 2020); Colonoscopy (2020); and Abdominal aortic aneurysm repair (N/A,  Device  Assistance: Unable to assess  Quality of Gait: Due to safety concerns with change in presentation of status not safe to perform at this time. Recommend miladys sims for mobility into bathroom or chair due to current respiratory status     Balance  Posture: Fair  Sitting - Static: Good  Sitting - Dynamic: Good;-  Standing - Static: Fair;-  Standing - Dynamic: Fair;-  Comments: Sitting EOB with SBA some impulsive tendencies due to medical status at this time with elevated BP, SPO2% changed 7 L NC for calming due to anxious with 9 L at this time taking it off and refusing, with 7 L > 91 % throughout. RN present for assessment aware of status change.            AM-PAC - Mobility    AM-PAC Basic Mobility - Inpatient   How much help is needed turning from your back to your side while in a flat bed without using bedrails?: A Little  How much help is needed moving from lying on your back to sitting on the side of a flat bed without using bedrails?: A Little  How much help is needed moving to and from a bed to a chair?: Total  How much help is needed standing up from a chair using your arms?: A Little  How much help is needed walking in hospital room?: Total  How much help is needed climbing 3-5 steps with a railing?: Total  AM-PAC Inpatient Mobility Raw Score : 12  AM-PAC Inpatient T-Scale Score : 35.33  Mobility Inpatient CMS 0-100% Score: 68.66  Mobility Inpatient CMS G-Code Modifier : CL           Goals  Short Term Goals  Time Frame for Short Term Goals: Prior to discharge  Short Term Goal 1: Bed mobility mod I  Short Term Goal 2: Transfer sit <-> stand supervision  Short Term Goal 3: Pt will be able to ambulate 50ft with LRAD SBA  Short Term Goal 4: Pt will be able to ascend/descend stairs with assistance PRN for discharge  Long Term Goals  Time Frame for Long Term Goals : STG=LTG  Patient Goals   Patient Goals : unable to state       Education  Patient Education  Education Given To: Patient  Education Provided:

## 2025-03-22 NOTE — PROGRESS NOTES
Medication Reconciliation    List of medications patient is currently taking is complete.     Source of information: 1. Conversation with patient over the phone                                      2. EPIC records      Notes regarding home medications:   1. Pt is a poor historian, but [facility] so I used [facility] most recent list and he was able to firm medications from that most current list.    Lory Heath [facility], PharmD 3/22/2025 11:09 AM

## 2025-03-22 NOTE — PROGRESS NOTES
This AM while PT/OT was working with pt, pt tachypneic, diaphoretic and hypertensive- /93. Scheduled verapamil administered.     Pt also requesting additional home meds be added. Home meds reviewed with pt and secure message sent to MD requesting medications be added. MD called RN and requested RN reach out to pharmacy to add meds to MAR. Message sent to pharmacy, awaiting meds to be added.    BP recheck 108/64. Breathing unlabored, pt resting comfortably in bed with eyes shut.    Electronically signed by Lamar Jo RN on 3/22/2025 at 10:36 AM

## 2025-03-22 NOTE — PLAN OF CARE
Problem: Discharge Planning  Goal: Discharge to home or other facility with appropriate resources  Outcome: Progressing  Flowsheets (Taken 3/21/2025 2329)  Discharge to home or other facility with appropriate resources: Identify barriers to discharge with patient and caregiver     Problem: Respiratory - Adult  Goal: Achieves optimal ventilation and oxygenation  Outcome: Progressing  Flowsheets (Taken 3/21/2025 2329)  Achieves optimal ventilation and oxygenation: Assess for changes in respiratory status     Problem: Cardiovascular - Adult  Goal: Maintains optimal cardiac output and hemodynamic stability  Outcome: Progressing  Flowsheets (Taken 3/21/2025 2329)  Maintains optimal cardiac output and hemodynamic stability: Monitor blood pressure and heart rate     Problem: Skin/Tissue Integrity - Adult  Goal: Skin integrity remains intact  Outcome: Progressing  Flowsheets (Taken 3/21/2025 2329)  Skin Integrity Remains Intact: Monitor for areas of redness and/or skin breakdown     Problem: Infection - Adult  Goal: Absence of infection at discharge  Outcome: Progressing  Flowsheets (Taken 3/21/2025 2329)  Absence of infection at discharge: Assess and monitor for signs and symptoms of infection

## 2025-03-22 NOTE — PROGRESS NOTES
4 Eyes Skin Assessment     NAME:  [facility]  YOB: 1955  MEDICAL RECORD NUMBER:  [id number]    The patient is being assessed for  Admission    I agree that at least one RN has performed a thorough Head to Toe Skin Assessment on the patient. ALL assessment sites listed below have been assessed.      Areas assessed by both nurses:    Head, Face, Ears, Shoulders, Back, Chest, Arms, Elbows, Hands, Sacrum. Buttock, Coccyx, Ischium, Legs. Feet and Heels, and Under Medical Devices         Does the Patient have a Wound? No noted wound(s)       Cesar Prevention initiated by RN: No  Wound Care Orders initiated by RN: No    Pressure Injury (Stage 3,4, Unstageable, DTI, NWPT, and Complex wounds) if present, place Wound referral order by RN under : No    New Ostomies, if present place, Ostomy referral order under : No     Nurse 1 eSignature: Electronically signed by Fartun Thayer RN on 3/22/25 at 12:24 AM EDT    **SHARE this note so that the co-signing nurse can place an eSignature**    Nurse 2 eSignature: Electronically signed by Nancie Faustin RN on 3/22/25 at 12:25 AM EDT

## 2025-03-23 LAB
ANION GAP SERPL CALCULATED.3IONS-SCNC: 16 MMOL/L (ref 3–16)
BUN SERPL-MCNC: 26 MG/DL (ref 7–20)
CALCIUM SERPL-MCNC: 10 MG/DL (ref 8.3–10.6)
CHLORIDE SERPL-SCNC: 105 MMOL/L (ref 99–110)
CO2 SERPL-SCNC: 26 MMOL/L (ref 21–32)
CREAT SERPL-MCNC: 0.8 MG/DL (ref 0.8–1.3)
GFR SERPLBLD CREATININE-BSD FMLA CKD-EPI: >90 ML/MIN/{1.73_M2}
GLUCOSE SERPL-MCNC: 147 MG/DL (ref 70–99)
POTASSIUM SERPL-SCNC: 4 MMOL/L (ref 3.5–5.1)
SODIUM SERPL-SCNC: 147 MMOL/L (ref 136–145)

## 2025-03-23 PROCEDURE — 6360000002 HC RX W HCPCS: Performed by: INTERNAL MEDICINE

## 2025-03-23 PROCEDURE — 2500000003 HC RX 250 WO HCPCS: Performed by: INTERNAL MEDICINE

## 2025-03-23 PROCEDURE — 6370000000 HC RX 637 (ALT 250 FOR IP): Performed by: INTERNAL MEDICINE

## 2025-03-23 PROCEDURE — 6370000000 HC RX 637 (ALT 250 FOR IP): Performed by: HOSPITALIST

## 2025-03-23 PROCEDURE — 80048 BASIC METABOLIC PNL TOTAL CA: CPT

## 2025-03-23 PROCEDURE — 99233 SBSQ HOSP IP/OBS HIGH 50: CPT | Performed by: INTERNAL MEDICINE

## 2025-03-23 PROCEDURE — 94761 N-INVAS EAR/PLS OXIMETRY MLT: CPT

## 2025-03-23 PROCEDURE — 1200000000 HC SEMI PRIVATE

## 2025-03-23 PROCEDURE — 94640 AIRWAY INHALATION TREATMENT: CPT

## 2025-03-23 PROCEDURE — 2580000003 HC RX 258: Performed by: HOSPITALIST

## 2025-03-23 PROCEDURE — 6360000002 HC RX W HCPCS: Performed by: HOSPITALIST

## 2025-03-23 PROCEDURE — 2500000003 HC RX 250 WO HCPCS

## 2025-03-23 PROCEDURE — 2500000003 HC RX 250 WO HCPCS: Performed by: HOSPITALIST

## 2025-03-23 PROCEDURE — 2700000000 HC OXYGEN THERAPY PER DAY

## 2025-03-23 PROCEDURE — 36415 COLL VENOUS BLD VENIPUNCTURE: CPT

## 2025-03-23 PROCEDURE — 6360000002 HC RX W HCPCS

## 2025-03-23 RX ORDER — WATER 10 ML/10ML
INJECTION INTRAMUSCULAR; INTRAVENOUS; SUBCUTANEOUS
Status: COMPLETED
Start: 2025-03-23 | End: 2025-03-23

## 2025-03-23 RX ORDER — POTASSIUM CHLORIDE 1500 MG/1
40 TABLET, EXTENDED RELEASE ORAL PRN
Status: DISCONTINUED | OUTPATIENT
Start: 2025-03-23 | End: 2025-03-25 | Stop reason: HOSPADM

## 2025-03-23 RX ORDER — IPRATROPIUM BROMIDE AND ALBUTEROL SULFATE 2.5; .5 MG/3ML; MG/3ML
1 SOLUTION RESPIRATORY (INHALATION)
Status: DISCONTINUED | OUTPATIENT
Start: 2025-03-23 | End: 2025-03-25 | Stop reason: HOSPADM

## 2025-03-23 RX ORDER — GABAPENTIN 300 MG/1
600 CAPSULE ORAL 4 TIMES DAILY
Status: DISCONTINUED | OUTPATIENT
Start: 2025-03-23 | End: 2025-03-25 | Stop reason: HOSPADM

## 2025-03-23 RX ORDER — HYDROXYZINE HYDROCHLORIDE 25 MG/1
25 TABLET, FILM COATED ORAL NIGHTLY PRN
Status: DISCONTINUED | OUTPATIENT
Start: 2025-03-23 | End: 2025-03-25 | Stop reason: HOSPADM

## 2025-03-23 RX ORDER — OLANZAPINE 10 MG/2ML
5 INJECTION, POWDER, FOR SOLUTION INTRAMUSCULAR ONCE
Status: COMPLETED | OUTPATIENT
Start: 2025-03-23 | End: 2025-03-23

## 2025-03-23 RX ORDER — DULOXETIN HYDROCHLORIDE 60 MG/1
60 CAPSULE, DELAYED RELEASE ORAL 2 TIMES DAILY
Status: DISCONTINUED | OUTPATIENT
Start: 2025-03-23 | End: 2025-03-25

## 2025-03-23 RX ORDER — POTASSIUM CHLORIDE 7.45 MG/ML
10 INJECTION INTRAVENOUS PRN
Status: DISCONTINUED | OUTPATIENT
Start: 2025-03-23 | End: 2025-03-25 | Stop reason: HOSPADM

## 2025-03-23 RX ORDER — PANTOPRAZOLE SODIUM 20 MG/1
20 TABLET, DELAYED RELEASE ORAL DAILY
Status: DISCONTINUED | OUTPATIENT
Start: 2025-03-24 | End: 2025-03-25 | Stop reason: HOSPADM

## 2025-03-23 RX ORDER — AZELASTINE 1 MG/ML
2 SPRAY, METERED NASAL 2 TIMES DAILY
Status: DISCONTINUED | OUTPATIENT
Start: 2025-03-23 | End: 2025-03-23 | Stop reason: RX

## 2025-03-23 RX ORDER — OXYCODONE AND ACETAMINOPHEN 5; 325 MG/1; MG/1
1 TABLET ORAL EVERY 4 HOURS PRN
Refills: 0 | Status: DISCONTINUED | OUTPATIENT
Start: 2025-03-23 | End: 2025-03-25 | Stop reason: HOSPADM

## 2025-03-23 RX ADMIN — WATER 40 MG: 1 INJECTION INTRAMUSCULAR; INTRAVENOUS; SUBCUTANEOUS at 20:18

## 2025-03-23 RX ADMIN — GABAPENTIN 600 MG: 300 CAPSULE ORAL at 20:16

## 2025-03-23 RX ADMIN — IPRATROPIUM BROMIDE AND ALBUTEROL SULFATE 1 DOSE: .5; 3 SOLUTION RESPIRATORY (INHALATION) at 16:08

## 2025-03-23 RX ADMIN — TIZANIDINE 4 MG: 4 TABLET ORAL at 14:43

## 2025-03-23 RX ADMIN — OLANZAPINE 5 MG: 10 INJECTION, POWDER, FOR SOLUTION INTRAMUSCULAR at 03:13

## 2025-03-23 RX ADMIN — IPRATROPIUM BROMIDE AND ALBUTEROL SULFATE 1 DOSE: .5; 3 SOLUTION RESPIRATORY (INHALATION) at 07:51

## 2025-03-23 RX ADMIN — GABAPENTIN 600 MG: 300 CAPSULE ORAL at 09:42

## 2025-03-23 RX ADMIN — PANTOPRAZOLE SODIUM 20 MG: 20 TABLET, DELAYED RELEASE ORAL at 05:55

## 2025-03-23 RX ADMIN — SODIUM CHLORIDE, PRESERVATIVE FREE 10 ML: 5 INJECTION INTRAVENOUS at 20:17

## 2025-03-23 RX ADMIN — IPRATROPIUM BROMIDE AND ALBUTEROL SULFATE 1 DOSE: .5; 3 SOLUTION RESPIRATORY (INHALATION) at 02:16

## 2025-03-23 RX ADMIN — Medication 2 PUFF: at 07:51

## 2025-03-23 RX ADMIN — DULOXETINE HYDROCHLORIDE 60 MG: 60 CAPSULE, DELAYED RELEASE ORAL at 09:43

## 2025-03-23 RX ADMIN — GABAPENTIN 600 MG: 300 CAPSULE ORAL at 17:22

## 2025-03-23 RX ADMIN — WATER: 1 INJECTION INTRAMUSCULAR; INTRAVENOUS; SUBCUTANEOUS at 03:13

## 2025-03-23 RX ADMIN — SODIUM CHLORIDE, PRESERVATIVE FREE 10 ML: 5 INJECTION INTRAVENOUS at 09:48

## 2025-03-23 RX ADMIN — OXYCODONE AND ACETAMINOPHEN 1 TABLET: 325; 5 TABLET ORAL at 19:34

## 2025-03-23 RX ADMIN — OXYCODONE AND ACETAMINOPHEN 1 TABLET: 325; 5 TABLET ORAL at 15:30

## 2025-03-23 RX ADMIN — WATER 40 MG: 1 INJECTION INTRAMUSCULAR; INTRAVENOUS; SUBCUTANEOUS at 04:41

## 2025-03-23 RX ADMIN — AZITHROMYCIN MONOHYDRATE 500 MG: 500 INJECTION, POWDER, LYOPHILIZED, FOR SOLUTION INTRAVENOUS at 15:41

## 2025-03-23 RX ADMIN — VERAPAMIL HYDROCHLORIDE 120 MG: 240 TABLET, FILM COATED, EXTENDED RELEASE ORAL at 09:43

## 2025-03-23 RX ADMIN — DULOXETINE HYDROCHLORIDE 60 MG: 60 CAPSULE, DELAYED RELEASE ORAL at 20:16

## 2025-03-23 RX ADMIN — TIZANIDINE 4 MG: 4 TABLET ORAL at 20:16

## 2025-03-23 RX ADMIN — ENOXAPARIN SODIUM 40 MG: 100 INJECTION SUBCUTANEOUS at 09:45

## 2025-03-23 RX ADMIN — TRAZODONE HYDROCHLORIDE 50 MG: 50 TABLET ORAL at 20:16

## 2025-03-23 RX ADMIN — HYDROXYZINE HYDROCHLORIDE 25 MG: 25 TABLET, FILM COATED ORAL at 20:16

## 2025-03-23 RX ADMIN — WATER 1000 MG: 1 INJECTION INTRAMUSCULAR; INTRAVENOUS; SUBCUTANEOUS at 15:11

## 2025-03-23 RX ADMIN — GABAPENTIN 600 MG: 300 CAPSULE ORAL at 12:22

## 2025-03-23 RX ADMIN — ALBUTEROL SULFATE 2.5 MG: 0.83 SOLUTION RESPIRATORY (INHALATION) at 02:16

## 2025-03-23 RX ADMIN — WATER 40 MG: 1 INJECTION INTRAMUSCULAR; INTRAVENOUS; SUBCUTANEOUS at 11:53

## 2025-03-23 RX ADMIN — IPRATROPIUM BROMIDE AND ALBUTEROL SULFATE 1 DOSE: .5; 3 SOLUTION RESPIRATORY (INHALATION) at 11:59

## 2025-03-23 RX ADMIN — Medication 2 PUFF: at 20:09

## 2025-03-23 RX ADMIN — IPRATROPIUM BROMIDE AND ALBUTEROL SULFATE 1 DOSE: .5; 3 SOLUTION RESPIRATORY (INHALATION) at 20:08

## 2025-03-23 ASSESSMENT — PAIN DESCRIPTION - LOCATION
LOCATION: BACK
LOCATION: BACK
LOCATION: BACK;SACRUM

## 2025-03-23 ASSESSMENT — PAIN SCALES - GENERAL
PAINLEVEL_OUTOF10: 6
PAINLEVEL_OUTOF10: 9
PAINLEVEL_OUTOF10: 10

## 2025-03-23 ASSESSMENT — PAIN DESCRIPTION - ORIENTATION: ORIENTATION: INNER

## 2025-03-23 ASSESSMENT — PAIN DESCRIPTION - DESCRIPTORS: DESCRIPTORS: ACHING;DISCOMFORT

## 2025-03-23 NOTE — PROGRESS NOTES
D: pt is asking for a muscle relaxant. pt says that he takes tizanidine at home, 4-6 times per day for back spasms. (not on home medication list)  pt is in pain right now, getting anxious and irritable. only tylenol ordered prn. pt gets so anxious after urinating as he overexerts himself and then displays these behaviours, \"Get me ice! put ice on my head! I need my inhaler! Please give me pain medication!\" I think that pt will benefit from prn pain medication/anxiety medication in order to help him relax and breathe easier. please advise. A: this RN sent secure message to Dr. Erazo with communication/assessment findings R: will continue to monitor pt

## 2025-03-23 NOTE — PROGRESS NOTES
Pt's wife came to nursing station with concerns over Pt's potential response to her taking a bottle of medicine with mixed meds in it home that the Pt brought in with him. This RN was informed in report  by off going RN that Pt did have a bottle of home meds and that the family was going to take them home. Informed the wife that either she take them home or we would have to send them to pharmacy. Medication taken home by wife

## 2025-03-23 NOTE — PROGRESS NOTES
V2.0    [facility] Progress Note      Name:  Manfred Washington /Age/Sex: 1955  (69 y.o. male)   MRN & CSN:  [id number] & [id number] Encounter Date/Time: 3/23/2025 12:54 PM EDT   Location:  [address] PCP: Suzette Oquendo APRN     Attending:Francisco J Erazo MD       Hospital Day: 3    Assessment and Recommendations   Manfred Washington is a 69 y.o. male with pmh of  who presents with Acute exacerbation of chronic obstructive pulmonary disease (COPD) ([facility])          Telemetry monitering  Oxygen  Duonebs   Steroids : IV solumedrol  Antibiotics :  add IV rocephin and zithromax   Moniter O2 sat    Pulmonology consulted      DVT ppx  : lovenox    Diet : regular diet    Code status  : full code    Advanced care planning was discussed with patient for a total of 16 minutes.  Full code, DNR CC, DNR CCA, power of  and living will were discussed.  Patient elected to be a full code.       Diet ADULT DIET; Regular; 4 carb choices (60 gm/meal); Low Fat/Low Chol/High Fiber/KMUAR   DVT Prophylaxis [] Lovenox, []  Heparin, [] SCDs, [] Ambulation,  [] Eliquis, [] Xarelto  [] Coumadin   Code Status Full Code   Disposition From:  Expected Disposition:   Estimated Date of Discharge:   Patient requires continued admission due to    Surrogate Decision Maker/ POA         Personally reviewed Lab Studies and Imaging           Telemetry reviewed by myself no ST elevation           Drugs that require monitoring for toxicity include steroids and the method of monitoring was blood sugar     Medical Decision Making:  The following items were considered in medical decision making:  Discussion of patient care with other providers  Reviewed clinical lab tests  Reviewed radiology tests  Reviewed other diagnostic tests/interventions  Independent review of radiologic images  Microbiology cultures and other micro tests reviewed            Subjective:     Chief Complaint:     Manfred Washington is a 69 y.o. male who presents with       Patient O2  diameter since at least December 2023. Long-term stability is favored to represent a benign etiology. 4. Emphysema.     XR CHEST PORTABLE  Result Date: 3/21/2025  EXAMINATION: ONE XRAY VIEW OF THE CHEST 3/21/2025 8:08 pm COMPARISON: Chest radiograph and CT chest angiogram 11/18/2024. HISTORY: ORDERING SYSTEM PROVIDED HISTORY: SOB TECHNOLOGIST PROVIDED HISTORY: Reason for exam:->SOB Reason for Exam: Shortness of Breath FINDINGS: The cardiomediastinal silhouette is within normal limits.  No pneumothorax, vascular congestion, consolidation, or pleural effusion is identified.  No acute osseous abnormality.     No acute process.       CBC:   Recent Labs     03/21/25 1916 03/22/25  0545   WBC 19.9* 18.8*   HGB 12.6* 12.8*   * 548*     BMP:    Recent Labs     03/21/25 1916 03/22/25  0545 03/23/25  0827    140 147*   K 2.8* 3.2* 4.0   CL 99 102 105   CO2 26 26 26   BUN 10 12 26*   CREATININE 0.7* 0.7* 0.8   GLUCOSE 136* 202* 147*     Hepatic:   Recent Labs     03/21/25 1916   AST 19   ALT 14   BILITOT 0.5   ALKPHOS 97     Lipids:   Lab Results   Component Value Date/Time    CHOL 144 03/04/2015 11:49 AM    HDL 56 03/04/2015 11:49 AM    HDL 51 05/04/2012 04:57 PM    TRIG 67 03/04/2015 11:49 AM     Hemoglobin A1C:   Lab Results   Component Value Date/Time    LABA1C 5.6 03/22/2025 05:45 AM     TSH:   Lab Results   Component Value Date/Time    TSH 0.26 03/22/2025 05:45 AM    TSH 0.58 09/22/2020 09:58 AM     Troponin: No results found for: \"TROPONINT\"  Lactic Acid:   Recent Labs     03/22/25  0040   LACTA 1.7     BNP:   Recent Labs     03/21/25 1916   PROBNP 2,347*     UA:  Lab Results   Component Value Date/Time    NITRU Negative 01/04/2024 10:10 AM    COLORU Yellow 01/04/2024 10:10 AM    PHUR 6.0 01/04/2024 10:10 AM    LABCAST 10-20 Hyaline 09/20/2015 09:22 PM    WBCUA 0-2 01/04/2024 10:10 AM    RBCUA 3-4 01/04/2024 10:10 AM    BACTERIA None Seen 01/01/2024 06:55 PM    CLARITYU Clear 01/04/2024 10:10 AM

## 2025-03-23 NOTE — PLAN OF CARE
Problem: Discharge Planning  Goal: Discharge to home or other facility with appropriate resources  3/23/2025 1119 by Suzie Villalba, RN  Outcome: Progressing

## 2025-03-23 NOTE — RT PROTOCOL NOTE
RT Inhaler-Nebulizer Bronchodilator Protocol Note    There is a bronchodilator order in the chart from a provider indicating to follow the RT Bronchodilator Protocol and there is an “Initiate RT Inhaler-Nebulizer Bronchodilator Protocol” order as well (see protocol at bottom of note).    CXR Findings:  XR CHEST PORTABLE  Result Date: 3/21/2025  No acute process.       The findings from the last RT Protocol Assessment were as follows:   History Pulmonary Disease: Chronic pulmonary disease  Respiratory Pattern: Mild dyspnea at rest, irregular pattern, or RR 21-25 bpm  Breath Sounds: Inspiratory and expiratory or bilateral wheezing and/or rhonchi  Cough: Strong, spontaneous, non-productive  Indication for Bronchodilator Therapy: Wheezing associated with pulm disorder  Bronchodilator Assessment Score: 12    Aerosolized bronchodilator medication orders have been revised according to the RT Inhaler-Nebulizer Bronchodilator Protocol below.    Respiratory Therapist to perform RT Therapy Protocol Assessment initially then follow the protocol.  Repeat RT Therapy Protocol Assessment PRN for score 0-3 or on second treatment, BID, and PRN for scores above 3.    No Indications - adjust the frequency to every 6 hours PRN wheezing or bronchospasm, if no treatments needed after 48 hours then discontinue using Per Protocol order mode.     If indication present, adjust the RT bronchodilator orders based on the Bronchodilator Assessment Score as indicated below.  Use Inhaler orders unless patient has one or more of the following: on home nebulizer, not able to hold breath for 10 seconds, is not alert and oriented, cannot activate and use MDI correctly, or respiratory rate 25 breaths per minute or more, then use the equivalent nebulizer order(s) with same Frequency and PRN reasons based on the score.  If a patient is on this medication at home then do not decrease Frequency below that used at home.    0-3 - enter or revise RT

## 2025-03-23 NOTE — PROGRESS NOTES
Called to patient bedside, upon entry patient was very SOB, tachypneic, breathing treatment given, due to severity of the patient, another treatment was given, patient was able to calm down after RN came back into the room. 0400 treatment was not given due to (patient resting per RN). RN advised to contact RT if patient needed another treatment.

## 2025-03-23 NOTE — PROGRESS NOTES
Pulmonary Progress Note    CC:  Follow up COPD    Subjective:    7 liters of oxygen  Still SOB at times    ROS  Cough with sob    Intake/Output Summary (Last 24 hours) at 3/23/2025 0722  Last data filed at 3/22/2025 1909  Gross per 24 hour   Intake 360 ml   Output 350 ml   Net 10 ml         PHYSICAL EXAM:  Blood pressure 139/83, pulse 96, temperature 97.7 °F (36.5 °C), temperature source Axillary, resp. rate 20, height 1.753 m (5' 9\"), weight 72.3 kg (159 lb 6.3 oz), SpO2 100%.'  Gen: Chronically ill appearing   Eyes: PERRL. No sclera icterus. No conjunctival injection.   ENT: No discharge. Pharynx clear. External appearance of ears and nose normal.  Neck: Trachea midline. No obvious mass.    Resp: barrel chested, scattered wheezing   CV: Regular rate. Regular rhythm. No murmur or rub.    GI: Non-tender. Non-distended. No hernia.   Skin: Warm, dry, normal texture and turgor. No nodule on exposed extremities.   Lymph: No cervical LAD. No supraclavicular LAD.   M/S: No cyanosis. No clubbing. No joint deformity.    Neuro: Moves all four extremities. CN 2-12 tested, no defect noted.  Ext:   no edema    Medications:    Scheduled Meds:   ipratropium 0.5 mg-albuterol 2.5 mg  1 Dose Inhalation Q4H RT    budesonide-formoterol  2 puff Inhalation BID RT    methylPREDNISolone sodium succ (SOLU-MEDROL) 40 mg in sterile water 1 mL injection  40 mg IntraVENous Q8H    DULoxetine  60 mg Oral BID    gabapentin  600 mg Oral 4x daily    pantoprazole  20 mg Oral QAM AC    sodium chloride flush  5-40 mL IntraVENous 2 times per day    enoxaparin  40 mg SubCUTAneous Daily    azithromycin  500 mg Oral Nightly    verapamil  120 mg Oral Daily       Continuous Infusions:   sodium chloride         PRN Meds:  sodium chloride flush, sodium chloride, ondansetron **OR** ondansetron, polyethylene glycol, acetaminophen **OR** acetaminophen, albuterol, guaiFENesin-dextromethorphan, traZODone    Labs:  CBC:   Recent Labs     03/21/25  1916

## 2025-03-23 NOTE — PROGRESS NOTES
Pt has had 2 panic episodes , tachypneic, diaphoretic, agitated . O2 was increased to 7 L per Hi Flow  from 4L and cold compressions were applied.. Restless, complaining of being hot and cold. . Respiratory Therapy was notified and breathing treatment was given as well as order changed to increase administration frequency. Respirations are labored at rest and when he least exerts himself is when these panic attacks occur. Message sent to MARÍA Vásquez. An order was given for 5 mg Zypexa IM. Medication given as ordered

## 2025-03-23 NOTE — PROGRESS NOTES
Pt rested well after receiving Zyprexa up until now. Exhibiting the same behavior as previously documented this shift. Taking oxygen out of his nose and trying to use in such a manner as a rescue breathing treatment. Redirected by staff but has been resistant. Insisting on sitting on side of bed .

## 2025-03-23 NOTE — PLAN OF CARE
Problem: Discharge Planning  Goal: Discharge to home or other facility with appropriate resources  3/23/2025 0130 by Jose Elias Bro RN  Outcome: Progressing  Flowsheets (Taken 3/22/2025 1947)  Discharge to home or other facility with appropriate resources:   Identify barriers to discharge with patient and caregiver   Arrange for needed discharge resources and transportation as appropriate   Identify discharge learning needs (meds, wound care, etc)  3/22/2025 1136 by Lamar Jo RN  Outcome: Progressing     Problem: Respiratory - Adult  Goal: Achieves optimal ventilation and oxygenation  3/23/2025 0130 by Jose Elias Bro RN  Outcome: Progressing  3/22/2025 1136 by Lamar Jo RN  Outcome: Progressing     Problem: Cardiovascular - Adult  Goal: Maintains optimal cardiac output and hemodynamic stability  3/23/2025 0130 by Jose Elias Bro RN  Outcome: Progressing  Flowsheets (Taken 3/22/2025 1947)  Maintains optimal cardiac output and hemodynamic stability:   Monitor blood pressure and heart rate   Monitor urine output and notify Licensed Independent Practitioner for values outside of normal range   Assess for signs of decreased cardiac output  3/22/2025 1136 by Lamar Jo RN  Outcome: Progressing     Problem: Skin/Tissue Integrity - Adult  Goal: Skin integrity remains intact  Description: 1.  Monitor for areas of redness and/or skin breakdown  2.  Assess vascular access sites hourly  3.  Every 4-6 hours minimum:  Change oxygen saturation probe site  4.  Every 4-6 hours:  If on nasal continuous positive airway pressure, respiratory therapy assess nares and determine need for appliance change or resting period  3/23/2025 0130 by Jose Elias Bro RN  Outcome: Progressing  Flowsheets  Taken 3/23/2025 0129  Skin Integrity Remains Intact: Monitor for areas of redness and/or skin breakdown  Taken 3/22/2025 1947  Skin Integrity Remains Intact: Monitor for areas of redness and/or skin

## 2025-03-24 PROBLEM — F34.9 PERSISTENT MOOD (AFFECTIVE) DISORDER, UNSPECIFIED: Status: ACTIVE | Noted: 2024-01-04

## 2025-03-24 PROCEDURE — 2500000003 HC RX 250 WO HCPCS: Performed by: INTERNAL MEDICINE

## 2025-03-24 PROCEDURE — 97530 THERAPEUTIC ACTIVITIES: CPT

## 2025-03-24 PROCEDURE — 6360000002 HC RX W HCPCS: Performed by: INTERNAL MEDICINE

## 2025-03-24 PROCEDURE — 6370000000 HC RX 637 (ALT 250 FOR IP): Performed by: INTERNAL MEDICINE

## 2025-03-24 PROCEDURE — 94761 N-INVAS EAR/PLS OXIMETRY MLT: CPT

## 2025-03-24 PROCEDURE — 93005 ELECTROCARDIOGRAM TRACING: CPT | Performed by: HOSPITALIST

## 2025-03-24 PROCEDURE — 2500000003 HC RX 250 WO HCPCS: Performed by: HOSPITALIST

## 2025-03-24 PROCEDURE — 6370000000 HC RX 637 (ALT 250 FOR IP): Performed by: HOSPITALIST

## 2025-03-24 PROCEDURE — 99233 SBSQ HOSP IP/OBS HIGH 50: CPT | Performed by: INTERNAL MEDICINE

## 2025-03-24 PROCEDURE — 6360000002 HC RX W HCPCS: Performed by: HOSPITALIST

## 2025-03-24 PROCEDURE — 94640 AIRWAY INHALATION TREATMENT: CPT

## 2025-03-24 PROCEDURE — 1200000000 HC SEMI PRIVATE

## 2025-03-24 PROCEDURE — 2580000003 HC RX 258: Performed by: HOSPITALIST

## 2025-03-24 PROCEDURE — 2700000000 HC OXYGEN THERAPY PER DAY

## 2025-03-24 RX ORDER — THEOPHYLLINE 400 MG/1
400 TABLET, EXTENDED RELEASE ORAL DAILY
Status: DISCONTINUED | OUTPATIENT
Start: 2025-03-24 | End: 2025-03-25 | Stop reason: HOSPADM

## 2025-03-24 RX ORDER — HYDRALAZINE HYDROCHLORIDE 20 MG/ML
10 INJECTION INTRAMUSCULAR; INTRAVENOUS EVERY 4 HOURS PRN
Status: DISCONTINUED | OUTPATIENT
Start: 2025-03-24 | End: 2025-03-25 | Stop reason: HOSPADM

## 2025-03-24 RX ADMIN — IPRATROPIUM BROMIDE AND ALBUTEROL SULFATE 1 DOSE: .5; 3 SOLUTION RESPIRATORY (INHALATION) at 04:53

## 2025-03-24 RX ADMIN — SODIUM CHLORIDE, PRESERVATIVE FREE 10 ML: 5 INJECTION INTRAVENOUS at 04:31

## 2025-03-24 RX ADMIN — TIZANIDINE 4 MG: 4 TABLET ORAL at 04:24

## 2025-03-24 RX ADMIN — IPRATROPIUM BROMIDE AND ALBUTEROL SULFATE 1 DOSE: .5; 3 SOLUTION RESPIRATORY (INHALATION) at 07:26

## 2025-03-24 RX ADMIN — GABAPENTIN 600 MG: 300 CAPSULE ORAL at 20:10

## 2025-03-24 RX ADMIN — DULOXETINE HYDROCHLORIDE 60 MG: 60 CAPSULE, DELAYED RELEASE ORAL at 08:29

## 2025-03-24 RX ADMIN — IPRATROPIUM BROMIDE AND ALBUTEROL SULFATE 1 DOSE: .5; 3 SOLUTION RESPIRATORY (INHALATION) at 16:01

## 2025-03-24 RX ADMIN — PANTOPRAZOLE SODIUM 20 MG: 20 TABLET, DELAYED RELEASE ORAL at 08:29

## 2025-03-24 RX ADMIN — WATER 1000 MG: 1 INJECTION INTRAMUSCULAR; INTRAVENOUS; SUBCUTANEOUS at 11:15

## 2025-03-24 RX ADMIN — OXYCODONE AND ACETAMINOPHEN 1 TABLET: 325; 5 TABLET ORAL at 08:29

## 2025-03-24 RX ADMIN — IPRATROPIUM BROMIDE AND ALBUTEROL SULFATE 1 DOSE: .5; 3 SOLUTION RESPIRATORY (INHALATION) at 11:39

## 2025-03-24 RX ADMIN — WATER 40 MG: 1 INJECTION INTRAMUSCULAR; INTRAVENOUS; SUBCUTANEOUS at 04:30

## 2025-03-24 RX ADMIN — Medication 2 PUFF: at 07:26

## 2025-03-24 RX ADMIN — TIZANIDINE 4 MG: 4 TABLET ORAL at 11:14

## 2025-03-24 RX ADMIN — OXYCODONE AND ACETAMINOPHEN 1 TABLET: 325; 5 TABLET ORAL at 23:22

## 2025-03-24 RX ADMIN — OXYCODONE AND ACETAMINOPHEN 1 TABLET: 325; 5 TABLET ORAL at 12:30

## 2025-03-24 RX ADMIN — OXYCODONE AND ACETAMINOPHEN 1 TABLET: 325; 5 TABLET ORAL at 16:33

## 2025-03-24 RX ADMIN — OXYCODONE AND ACETAMINOPHEN 1 TABLET: 325; 5 TABLET ORAL at 04:20

## 2025-03-24 RX ADMIN — IPRATROPIUM BROMIDE AND ALBUTEROL SULFATE 1 DOSE: .5; 3 SOLUTION RESPIRATORY (INHALATION) at 23:29

## 2025-03-24 RX ADMIN — DULOXETINE HYDROCHLORIDE 60 MG: 60 CAPSULE, DELAYED RELEASE ORAL at 20:10

## 2025-03-24 RX ADMIN — THEOPHYLLINE 400 MG: 400 TABLET, EXTENDED RELEASE ORAL at 11:14

## 2025-03-24 RX ADMIN — IPRATROPIUM BROMIDE AND ALBUTEROL SULFATE 1 DOSE: .5; 3 SOLUTION RESPIRATORY (INHALATION) at 00:12

## 2025-03-24 RX ADMIN — VERAPAMIL HYDROCHLORIDE 120 MG: 240 TABLET, FILM COATED, EXTENDED RELEASE ORAL at 08:29

## 2025-03-24 RX ADMIN — AZITHROMYCIN MONOHYDRATE 500 MG: 500 INJECTION, POWDER, LYOPHILIZED, FOR SOLUTION INTRAVENOUS at 11:17

## 2025-03-24 RX ADMIN — Medication 2 PUFF: at 19:56

## 2025-03-24 RX ADMIN — SODIUM CHLORIDE, PRESERVATIVE FREE 10 ML: 5 INJECTION INTRAVENOUS at 20:11

## 2025-03-24 RX ADMIN — ENOXAPARIN SODIUM 40 MG: 100 INJECTION SUBCUTANEOUS at 08:32

## 2025-03-24 RX ADMIN — GABAPENTIN 600 MG: 300 CAPSULE ORAL at 08:29

## 2025-03-24 RX ADMIN — TRAZODONE HYDROCHLORIDE 50 MG: 50 TABLET ORAL at 20:10

## 2025-03-24 RX ADMIN — WATER 40 MG: 1 INJECTION INTRAMUSCULAR; INTRAVENOUS; SUBCUTANEOUS at 11:15

## 2025-03-24 RX ADMIN — SODIUM CHLORIDE, PRESERVATIVE FREE 10 ML: 5 INJECTION INTRAVENOUS at 08:33

## 2025-03-24 RX ADMIN — GABAPENTIN 600 MG: 300 CAPSULE ORAL at 12:30

## 2025-03-24 RX ADMIN — IPRATROPIUM BROMIDE AND ALBUTEROL SULFATE 1 DOSE: .5; 3 SOLUTION RESPIRATORY (INHALATION) at 19:56

## 2025-03-24 RX ADMIN — GABAPENTIN 600 MG: 300 CAPSULE ORAL at 16:34

## 2025-03-24 RX ADMIN — TIZANIDINE 4 MG: 4 TABLET ORAL at 20:10

## 2025-03-24 RX ADMIN — WATER 40 MG: 1 INJECTION INTRAMUSCULAR; INTRAVENOUS; SUBCUTANEOUS at 20:10

## 2025-03-24 RX ADMIN — HYDROXYZINE HYDROCHLORIDE 25 MG: 25 TABLET, FILM COATED ORAL at 23:22

## 2025-03-24 ASSESSMENT — PAIN DESCRIPTION - DESCRIPTORS
DESCRIPTORS: ACHING
DESCRIPTORS: DISCOMFORT
DESCRIPTORS: ACHING
DESCRIPTORS: ACHING
DESCRIPTORS: SPASM
DESCRIPTORS: ACHING

## 2025-03-24 ASSESSMENT — PAIN DESCRIPTION - ORIENTATION
ORIENTATION: INNER
ORIENTATION: MID

## 2025-03-24 ASSESSMENT — PAIN SCALES - GENERAL
PAINLEVEL_OUTOF10: 4
PAINLEVEL_OUTOF10: 7
PAINLEVEL_OUTOF10: 6
PAINLEVEL_OUTOF10: 7
PAINLEVEL_OUTOF10: 4
PAINLEVEL_OUTOF10: 10
PAINLEVEL_OUTOF10: 5
PAINLEVEL_OUTOF10: 9
PAINLEVEL_OUTOF10: 8
PAINLEVEL_OUTOF10: 3
PAINLEVEL_OUTOF10: 8
PAINLEVEL_OUTOF10: 4
PAINLEVEL_OUTOF10: 5
PAINLEVEL_OUTOF10: 4

## 2025-03-24 ASSESSMENT — PAIN DESCRIPTION - LOCATION
LOCATION: BACK
LOCATION: GENERALIZED

## 2025-03-24 ASSESSMENT — PAIN - FUNCTIONAL ASSESSMENT
PAIN_FUNCTIONAL_ASSESSMENT: ACTIVITIES ARE NOT PREVENTED

## 2025-03-24 ASSESSMENT — PAIN SCALES - WONG BAKER: WONGBAKER_NUMERICALRESPONSE: NO HURT

## 2025-03-24 NOTE — PLAN OF CARE
Problem: Discharge Planning  Goal: Discharge to home or other facility with appropriate resources  3/23/2025 2130 by Fartun Thayer RN  Outcome: Progressing  Flowsheets (Taken 3/23/2025 2028)  Discharge to home or other facility with appropriate resources: Identify barriers to discharge with patient and caregiver  3/23/2025 1119 by Suzie Villalba RN  Outcome: Progressing     Problem: Respiratory - Adult  Goal: Achieves optimal ventilation and oxygenation  3/23/2025 2130 by Fartun Thayer RN  Outcome: Progressing  Flowsheets (Taken 3/23/2025 2028)  Achieves optimal ventilation and oxygenation: Assess for changes in respiratory status  3/23/2025 1119 by Suzie Villalba RN  Outcome: Progressing     Problem: Cardiovascular - Adult  Goal: Maintains optimal cardiac output and hemodynamic stability  3/23/2025 2130 by Fartun Thayer RN  Outcome: Progressing  Flowsheets (Taken 3/23/2025 2028)  Maintains optimal cardiac output and hemodynamic stability: Monitor blood pressure and heart rate  3/23/2025 1119 by Suzie Villalba RN  Outcome: Progressing     Problem: Skin/Tissue Integrity - Adult  Goal: Skin integrity remains intact  Description: 1.  Monitor for areas of redness and/or skin breakdown  2.  Assess vascular access sites hourly  3.  Every 4-6 hours minimum:  Change oxygen saturation probe site  4.  Every 4-6 hours:  If on nasal continuous positive airway pressure, respiratory therapy assess nares and determine need for appliance change or resting period  3/23/2025 2130 by Fartun Thayer RN  Outcome: Progressing  Flowsheets (Taken 3/23/2025 2028)  Skin Integrity Remains Intact: Monitor for areas of redness and/or skin breakdown  3/23/2025 1119 by Suzie Villalba RN  Outcome: Progressing     Problem: Infection - Adult  Goal: Absence of infection at discharge  3/23/2025 2130 by Fartun Thayer RN  Outcome: Progressing  Flowsheets (Taken 3/23/2025 2028)  Absence of infection at discharge: Assess and  monitor for signs and symptoms of infection  3/23/2025 1119 by Suzie Villalba RN  Outcome: Progressing     Problem: Safety - Adult  Goal: Free from fall injury  3/23/2025 2130 by Fartun Thayer RN  Outcome: Progressing  3/23/2025 1119 by Suzie Villalba RN  Outcome: Progressing     Problem: Skin/Tissue Integrity  Goal: Skin integrity remains intact  Description: 1.  Monitor for areas of redness and/or skin breakdown  2.  Assess vascular access sites hourly  3.  Every 4-6 hours minimum:  Change oxygen saturation probe site  4.  Every 4-6 hours:  If on nasal continuous positive airway pressure, respiratory therapy assess nares and determine need for appliance change or resting period  3/23/2025 2130 by Fartun Thayer RN  Outcome: Progressing  Flowsheets (Taken 3/23/2025 2028)  Skin Integrity Remains Intact: Monitor for areas of redness and/or skin breakdown  3/23/2025 1119 by Suzie Villalba RN  Outcome: Progressing     Problem: Musculoskeletal - Adult  Goal: Return mobility to safest level of function  Outcome: Progressing  Flowsheets (Taken 3/23/2025 2028)  Return Mobility to Safest Level of Function: Assess patient stability and activity tolerance for standing, transferring and ambulating with or without assistive devices

## 2025-03-24 NOTE — PROGRESS NOTES
Physical Therapy  Facility/Department: [address] MED SURG  Physical Therapy Daily Note  ([place] due to behaviors)  If patient discharges prior to next session this note will serve as a discharge summary.  Please see below for the latest assessment towards goals.     Name: Manfred Washington  : 1955  MRN: [id number]  Date of Service: 3/24/2025    Discharge Recommendations:  Patient would benefit from continued therapy after discharge (3-5, vs  and home PT)   Manfred Washington scored a 15/ on the AM-PAC short mobility form. Current research shows that an AM-PAC score of 17 or less is typically not associated with a discharge to the patient's home setting. Based on the patient's AM-PAC score and their current functional mobility deficits, it is recommended that the patient have 3-5 sessions per week of Physical Therapy at d/c to increase the patient's independence.  Please see assessment section for further patient specific details.  PT Equipment Recommendations  Equipment Needed: No  Other: but will monitor      Patient Diagnosis(es): The primary encounter diagnosis was Acute exacerbation of chronic obstructive pulmonary disease (COPD) (HCC). Diagnoses of Hypokalemia and Hypoxemia were also pertinent to this visit.  Past Medical History:  has a past medical history of Arthritis, Gong's esophagus, Chronic back pain, COPD (chronic obstructive pulmonary disease) (HCC), Depression, GERD (gastroesophageal reflux disease), HYPERCHOLESTERAEMIA, Hypertension, Prinzmetal angina, Spinal stenoses, and TIA (transient ischaemic attack).  Past Surgical History:  has a past surgical history that includes joint replacement; Colonoscopy; back surgery; Splenectomy; Cholecystectomy; Appendectomy; Upper gastrointestinal endoscopy (5-14-10); Upper gastrointestinal endoscopy (12); Clavicle surgery; bronchoscopy (N/A, 2019); bronchoscopy (2019); Colonoscopy (N/A, 2020); Colonoscopy (2020); and Abdominal aortic        -PAC - Mobility    -PAC Basic Mobility - Inpatient   How much help is needed turning from your back to your side while in a flat bed without using bedrails?: A Little  How much help is needed moving from lying on your back to sitting on the side of a flat bed without using bedrails?: A Little  How much help is needed moving to and from a bed to a chair?: A Little  How much help is needed standing up from a chair using your arms?: A Little  How much help is needed walking in hospital room?: A Lot  How much help is needed climbing 3-5 steps with a railing?: Total  AM-PAC Inpatient Mobility Raw Score : 15  AM-PAC Inpatient T-Scale Score : 39.45  Mobility Inpatient CMS 0-100% Score: 57.7  Mobility Inpatient CMS G-Code Modifier : CK        Goals  Short Term Goals  Time Frame for Short Term Goals: Prior to discharge (slow progress toward goals)  Short Term Goal 1: Bed mobility mod I  Short Term Goal 2: Transfer sit <-> stand supervision  Short Term Goal 3: Pt will be able to ambulate 50ft with LRAD SBA  Short Term Goal 4: Pt will be able to ascend/descend stairs with assistance PRN for discharge  Long Term Goals  Time Frame for Long Term Goals : STG=LTG  Patient Goals   Patient Goals : unable to state       Education  Patient Education  Education Given To: Patient  Education Provided: Role of Therapy;Plan of Care;Precautions;Equipment;Transfer Training;Mobility Training;Energy Conservation  Education Provided Comments: importance of participating in therapy to improve his strength and mobility status  Education Method: Verbal;Demonstration  Barriers to Learning: Cognition;Hearing;Other (Comment) (poor insight)  Education Outcome: Continued education needed      Therapy Time   Individual Concurrent Group Co-treatment   Time In 0949         Time Out 1018         Minutes 29         Timed Code Treatment Minutes: 29 Minutes   [x]co-treatment indicated; patient seen for co-treatment due to: behavioral concerns.    PT

## 2025-03-24 NOTE — VIRTUAL HEALTH
[facility], was evaluated through a synchronous (real-time) audio-video encounter. The patient (and/or guardian if applicable) is aware that this is a billable service, which includes applicable co-pays. This virtual visit was conducted with patient's (and/or legal guardian's) consent. Patient identification was verified, and a caregiver was present when appropriate.  The patient was located at Facility (Appt Department): [facility]  WS 4W MED SURG  [address] [facility] [facility] [address]  Loc: [phone]  The provider was located at Home (City/State): [place], [place]  Confirm you are appropriately licensed, registered, or certified to deliver care in the state where the patient is located as indicated above. If you are not or unsure, please re-schedule the visit: Yes, I confirm.   [place] Consult to Tele-pysch Provider  Consult performed by: Eden Haynes APRN - CNP  Consult ordered by: Francisco J Erazo MD  Reason for consult: Depression/family request      Total time spent on this encounter: Not billed by time    --TORIE Casey CNP on 3/24/2025 at 4:14 PM    An electronic signature was used to authenticate this note.    [facility]  [id number]  1955     INPATIENT TELEPSYCHIATRY EVALUATION    03/24/25    Chief Complaint:  “Depression/family request”  HPI: Patient is a 69 y.o.  male who presents for depression. Patient presented to the ED on 3/21/2025 with a report of shortness of breath x 7 days with a history of COPD.  According to ED documentation: Chief Complaint of shortness of breath and wheezing.  Patient complain of cough with brownish mucus.  Said his symptoms started about 7 days ago.  He does not require oxygen at home.  And he was placed on 6 L of oxygen and his O2 sat is at 94%.  According to EMS his O2 saturation was in the 80s when they arrived.  Patient denies fever, he states he still smokes 1 pack/day.  Denies chest pain.  No abdominal pain.  No

## 2025-03-24 NOTE — PROGRESS NOTES
NITRU Negative 01/04/2024 10:10 AM    COLORU Yellow 01/04/2024 10:10 AM    PHUR 6.0 01/04/2024 10:10 AM    LABCAST 10-20 Hyaline 09/20/2015 09:22 PM    WBCUA 0-2 01/04/2024 10:10 AM    RBCUA 3-4 01/04/2024 10:10 AM    BACTERIA None Seen 01/01/2024 06:55 PM    CLARITYU Clear 01/04/2024 10:10 AM    LEUKOCYTESUR Negative 01/04/2024 10:10 AM    UROBILINOGEN 0.2 01/04/2024 10:10 AM    BILIRUBINUR Negative 01/04/2024 10:10 AM    BLOODU LARGE 01/04/2024 10:10 AM    GLUCOSEU Negative 01/04/2024 10:10 AM    KETUA Negative 01/04/2024 10:10 AM    AMORPHOUS 1+ 01/04/2024 10:10 AM     Urine Cultures: No results found for: \"LABURIN\"  Blood Cultures:   Lab Results   Component Value Date/Time    BC No Growth after 4 days of incubation. 12/23/2021 07:00 PM     Lab Results   Component Value Date/Time    BLOODCULT2 No Growth after 4 days of incubation. 12/23/2021 07:15 PM     Organism:   Lab Results   Component Value Date/Time    ORG Candida albicans 12/30/2019 12:48 PM         Electronically signed by Francisco J Erazo MD on 3/24/2025 at 12:13 PM

## 2025-03-24 NOTE — PROGRESS NOTES
Pt. Awake, requested NOD to remove his necklace. Removed, placed inside pts. Na pack per pts. Instruction.     Electronically signed by Fartun Thayer RN on 3/24/25 at 4:28 AM EDT

## 2025-03-24 NOTE — PROGRESS NOTES
Cardiac monitoring order expiring.       Radha Vásquez NP notified through secure message, awaiting for response.      Electronically signed by Fartun Thayer RN on 3/23/25 at 9:27 PM EDT

## 2025-03-24 NOTE — CARE COORDINATION
Case Management Assessment  Initial Evaluation    Date/Time of Evaluation: 3/24/2025 12:24 PM  Assessment Completed by: EAN Oliver    If patient is discharged prior to next notation, then this note serves as note for discharge by case management.    Patient Name: Manfred Washington                   YOB: 1955  Diagnosis: Hypoxemia [R09.02]  Hypokalemia [E87.6]  Acute exacerbation of chronic obstructive pulmonary disease (COPD) (HCC) [J44.1]  COPD exacerbation (HCC) [J44.1]                   Date / Time: 3/21/2025  6:59 PM    Patient Admission Status: Inpatient   Readmission Risk (Low < 19, Mod (19-27), High > 27): Readmission Risk Score: 12.6    Current PCP: Suzette Oquendo APRN  PCP verified by CM? (P) Yes    Chart Reviewed: Yes      History Provided by: (P) Patient  Patient Orientation: (P) Alert and Oriented, Person, Place, Situation, Self    Patient Cognition: (P) Alert    Hospitalization in the last 30 days (Readmission):  No    If yes, Readmission Assessment in  Navigator will be completed.    Advance Directives:      Code Status: Full Code   Patient's Primary Decision Maker is: (P) Legal Next of Kin    Primary Decision Maker: Virginia Washington - Spouse - [phone]    Discharge Planning:    Patient lives with: (P) Spouse/Significant Other Type of Home: (P) House (2 story home.  Ramped entrance.  [address])  Primary Care Giver: (P) Self  Patient Support Systems include: (P) Spouse/Significant Other, Family Members   Current Financial resources: (P) [place]), [place]  Current community resources: (P) None  Current services prior to admission: (P) Durable Medical Equipment            Current DME: (P) Shower Chair, Walker, Cane, Wheelchair, Hospital Bed (Electric wheelchair)            Type of Home Care services:  (P) None    ADLS  Prior functional level: (P) Assistance with the following:, Shopping, Housework, Cooking  Current functional level: (P) Assistance with the following:,

## 2025-03-24 NOTE — PROGRESS NOTES
Occupational Therapy  Facility/Department: [address] MED SURG  Occupational Therapy Daily Treatment Note    Name: Manfred Washington  : 1955  MRN: [id number]  Date of Service: 3/24/2025    Discharge Recommendations:  3-5 sessions per week first recommendation, however, based on pt's behavior this session, do not anticipate would be agreeable to this recommendation. If home, recommend 24 hour assist and HHOT.  OT Equipment Recommendations  Other: TBD    Manfred Washington scored a 17/24 on the AM-PAC ADL Inpatient form. Current research shows that an AM-PAC score of 17 or less is typically not associated with a discharge to the patient's home setting. Based on the patient's AM-PAC score and their current ADL deficits, it is recommended that the patient have 3-5 sessions per week of Occupational Therapy at d/c to increase the patient's independence.  Please see assessment section for further patient specific details.    If patient discharges prior to next session this note will serve as a discharge summary.  Please see below for the latest assessment towards goals.         Patient Diagnosis(es): The primary encounter diagnosis was Acute exacerbation of chronic obstructive pulmonary disease (COPD) (HCC). Diagnoses of Hypokalemia and Hypoxemia were also pertinent to this visit.  Past Medical History:  has a past medical history of Arthritis, Gong's esophagus, Chronic back pain, COPD (chronic obstructive pulmonary disease) (HCC), Depression, GERD (gastroesophageal reflux disease), HYPERCHOLESTERAEMIA, Hypertension, Prinzmetal angina, Spinal stenoses, and TIA (transient ischaemic attack).  Past Surgical History:  has a past surgical history that includes joint replacement; Colonoscopy; back surgery; Splenectomy; Cholecystectomy; Appendectomy; Upper gastrointestinal endoscopy (5-14-10); Upper gastrointestinal endoscopy (12); Clavicle surgery; bronchoscopy (N/A, 2019); bronchoscopy (2019); Colonoscopy (N/A,  pt did not know who Dr. Mendez was even though he has come to see him everyday; argumentative, distractable, low motivation.    Static Sitting Balance: seated EOB independently at start of session  Static Standing Balance: CGA  Dynamic Standing Balance : CGA during SPT    Education Given To: Patient  Education Provided: Role of Therapy;Plan of Care;Transfer Training;Precautions;Energy Conservation  Education Provided Comments: importance of attempting OOB activity to increase strength and endurance  Education Method: Verbal;Demonstration  Barriers to Learning: Cognition;Hearing  Education Outcome: Continued education needed                                AM-PAC - ADL  AM-PAC Daily Activity - Inpatient   How much help is needed for putting on and taking off regular lower body clothing?: A Lot  How much help is needed for bathing (which includes washing, rinsing, drying)?: A Lot  How much help is needed for toileting (which includes using toilet, bedpan, or urinal)?: A Little  How much help is needed for putting on and taking off regular upper body clothing?: A Little  How much help is needed for taking care of personal grooming?: A Little  How much help for eating meals?: None  AM-[facility] Inpatient Daily Activity Raw Score: 17  AM-PAC Inpatient ADL T-Scale Score : 37.26  ADL Inpatient CMS 0-100% Score: 50.11  ADL Inpatient CMS G-Code Modifier : CK        Goals  Short Term Goals  Time Frame for Short Term Goals: Prior to d/c: status goals 3/24: all goals ongoing  Short Term Goal 1: Pt will bathe with supervision.  Short Term Goal 2: Pt will dress with supervision.  Short Term Goal 3: Pt will toilet with supervision.  Short Term Goal 4: Pt will complete fxl mobility and fxl transfers to/from ADL surfaces with supervision using LRAD prn.  Short Term Goal 5: Pt will tolerate standing 3-5 minutes for functional task with supervision to improve standing tolerance for ADL routine.  Long Term Goals  Time Frame for Long Term

## 2025-03-24 NOTE — PLAN OF CARE
Problem: Discharge Planning  Goal: Discharge to home or other facility with appropriate resources  3/24/2025 0956 by Jeyson Platt RN  Outcome: Progressing  3/23/2025 2130 by Fartun Thayer RN  Outcome: Progressing  Flowsheets (Taken 3/23/2025 2028)  Discharge to home or other facility with appropriate resources: Identify barriers to discharge with patient and caregiver     Problem: Respiratory - Adult  Goal: Achieves optimal ventilation and oxygenation  3/24/2025 0956 by Jeyson Platt RN  Outcome: Progressing  3/23/2025 2130 by Fartun Thayer RN  Outcome: Progressing  Flowsheets (Taken 3/23/2025 2028)  Achieves optimal ventilation and oxygenation: Assess for changes in respiratory status     Problem: Cardiovascular - Adult  Goal: Maintains optimal cardiac output and hemodynamic stability  3/24/2025 0956 by Jeyson Platt RN  Outcome: Progressing  3/23/2025 2130 by Fartun Thayer RN  Outcome: Progressing  Flowsheets (Taken 3/23/2025 2028)  Maintains optimal cardiac output and hemodynamic stability: Monitor blood pressure and heart rate     Problem: Skin/Tissue Integrity - Adult  Goal: Skin integrity remains intact  Description: 1.  Monitor for areas of redness and/or skin breakdown  2.  Assess vascular access sites hourly  3.  Every 4-6 hours minimum:  Change oxygen saturation probe site  4.  Every 4-6 hours:  If on nasal continuous positive airway pressure, respiratory therapy assess nares and determine need for appliance change or resting period  3/24/2025 0956 by Jeyson Platt RN  Outcome: Progressing  3/23/2025 2130 by Fartun Thayer RN  Outcome: Progressing  Flowsheets (Taken 3/23/2025 2028)  Skin Integrity Remains Intact: Monitor for areas of redness and/or skin breakdown     Problem: Infection - Adult  Goal: Absence of infection at discharge  3/24/2025 0956 by Jeyson Platt RN  Outcome: Progressing  3/23/2025 2130 by Fartun Thayer RN  Outcome: Progressing  Flowsheets (Taken

## 2025-03-24 NOTE — PROGRESS NOTES
Pulmonary Progress Note    CC:  Follow up COPD    Subjective:    5 liters of oxygen   Still not doing well  SOB    ROS  SOB    Intake/Output Summary (Last 24 hours) at 3/24/2025 0831  Last data filed at 3/24/2025 0554  Gross per 24 hour   Intake 1576 ml   Output 850 ml   Net 726 ml         PHYSICAL EXAM:  Blood pressure (!) 191/97, pulse 90, temperature 97.5 °F (36.4 °C), temperature source Temporal, resp. rate 16, height 1.753 m (5' 9\"), weight 72.5 kg (159 lb 13.3 oz), SpO2 96%.'  Gen: Chronically ill appearing   Eyes: PERRL. No sclera icterus. No conjunctival injection.   ENT: No discharge. Pharynx clear. External appearance of ears and nose normal.  Neck: Trachea midline. No obvious mass.    Resp: barrel chested, scattered wheezing with prolonged exhalation   CV: Regular rate. Regular rhythm. No murmur or rub.    GI: Non-tender. Non-distended. No hernia.   Skin: Warm, dry, normal texture and turgor. No nodule on exposed extremities.   Lymph: No cervical LAD. No supraclavicular LAD.   M/S: No cyanosis. No clubbing. No joint deformity.    Neuro: Moves all four extremities. CN 2-12 tested, no defect noted.  Ext:   no edema    Medications:    Scheduled Meds:   ipratropium 0.5 mg-albuterol 2.5 mg  1 Dose Inhalation Q4H RT    cefTRIAXone (ROCEPHIN) IV  1,000 mg IntraVENous Q24H    azithromycin  500 mg IntraVENous Q24H    DULoxetine  60 mg Oral BID    gabapentin  600 mg Oral 4x daily    pantoprazole  20 mg Oral Daily    budesonide-formoterol  2 puff Inhalation BID RT    methylPREDNISolone sodium succ (SOLU-MEDROL) 40 mg in sterile water 1 mL injection  40 mg IntraVENous Q8H    sodium chloride flush  5-40 mL IntraVENous 2 times per day    enoxaparin  40 mg SubCUTAneous Daily    verapamil  120 mg Oral Daily       Continuous Infusions:   sodium chloride         PRN Meds:  potassium chloride **OR** potassium alternative oral replacement **OR** potassium chloride, oxyCODONE-acetaminophen, tiZANidine, hydrOXYzine HCl, sodium

## 2025-03-25 VITALS
WEIGHT: 158.29 LBS | OXYGEN SATURATION: 91 % | SYSTOLIC BLOOD PRESSURE: 170 MMHG | BODY MASS INDEX: 23.44 KG/M2 | HEIGHT: 69 IN | DIASTOLIC BLOOD PRESSURE: 98 MMHG | RESPIRATION RATE: 16 BRPM | TEMPERATURE: 97.7 F | HEART RATE: 82 BPM

## 2025-03-25 PROCEDURE — 6370000000 HC RX 637 (ALT 250 FOR IP): Performed by: INTERNAL MEDICINE

## 2025-03-25 PROCEDURE — 6370000000 HC RX 637 (ALT 250 FOR IP): Performed by: HOSPITALIST

## 2025-03-25 PROCEDURE — 6370000000 HC RX 637 (ALT 250 FOR IP): Performed by: NURSE PRACTITIONER

## 2025-03-25 PROCEDURE — 94640 AIRWAY INHALATION TREATMENT: CPT

## 2025-03-25 PROCEDURE — 2500000003 HC RX 250 WO HCPCS: Performed by: INTERNAL MEDICINE

## 2025-03-25 PROCEDURE — 93005 ELECTROCARDIOGRAM TRACING: CPT | Performed by: HOSPITALIST

## 2025-03-25 PROCEDURE — 94680 O2 UPTK RST&XERS DIR SIMPLE: CPT

## 2025-03-25 PROCEDURE — 6360000002 HC RX W HCPCS: Performed by: INTERNAL MEDICINE

## 2025-03-25 PROCEDURE — 6360000002 HC RX W HCPCS: Performed by: HOSPITALIST

## 2025-03-25 PROCEDURE — 2500000003 HC RX 250 WO HCPCS: Performed by: HOSPITALIST

## 2025-03-25 PROCEDURE — 2700000000 HC OXYGEN THERAPY PER DAY

## 2025-03-25 PROCEDURE — 99232 SBSQ HOSP IP/OBS MODERATE 35: CPT | Performed by: INTERNAL MEDICINE

## 2025-03-25 PROCEDURE — 94761 N-INVAS EAR/PLS OXIMETRY MLT: CPT

## 2025-03-25 RX ORDER — DULOXETIN HYDROCHLORIDE 30 MG/1
30 CAPSULE, DELAYED RELEASE ORAL ONCE
Status: COMPLETED | OUTPATIENT
Start: 2025-03-25 | End: 2025-03-25

## 2025-03-25 RX ORDER — DULOXETIN HYDROCHLORIDE 30 MG/1
90 CAPSULE, DELAYED RELEASE ORAL DAILY
Qty: 30 CAPSULE | Refills: 3 | Status: SHIPPED | OUTPATIENT
Start: 2025-03-25

## 2025-03-25 RX ORDER — DOXYCYCLINE HYCLATE 100 MG
100 TABLET ORAL EVERY 12 HOURS SCHEDULED
Status: DISCONTINUED | OUTPATIENT
Start: 2025-03-25 | End: 2025-03-25 | Stop reason: HOSPADM

## 2025-03-25 RX ORDER — DOXYCYCLINE HYCLATE 100 MG
100 TABLET ORAL 2 TIMES DAILY
Qty: 10 TABLET | Refills: 0 | Status: SHIPPED | OUTPATIENT
Start: 2025-03-25 | End: 2025-03-30

## 2025-03-25 RX ORDER — THEOPHYLLINE 400 MG/1
400 TABLET, EXTENDED RELEASE ORAL DAILY
Qty: 30 TABLET | Refills: 3 | Status: SHIPPED | OUTPATIENT
Start: 2025-03-26

## 2025-03-25 RX ORDER — ARIPIPRAZOLE 5 MG/1
2.5 TABLET ORAL DAILY
Status: DISCONTINUED | OUTPATIENT
Start: 2025-03-25 | End: 2025-03-25 | Stop reason: HOSPADM

## 2025-03-25 RX ORDER — ARIPIPRAZOLE 5 MG/1
2.5 TABLET ORAL DAILY
Qty: 30 TABLET | Refills: 3 | Status: SHIPPED | OUTPATIENT
Start: 2025-03-25

## 2025-03-25 RX ORDER — PREDNISONE 20 MG/1
40 TABLET ORAL DAILY
Status: DISCONTINUED | OUTPATIENT
Start: 2025-03-25 | End: 2025-03-25 | Stop reason: HOSPADM

## 2025-03-25 RX ORDER — PREDNISONE 20 MG/1
40 TABLET ORAL DAILY
Qty: 10 TABLET | Refills: 0 | Status: SHIPPED | OUTPATIENT
Start: 2025-03-25 | End: 2025-03-30

## 2025-03-25 RX ORDER — GUAIFENESIN/DEXTROMETHORPHAN 100-10MG/5
5 SYRUP ORAL EVERY 4 HOURS PRN
Qty: 120 ML | Refills: 0 | Status: SHIPPED | OUTPATIENT
Start: 2025-03-25 | End: 2025-04-04

## 2025-03-25 RX ADMIN — PANTOPRAZOLE SODIUM 20 MG: 20 TABLET, DELAYED RELEASE ORAL at 08:35

## 2025-03-25 RX ADMIN — OXYCODONE AND ACETAMINOPHEN 1 TABLET: 325; 5 TABLET ORAL at 06:35

## 2025-03-25 RX ADMIN — GABAPENTIN 600 MG: 300 CAPSULE ORAL at 12:58

## 2025-03-25 RX ADMIN — SODIUM CHLORIDE, PRESERVATIVE FREE 10 ML: 5 INJECTION INTRAVENOUS at 08:35

## 2025-03-25 RX ADMIN — WATER 1000 MG: 1 INJECTION INTRAMUSCULAR; INTRAVENOUS; SUBCUTANEOUS at 11:22

## 2025-03-25 RX ADMIN — DOXYCYCLINE HYCLATE 100 MG: 100 TABLET, COATED ORAL at 08:36

## 2025-03-25 RX ADMIN — Medication 2 PUFF: at 07:31

## 2025-03-25 RX ADMIN — ARIPIPRAZOLE 2.5 MG: 5 TABLET ORAL at 13:56

## 2025-03-25 RX ADMIN — IPRATROPIUM BROMIDE AND ALBUTEROL SULFATE 1 DOSE: .5; 3 SOLUTION RESPIRATORY (INHALATION) at 07:31

## 2025-03-25 RX ADMIN — THEOPHYLLINE 400 MG: 400 TABLET, EXTENDED RELEASE ORAL at 08:34

## 2025-03-25 RX ADMIN — DULOXETINE 30 MG: 30 CAPSULE, DELAYED RELEASE ORAL at 13:56

## 2025-03-25 RX ADMIN — VERAPAMIL HYDROCHLORIDE 120 MG: 240 TABLET, FILM COATED, EXTENDED RELEASE ORAL at 08:34

## 2025-03-25 RX ADMIN — OXYCODONE AND ACETAMINOPHEN 1 TABLET: 325; 5 TABLET ORAL at 11:22

## 2025-03-25 RX ADMIN — PREDNISONE 40 MG: 20 TABLET ORAL at 11:22

## 2025-03-25 RX ADMIN — GABAPENTIN 600 MG: 300 CAPSULE ORAL at 08:35

## 2025-03-25 RX ADMIN — WATER 40 MG: 1 INJECTION INTRAMUSCULAR; INTRAVENOUS; SUBCUTANEOUS at 02:42

## 2025-03-25 RX ADMIN — TIZANIDINE 4 MG: 4 TABLET ORAL at 08:34

## 2025-03-25 RX ADMIN — Medication 3 MG: at 02:42

## 2025-03-25 RX ADMIN — DULOXETINE HYDROCHLORIDE 60 MG: 60 CAPSULE, DELAYED RELEASE ORAL at 08:34

## 2025-03-25 RX ADMIN — IPRATROPIUM BROMIDE AND ALBUTEROL SULFATE 1 DOSE: .5; 3 SOLUTION RESPIRATORY (INHALATION) at 11:48

## 2025-03-25 RX ADMIN — ENOXAPARIN SODIUM 40 MG: 100 INJECTION SUBCUTANEOUS at 08:35

## 2025-03-25 ASSESSMENT — PAIN SCALES - GENERAL
PAINLEVEL_OUTOF10: 10
PAINLEVEL_OUTOF10: 5
PAINLEVEL_OUTOF10: 7
PAINLEVEL_OUTOF10: 3

## 2025-03-25 ASSESSMENT — PAIN DESCRIPTION - LOCATION
LOCATION: BACK
LOCATION: BACK

## 2025-03-25 ASSESSMENT — PAIN DESCRIPTION - DESCRIPTORS: DESCRIPTORS: ACHING

## 2025-03-25 ASSESSMENT — PAIN DESCRIPTION - ORIENTATION: ORIENTATION: MID

## 2025-03-25 ASSESSMENT — PAIN - FUNCTIONAL ASSESSMENT: PAIN_FUNCTIONAL_ASSESSMENT: ACTIVITIES ARE NOT PREVENTED

## 2025-03-25 ASSESSMENT — PAIN SCALES - WONG BAKER: WONGBAKER_NUMERICALRESPONSE: NO HURT

## 2025-03-25 NOTE — CARE COORDINATION
Discharge Planning:  [facility] [phone] in an attempt to arrange HC services for this pt.  Message left requesting a return call.   EAN [facility]  [phone]  Electronically signed by EAN Oliver on 3/25/2025 at 12:12 PM

## 2025-03-25 NOTE — PROGRESS NOTES
Patient discharged, PIV removed, home medication instructions written and verbal given to patient and wife. Electronically signed by Jeyson Platt RN on 3/25/2025 at 2:25 PM

## 2025-03-25 NOTE — PLAN OF CARE
Problem: Discharge Planning  Goal: Discharge to home or other facility with appropriate resources  3/25/2025 0937 by Jeyson Platt RN  Outcome: Progressing  3/24/2025 2120 by Myla Lugo RN  Outcome: Progressing     Problem: Respiratory - Adult  Goal: Achieves optimal ventilation and oxygenation  3/25/2025 0937 by Jeyson Platt RN  Outcome: Progressing  3/24/2025 2120 by Myla Lugo RN  Outcome: Progressing     Problem: Cardiovascular - Adult  Goal: Maintains optimal cardiac output and hemodynamic stability  3/25/2025 0937 by Jeyson Platt RN  Outcome: Progressing  3/24/2025 2120 by Myla Lugo RN  Outcome: Progressing     Problem: Skin/Tissue Integrity - Adult  Goal: Skin integrity remains intact  Description: 1.  Monitor for areas of redness and/or skin breakdown  2.  Assess vascular access sites hourly  3.  Every 4-6 hours minimum:  Change oxygen saturation probe site  4.  Every 4-6 hours:  If on nasal continuous positive airway pressure, respiratory therapy assess nares and determine need for appliance change or resting period  3/25/2025 0937 by Jeyson Platt RN  Outcome: Progressing  3/24/2025 2120 by Myla Lugo RN  Outcome: Progressing     Problem: Infection - Adult  Goal: Absence of infection at discharge  3/25/2025 0937 by Jeyson Platt RN  Outcome: Progressing  3/24/2025 2120 by Myla Lugo RN  Outcome: Progressing     Problem: Safety - Adult  Goal: Free from fall injury  3/25/2025 0937 by Jeyson Platt RN  Outcome: Progressing  3/24/2025 2120 by Myla Lugo RN  Outcome: Progressing     Problem: Skin/Tissue Integrity  Goal: Skin integrity remains intact  Description: 1.  Monitor for areas of redness and/or skin breakdown  2.  Assess vascular access sites hourly  3.  Every 4-6 hours minimum:  Change oxygen saturation probe site  4.  Every 4-6 hours:  If on nasal continuous positive airway pressure, respiratory therapy assess nares and determine need for appliance

## 2025-03-25 NOTE — PROGRESS NOTES
03/25/25 1013   Resting (Room Air)   SpO2 91   HR 81   During Walk (Room Air)   SpO2 92      Walk/Assistance Device Walker   Rate of Dyspnea 0   Symptoms Other (comment)   Comments Pain in back when walking   After Walk   SpO2 94      Rate of Dyspnea 0   Does the Patient Qualify for Home O2 No   Does the Patient Need Portable Oxygen Tanks No

## 2025-03-25 NOTE — PROGRESS NOTES
Pulmonary Progress Note    CC:  Follow up COPD    Subjective:    4 liters of oxygen   Feels lungs have opened up  Hopeful to go home      Intake/Output Summary (Last 24 hours) at 3/25/2025 0815  Last data filed at 3/25/2025 0303  Gross per 24 hour   Intake 580 ml   Output 300 ml   Net 280 ml         PHYSICAL EXAM:  Blood pressure (!) 172/94, pulse 97, temperature 97.7 °F (36.5 °C), temperature source Oral, resp. rate 16, height 1.753 m (5' 9\"), weight 71.8 kg (158 lb 4.6 oz), SpO2 90%.'  Gen: Chronically ill appearing   Eyes: PERRL. No sclera icterus. No conjunctival injection.   ENT: No discharge. Pharynx clear. External appearance of ears and nose normal.  Neck: Trachea midline. No obvious mass.    Resp: barrel chested, less wheezing, prolonged exhalation   CV: Regular rate. Regular rhythm. No murmur or rub.    GI: Non-tender. Non-distended. No hernia.   Skin: Warm, dry, normal texture and turgor. No nodule on exposed extremities.   Lymph: No cervical LAD. No supraclavicular LAD.   M/S: No cyanosis. No clubbing. No joint deformity.    Neuro: Moves all four extremities. CN 2-12 tested, no defect noted.  Ext:   no edema    Medications:    Scheduled Meds:   theophylline  400 mg Oral Daily    ipratropium 0.5 mg-albuterol 2.5 mg  1 Dose Inhalation Q4H RT    cefTRIAXone (ROCEPHIN) IV  1,000 mg IntraVENous Q24H    azithromycin  500 mg IntraVENous Q24H    DULoxetine  60 mg Oral BID    gabapentin  600 mg Oral 4x daily    pantoprazole  20 mg Oral Daily    budesonide-formoterol  2 puff Inhalation BID RT    methylPREDNISolone sodium succ (SOLU-MEDROL) 40 mg in sterile water 1 mL injection  40 mg IntraVENous Q8H    sodium chloride flush  5-40 mL IntraVENous 2 times per day    enoxaparin  40 mg SubCUTAneous Daily    verapamil  120 mg Oral Daily       Continuous Infusions:   sodium chloride         PRN Meds:  melatonin, hydrALAZINE, potassium chloride **OR** potassium alternative oral replacement **OR** potassium chloride,

## 2025-03-25 NOTE — VIRTUAL HEALTH
[facility], was evaluated through a synchronous (real-time) audio-video encounter. The patient (and/or guardian if applicable) is aware that this is a billable service, which includes applicable co-pays. This virtual visit was conducted with patient's (and/or legal guardian's) consent. Patient identification was verified, and a caregiver was present when appropriate.  The patient was located at Facility (Appt Department): [facility]  WS 4W MED SURG  [address] [facility] [address]  Loc: [phone]  The provider was located at Home (City/State): [place], [place]  Confirm you are appropriately licensed, registered, or certified to deliver care in the state where the patient is located as indicated above. If you are not or unsure, please re-schedule the visit: Yes, I confirm.   Consults     Total time spent on this encounter: Not billed by time    --Eden Haynes, APRN - CNP on 3/25/2025 at 12:59 PM    An electronic signature was used to authenticate this note.    [facility]  [id number]  1955       INPATIENT TELEPSYCHIATRY REASSESSMENT    03/25/25    History  From: Patient, wife [place]    Chief Complaint:  “Depression”    HPI:   This is a 69 y.o. male patient who is being seen for a reassessment.  He reports that he had a difficult time sleeping last night however relates it to the uncomfortable bed.  He remains open to psychotropic medication adjustments for treatment of his depression.  He continues to have a prolonged QTc, 540 with a repeat EKG.  He also remains hypertensive.  We discussed his current psychotropic medication regimen, he is maxed out on Cymbalta at 120 mg/day.  He verbalized understanding that this medication can contribute to hypertension.  We discussed a slow taper process of the Cymbalta with the addition of Abilify to try and stabilize the mood.  He and his wife verbalized understanding, we discussed risk/benefit and potential side effect.  He verbalized     Q-T Interval 454 ms    QTc Calculation (Bazett) 540 ms    P Axis 74 degrees    R Axis 50 degrees    T Axis 70 degrees    Diagnosis       Sinus rhythm with occasional Premature ventricular complexesMinimal voltage criteria for LVH, may be normal variant ( Sokolow-Jefferson )Prolonged QTAbnormal ECGWhen compared with ECG of 24-MAR-2025 19:40, (unconfirmed)Premature ventricular complexes are now Present       Imaging:   CT CHEST PULMONARY EMBOLISM W CONTRAST   Final Result   1. No pulmonary embolus.   2. Subtle left base airspace opacity may represent atelectasis or early   infection.   3. Stable appearance of a nodular opacity in the medial aspect of the left   lower lobe measuring 14 mm in mean diameter since at least December 2023.   Long-term stability is favored to represent a benign etiology.   4. Emphysema.         XR CHEST PORTABLE   Final Result   No acute process.           Radiology:  CT CHEST PULMONARY EMBOLISM W CONTRAST  Result Date: 3/22/2025  EXAMINATION: CTA OF THE CHEST 3/21/2025 9:27 pm TECHNIQUE: CTA of the chest was performed after the administration of intravenous contrast.  Multiplanar reformatted images are provided for review.  MIP images are provided for review. Automated exposure control, iterative reconstruction, and/or weight based adjustment of the mA/kV was utilized to reduce the radiation dose to as low as reasonably achievable. COMPARISON: November 18, 2024. PET/CT dated December 12, 2023. HISTORY: ORDERING SYSTEM PROVIDED HISTORY: Shortness of breath and hypoxia. TECHNOLOGIST PROVIDED HISTORY: Reason for exam:->Shortness of breath and hypoxia. Additional Contrast?->1 Reason for Exam: Shortness of breath and hypoxia. FINDINGS: Pulmonary Arteries: Pulmonary arteries are adequately opacified for evaluation.  No evidence of intraluminal filling defect to suggest pulmonary embolism.  Main pulmonary artery is normal in caliber. Mediastinum: No evidence of mediastinal lymphadenopathy.  The

## 2025-03-25 NOTE — PROGRESS NOTES
Pt resting comfortably sitting on the side of the bed  bed with 2 rails up. Denies any request at this time. All questions answered. Call light and bedside table in reach, along with all personal items.

## 2025-03-25 NOTE — PROGRESS NOTES
CLINICAL PHARMACY NOTE: MEDS TO BEDS    All prescriptions were transferred to [facility] in [place], [place] per patient request.    Transferred rxs included:    Guaifenesin-DM liquid  Prednisone 20 mg  Theophylline  mg  Doxycycline 100 mg  Aripiprazole 5 mg  Duloxetine 30 mg    Additional Documentation: n/a

## 2025-03-25 NOTE — PLAN OF CARE
Problem: Discharge Planning  Goal: Discharge to home or other facility with appropriate resources  3/24/2025 2120 by Myla Lugo RN  Outcome: Progressing  3/24/2025 0956 by Jeyson Platt RN  Outcome: Progressing     Problem: Respiratory - Adult  Goal: Achieves optimal ventilation and oxygenation  3/24/2025 2120 by Myla Lugo RN  Outcome: Progressing  3/24/2025 0956 by Jeyson Platt RN  Outcome: Progressing     Problem: Cardiovascular - Adult  Goal: Maintains optimal cardiac output and hemodynamic stability  3/24/2025 2120 by Myla Lugo RN  Outcome: Progressing  3/24/2025 0956 by Jeyson Platt RN  Outcome: Progressing     Problem: Skin/Tissue Integrity - Adult  Goal: Skin integrity remains intact  Description: 1.  Monitor for areas of redness and/or skin breakdown  2.  Assess vascular access sites hourly  3.  Every 4-6 hours minimum:  Change oxygen saturation probe site  4.  Every 4-6 hours:  If on nasal continuous positive airway pressure, respiratory therapy assess nares and determine need for appliance change or resting period  3/24/2025 2120 by Myla Lugo RN  Outcome: Progressing  3/24/2025 0956 by Jeyson Platt RN  Outcome: Progressing     Problem: Infection - Adult  Goal: Absence of infection at discharge  3/24/2025 2120 by Myla Lugo RN  Outcome: Progressing  3/24/2025 0956 by Jeyson Platt RN  Outcome: Progressing     Problem: Musculoskeletal - Adult  Goal: Return mobility to safest level of function  3/24/2025 2120 by Myla Lugo RN  Outcome: Progressing  3/24/2025 0956 by Jeyson Platt RN  Outcome: Progressing     Problem: Safety - Adult  Goal: Free from fall injury  3/24/2025 2120 by Myla Lugo RN  Outcome: Progressing  3/24/2025 0956 by Jeyson Platt RN  Outcome: Progressing     Problem: Skin/Tissue Integrity  Goal: Skin integrity remains intact  Description: 1.  Monitor for areas of redness and/or skin breakdown  2.  Assess vascular access sites

## 2025-03-25 NOTE — CARE COORDINATION
Case Management Discharge Note          Date / Time of Note: 3/25/2025 2:00 PM                  Patient Name: Manfred Washington   YOB: 1955  Diagnosis: Hypoxemia [R09.02]  Hypokalemia [E87.6]  Acute exacerbation of chronic obstructive pulmonary disease (COPD) (HCC) [J44.1]  COPD exacerbation (HCC) [J44.1]   Date / Time: 3/21/2025  6:59 PM    Financial:  Payor: [employer] / Plan: [employer] / Product Type: *No Product type* /      Pharmacy:    [id number] - [place], [place] - [address] [place] [address] - P [phone] - F [phone]  [address] [place] [address]  Phone: [phone] Fax: [phone]      Assistance purchasing medications?: Potential Assistance Purchasing Medications: No  Assistance provided by Case Management: None at this time    DISCHARGE Disposition: Home with Home Health Care        Home Care:  AWILDA spoke with Mirela at the [phone].  Mirela stated she has talked with pts PCP and HC orders will be placed and they will reach out to pt to arrange HC services.  Pt and pts spouse aware and agreeable.         Transportation:  Transportation PLAN for discharge: family   Mode of Transport: Private Car  Spouse to transport pt home at d/c.    Transport form completed: No    IMM Completed:   Yes, Case management has presented and reviewed IMM letter #2.       IMM Letter date given:: 03/25/25   .   Patient and/or family/POA verbalized understanding of their medicare rights and appeal process if needed. Patient and/or family/POA has signed, initialed and placed the date and time on IMM letter #2 on the the appropriate lines. Copy of letter offered and they are aware that the original copy of IMM letter #2 is available prior to discharge from the paper chart on the unit.  Electronic documentation has been entered into epic for IMM letter #2 and original paper copy has been added to the paper chart at the nurses station.     Additional CM Notes: Pt plans to return home

## 2025-03-25 NOTE — PLAN OF CARE
Problem: Discharge Planning  Goal: Discharge to home or other facility with appropriate resources  3/25/2025 1401 by Jeyson Platt RN  Outcome: Completed  3/25/2025 0937 by Jeyson Platt RN  Outcome: Progressing     Problem: Respiratory - Adult  Goal: Achieves optimal ventilation and oxygenation  3/25/2025 1401 by Jeyson Platt RN  Outcome: Completed  3/25/2025 0937 by Jeyson Platt RN  Outcome: Progressing     Problem: Cardiovascular - Adult  Goal: Maintains optimal cardiac output and hemodynamic stability  3/25/2025 1401 by Jeyson Platt RN  Outcome: Completed  3/25/2025 0937 by Jeyson Platt RN  Outcome: Progressing     Problem: Skin/Tissue Integrity - Adult  Goal: Skin integrity remains intact  Description: 1.  Monitor for areas of redness and/or skin breakdown  2.  Assess vascular access sites hourly  3.  Every 4-6 hours minimum:  Change oxygen saturation probe site  4.  Every 4-6 hours:  If on nasal continuous positive airway pressure, respiratory therapy assess nares and determine need for appliance change or resting period  3/25/2025 1401 by Jeyson Platt RN  Outcome: Completed  3/25/2025 0937 by Jeyson Platt RN  Outcome: Progressing     Problem: Infection - Adult  Goal: Absence of infection at discharge  3/25/2025 1401 by Jeyson Platt RN  Outcome: Completed  3/25/2025 0937 by Jeyson Platt RN  Outcome: Progressing     Problem: Safety - Adult  Goal: Free from fall injury  3/25/2025 1401 by Jeyson Platt RN  Outcome: Completed  3/25/2025 0937 by Jeyson Platt RN  Outcome: Progressing     Problem: Skin/Tissue Integrity  Goal: Skin integrity remains intact  Description: 1.  Monitor for areas of redness and/or skin breakdown  2.  Assess vascular access sites hourly  3.  Every 4-6 hours minimum:  Change oxygen saturation probe site  4.  Every 4-6 hours:  If on nasal continuous positive airway pressure, respiratory therapy assess nares and determine need for appliance change or resting  period  3/25/2025 1401 by Jeyson Platt, RN  Outcome: Completed  3/25/2025 0937 by Jeyson Platt RN  Outcome: Progressing     Problem: Musculoskeletal - Adult  Goal: Return mobility to safest level of function  3/25/2025 1401 by Jeyson Platt, RN  Outcome: Completed  3/25/2025 0937 by Jeyson Platt RN  Outcome: Progressing     Problem: Pain  Goal: Verbalizes/displays adequate comfort level or baseline comfort level  3/25/2025 1401 by Jeyson Platt, RN  Outcome: Completed  3/25/2025 0937 by Jeyson Platt, RN  Outcome: Progressing

## 2025-03-26 LAB
EKG ATRIAL RATE: 78 BPM
EKG ATRIAL RATE: 85 BPM
EKG DIAGNOSIS: NORMAL
EKG DIAGNOSIS: NORMAL
EKG P AXIS: 66 DEGREES
EKG P AXIS: 74 DEGREES
EKG P-R INTERVAL: 142 MS
EKG P-R INTERVAL: 146 MS
EKG Q-T INTERVAL: 454 MS
EKG Q-T INTERVAL: 478 MS
EKG QRS DURATION: 110 MS
EKG QRS DURATION: 114 MS
EKG QTC CALCULATION (BAZETT): 540 MS
EKG QTC CALCULATION (BAZETT): 544 MS
EKG R AXIS: 42 DEGREES
EKG R AXIS: 50 DEGREES
EKG T AXIS: 65 DEGREES
EKG T AXIS: 70 DEGREES
EKG VENTRICULAR RATE: 78 BPM
EKG VENTRICULAR RATE: 85 BPM

## 2025-03-26 PROCEDURE — 93010 ELECTROCARDIOGRAM REPORT: CPT | Performed by: INTERNAL MEDICINE

## 2025-03-26 NOTE — DISCHARGE SUMMARY
Hospital Medicine Discharge Summary    Patient ID: Manfred Washington      Patient's PCP: Suzette Oquendo APRN    Admit Date: 3/21/2025     Discharge Date: 3/25/2025     Admitting Physician: Leah Rojas MD     Discharge Physician: Francisco J Erazo MD     Discharge Diagnoses:       Active Hospital Problems    Diagnosis     Acute exacerbation of chronic obstructive pulmonary disease (COPD) (HCC) [J44.1]     Hyperglycemia [R73.9]     Persistent mood (affective) disorder, unspecified [F34.9]     Hypokalemia [E87.6]     Acute respiratory failure with hypoxia (HCC) [J96.01]     Tobacco abuse [Z72.0]        The patient was seen and examined on day of discharge and this discharge summary is in conjunction with any daily progress note from day of discharge.    Hospital Course:     Manfred Washington is a 69 y.o. male smoker with pmh of COPD (refusing home oxygen), hypertension, hyperlipidemia, TIA, GERD with Gong's esophagus, mood disorder who presents with worsening shortness of breath and cough productive of dark brown/green sputum for the past couple of weeks.  He denies any fevers, chills, night sweats, hemoptysis, nausea or vomiting, diarrhea, headaches, malaise or fatigue, anorexia or weight loss.  He also denies any chest pain.     In the emergency room his temperature 37.1, blood pressure 130/71 with pulse of 96, respirations 18, sats 97% on 4 L nasal cannula, BMI 23.41.      Pulmonology recommendation as follows    Acute Hypoxic Respiratory Failure with saturations less than 90% on room air (likely has been chronically hypoxic)  Titrate oxygen for saturations greater than or equal to 90%  Will need home oxygen assessment and will likely require oxygen at DC  COPD exacerbation  Symbicort q 12 hours  Solumedrol as is due to lack of improvement   Duonebs q 4 hours   Azithromycin   Continue with Theophylline   Tobacco Abuse   Must quit      DVT prophylaxis  Lovenox      Ok to DC after oxygen

## 2025-04-11 NOTE — PROGRESS NOTES
Physician Progress Note      PATIENT:               CODY TSANG  CSN #:                  [id number]  :                       1955  ADMIT DATE:       3/21/2025 6:59 PM  DISCH DATE:        3/25/2025 3:47 PM  RESPONDING  PROVIDER #:        Francisco J Erazo MD          QUERY TEXT:    Acute respiratory failure is documented in the medical record (DS 3/25).   Please provide additional clinical indicators supportive of your   documentation. Or please document if the diagnosis of acute respiratory   failure has been ruled out after study.    The clinical indicators include:  Options provided:  -- Acute Respiratory Failure as evidenced by, Please document evidence.  -- Acute Respiratory Failure ruled out after study  -- Other - I will add my own diagnosis  -- Disagree - Not applicable / Not valid  -- Disagree - Clinically unable to determine / Unknown  -- Refer to Clinical Documentation Reviewer    PROVIDER RESPONSE TEXT:    Acute Respiratory Failure has been ruled out after study.    Query created by: Alvarez Holder on 2025 4:27 PM      Electronically signed by:  Francisco J Erazo MD 2025 5:31 PM

## (undated) DEVICE — SPONGE,DISSECTOR,K,XRAY,9/16"X1/4",STRL: Brand: MEDLINE

## (undated) DEVICE — 4-PORT MANIFOLD: Brand: NEPTUNE 2

## (undated) DEVICE — SUTURE NONABSORBABLE MONOFILAMENT 5-0 C-1 1X24 IN PROLENE 8725H

## (undated) DEVICE — SPONGE LAP W18XL18IN WHT COT 4 PLY FLD STRUNG RADPQ DISP ST 2 PER PACK

## (undated) DEVICE — 60 ML SYRINGE,REGULAR TIP: Brand: MONOJECT

## (undated) DEVICE — APPLIER LIG CLP M L11IN TI STR RNG HNDL FOR 20 CLP DISP

## (undated) DEVICE — SOLUTION IRRIG 1000ML 0.9% SOD CHL USP POUR PLAS BTL

## (undated) DEVICE — FOGARTY - HYDRAGRIP SURGICAL - CLAMP INSERTS: Brand: FOGARTY SOFTJAW

## (undated) DEVICE — CONMED CHANNEL MASTER PULMONARY AND PEDIATRIC CLEANING BRUSH, 160 CM X 2.0 MM: Brand: CONMED

## (undated) DEVICE — MERCY HEALTH WEST TURNOVER: Brand: MEDLINE INDUSTRIES, INC.

## (undated) DEVICE — SPECIMEN TRAP: Brand: ARGYLE

## (undated) DEVICE — TRAP POLYP ETRAP

## (undated) DEVICE — GLOVE,SURG,SENSICARE SLT,LF,PF,7: Brand: MEDLINE

## (undated) DEVICE — ELECTRODE BLDE L6.5IN CAUT EXT DISP

## (undated) DEVICE — SINGLE-USE POLYPECTOMY SNARE: Brand: CAPTIFLEX

## (undated) DEVICE — SYRINGE MED 10ML POLYPR LUERSLIP TIP FLAT TOP W/O SFTY DISP

## (undated) DEVICE — SUTURE VCRL + SZ 0 L27IN ABSRB UD CT-1 L36MM 1/2 CIR TAPR VCP260H

## (undated) DEVICE — SUTURE PROL SZ 3-0 L36IN NONABSORBABLE BLU L26MM SH 1/2 CIR 8522H

## (undated) DEVICE — AGENT HEMSTAT W4XL8IN OXIDIZED REGENERATED CELOS ABSRB

## (undated) DEVICE — SUTURE NONABSORBABLE MONOFILAMENT 4-0 RB-1 36 IN BLU PROLENE 8557H

## (undated) DEVICE — TOWEL,OR,DSP,ST,BLUE,STD,4/PK,20PK/CS: Brand: MEDLINE

## (undated) DEVICE — PROCEDURE KIT ENDOSCP CUST

## (undated) DEVICE — SUTURE VCRL + SZ 3-0 L27IN ABSRB WHT CT-1 1/2 CIR VCP258H

## (undated) DEVICE — SINGLE USE BIOPSY VALVE MAJ-210: Brand: SINGLE USE BIOPSY VALVE (STERILE)

## (undated) DEVICE — FORCEPS BX 240CM 2.4MM L NDL RAD JAW 4 M00513334

## (undated) DEVICE — SOLUTION IV 1000ML 0.9% SOD CHL PH 5 INJ USP VIAFLX PLAS

## (undated) DEVICE — RETAINER VISCERA GLASSMAN FISH DISP ST

## (undated) DEVICE — SPONGE GZ W4XL4IN COT 12 PLY TYP VII WVN C FLD DSGN STERILE

## (undated) DEVICE — SINGLE USE SUCTION VALVE MAJ-209: Brand: SINGLE USE SUCTION VALVE (STERILE)

## (undated) DEVICE — CONTAINER SPEC 480ML CLR POLYSTYR 10% NEUT BUFF FRMLN ZN

## (undated) DEVICE — SUTURE PROL SZ 3-0 L48IN NONABSORBABLE BLU SH L26MM 1/2 CIR 8534H

## (undated) DEVICE — Device

## (undated) DEVICE — ENDOSCOPY KIT: Brand: MEDLINE INDUSTRIES, INC.

## (undated) DEVICE — GOWN AURORA NONREINF LG: Brand: MEDLINE INDUSTRIES, INC.

## (undated) DEVICE — SUTURE PDS II SZ 0 L60IN ABSRB VLT L48MM CTX 1/2 CIR Z990G

## (undated) DEVICE — PACK DRP UNIV W BK TBL MAYO STD BTM TOP SIDE UTIL CVR ZONE

## (undated) DEVICE — STAPLER SKIN H3.9MM WIRE DIA0.58MM CRWN 6.9MM 35 STPL ROT

## (undated) DEVICE — TAPE MED W1/8XL30IN WHT POLY

## (undated) DEVICE — MAJOR VASCULAR: Brand: MEDLINE INDUSTRIES, INC.

## (undated) DEVICE — DISPOSABLE CYTOLOGY BRUSH: Brand: DISPOSABLE CYTOLOGY BRUSH

## (undated) DEVICE — AIRWAY PHGEAL AD 100MM PNK M WLLMS

## (undated) DEVICE — SUTURE PERMAHAND SZ 4-0 L12X30IN NONABSORBABLE BLK SILK A303H

## (undated) DEVICE — DRAPE,INSTRUMENT,MAGNETIC,10X16: Brand: MEDLINE

## (undated) DEVICE — 1LYRTR 16FR10ML100%SIL UMS SNP: Brand: MEDLINE INDUSTRIES, INC.

## (undated) DEVICE — LIQUIBAND RAPID ADHESIVE 36/CS 0.8ML: Brand: MEDLINE

## (undated) DEVICE — APPLIER CLP L L13IN TI MULT RNG HNDL 20 CLP STR LIGACLP

## (undated) DEVICE — APPLIER CLP L9.38IN M LIG TI DISP STR RNG HNDL LIGACLP

## (undated) DEVICE — SUTURE PERMA-HAND SZ 2-0 L30IN NONABSORBABLE BLK L26MM SH K833H

## (undated) DEVICE — LOOP VES W1.3MM THK0.9MM MINI YEL SIL FLD REPELLENT

## (undated) DEVICE — SIZER GRAFT VASCULAR VASCUTEK DISP